# Patient Record
Sex: MALE | Race: BLACK OR AFRICAN AMERICAN | Employment: OTHER | URBAN - METROPOLITAN AREA
[De-identification: names, ages, dates, MRNs, and addresses within clinical notes are randomized per-mention and may not be internally consistent; named-entity substitution may affect disease eponyms.]

---

## 2017-10-11 PROCEDURE — 96374 THER/PROPH/DIAG INJ IV PUSH: CPT

## 2017-10-11 PROCEDURE — 96361 HYDRATE IV INFUSION ADD-ON: CPT

## 2017-10-11 PROCEDURE — 96375 TX/PRO/DX INJ NEW DRUG ADDON: CPT

## 2017-10-11 PROCEDURE — 99285 EMERGENCY DEPT VISIT HI MDM: CPT

## 2017-10-12 ENCOUNTER — APPOINTMENT (OUTPATIENT)
Dept: CT IMAGING | Age: 67
DRG: 287 | End: 2017-10-12
Payer: MEDICARE

## 2017-10-12 ENCOUNTER — APPOINTMENT (OUTPATIENT)
Dept: GENERAL RADIOLOGY | Age: 67
DRG: 287 | End: 2017-10-12
Payer: MEDICARE

## 2017-10-12 ENCOUNTER — HOME HEALTH ADMISSION (OUTPATIENT)
Dept: HOME HEALTH SERVICES | Facility: HOME HEALTH | Age: 67
End: 2017-10-12
Payer: MEDICARE

## 2017-10-12 ENCOUNTER — HOSPITAL ENCOUNTER (INPATIENT)
Age: 67
LOS: 3 days | Discharge: HOME HEALTH | DRG: 287 | End: 2017-10-15
Admitting: INTERNAL MEDICINE
Payer: MEDICARE

## 2017-10-12 DIAGNOSIS — J44.1 ACUTE EXACERBATION OF CHRONIC OBSTRUCTIVE PULMONARY DISEASE (COPD) (HCC): ICD-10-CM

## 2017-10-12 DIAGNOSIS — E11.8 TYPE 2 DIABETES MELLITUS WITH COMPLICATION, UNSPECIFIED LONG TERM INSULIN USE STATUS: ICD-10-CM

## 2017-10-12 DIAGNOSIS — R77.8 ELEVATED TROPONIN: ICD-10-CM

## 2017-10-12 DIAGNOSIS — I50.9 CONGESTIVE HEART FAILURE, UNSPECIFIED CONGESTIVE HEART FAILURE CHRONICITY, UNSPECIFIED CONGESTIVE HEART FAILURE TYPE: Primary | ICD-10-CM

## 2017-10-12 PROBLEM — Z91.199 NON COMPLIANCE WITH MEDICAL TREATMENT: Status: ACTIVE | Noted: 2017-10-12

## 2017-10-12 PROBLEM — Z87.891 FORMER TOBACCO USE: Status: ACTIVE | Noted: 2017-10-12

## 2017-10-12 LAB
ALBUMIN SERPL-MCNC: 3.1 G/DL (ref 3.2–4.6)
ALBUMIN SERPL-MCNC: 3.3 G/DL (ref 3.2–4.6)
ALBUMIN/GLOB SERPL: 0.7 {RATIO} (ref 1.2–3.5)
ALBUMIN/GLOB SERPL: 0.8 {RATIO} (ref 1.2–3.5)
ALP SERPL-CCNC: 69 U/L (ref 50–136)
ALP SERPL-CCNC: 70 U/L (ref 50–136)
ALT SERPL-CCNC: 22 U/L (ref 12–65)
ALT SERPL-CCNC: 24 U/L (ref 12–65)
ANION GAP SERPL CALC-SCNC: 10 MMOL/L (ref 7–16)
ANION GAP SERPL CALC-SCNC: 11 MMOL/L (ref 7–16)
APTT PPP: 25.4 SEC (ref 23.5–31.7)
AST SERPL-CCNC: 19 U/L (ref 15–37)
AST SERPL-CCNC: 65 U/L (ref 15–37)
ATRIAL RATE: 108 BPM
ATRIAL RATE: 90 BPM
ATRIAL RATE: 98 BPM
BASOPHILS # BLD: 0 K/UL (ref 0–0.2)
BASOPHILS NFR BLD: 0 % (ref 0–2)
BILIRUB SERPL-MCNC: 0.4 MG/DL (ref 0.2–1.1)
BILIRUB SERPL-MCNC: 0.6 MG/DL (ref 0.2–1.1)
BNP SERPL-MCNC: 599 PG/ML
BUN SERPL-MCNC: 23 MG/DL (ref 8–23)
BUN SERPL-MCNC: 25 MG/DL (ref 8–23)
CALCIUM SERPL-MCNC: 8.6 MG/DL (ref 8.3–10.4)
CALCIUM SERPL-MCNC: 8.8 MG/DL (ref 8.3–10.4)
CALCULATED P AXIS, ECG09: 64 DEGREES
CALCULATED P AXIS, ECG09: 64 DEGREES
CALCULATED P AXIS, ECG09: 74 DEGREES
CALCULATED R AXIS, ECG10: 58 DEGREES
CALCULATED R AXIS, ECG10: 64 DEGREES
CALCULATED R AXIS, ECG10: 65 DEGREES
CALCULATED T AXIS, ECG11: -150 DEGREES
CALCULATED T AXIS, ECG11: -82 DEGREES
CALCULATED T AXIS, ECG11: 96 DEGREES
CHLORIDE SERPL-SCNC: 105 MMOL/L (ref 98–107)
CHLORIDE SERPL-SCNC: 107 MMOL/L (ref 98–107)
CHOLEST SERPL-MCNC: 304 MG/DL
CO2 SERPL-SCNC: 22 MMOL/L (ref 21–32)
CO2 SERPL-SCNC: 24 MMOL/L (ref 21–32)
CREAT SERPL-MCNC: 1.37 MG/DL (ref 0.8–1.5)
CREAT SERPL-MCNC: 1.55 MG/DL (ref 0.8–1.5)
D DIMER PPP FEU-MCNC: 1.61 UG/ML(FEU)
DIAGNOSIS, 93000: NORMAL
DIFFERENTIAL METHOD BLD: ABNORMAL
EOSINOPHIL # BLD: 0.1 K/UL (ref 0–0.8)
EOSINOPHIL NFR BLD: 1 % (ref 0.5–7.8)
ERYTHROCYTE [DISTWIDTH] IN BLOOD BY AUTOMATED COUNT: 13.8 % (ref 11.9–14.6)
EST. AVERAGE GLUCOSE BLD GHB EST-MCNC: 183 MG/DL
GLOBULIN SER CALC-MCNC: 4.4 G/DL (ref 2.3–3.5)
GLOBULIN SER CALC-MCNC: 4.6 G/DL (ref 2.3–3.5)
GLUCOSE BLD STRIP.AUTO-MCNC: 180 MG/DL (ref 65–100)
GLUCOSE BLD STRIP.AUTO-MCNC: 187 MG/DL (ref 65–100)
GLUCOSE BLD STRIP.AUTO-MCNC: 192 MG/DL (ref 65–100)
GLUCOSE BLD STRIP.AUTO-MCNC: 229 MG/DL (ref 65–100)
GLUCOSE SERPL-MCNC: 164 MG/DL (ref 65–100)
GLUCOSE SERPL-MCNC: 217 MG/DL (ref 65–100)
HBA1C MFR BLD: 8 % (ref 4.8–6)
HCT VFR BLD AUTO: 39.7 % (ref 41.1–50.3)
HDLC SERPL-MCNC: 64 MG/DL (ref 40–60)
HDLC SERPL: 4.8 {RATIO}
HGB BLD-MCNC: 14.2 G/DL (ref 13.6–17.2)
IMM GRANULOCYTES # BLD: 0 K/UL (ref 0–0.5)
IMM GRANULOCYTES NFR BLD: 0.4 % (ref 0–5)
INR PPP: 1 (ref 0.9–1.2)
LDLC SERPL CALC-MCNC: 218.4 MG/DL
LIPID PROFILE,FLP: ABNORMAL
LYMPHOCYTES # BLD: 1.9 K/UL (ref 0.5–4.6)
LYMPHOCYTES NFR BLD: 35 % (ref 13–44)
MAGNESIUM SERPL-MCNC: 2.2 MG/DL (ref 1.8–2.4)
MCH RBC QN AUTO: 27.6 PG (ref 26.1–32.9)
MCHC RBC AUTO-ENTMCNC: 35.8 G/DL (ref 31.4–35)
MCV RBC AUTO: 77.1 FL (ref 79.6–97.8)
MONOCYTES # BLD: 0.3 K/UL (ref 0.1–1.3)
MONOCYTES NFR BLD: 6 % (ref 4–12)
NEUTS SEG # BLD: 3.1 K/UL (ref 1.7–8.2)
NEUTS SEG NFR BLD: 58 % (ref 43–78)
P-R INTERVAL, ECG05: 140 MS
P-R INTERVAL, ECG05: 142 MS
P-R INTERVAL, ECG05: 144 MS
PLATELET # BLD AUTO: 254 K/UL (ref 150–450)
PMV BLD AUTO: 10.3 FL (ref 10.8–14.1)
POTASSIUM SERPL-SCNC: 3.7 MMOL/L (ref 3.5–5.1)
POTASSIUM SERPL-SCNC: 5.9 MMOL/L (ref 3.5–5.1)
PROT SERPL-MCNC: 7.7 G/DL (ref 6.3–8.2)
PROT SERPL-MCNC: 7.7 G/DL (ref 6.3–8.2)
PROTHROMBIN TIME: 10.6 SEC (ref 9.6–12)
Q-T INTERVAL, ECG07: 328 MS
Q-T INTERVAL, ECG07: 364 MS
Q-T INTERVAL, ECG07: 384 MS
QRS DURATION, ECG06: 82 MS
QRS DURATION, ECG06: 82 MS
QRS DURATION, ECG06: 84 MS
QTC CALCULATION (BEZET), ECG08: 418 MS
QTC CALCULATION (BEZET), ECG08: 469 MS
QTC CALCULATION (BEZET), ECG08: 487 MS
RBC # BLD AUTO: 5.15 M/UL (ref 4.23–5.67)
SODIUM SERPL-SCNC: 139 MMOL/L (ref 136–145)
SODIUM SERPL-SCNC: 140 MMOL/L (ref 136–145)
TRIGL SERPL-MCNC: 108 MG/DL (ref 35–150)
TROPONIN I BLD-MCNC: 0.14 NG/ML (ref 0.02–0.05)
TROPONIN I BLD-MCNC: 0.17 NG/ML (ref 0.02–0.05)
TROPONIN I BLD-MCNC: 0.19 NG/ML (ref 0.02–0.05)
TROPONIN I SERPL-MCNC: 0.29 NG/ML (ref 0.02–0.05)
TSH SERPL DL<=0.005 MIU/L-ACNC: 7.11 UIU/ML (ref 0.36–3.74)
VENTRICULAR RATE, ECG03: 108 BPM
VENTRICULAR RATE, ECG03: 90 BPM
VENTRICULAR RATE, ECG03: 98 BPM
VLDLC SERPL CALC-MCNC: 21.6 MG/DL (ref 6–23)
WBC # BLD AUTO: 5.4 K/UL (ref 4.3–11.1)

## 2017-10-12 PROCEDURE — 83880 ASSAY OF NATRIURETIC PEPTIDE: CPT

## 2017-10-12 PROCEDURE — 85610 PROTHROMBIN TIME: CPT

## 2017-10-12 PROCEDURE — 74011636320 HC RX REV CODE- 636/320

## 2017-10-12 PROCEDURE — 65270000029 HC RM PRIVATE

## 2017-10-12 PROCEDURE — 80061 LIPID PANEL: CPT | Performed by: INTERNAL MEDICINE

## 2017-10-12 PROCEDURE — 84484 ASSAY OF TROPONIN QUANT: CPT

## 2017-10-12 PROCEDURE — 74011000258 HC RX REV CODE- 258

## 2017-10-12 PROCEDURE — 80053 COMPREHEN METABOLIC PANEL: CPT

## 2017-10-12 PROCEDURE — 36415 COLL VENOUS BLD VENIPUNCTURE: CPT | Performed by: INTERNAL MEDICINE

## 2017-10-12 PROCEDURE — 74011250636 HC RX REV CODE- 250/636: Performed by: INTERNAL MEDICINE

## 2017-10-12 PROCEDURE — 71260 CT THORAX DX C+: CPT

## 2017-10-12 PROCEDURE — 74011636637 HC RX REV CODE- 636/637: Performed by: INTERNAL MEDICINE

## 2017-10-12 PROCEDURE — 93005 ELECTROCARDIOGRAM TRACING: CPT

## 2017-10-12 PROCEDURE — 74011250636 HC RX REV CODE- 250/636

## 2017-10-12 PROCEDURE — 85025 COMPLETE CBC W/AUTO DIFF WBC: CPT

## 2017-10-12 PROCEDURE — 74011000250 HC RX REV CODE- 250

## 2017-10-12 PROCEDURE — 85730 THROMBOPLASTIN TIME PARTIAL: CPT

## 2017-10-12 PROCEDURE — 93005 ELECTROCARDIOGRAM TRACING: CPT | Performed by: INTERNAL MEDICINE

## 2017-10-12 PROCEDURE — 83735 ASSAY OF MAGNESIUM: CPT

## 2017-10-12 PROCEDURE — 84443 ASSAY THYROID STIM HORMONE: CPT | Performed by: INTERNAL MEDICINE

## 2017-10-12 PROCEDURE — 71010 XR CHEST PORT: CPT

## 2017-10-12 PROCEDURE — 74011250637 HC RX REV CODE- 250/637

## 2017-10-12 PROCEDURE — 65660000000 HC RM CCU STEPDOWN

## 2017-10-12 PROCEDURE — 85379 FIBRIN DEGRADATION QUANT: CPT

## 2017-10-12 PROCEDURE — 80053 COMPREHEN METABOLIC PANEL: CPT | Performed by: INTERNAL MEDICINE

## 2017-10-12 PROCEDURE — C8929 TTE W OR WO FOL WCON,DOPPLER: HCPCS

## 2017-10-12 PROCEDURE — 74011250637 HC RX REV CODE- 250/637: Performed by: INTERNAL MEDICINE

## 2017-10-12 PROCEDURE — 83036 HEMOGLOBIN GLYCOSYLATED A1C: CPT | Performed by: NURSE PRACTITIONER

## 2017-10-12 PROCEDURE — 82962 GLUCOSE BLOOD TEST: CPT

## 2017-10-12 PROCEDURE — 74011000250 HC RX REV CODE- 250: Performed by: INTERNAL MEDICINE

## 2017-10-12 RX ORDER — ACETAMINOPHEN 325 MG/1
650 TABLET ORAL
Status: DISCONTINUED | OUTPATIENT
Start: 2017-10-12 | End: 2017-10-15 | Stop reason: HOSPADM

## 2017-10-12 RX ORDER — HEPARIN SODIUM 5000 [USP'U]/ML
5000 INJECTION, SOLUTION INTRAVENOUS; SUBCUTANEOUS EVERY 8 HOURS
Status: DISCONTINUED | OUTPATIENT
Start: 2017-10-12 | End: 2017-10-15 | Stop reason: HOSPADM

## 2017-10-12 RX ORDER — LISINOPRIL 5 MG/1
5 TABLET ORAL DAILY
Status: DISCONTINUED | OUTPATIENT
Start: 2017-10-12 | End: 2017-10-13

## 2017-10-12 RX ORDER — ATORVASTATIN CALCIUM 40 MG/1
40 TABLET, FILM COATED ORAL DAILY
Status: DISCONTINUED | OUTPATIENT
Start: 2017-10-12 | End: 2017-10-15 | Stop reason: HOSPADM

## 2017-10-12 RX ORDER — METOPROLOL SUCCINATE 25 MG/1
25 TABLET, EXTENDED RELEASE ORAL DAILY
Status: DISCONTINUED | OUTPATIENT
Start: 2017-10-12 | End: 2017-10-13

## 2017-10-12 RX ORDER — FUROSEMIDE 10 MG/ML
40 INJECTION INTRAMUSCULAR; INTRAVENOUS
Status: COMPLETED | OUTPATIENT
Start: 2017-10-12 | End: 2017-10-12

## 2017-10-12 RX ORDER — FUROSEMIDE 10 MG/ML
40 INJECTION INTRAMUSCULAR; INTRAVENOUS DAILY
Status: DISCONTINUED | OUTPATIENT
Start: 2017-10-12 | End: 2017-10-12

## 2017-10-12 RX ORDER — GUAIFENESIN 100 MG/5ML
324 LIQUID (ML) ORAL
Status: COMPLETED | OUTPATIENT
Start: 2017-10-12 | End: 2017-10-12

## 2017-10-12 RX ORDER — INSULIN GLARGINE 100 [IU]/ML
15 INJECTION, SOLUTION SUBCUTANEOUS DAILY
Status: DISCONTINUED | OUTPATIENT
Start: 2017-10-13 | End: 2017-10-15 | Stop reason: HOSPADM

## 2017-10-12 RX ORDER — LABETALOL HYDROCHLORIDE 5 MG/ML
20 INJECTION, SOLUTION INTRAVENOUS
Status: COMPLETED | OUTPATIENT
Start: 2017-10-12 | End: 2017-10-12

## 2017-10-12 RX ORDER — INSULIN LISPRO 100 [IU]/ML
5 INJECTION, SOLUTION INTRAVENOUS; SUBCUTANEOUS
Status: DISCONTINUED | OUTPATIENT
Start: 2017-10-12 | End: 2017-10-15 | Stop reason: HOSPADM

## 2017-10-12 RX ORDER — ONDANSETRON 2 MG/ML
4 INJECTION INTRAMUSCULAR; INTRAVENOUS
Status: COMPLETED | OUTPATIENT
Start: 2017-10-12 | End: 2017-10-14

## 2017-10-12 RX ORDER — SODIUM CHLORIDE 0.9 % (FLUSH) 0.9 %
10 SYRINGE (ML) INJECTION
Status: COMPLETED | OUTPATIENT
Start: 2017-10-12 | End: 2017-10-12

## 2017-10-12 RX ORDER — SODIUM CHLORIDE 9 MG/ML
125 INJECTION, SOLUTION INTRAVENOUS CONTINUOUS
Status: DISCONTINUED | OUTPATIENT
Start: 2017-10-12 | End: 2017-10-12

## 2017-10-12 RX ADMIN — INSULIN LISPRO 5 UNITS: 100 INJECTION, SOLUTION INTRAVENOUS; SUBCUTANEOUS at 12:22

## 2017-10-12 RX ADMIN — FUROSEMIDE 40 MG: 10 INJECTION, SOLUTION INTRAMUSCULAR; INTRAVENOUS at 05:37

## 2017-10-12 RX ADMIN — Medication 10 ML: at 02:51

## 2017-10-12 RX ADMIN — INSULIN LISPRO 5 UNITS: 100 INJECTION, SOLUTION INTRAVENOUS; SUBCUTANEOUS at 17:29

## 2017-10-12 RX ADMIN — FUROSEMIDE 40 MG: 10 INJECTION, SOLUTION INTRAMUSCULAR; INTRAVENOUS at 09:47

## 2017-10-12 RX ADMIN — IOPAMIDOL 100 ML: 755 INJECTION, SOLUTION INTRAVENOUS at 02:51

## 2017-10-12 RX ADMIN — ASPIRIN 81 MG 324 MG: 81 TABLET ORAL at 05:44

## 2017-10-12 RX ADMIN — LISINOPRIL 5 MG: 5 TABLET ORAL at 09:47

## 2017-10-12 RX ADMIN — HEPARIN SODIUM 5000 UNITS: 5000 INJECTION, SOLUTION INTRAVENOUS; SUBCUTANEOUS at 17:29

## 2017-10-12 RX ADMIN — LABETALOL HYDROCHLORIDE 20 MG: 5 INJECTION, SOLUTION INTRAVENOUS at 07:22

## 2017-10-12 RX ADMIN — SODIUM CHLORIDE 100 ML: 900 INJECTION, SOLUTION INTRAVENOUS at 02:51

## 2017-10-12 RX ADMIN — HEPARIN SODIUM 5000 UNITS: 5000 INJECTION, SOLUTION INTRAVENOUS; SUBCUTANEOUS at 09:47

## 2017-10-12 RX ADMIN — METOPROLOL SUCCINATE 25 MG: 25 TABLET, EXTENDED RELEASE ORAL at 09:47

## 2017-10-12 RX ADMIN — PERFLUTREN 1 ML: 6.52 INJECTION, SUSPENSION INTRAVENOUS at 09:00

## 2017-10-12 RX ADMIN — SODIUM CHLORIDE 125 ML/HR: 900 INJECTION, SOLUTION INTRAVENOUS at 04:23

## 2017-10-12 RX ADMIN — ATORVASTATIN CALCIUM 40 MG: 40 TABLET, FILM COATED ORAL at 09:47

## 2017-10-12 NOTE — ED NOTES
Report from Davon Steward [facility] for continuation of care.

## 2017-10-12 NOTE — PROGRESS NOTES
Care Management Interventions  PCP Verified by CM: Yes (Has a new patient appointment with Dr. Loren Infante on October 19 at 2:30 pm)  Transition of Care Consult (CM Consult): [address]: Yes  Physical Therapy Consult: No  Occupational Therapy Consult: No  Current Support Network: Lives with Spouse, Other (Lives in his mom's home and is her primary caretaker. She  (mother) is mobile,. )  Confirm Follow Up Transport: Family  Plan discussed with Pt/Family/Caregiver: Yes  Freedom of Choice Offered: Yes  Discharge Location  Discharge Placement: Home with home health     Met with patient and his spouse. Patient is a very pleasant gentleman arnel has neglected his own health this past year. He had taken the initiative to make a new PCP appointment and in fact he was supposed to be seen by Dr. Wing Moreland at 11:10 am today. Called and notified office of hospitalization and they rescheduled him for next Thursday October 19 at 2:30 PM. Info in the AVS.     In the meantime, will order [facility] RN because of CHF exacerbation and the untreated diabetes. Patient will go out on new scripts and will need to be checking his BS. Seen by diabetic educater and does know how to give himself insulin. Prefers PENS. He has been given a new meter. No o2 in the home and patient not on it now. Ambulatory but needs education reinforcement. Sarah Albrecht December

## 2017-10-12 NOTE — DIABETES MGMT
Patient admitted with CHF, seen by diabetes educator. Patient voices a positive family history of DM and was diagnosed about 7 years ago. Only HgA1c on record was 14.4 in July of 2010. Patient reports he was taking Levemir 22 units daily and metformin 1000 mg BID a year ago, but his PCP left and her never got a new one. Patient has not taken any diabetic medications or checked his blood glucose in a year. Patient states he did attend diabetic classes when he was diagnosed. Blood glucose 164 on admission. Blood glucose 192 this morning. HgA1c to be drawn today. Patient states he had an appointment with Zulema Echavarria MD today to establish himself with her. Patient states that he has a glucometer at home but he doesn't know if it works or not. Patient states he has used insulin pens and vials and syringes in the past. Patient prefers insulin pens if he requires insulin at discharge. Explained the importance of blood glucose monitoring. Recommended frequency of qid ac, hs, and to record in log book to take to PCP appointment. Provided blood glucose meter, strips, and instructed pt in use of meter. Pt was able to return demonstrate correct use of meter. Patient will need a prescription for glucometer supplies at discharge. Pt given educational material, \"Diabetes Self-Management: A Patient Teaching Guide\", which was reviewed with pt. Primary nurse to clarify diabetic regimen with PCP. Patient voices no questions.

## 2017-10-12 NOTE — ED NOTES
Pt resting in bed, respirations even and unlabored. Pt denies any additional needs at this time. Bed locked and low, call bell within reach, pt on monitor.

## 2017-10-12 NOTE — PROGRESS NOTES
IP consult for Cardiac Rehab. Patient has Dx of CHF. Echocardiogram pending. Will contact patient when EF% is known.

## 2017-10-12 NOTE — PROGRESS NOTES
Initial visit by  to convey care and concern and encourage patient that  services are available if desired. No needs were voiced during the visit. Provided business card for future reference.      Irlanda Le [address]  Board Certified

## 2017-10-12 NOTE — PROGRESS NOTES
Pt's PIV leaking. Spoke with Dr. Baron Chandler, new orders received for central line in IR. Spoke with Rocio in [facility], reports they likely will not get to pt today. Will call [facility] for help with new PIV placement for time being.

## 2017-10-12 NOTE — ED TRIAGE NOTES
Pt complains of chest tightness and sob x2 days. He states he has been under stress from family. States he has pcp appointment for the same tomorrow but sob became worse tonight.

## 2017-10-12 NOTE — PROGRESS NOTES
Admitted to room 633 from ED. Oriented to room and call light system, instructed to call with needs, verbalized understanding. Dual skin assessment with this RN and Kaitlyn Maya RN. No pressure breakdown noted. Admission assessment completed upon arrival to floor.

## 2017-10-12 NOTE — CONSULTS
[facility] Consult                Date of  Admission: 10/12/2017 12:09 AM     Primary Care Physician: Dr. Meño Vaz  Primary Cardiologist: None  Referring Physician: Hospitalist  Consulting Physician: Dr. Donnell Moody    CC/Reason for consult: Heart failure      Kandy Khalil is a 75 y.o.  male with a PMHx or DM, HTN, HLD and former smoker (quit 2 weeks ago, was 1ppd x 20yrs) who presented to the ED last night with increasing SOB. He reports that over the past couple weeks he has noted worsening SOB and KC. SOB was so severe that he stopped smoking. He denies prior cardiac history but reports mother had an angioplasty in the past. 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 His PCP changed practices and he has been without a PCP and without his medications for approx 1 yr. In the ED BP was 205/119, initial troponin was 0.29 and . He was admitted be the Hospitalist for management of heart failure. He denies chest pain but reports chest is sore from coughing. Repeat troponin levels 0.14, 0.17, 0.19.      Patient Active Problem List   Diagnosis Code    Diabetes mellitus ([facility] E11.9    HTN (hypertension) I10    Hypertriglyceridemia E78.1    [facility]) [facility] I50.9    Non compliance with medical treatment Z91.19    Former tobacco use Z87.891       Past Medical History:   Diagnosis Date    CHF (congestive heart failure) ([facility] 10/12/2017    Diabetes mellitus ([facility] 7/22/2010    HTN (hypertension) 7/22/2010      Past Surgical History:   Procedure Laterality Date    HX APPENDECTOMY      1963     Allergies   Allergen Reactions    Aspirin Nausea and Vomiting      Family History   Problem Relation Age of Onset    Diabetes          Current Facility-Administered Medications   Medication Dose Route Frequency    ondansetron (ZOFRAN) injection 4 mg  4 mg IntraVENous ONCE PRN    lisinopril (PRINIVIL, ZESTRIL) tablet 5 mg  5 mg Oral DAILY    metoprolol succinate (TOPROL-XL) XL tablet 25 mg  25 mg Oral DAILY    heparin (porcine) injection 5,000 Units  5,000 Units SubCUTAneous Q8H    acetaminophen (TYLENOL) tablet 650 mg  650 mg Oral Q6H PRN    furosemide (LASIX) injection 40 mg  40 mg IntraVENous DAILY    atorvastatin (LIPITOR) tablet 40 mg  40 mg Oral DAILY       Review of Systems   Constitutional: Negative for chills, diaphoresis and fever. HENT: Negative. Eyes: Negative. Negative for blurred vision and double vision. Respiratory: Positive for cough, sputum production and shortness of breath. Negative for hemoptysis and wheezing. Cardiovascular: Positive for orthopnea. Negative for chest pain, palpitations and leg swelling. Increased KC   Gastrointestinal: Negative. Negative for constipation, diarrhea, nausea and vomiting. Genitourinary: Negative. Musculoskeletal: Negative. Skin: Negative. Neurological: Negative. Negative for weakness. Endo/Heme/Allergies: Negative. Psychiatric/Behavioral:        Increased stress        Physical Exam  Vitals:    10/12/17 0722 10/12/17 0724 10/12/17 0728 10/12/17 0903   BP: (!) 169/103 125/65 133/72 (!) 154/102   Pulse: 100 82 87 88   Resp:  18 16 14   Temp:    97.4 °F (36.3 °C)   SpO2:  93% 95% 95%   Weight:       Height:           Physical Exam:  Physical Exam   Constitutional: He is oriented to person, place, and time and well-developed, well-nourished, and in no distress. HENT:   Head: Normocephalic and atraumatic. Mouth/Throat: Oropharynx is clear and moist.   Eyes: Conjunctivae and EOM are normal. Pupils are equal, round, and reactive to light. No scleral icterus. Neck: Normal range of motion. Neck supple. Cardiovascular: Regular rhythm, normal heart sounds and intact distal pulses. Exam reveals no friction rub. No murmur heard. Mildly tachy   Pulmonary/Chest: Effort normal. No stridor. No respiratory distress. He has no wheezes. He has no rales. Diminished R>L   Abdominal: Soft. Bowel sounds are normal. He exhibits no distension. There is no tenderness. Musculoskeletal: Normal range of motion. He exhibits no edema. Neurological: He is alert and oriented to person, place, and time. No cranial nerve deficit. Skin: Skin is warm and dry. No rash noted. No erythema. Psychiatric: Mood, memory, affect and judgment normal.       Cardiographics    Telemetry: not on tele  ECG: SR with T-wave inversion in inferior leads changed from previous EKG on arrival  Echocardiogram: ordered    Labs:   Recent Labs      10/12/17   0125  10/12/17   0002   NA   --   139   K   --   5.9*   MG  2.2   --    BUN   --   25*   CREA   --   1.37   GLU   --   164*   WBC   --   5.4   HGB   --   14.2   HCT   --   39.7*   PLT   --   254   INR  1.0   --         Assessment/Plan:     Assessment:      Principal Problem:    CHF (congestive heart failure) -- check ECHO today, cont lasix and monitor I&O, daily weights    Active Problems:    Diabetes mellitus -- check A1C, will defer DM management to primary team, needs better management/control, will have DM Ed to see      HTN (hypertension) -- unsure home meds, now on Toprol and lisinopril -- continue and monitor BP, adjust as tolerated      Hypertriglyceridemia -- check lipids, last triglyceride level 2010 was 808, on Lipitor      Non compliance with medical treatment -- now has PCP and is motivated to go to appts      Former tobacco use -- continued cessation encouraged      Overview: Quit 2 weeks ago      Abnormal EKG -- EKG changes and elevated troponin on arrival, further recommendations following ECHO        Thank you very much for this referral. We appreciate the opportunity to participate in this patient's care. We will follow along with above stated plan.     Carson Barrett NP  Consulting MD: Dr. Minal Dobson

## 2017-10-12 NOTE — H&P
History and Physical    Patient: Jewell Tovar MRN: [id number]  SSN: [id number]    YOB: 1950  Age: 75 y.o. Sex: male      Subjective:      Jewell Tovar is a 75 y.o. male with PMH of HTN, DM (uncontrolled), HLD, who quit smoking 2 weeks ago when he noticed that he was quite short of breath. He States that over the past two weeks he has been getting porgressively more short of breath. Worse when lying down. He sleeps in a chair now. He states that he has \"good and bad\" days. Last night he was unable to catch his breath and came to the ED. He has been non compliant in his medical care, has not taken any medications in over a year, he has not seen a doctor in almost 2 years. His wife is at the bedside with him. In the ED he was found to have slightly elevated troponin levels and a BTNP of 600. Patient had a positive D dimer, and CT chest was done, which showed emphasematous changes and bilateral pleural effusions. Of note there was also a 5.1 cm mass in the right kidney that is concerning for malignancy. Dr. Irish Brothers was called by ED physician who requested hospitalist to admit for CHF exacerbation. Thiago was given lasix in the ED, and given IVF, he states he feels better already. Past Medical History:   Diagnosis Date    CHF (congestive heart failure) ([facility] 10/12/2017    [facility] 7/22/2010    HTN (hypertension) 7/22/2010     Past Surgical History:   Procedure Laterality Date    HX APPENDECTOMY      1963      Family History   Problem Relation Age of Onset    Diabetes       Social History   Substance Use Topics    Smoking status: Current Every Day Smoker     Packs/day: 1.00    Smokeless tobacco: Not on file    Alcohol use Yes      Comment: occasional      Prior to Admission medications    Medication Sig Start Date End Date Taking? Authorizing Provider   glipiZIDE SR (GLUCOTROL) 5 mg CR tablet Take 1 Tab by mouth daily (before breakfast).  7/24/10   Chelly Shook MD   insulin NPH (NOVOLIN) 100 unit/mL injection 10 Units by SubCUTAneous route two (2) times a day (before meals). 7/24/10   Kendall Sanchez MD   metformin (GLUCOPHAGE) 500 mg tablet Take 1 Tab by mouth two (2) times daily (with meals). 7/24/10   Kendall Sanchez MD   Blood-Glucose Meter monitoring kit by Does Not Apply route. Check blood sugars at meals and bedtime. Keep a record of your blood sugars and bring to your doctor.  7/24/10   Kendall Sanchez MD        Allergies   Allergen Reactions    Aspirin Nausea and Vomiting       Review of Systems:  Constitutional: No acute distress  Eyes: No visiual changes  Ears, Nose, Mouth, Throat, and Face: mucous membranes moist and dry  Respiratory: shortness of breath  Cardiovascular: no palpitations or murmurs  GI: No abdominal pain  : No dysuria  Heme: No active bleeding  Lymph: No lymphadenopathy  Msk: No weakness, or joint pain  Neuo:Alert and oriented, no headaches  Psych:Appropriate mood and affect    Objective:     Vitals:    10/12/17 0654 10/12/17 0722 10/12/17 0724 10/12/17 0728   BP: (!) 169/103 (!) 169/103 125/65 133/72   Pulse: 96 100 82 87   Resp:   18 16   Temp:       SpO2: 94%  93% 95%   Weight:       Height:            Physical Exam:  General appearance: NAD, conversant  Eyes: anicteric sclerae, moist conjunctivae; no lid-lag  Neck: Trachea midline, no jvd  FROM, supple, no thyromegaly or lymphadenopathy  Lungs: mild bilateral crackles  CV: S1,S2 present, no added murmurs  Abdomen: Soft, non-tender; no masses   Extremities: No peripheral edema or extremity lymphadenopathy  Skin: Normal temperature, turgor and texture; no subcutaneous nodules  Psych: Appropriate affect, alert and oriented to person, place and time    Assessment:     Hospital Problems  Never Reviewed          Codes Class Noted POA    CHF (congestive heart failure) (HCC) ICD-10-CM: I50.9  ICD-9-CM: 428.0  10/12/2017 Unknown              Plan:     75year old male with PMH of HTn, HLD, DM2, and current smoker (quit 2 weeks ago, 1ppd, 50+ year) presents with signs of acute CHF exacerbation and possible new right kidney mass    #Acute CHF, unspecified type  -will need repeat TTE  -f/u Hg A1c  -f/u Lipid panel  -f/u TSH  -patient without JVD or edema  -will start BB and Ace-I therapy  -will adjust medications after Echo, may require hydralazine and nitrate, or perhaps entresto therapy instead of Ace-I, may need potassium sparing diuretic  -lasix 40 mg daily for now, may not need much longer  -breathing well on 2 L NC no need for Bipap at this time  -Consult to cardiology, he is also moderate risk for ACS, may require stress    #Right renal mass  -will need CT of abdomen pelvis  -given recent dye load will get US for now  -will consult for IR guided biopsy after    #HTN  -continue on cardiac montitoring  -have started ACE-I and BB  -will titrate as needed    #HLD  -will start statin therapy  -atorvastatin 40mg daily  -baseline CMP    #DM2  -last HgA1c was 14.4  -repeat hgA1c  -Start on insulin therapy  -12 U levemir and 4U humalog TIDAC    #Smoking  -has stopped over the past 2 weeks    #GI/DVT  -Hep sub q  -Cardiac diet    #Code Status  -Full Code      Signed By: Noemi Wheatley MD     October 12, 2017

## 2017-10-12 NOTE — ED PROVIDER NOTES
HPI Comments: 57-year-old male with complaints of chest tightness shortness of breath for 2 days. Chest tightness resolved prior to arrival but shortness of breath became worse tonight. His appointment with his doctor tomorrow to recheck tonight because he was feeling tired. Patient states that under a lot of stress at home. Patient has a history of diabetes and hypertension he's not been taking any of his medicines for almost a year because he has not seen a PCP. Patient is a 75 y.o. male presenting with shortness of breath. The history is provided by the patient. Shortness of Breath   This is a new problem. The problem occurs continuously. The current episode started 2 days ago. The problem has not changed since onset. Associated symptoms include chest pain. Pertinent negatives include no fever, no orthopnea and no abdominal pain. It is unknown what precipitated the problem. He has tried nothing for the symptoms. He has had prior hospitalizations. He has had prior ED visits. Associated medical issues include asthma. Past Medical History:   Diagnosis Date    Diabetes mellitus ([address] 7/22/2010    HTN (hypertension) 7/22/2010       Past Surgical History:   Procedure Laterality Date    HX APPENDECTOMY      1963         Family History:   Problem Relation Age of Onset    Diabetes         Social History     Social History    Marital status:      Spouse name: N/A    Number of children: N/A    Years of education: N/A     Occupational History    Not on file. Social History Main Topics    Smoking status: Current Every Day Smoker     Packs/day: 1.00    Smokeless tobacco: Not on file    Alcohol use Yes      Comment: occasional    Drug use: Not on file    Sexual activity: Not on file     Other Topics Concern    Not on file     Social History Narrative    No narrative on file         ALLERGIES: Aspirin    Review of Systems   Constitutional: Negative. Negative for activity change and fever. HENT: Negative. Eyes: Negative. Respiratory: Positive for shortness of breath. Cardiovascular: Positive for chest pain. Negative for orthopnea. Gastrointestinal: Negative. Negative for abdominal pain. Genitourinary: Negative. Musculoskeletal: Negative. Skin: Negative. Neurological: Negative. Psychiatric/Behavioral: Negative. All other systems reviewed and are negative. Vitals:    10/11/17 2347   BP: (!) 205/119   Pulse: (!) 113   Resp: 18   Temp: 98 °F (36.7 °C)   Weight: 63.5 kg (140 lb)   Height: 5' 3\" (1.6 m)            Physical Exam   Constitutional: He is oriented to person, place, and time. He appears well-developed and well-nourished. No distress. HENT:   Head: Normocephalic and atraumatic. Right Ear: External ear normal.   Left Ear: External ear normal.   Nose: Nose normal.   Mouth/Throat: Oropharynx is clear and moist. No oropharyngeal exudate. Eyes: Conjunctivae and EOM are normal. Pupils are equal, round, and reactive to light. Right eye exhibits no discharge. Left eye exhibits no discharge. No scleral icterus. Neck: Normal range of motion. Neck supple. No JVD present. No tracheal deviation present. Cardiovascular: Normal rate, regular rhythm and intact distal pulses. Pulmonary/Chest: Effort normal and breath sounds normal. No stridor. No respiratory distress. He has no wheezes. He exhibits no tenderness. Abdominal: Soft. Bowel sounds are normal. He exhibits no distension and no mass. There is no tenderness. Musculoskeletal: Normal range of motion. He exhibits no edema or tenderness. Neurological: He is alert and oriented to person, place, and time. No cranial nerve deficit. Skin: Skin is warm and dry. No rash noted. He is not diaphoretic. No erythema. No pallor. Psychiatric: He has a normal mood and affect. His behavior is normal. Thought content normal.   Nursing note and vitals reviewed.        MDM  Number of Diagnoses or Management Options  Acute exacerbation of chronic obstructive pulmonary disease (COPD) ([facility]):   Congestive heart failure, unspecified congestive heart failure chronicity, unspecified congestive heart failure type [facility]):   Elevated troponin:   Type 2 diabetes mellitus with complication, unspecified long term insulin use status [facility]):   Diagnosis management comments: All symptoms resolved and he remained pain-free for his entire stay in the ED. He had no further shortness of breath. He ambulated in the room several times without complaint or discomfort. Troponin was slightly elevated but remained low.        Amount and/or Complexity of Data Reviewed  Clinical lab tests: ordered and reviewed  Tests in the radiology section of CPT®: ordered and reviewed  Tests in the medicine section of CPT®: ordered and reviewed      ED Course       Procedures

## 2017-10-12 NOTE — ED NOTES
TRANSFER - OUT REPORT:    Verbal report given to CHRISTEL Benítez(name) on [employer]  being transferred to [facility]unit) for routine progression of care       Report consisted of patients Situation, Background, Assessment and   Recommendations(SBAR). Information from the following report(s) SBAR was reviewed with the receiving nurse. Lines:   Peripheral IV 10/12/17 Left Antecubital (Active)       Peripheral IV 10/12/17 Left Hand (Active)   Site Assessment Clean, dry, & intact 10/12/2017  6:57 AM   Phlebitis Assessment 0 10/12/2017  6:57 AM   Infiltration Assessment 0 10/12/2017  6:57 AM   Dressing Status Clean, dry, & intact 10/12/2017  6:57 AM   Dressing Type Tape;Transparent 10/12/2017  6:57 AM   Hub Color/Line Status Pink 10/12/2017  6:57 AM        Opportunity for questions and clarification was provided.       Patient transported with:   O2 @ 2 liters

## 2017-10-12 NOTE — PROGRESS NOTES
Interventional Radiology Inpatient Communication    Interventional Radiology has received a consult for kidney biopsy. Dr. Renetta Aguirre reviewed pts chart and recommended a Urology consult. He also recommended a Abdomen/Pelvis CT with contrast prior to a biopsy being performed. We anticipate this procedure will be completed when further imaging available and with MD approval after reviewing that imaging. Jackelin KEARNEY aware that we will follow along on pt to see when above recommendations are complete. Primary Care Nurse:    Please ensure the following is completed on day of procedure:    Patient is NPO: yes    Blood thinners held: yes    Patient must have working IV: yes    Patient must be in a hospital gown with snaps and be transported via stretcher to Interventional Radiology. Please send hospital chart and labels. If the patient is unable to provide consent for the procedure, please contact Interventional Radiology at [phone].

## 2017-10-12 NOTE — PROGRESS NOTES
[facility] - INPATIENT  Face to Face Encounter    Patients Name: Kelly Chiu    YOB: 1950    Ordering Physician: Guido Avery    Primary Diagnosis: CHF (congestive heart failure) [facility])    Date of Face to Face:   10/12/2017                                  Face to Face Encounter findings are related to primary reason for home care:   yes. 1. I certify that the patient needs intermittent care as follows: skilled nursing care:  skilled observation/assessment, patient education    2. I certify that this patient is homebound, that is: 1) patient requires the use of a N/a device, special transportation, or assistance of another to leave the home; or 2) patient's condition makes leaving the home medically contraindicated; and 3) patient has a normal inability to leave the home and leaving the home requires considerable and taxing effort. Patient may leave the home for infrequent and short duration for medical reasons, and occasional absences for non-medical reasons. Homebound status is due to the following functional limitations: Patient with strength deficits limiting the performance of all ADL's without caregiver assistance or the use of an assistive device. 3. I certify that this patient is under my care and that I, or a nurse practitioner or  [id number], or clinical nurse specialist, or certified nurse midwife, working with me, had a Face-to-Face Encounter that meets the physician Face-to-Face Encounter requirements. The following are the clinical findings from the [address] [place] [address] encounter that support the need for skilled services and is a summary of the encounter:     See Hospital chart      [facility]  10/12/2017      THE FOLLOWING TO BE COMPLETED BY THE COMMUNITY PHYSICIAN:    I concur with the findings described above from the [facility] encounter that this patient is homebound and in need of a skilled service.     Certifying Physician: _____________________________________ Printed Certifying Physician Name: _____________________________________    Date: _________________

## 2017-10-12 NOTE — IP AVS SNAPSHOT
[address] 
[phone] Patient: Dolores Bear MRN: [id number] You are allergic to the following Allergen Reactions Aspirin Nausea and Vomiting Recent Documentation Height Weight BMI Smoking Status 1.6 m 57 kg 22.26 kg/m2 Current Every Day Smoker Emergency Contacts Name Discharge Info Relation Home Work Mobile Mercy Ricardo  Spouse [3] [phone] About your hospitalization You were admitted on:  October 12, 2017 You last received care in the:  [facility] 6 MED SURG You were discharged on:  October 15, 2017 Unit phone number:  [phone] Why you were hospitalized Your primary diagnosis was:  Systolic Chf, Acute (Hcc) Your diagnoses also included:  Chf (Congestive Heart Failure) (Hcc), Diabetes Mellitus (Hcc), Htn (Hypertension), Hypertriglyceridemia, Non Compliance With Medical Treatment, Former Tobacco Use, Renal Mass Providers Seen During Your Hospitalizations Provider Role Specialty Primary office phone Enrique Mercado MD Attending Provider Emergency Medicine [phone] Charlotte Allen MD Attending Provider Internal Medicine [phone] Your Primary Care Physician (PCP) Primary Care Physician Office Phone Office Fax Rocío Joseph [phone] Follow-up Information Follow up With Details Comments Contact Info [address] for diabetic education and for CHF education. [address] [place] [address] 
[phone] Windy Russell MD  Please call and schedule one week follow up appointment. Thank you. [facility] [address] 
[phone] MD Dr. Cale Jerez office will call you Monday to schedule follow up appointment. [address] [facility] [address] 
[phone] Your Appointments Thursday October 19, 2017  2:30 PM EDT New Patient with Ashley Lam, NP [address] ([facility]) [place] [address]  
[phone] Current Discharge Medication List  
  
START taking these medications Dose & Instructions Dispensing Information Comments Morning Noon Evening Bedtime  
 atorvastatin 40 mg tablet Commonly known as:  LIPITOR Your last dose was: Your next dose is:    
   
   
 Dose:  40 mg Take 1 Tab by mouth daily. Quantity:  30 Tab Refills:  1  
     
   
   
   
  
 carvedilol 12.5 mg tablet Commonly known as:  Brinda Paramjit Your last dose was: Your next dose is:    
   
   
 Dose:  12.5 mg Take 1 Tab by mouth two (2) times daily (with meals). Quantity:  60 Tab Refills:  2  
     
   
   
   
  
 clopidogrel 75 mg Tab Commonly known as:  PLAVIX Your last dose was: Your next dose is:    
   
   
 Dose:  75 mg Take 1 Tab by mouth daily. Quantity:  30 Tab Refills:  2  
     
   
   
   
  
 furosemide 40 mg tablet Commonly known as:  LASIX Your last dose was: Your next dose is:    
   
   
 Dose:  40 mg Take 1 Tab by mouth daily. Quantity:  30 Tab Refills:  2  
     
   
   
   
  
 lisinopril 10 mg tablet Commonly known as:  Lee Selam Your last dose was: Your next dose is:    
   
   
 Dose:  10 mg Take 1 Tab by mouth daily. Quantity:  30 Tab Refills:  2 CONTINUE these medications which have NOT CHANGED Dose & Instructions Dispensing Information Comments Morning Noon Evening Bedtime Blood-Glucose Meter monitoring kit Your last dose was: Your next dose is:    
   
   
 by Does Not Apply route. Check blood sugars at meals and bedtime.   Keep a record of your blood sugars and bring to your doctor. Quantity:  1 Kit Refills:  0  
     
   
   
   
  
 glipiZIDE SR 5 mg CR tablet Commonly known as:  GLUCOTROL XL Your last dose was: Your next dose is:    
   
   
 Dose:  5 mg Take 1 Tab by mouth daily (before breakfast). Quantity:  30 Tab Refills:  0  
     
   
   
   
  
 insulin  unit/mL injection Commonly known as:  Suad Opal Your last dose was: Your next dose is:    
   
   
 Dose:  10 Units 10 Units by SubCUTAneous route two (2) times a day (before meals). Quantity:  1 Vial  
Refills:  0  
     
   
   
   
  
 metFORMIN 500 mg tablet Commonly known as:  GLUCOPHAGE Your last dose was: Your next dose is:    
   
   
 Dose:  500 mg Take 1 Tab by mouth two (2) times daily (with meals). Quantity:  60 Tab Refills:  0 Where to Get Your Medications Information on where to get these meds will be given to you by the nurse or doctor. ! Ask your nurse or doctor about these medications  
  atorvastatin 40 mg tablet  
 carvedilol 12.5 mg tablet  
 clopidogrel 75 mg Tab  
 furosemide 40 mg tablet  
 lisinopril 10 mg tablet Discharge Instructions Left Heart Catheterization: About This Test 
What is it? Cardiac catheterization is a test to check the left side of your heart. Your doctor might look at the shape of your heart, the motion of your heart, or the blood pressure inside the chambers. Why is this test done? This test gives information about how your heart is working. It can: · Check blood flow and blood pressure in the chambers of the heart. · Check the pumping action of the heart. · Find out if a heart defect is present and how severe it is. · Find out how well the heart valves work. What happens during the test? 
· You will get medicine to help you relax. · A thin tube called a catheter is put into a blood vessel in the groin or the arm. The doctor moves the catheter through the blood vessel into your heart. · You will get a shot to numb the skin where the catheter goes in. You may feel pressure when the doctor moves the catheter through your blood vessel into your heart. · Dye may be injected into your heart. Your doctor can watch on special monitors as the dye moves in your heart. The dye helps your doctor see blood flow in your heart. · You may feel hot or flushed for several seconds when the dye is put in. 
· If a heart defect is found, cardiac catheterization sometimes is used to correct it during the test. 
How long does it take? · The test will take about 30 minutes. If a problem is found and the doctor treats it, it can take a few hours longer. What happens after the test? 
· You will stay in a room for at least a few hours to make sure the catheter site starts to heal. You may have a bandage or a compression device on your groin or arm to prevent bleeding. · If the catheter was placed in your groin, you may lie in bed for a few hours. If the catheter was put in your arm, you will need to keep your arm still for at least 1 hour. · You may or may not need to stay in the hospital overnight. You will get more instructions for what to do at home. · Drink plenty of fluids for several hours after the test. 
Follow-up care is a key part of your treatment and safety. Be sure to make and go to all appointments, and call your doctor if you are having problems. It's also a good idea to know your test results and keep a list of the medicines you take. Where can you learn more? Go to http://citlaly-john.info/. Enter W306 in the search box to learn more about \"Left Heart Catheterization: About This Test.\" Current as of: January 12, 2017 Content Version: 11.3 © 5996-2593 [employer].  Care instructions adapted under license by [address] (which disclaims liability or warranty for this information). If you have questions about a medical condition or this instruction, always ask your healthcare professional. [address] any warranty or liability for your use of this information. DISCHARGE SUMMARY from Nurse The following personal items are in your possession at time of discharge: 
 
Dental Appliances: Uppers, Lowers Visual Aid: Glasses, With patient Clothing: Shirt, Pants, Undergarments, Footwear Other Valuables: Cell Phone PATIENT INSTRUCTIONS: 
 
 
F-face looks uneven A-arms unable to move or move unevenly S-speech slurred or non-existent T-time-call 911 as soon as signs and symptoms begin-DO NOT go Back to bed or wait to see if you get better-TIME IS BRAIN. Warning Signs of HEART ATTACK Call 911 if you have these symptoms: 
? Chest discomfort. Most heart attacks involve discomfort in the center of the chest that lasts more than a few minutes, or that goes away and comes back. It can feel like uncomfortable pressure, squeezing, fullness, or pain. ? Discomfort in other areas of the upper body. Symptoms can include pain or discomfort in one or both arms, the back, neck, jaw, or stomach. ? Shortness of breath with or without chest discomfort. ? Other signs may include breaking out in a cold sweat, nausea, or lightheadedness. Don't wait more than five minutes to call [address]! Fast action can save your life.  Calling 911 is almost always the fastest way to get lifesaving treatment. Emergency Medical Services staff can begin treatment when they arrive  up to an hour sooner than if someone gets to the hospital by car. The discharge information has been reviewed with the patient. The patient verbalized understanding. Discharge medications reviewed with the patient and appropriate educational materials and side effects teaching were provided. Heart Failure: Care Instructions Your Care Instructions Heart failure occurs when your heart does not pump as much blood as the body needs. Failure does not mean that the heart has stopped pumping but rather that it is not pumping as well as it should. Over time, this causes fluid buildup in your lungs and other parts of your body. Fluid buildup can cause shortness of breath, fatigue, swollen ankles, and other problems. By taking medicines regularly, reducing sodium (salt) in your diet, checking your weight every day, and making lifestyle changes, you can feel better and live longer. Follow-up care is a key part of your treatment and safety. Be sure to make and go to all appointments, and call your doctor if you are having problems. It's also a good idea to know your test results and keep a list of the medicines you take. How can you care for yourself at home? Medicines · Be safe with medicines. Take your medicines exactly as prescribed. Call your doctor if you think you are having a problem with your medicine. · Do not take any vitamins, over-the-counter medicine, or herbal products without talking to your doctor first. Almita Johnston not take ibuprofen (Advil or Motrin) and naproxen (Aleve) without talking to your doctor first. They could make your heart failure worse. · You may be taking some of the following medicine. ¨ Beta-blockers can slow heart rate, decrease blood pressure, and improve your condition. Taking a beta-blocker may lower your chance of needing to be hospitalized. ¨ Angiotensin-converting enzyme inhibitors (ACEIs) reduce the heart's workload, lower blood pressure, and reduce swelling. Taking an ACEI may lower your chance of needing to be hospitalized again. ¨ Angiotensin II receptor blockers (ARBs) work like ACEIs. Your doctor may prescribe them instead of ACEIs. ¨ Diuretics, also called water pills, reduce swelling. ¨ Potassium supplements replace this important mineral, which is sometimes lost with diuretics. ¨ Aspirin and other blood thinners prevent blood clots, which can cause a stroke or heart attack. You will get more details on the specific medicines your doctor prescribes. Diet · Your doctor may suggest that you limit sodium to 2,000 milligrams (mg) a day or less. That is less than 1 teaspoon of salt a day, including all the salt you eat in cooking or in packaged foods. People get most of their sodium from processed foods. Fast food and restaurant meals also tend to be very high in sodium. · Ask your doctor how much liquid you can drink each day. You may have to limit liquids. Weight · Weigh yourself without clothing at the same time each day. Record your weight. Call your doctor if you have a sudden weight gain, such as more than 2 to 3 pounds in a day or 5 pounds in a week. (Your doctor may suggest a different range of weight gain.) A sudden weight gain may mean that your heart failure is getting worse. Activity level · Start light exercise (if your doctor says it is okay). Even if you can only do a small amount, exercise will help you get stronger, have more energy, and manage your weight and your stress. Walking is an easy way to get exercise. Start out by walking a little more than you did before. Bit by bit, increase the amount you walk. · When you exercise, watch for signs that your heart is working too hard. You are pushing yourself too hard if you cannot talk while you are exercising.  If you become short of breath or dizzy or have chest pain, stop, sit down, and rest. 
· If you feel \"wiped out\" the day after you exercise, walk slower or for a shorter distance until you can work up to a better pace. · Get enough rest at night. Sleeping with 1 or 2 pillows under your upper body and head may help you breathe easier. Lifestyle changes · Do not smoke. Smoking can make a heart condition worse. If you need help quitting, talk to your doctor about stop-smoking programs and medicines. These can increase your chances of quitting for good. Quitting smoking may be the most important step you can take to protect your heart. · Limit alcohol to 2 drinks a day for men and 1 drink a day for women. Too much alcohol can cause health problems. · Avoid getting sick from colds and the flu. Get a pneumococcal vaccine shot. If you have had one before, ask your doctor whether you need another dose. Get a flu shot each year. If you must be around people with colds or the flu, wash your hands often. When should you call for help? Call 911 if you have symptoms of sudden heart failure such as: 
· You have severe trouble breathing. · You cough up pink, foamy mucus. · You have a new irregular or rapid heartbeat. Call your doctor now or seek immediate medical care if: 
· You have new or increased shortness of breath. · You are dizzy or lightheaded, or you feel like you may faint. · You have sudden weight gain, such as more than 2 to 3 pounds in a day or 5 pounds in a week. (Your doctor may suggest a different range of weight gain.) · You have increased swelling in your legs, ankles, or feet. · You are suddenly so tired or weak that you cannot do your usual activities. Watch closely for changes in your health, and be sure to contact your doctor if: 
· You develop new symptoms. Where can you learn more? Go to http://citlaly-john.info/. Enter D209 in the search box to learn more about \"Heart Failure: Care Instructions. \" Current as of: April 3, 2017 Content Version: 11.3 © 0627-0810 [employer]. Care instructions adapted under license by [employer] (which disclaims liability or warranty for this information). If you have questions about a medical condition or this instruction, always ask your healthcare professional. [address] any warranty or liability for your use of this information. Discharge Orders None [employer] Announcement We are excited to announce that we are making your provider's discharge notes available to you in [employer]. You will see these notes when they are completed and signed by the physician that discharged you from your recent hospital stay. If you have any questions or concerns about any information you see in [employer] please call the Health Information Department where you were seen or reach out to your Primary Care Provider for more information about your plan of care. Introducing [facility]! [facility] introduces [employer] patient portal. Now you can access parts of your medical record, email your doctor's office, and request medication refills online. 1. In your internet browser, go to [email or web address] 2. Click on the First Time User? Click Here link in the Sign In box. You will see the New Member Sign Up page. 3. Enter your [employer] Access Code exactly as it appears below. You will not need to use this code after youve completed the sign-up process. If you do not sign up before the expiration date, you must request a new code. · [employer] Access Code: [email or web address] Expires: 1/9/2018 11:09 PM 
 
4. Enter the last four digits of your Social Security Number (xxxx) and Date of Birth (mm/dd/yyyy) as indicated and click Submit. You will be taken to the next sign-up page. 5. Create a [employer] ID. This will be your [employer] login ID and cannot be changed, so think of one that is secure and easy to remember. 6. Create a [employer] password. You can change your password at any time. 7. Enter your Password Reset Question and Answer. This can be used at a later time if you forget your password. 8. Enter your e-mail address. You will receive e-mail notification when new information is available in [address] 9. Click Sign Up. You can now view and download portions of your medical record. 10. Click the Download Summary menu link to download a portable copy of your medical information. If you have questions, please visit the Frequently Asked Questions section of the [employer] website. Remember, [employer] is NOT to be used for urgent needs. For medical emergencies, dial 911. Now available from your iPhone and Android! General Information Please provide this summary of care documentation to your next provider. Patient Signature:  ____________________________________________________________ Date:  ____________________________________________________________  
  
[facility] Provider Signature:  ____________________________________________________________ Date:  ____________________________________________________________

## 2017-10-13 ENCOUNTER — APPOINTMENT (OUTPATIENT)
Dept: ULTRASOUND IMAGING | Age: 67
DRG: 287 | End: 2017-10-13
Attending: UROLOGY
Payer: MEDICARE

## 2017-10-13 ENCOUNTER — APPOINTMENT (OUTPATIENT)
Dept: CT IMAGING | Age: 67
DRG: 287 | End: 2017-10-13
Attending: INTERNAL MEDICINE
Payer: MEDICARE

## 2017-10-13 PROBLEM — N28.89 RENAL MASS: Status: ACTIVE | Noted: 2017-10-13

## 2017-10-13 PROBLEM — I50.9 CHF (CONGESTIVE HEART FAILURE) (HCC): Status: RESOLVED | Noted: 2017-10-12 | Resolved: 2017-10-13

## 2017-10-13 PROBLEM — I50.21 SYSTOLIC CHF, ACUTE (HCC): Status: ACTIVE | Noted: 2017-10-13

## 2017-10-13 LAB
ANION GAP SERPL CALC-SCNC: 10 MMOL/L (ref 7–16)
BUN SERPL-MCNC: 30 MG/DL (ref 8–23)
CALCIUM SERPL-MCNC: 8.1 MG/DL (ref 8.3–10.4)
CHLORIDE SERPL-SCNC: 107 MMOL/L (ref 98–107)
CO2 SERPL-SCNC: 25 MMOL/L (ref 21–32)
CREAT SERPL-MCNC: 1.72 MG/DL (ref 0.8–1.5)
ERYTHROCYTE [DISTWIDTH] IN BLOOD BY AUTOMATED COUNT: 13.8 % (ref 11.9–14.6)
GLUCOSE BLD STRIP.AUTO-MCNC: 129 MG/DL (ref 65–100)
GLUCOSE BLD STRIP.AUTO-MCNC: 162 MG/DL (ref 65–100)
GLUCOSE BLD STRIP.AUTO-MCNC: 184 MG/DL (ref 65–100)
GLUCOSE BLD STRIP.AUTO-MCNC: 241 MG/DL (ref 65–100)
GLUCOSE SERPL-MCNC: 122 MG/DL (ref 65–100)
HCT VFR BLD AUTO: 38.3 % (ref 41.1–50.3)
HGB BLD-MCNC: 13.5 G/DL (ref 13.6–17.2)
MCH RBC QN AUTO: 27.2 PG (ref 26.1–32.9)
MCHC RBC AUTO-ENTMCNC: 35.2 G/DL (ref 31.4–35)
MCV RBC AUTO: 77.1 FL (ref 79.6–97.8)
PLATELET # BLD AUTO: 233 K/UL (ref 150–450)
PMV BLD AUTO: 10.2 FL (ref 10.8–14.1)
POTASSIUM SERPL-SCNC: 3.6 MMOL/L (ref 3.5–5.1)
RBC # BLD AUTO: 4.97 M/UL (ref 4.23–5.67)
SODIUM SERPL-SCNC: 142 MMOL/L (ref 136–145)
WBC # BLD AUTO: 5 K/UL (ref 4.3–11.1)

## 2017-10-13 PROCEDURE — 36415 COLL VENOUS BLD VENIPUNCTURE: CPT | Performed by: INTERNAL MEDICINE

## 2017-10-13 PROCEDURE — 65660000000 HC RM CCU STEPDOWN

## 2017-10-13 PROCEDURE — 74011250637 HC RX REV CODE- 250/637: Performed by: INTERNAL MEDICINE

## 2017-10-13 PROCEDURE — 85027 COMPLETE CBC AUTOMATED: CPT | Performed by: INTERNAL MEDICINE

## 2017-10-13 PROCEDURE — 74176 CT ABD & PELVIS W/O CONTRAST: CPT

## 2017-10-13 PROCEDURE — 80048 BASIC METABOLIC PNL TOTAL CA: CPT | Performed by: INTERNAL MEDICINE

## 2017-10-13 PROCEDURE — 82962 GLUCOSE BLOOD TEST: CPT

## 2017-10-13 PROCEDURE — 74011636637 HC RX REV CODE- 636/637: Performed by: INTERNAL MEDICINE

## 2017-10-13 PROCEDURE — 74011250636 HC RX REV CODE- 250/636: Performed by: INTERNAL MEDICINE

## 2017-10-13 PROCEDURE — 76700 US EXAM ABDOM COMPLETE: CPT

## 2017-10-13 PROCEDURE — 97161 PT EVAL LOW COMPLEX 20 MIN: CPT

## 2017-10-13 RX ORDER — SODIUM CHLORIDE 9 MG/ML
75 INJECTION, SOLUTION INTRAVENOUS CONTINUOUS
Status: DISCONTINUED | OUTPATIENT
Start: 2017-10-13 | End: 2017-10-14

## 2017-10-13 RX ORDER — METOPROLOL SUCCINATE 50 MG/1
50 TABLET, EXTENDED RELEASE ORAL DAILY
Status: DISCONTINUED | OUTPATIENT
Start: 2017-10-13 | End: 2017-10-14

## 2017-10-13 RX ORDER — SODIUM CHLORIDE 9 MG/ML
100 INJECTION, SOLUTION INTRAVENOUS CONTINUOUS
Status: DISCONTINUED | OUTPATIENT
Start: 2017-10-13 | End: 2017-10-14

## 2017-10-13 RX ADMIN — INSULIN LISPRO 5 UNITS: 100 INJECTION, SOLUTION INTRAVENOUS; SUBCUTANEOUS at 10:04

## 2017-10-13 RX ADMIN — HEPARIN SODIUM 5000 UNITS: 5000 INJECTION, SOLUTION INTRAVENOUS; SUBCUTANEOUS at 10:05

## 2017-10-13 RX ADMIN — ATORVASTATIN CALCIUM 40 MG: 40 TABLET, FILM COATED ORAL at 10:03

## 2017-10-13 RX ADMIN — HEPARIN SODIUM 5000 UNITS: 5000 INJECTION, SOLUTION INTRAVENOUS; SUBCUTANEOUS at 01:53

## 2017-10-13 RX ADMIN — HEPARIN SODIUM 5000 UNITS: 5000 INJECTION, SOLUTION INTRAVENOUS; SUBCUTANEOUS at 18:09

## 2017-10-13 RX ADMIN — INSULIN LISPRO 5 UNITS: 100 INJECTION, SOLUTION INTRAVENOUS; SUBCUTANEOUS at 18:09

## 2017-10-13 RX ADMIN — SODIUM CHLORIDE 100 ML/HR: 900 INJECTION, SOLUTION INTRAVENOUS at 10:12

## 2017-10-13 RX ADMIN — INSULIN GLARGINE 15 UNITS: 100 INJECTION, SOLUTION SUBCUTANEOUS at 10:05

## 2017-10-13 RX ADMIN — METOPROLOL SUCCINATE 50 MG: 50 TABLET, EXTENDED RELEASE ORAL at 10:03

## 2017-10-13 NOTE — PROGRESS NOTES
10/13/2017 10:18 AM    Admit Date: 10/12/2017    Admit Diagnosis: CHF (congestive heart failure) (formerly [facility])      Subjective:   No cp or sob lying flat      Objective:      Visit Vitals    /72    Pulse 80    Temp 97.9 °F (36.6 °C)    Resp 18    Ht 5' 3\" (1.6 m)    Wt 58.3 kg (128 lb 9.6 oz)    SpO2 93%    BMI 22.78 kg/m2       Physical Exam:  Garrick Heady, Well Nourished, No Acute Distress, Alert & Oriented x 3, appropriate mood. Neck- supple, no JVD  CV- regular rate and rhythm no MRG  Lung- clear bilaterally  Abd- soft, nontender, nondistended  Ext- no edema bilaterally. Skin- warm and dry        Data Review:   Recent Labs      10/13/17   0643  10/12/17   1125  10/12/17   0125   NA  142  140   --    K  3.6  3.7   --    BUN  30*  23   --    CREA  1.72*  1.55*   --    WBC  5.0   --    --    HGB  13.5*   --    --    HCT  38.3*   --    --    PLT  233   --    --    INR   --    --   1.0   HDL   --   64*   --        Assessment/Plan:     Principal Problem:    CHF (congestive heart failure) (formerly [facility]) (30/95/2218)- Acute systolic- Improved with current therapy. Will continue medications  Hold ace with increasing cr- gentle hydration- needs cath but can be done in future as no angina after cr better and renal mass biopsied    Active Problems:    Diabetes mellitus ([address] (7/22/2010)      HTN (hypertension) (7/22/2010)Improved with current therapy.  Will continue medications        Hypertriglyceridemia (7/23/2010)      Non compliance with medical treatment (10/12/2017)      Former tobacco use (10/12/2017)      Overview: Quit 2 weeks ago

## 2017-10-13 NOTE — PROGRESS NOTES
Progress Note    Patient: Rolo Kirby MRN: [id number]  SSN: [id number]    YOB: 1950  Age: 75 y.o. Sex: male      Admit Date: 10/12/2017    LOS: 1 day     Subjective:   Rolo Kirby is a 75 y.o. male with PMH of HTN, DM (uncontrolled), HLD, who quit smoking 2 weeks ago when he noticed that he was quite short of breath. He States that over the past two weeks he has been getting porgressively more short of breath. Worse when lying down. He sleeps in a chair now. He states that he has \"good and bad\" days. Last night he was unable to catch his breath and came to the ED. He has been non compliant in his medical care, has not taken any medications in over a year, he has not seen a doctor in almost 2 years. His wife is at the bedside with him. In the ED he was found to have slightly elevated troponin levels and a BTNP of 600. Patient had a positive D dimer, and CT chest was done, which showed emphasematous changes and bilateral pleural effusions. Of note there was also a 5.1 cm mass in the right kidney that is concerning for malignancy. Dr. Isabella Jason was called by ED physician who requested hospitalist to admit for CHF exacerbation. Thiago was given lasix in the ED, and given IVF, he states he feels better already.      10/13: patient is alert and oriented he has no shortness of breath, awaiting evaluation of right renal mass    Objective:     Vitals:    10/12/17 1917 10/12/17 2357 10/13/17 0459 10/13/17 0745   BP: 149/68 138/76 122/77 127/72   Pulse: 91 84 87 80   Resp: 16 16 18 18   Temp: 97.8 °F (36.6 °C) 97.9 °F (36.6 °C) 98.1 °F (36.7 °C) 97.9 °F (36.6 °C)   SpO2: 96% 96% 96% 93%   Weight:       Height:            Intake and Output:  Current Shift:    Last three shifts: 10/11 1901 - 10/13 0700  In: 600 [P.O.:600]  Out: -     Physical Exam:   General appearance: NAD, conversant  Neck: Trachea midline   Lungs: CTA, with normal respiratory effort and no intercostal retractions  CV: S1,S2 present, no added murmurs  Abdomen: Soft, non-tender; no masses   Extremities: No peripheral edema or extremity lymphadenopathy  Skin: Normal temperature, turgor and texture; no subcutaneous nodules  Psych: Appropriate affect, alert and oriented to person, place and time    Lab/Data Review: All lab results for the last 24 hours reviewed.      Assessment:     Principal Problem:    CHF (congestive heart failure) ([facility] (10/12/2017)    Active Problems:    Diabetes mellitus [facility] (7/22/2010)      HTN (hypertension) (7/22/2010)      Hypertriglyceridemia (7/23/2010)      Non compliance with medical treatment (10/12/2017)      Former tobacco use (10/12/2017)      Overview: Quit 2 weeks ago        Plan:     75year old male with PMH of HTn, HLD, DM2, and current smoker (quit 2 weeks ago, 1ppd, 50+ year) presents with signs of acute CHF exacerbation and possible new right kidney mass     #Acute CHF, unspecified type  -ECHO showing EF 35%  -HgA1c is 8  -cardiology following, appreciate help  -may need cath after renal mass is evaluated     #Right renal mass  #KALYAN  -kalyan likley from lasix  -however given renal mass cannot be sure  -will start IVF gently, DC tomrorow  -will get urine studies if Cr continues to worsen, and will call nephrology  -pending IR guided bipsy of renal mass     #HTN  -continue on cardiac montitoring  -have started ACE-I and BB  -will titrate as needed     #HLD  -will start statin therapy  -atorvastatin 40mg daily  -baseline CMP     #DM2  -continue on insulin therapy  -Lantus 15U and Humalog 5U TID     #Smoking  -has stopped over the past 2 weeks     #GI/DVT  -Hep sub q  -Cardiac diet     #Code Status  -Full Code    Signed By: Mallory Weir MD     October 13, 2017

## 2017-10-13 NOTE — PROGRESS NOTES
Hourly rounds completed this shift, needs met, family at bedside, no complaints of pain this shift.  Report given to Izabel Wong

## 2017-10-13 NOTE — PROGRESS NOTES
Echocardiogram results were discussed with patient. EF reduced at 30-35%. Deatrice Mouse is ordered.     Reg Tellez PA-C

## 2017-10-13 NOTE — ROUTINE PROCESS
CHF teaching started to pt post introduction.  Will follow post cardio assessment, Planner @ BS; 10mins    Start 2 L/D FR ( currently NPO)  Palliative care: RATT 16, none

## 2017-10-13 NOTE — PROGRESS NOTES
Consult received for renal biopsy. No renal biopsy per Dr. Epi Acosta with Urology. Primary RN made award.

## 2017-10-13 NOTE — ROUTINE PROCESS
CHF teaching started post introduction to pt.; aware of diagnosis. Planner/needs scale @ BS and will follow. Smoking/ ETOH/Illicit drug use cessation covered. Pt. aware that I can not prescribe nor adjust  medications: 15mins  Palliative Care score:  Start 2 Liter Fluid Restriction  CHF teaching continues to pt. . Emphasis on taking prescription meds as ordered, to keep F/U appts and  maintain healthy weight , to call MD STAT .  Report worsening dyspnea

## 2017-10-13 NOTE — PROGRESS NOTES
Problem: Mobility Impaired (Adult and Pediatric)  Goal: *Acute Goals and Plan of Care (Insert Text)      PHYSICAL THERAPY: INITIAL ASSESSMENT, DISCHARGE, AM 10/13/2017  INPATIENT: Hospital Day: 2  Payor: [facility] MEDICARE / Plan: [facility] MEDICARE / Product Type: Managed Care Medicare /      NAME/AGE/GENDER: Stone Pavon is a 75 y.o. male         PRIMARY DIAGNOSIS: CHF (congestive heart failure) ([facility]) Systolic CHF, acute ([facility]) Systolic CHF, acute ([facility]        ICD-10: Treatment Diagnosis:       · Generalized Muscle Weakness (M62.81)  · Other abnormalities of gait and mobility (R26.89)   Precaution/Allergies:  Aspirin       ASSESSMENT:      Mr. Rayne Mendez is a 75year old male admitted from home for acute CHF exacerbation. 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 Mostly helps with reminders and minimal physical assistance at times. He presents in supine without complaints and is agreeable to therapy assessment. Transfers to sitting independently. Ambulates total of 500 ft in room and hallway without use of any DME, demonstrating intact balance and good safety awareness with accelerated gait pace. Returned to room and back to supine at his request. Stone Pavon appears to be functioning at baseline physically and has no needs for continued therapy services at this time. He is safe and independent and hoping to discharge home soon. This section established at most recent assessment   PROBLEM LIST (Impairments causing functional limitations):   1. none    INTERVENTIONS PLANNED: (Benefits and precautions of physical therapy have been discussed with the patient.)  1. none; pt evaluated and discharge      TREATMENT PLAN: Frequency/Duration: pt evaluated and discharge 10/13/17 due to independence with mobility          RECOMMENDED REHABILITATION/EQUIPMENT: (at time of discharge pending progress): Due to the probability of continued deficits (see above) this patient will not likely need continued skilled physical therapy after discharge. Equipment:   · None at this time                   HISTORY:   History of Present Injury/Illness (Reason for Referral):  Per H&P, \"Rajesh Rankin is a 75 y.o. male with PMH of HTN, DM (uncontrolled), HLD, who quit smoking 2 weeks ago when he noticed that he was quite short of breath. He States that over the past two weeks he has been getting porgressively more short of breath. Worse when lying down. He sleeps in a chair now. He states that he has \"good and bad\" days. Last night he was unable to catch his breath and came to the ED. He has been non compliant in his medical care, has not taken any medications in over a year, he has not seen a doctor in almost 2 years. His wife is at the bedside with him. In the ED he was found to have slightly elevated troponin levels and a BTNP of 600. Patient had a positive D dimer, and CT chest was done, which showed emphasematous changes and bilateral pleural effusions. Of note there was also a 5.1 cm mass in the right kidney that is concerning for malignancy. Dr. Maday Vazquez was called by ED physician who requested hospitalist to admit for CHF exacerbation. Thiago was given lasix in the ED, and given IVF, he states he feels better already\"     Past Medical History/Comorbidities:   Mr. Devyn Mireles.  has a past medical history of CHF (congestive heart failure) ([facility] (10/12/2017); Diabetes mellitus ([address] (7/22/2010); and HTN (hypertension) (7/22/2010). He also has no past medical history of Arthritis; Asthma; Autoimmune disease ([address] CAD (coronary artery disease); Cancer ([address] Chronic kidney disease; COPD; DEMENTIA; Endocrine disease; Gastrointestinal disorder; Infectious disease; Liver disease; Neurological disorder; Other ill-defined conditions(799.89); Psychiatric disorder; PUD (peptic ulcer disease); Seizures ([address]  Sleep disorder; Stroke [facility]); or Thromboembolus [facility]).  Mr. Peewee Cisneros  has a past surgical history that includes appendectomy. Social History/Living Environment:   Home Environment: Private residence  # Steps to Enter: 5  Rails to Enter: Yes  Hand Rails : Right  Wheelchair Ramp: No  One/Two Story Residence: One story  Living Alone: No  Support Systems: Family member(s), Friends \ neighbors, Parent  Patient Expects to be Discharged to[de-identified] Private residence  Current DME Used/Available at Home: Shower chair  Tub or Shower Type: Shower  Prior Level of Function/Work/Activity:  [address] 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 He is completely independent and active. Does not use any DME. No falls. Drive. Number of Personal Factors/Comorbidities that affect the Plan of Care: 0: LOW COMPLEXITY   EXAMINATION:   Most Recent Physical Functioning:   Gross Assessment:  AROM: Within functional limits  Strength: Within functional limits  Coordination: Within functional limits               Posture:  Posture (WDL): Within defined limits  Balance:  Sitting: Intact  Standing: Intact Bed Mobility:  Rolling: Independent  Supine to Sit: Independent  Sit to Supine: Independent  Scooting: Independent  Wheelchair Mobility:     Transfers:  Sit to Stand: Independent  Stand to Sit: Independent  Bed to Chair: Independent  Gait:     Base of Support: Narrowed  Speed/Marlena: Accelerated  Gait Abnormalities: Trunk sway increased  Distance (ft): 500 Feet (ft)  Ambulation - Level of Assistance: Independent  Interventions: Verbal cues       Body Structures Involved:  1. None Body Functions Affected:  1. None Activities and Participation Affected:  1.  None   Number of elements that affect the Plan of Care: 1-2: LOW COMPLEXITY   CLINICAL PRESENTATION:   Presentation: Stable and uncomplicated: LOW COMPLEXITY   CLINICAL DECISION MAKIN [facility] [address] [id number] AM-PAC 6 Clicks   Basic Mobility Inpatient Short Form  How much difficulty does the patient currently have. .. Unable A Lot A Little None   1. Turning over in bed (including adjusting bedclothes, sheets and blankets)? [ ] 1   [ ] 2   [ ] 3   [X] 4   2. Sitting down on and standing up from a chair with arms ( e.g., wheelchair, bedside commode, etc.)   [ ] 1   [ ] 2   [ ] 3   [X] 4   3. Moving from lying on back to sitting on the side of the bed? [ ] 1   [ ] 2   [ ] 3   [X] 4   How much help from another person does the patient currently need. .. Total A Lot A Little None   4. Moving to and from a bed to a chair (including a wheelchair)? [ ] 1   [ ] 2   [ ] 3   [X] 4   5. Need to walk in hospital room? [ ] 1   [ ] 2   [ ] 3   [X] 4   6. Climbing 3-5 steps with a railing? [ ] 1   [ ] 2   [ ] 3   [X] 4   © 2007, Trustees of [address] [place] [address] [address] [address], under license to [employer]. All rights reserved    Score:  Initial: 24 Most Recent: X (Date: -- )     Interpretation of Tool:  Represents activities that are increasingly more difficult (i.e. Bed mobility, Transfers, Gait). Score 24 23 22-20 19-15 14-10 9-7 6       Modifier CH CI CJ CK CL CM CN         · Mobility - Walking and Moving Around:               - CURRENT STATUS:    CH - 0% impaired, limited or restricted               - GOAL STATUS:           CH - 0% impaired, limited or restricted               - D/C STATUS:                       CH - 0% impaired, limited or restricted  Payor: [facility] MEDICARE / Plan: [facility] MEDICARE / Product Type: Managed Care Medicare /       Medical Necessity:     · Mr. Talia Estes is performing all mobility, ambulation, and ADLs independently today and is functioning at baseline. Reason for Services/Other Comments:  · Mr. Talia Estes will be discharged from acute PT services due to safety and independence with mobility.    Use of outcome tool(s) and clinical judgement create a POC that gives a: Clear prediction of patient's progress: LOW COMPLEXITY                 TREATMENT:   (In addition to Assessment/Re-Assessment sessions the following treatments were rendered)   Pre-treatment Symptoms/Complaints:  \"thank you for coming\"  Pain: Initial:   Pain Intensity 1: 0  Post Session:  0/10      Assessment/Reassessment only, no treatment provided today     Braces/Orthotics/Lines/Etc:   · IV  · O2 Device: Room air  Treatment/Session Assessment:    · Response to Treatment:  Pt performs all mobility independently  · Interdisciplinary Collaboration:  · Physical Therapist  · Registered Nurse  · After treatment position/precautions:  · Supine in bed  · Bed/Chair-wheels locked  · Bed in low position  · Call light within reach      · Recommendations/Intent for next treatment session:  Mr. Isaak Munroe will be discharged from acute PT services as he is independent and functioning at baseline with no therapy needs.   Total Treatment Duration:  PT Patient Time In/Time Out  Time In: 1035  Time Out: Corky Fuentes 20, DPT

## 2017-10-13 NOTE — CONSULTS
Urology Consult    Subjective:     Date of Consultation:  October 13, 2017    Referring Physician: Gurpreet Oliva NP  Reason for Consultation: Renal mass    History of Present Illness:     Mr. Yenny Stanley is a very pleasant 75 y.o.  male with a past medical history of HTN, uncontrolled DM, HLD and a former smoker (quit 2 weeks ago) who is being seen for a R renal mass. He was admitted to the hospitalist service on 10/12/2017 for acute CHF. He reported then that he has been non-compliant with his medical care, he has not taken his medications in at least a year and has not seen a doctor in two years. He was found to have slightly elevated troponin and a BNP of 600. His EF is 35%. He also had a positive D-dimer. A CT of chest was ordered and  Showed:    IMPRESSION  Impression:    1. No evidence of pulmonary embolism. 2. Moderate right and small left pleural effusions. Areas of interlobular septal  thickening noted. Please correlate with cardiac function and fluid status. 3. Mild diffuse bronchial wall thickening and upper lobe-predominant  emphysematous changes. 4. Partially visualized heterogeneous 5.1 cm mass arising from the right kidney,  worrisome for malignancy. Further evaluation with contrast-enhanced abdominal CT  or renal ultrasound is advised      We were consulted to see him to evaluate the 5.1 cm right renal mass. Creatinine is currently 1.72,  BUN is 30. KALYAN thought to be from IV lasix, this has been discontinued. He is getting gentle IVF in the setting of CHF. Further evaluation of contrast-enhanced abdominal CT was recommended; however, with his worsening renal function, this would cannot be obtained at this time. He is currently getting a CT abdomen/pelvis without contrast and a renal ultrasound. Dr. Veronica Ramos has reviewed the images from the original CT scan and this mass is certainly highly suspicious of malignancy.   It would not be necessary to perform a renal biopsy, considering the size of the mass and the high suspicion for malignancy. He will more than likely need a nephrectomy once he is stable from a cardiology standpoint. He will most likely undergo a cardiac catheterization during this visit and once he is stable from a cardiology standpoint, we will get him back in for a scheduled nephrectomy.            Past Medical History:   Diagnosis Date    CHF (congestive heart failure) ([facility] 10/12/2017          Past Medical History:   Diagnosis Date    [facility] 10/12/2017    Diabetes mellitus ([facility] 7/22/2010    HTN (hypertension) 7/22/2010      Past Surgical History:   Procedure Laterality Date    HX APPENDECTOMY      1963      Family History   Problem Relation Age of Onset    Diabetes        Social History   Substance Use Topics    Smoking status: Current Every Day Smoker     Packs/day: 1.00    Smokeless tobacco: Not on file    Alcohol use Yes      Comment: occasional     Allergies   Allergen Reactions    Aspirin Nausea and Vomiting      Prior to Admission medications    Medication Sig Start Date End Date Taking? Authorizing Provider   glipiZIDE SR (GLUCOTROL) 5 mg CR tablet Take 1 Tab by mouth daily (before breakfast). 7/24/10   Hermelindo Ross MD   insulin NPH (NOVOLIN) 100 unit/mL injection 10 Units by SubCUTAneous route two (2) times a day (before meals). 7/24/10   Hermelindo Ross MD   metformin (GLUCOPHAGE) 500 mg tablet Take 1 Tab by mouth two (2) times daily (with meals). 7/24/10   Hermelindo Ross MD   Blood-Glucose Meter monitoring kit by Does Not Apply route. Check blood sugars at meals and bedtime. Keep a record of your blood sugars and bring to your doctor.  7/24/10   Hermelindo Ross MD         Review of Systems:  Constitutional: negative for fevers, chills and fatigue  Respiratory: negative for pleurisy/chest pain, wheezing or dyspnea on exertion  Cardiovascular: negative for chest pain, dyspnea, lower extremity edema, dyspnea on exertion  Gastrointestinal: negative for nausea, vomiting, melena and abdominal pain  Genitourinary:negative for frequency, dysuria, nocturia and urinary incontinence  Musculoskeletal:negative for back pain and muscle weakness    Objective:     Patient Vitals for the past 8 hrs:   BP Temp Pulse Resp SpO2 Weight   10/13/17 1104 143/79 97.9 °F (36.6 °C) 87 18 97 % -   10/13/17 1010 - - - - - 128 lb 9.6 oz (58.3 kg)   10/13/17 0745 127/72 97.9 °F (36.6 °C) 80 18 93 % -     Temp (24hrs), Av.9 °F (36.6 °C), Min:97.5 °F (36.4 °C), Max:98.1 °F (36.7 °C)      Intake and Output:   10/11 1901 - 10/13 0700  In: 600 [P.O.:600]  Out: -     Physical Exam:            General:    alert, cooperative, no distress                     Skin:  no rash or abnormalities                HEENT:  PERRLA         Throat/Neck:  neck supple. Lymph nodes:  Cervical nodes normal.                 Lungs:  clear to auscultation bilaterally      Cardiovascular:  regular rate and rhythm, S1 S2             Abdomen[de-identified]  soft, non-tender. Bowel sounds present. Musculoskeletal   Full ROM, able to move all extremities well              Assessment:     Principal Problem:    Systolic CHF, acute ([facility] (10/13/2017)    Active Problems:    Diabetes mellitus ([facility] (2010)      HTN (hypertension) (2010)      Hypertriglyceridemia (2010)      Non compliance with medical treatment (10/12/2017)      Former tobacco use (10/12/2017)      Overview: Quit 2 weeks ago      Renal mass (10/13/2017)      Overview: Right        Plan:     Await results from [address] and CT abd/pelvis. Further recommendations to follow, per Dr. Shad aBrrios. Per MERNA Freeman, they are considering heart cath tomorrow depending on lab results. Signed By: Rudy Poole NP                         2017   I have reviewed the above note and examined the patient. I agree with the exam, assessment and plan.     Lonnie Kong MD

## 2017-10-13 NOTE — PROGRESS NOTES
Hourly rounds performed; pt slept throughout night without complaints; all pt needs met. Bedside shift report given to Gina Ryan RN.

## 2017-10-13 NOTE — DIABETES MGMT
Patient's blood glucose ranged 180-229 yesterday with patient receiving Humalog 10 units. Blood glucose 122-162 this morning. HgA1c is 8 (eA). Reviewed with patient. Patient very talkative today. Patient states that the doctors are \"worried about his kidney\". Patient recalls that his mother had to have a kidney removed about 40 years ago, but he can't remember why. Patient also voices a positive family history of sickle cell which the patient states he has the \"trait\". Patient states he is a  and quit smoking a 1PPD habit about 2-3 weeks ago. \" Patient reports he has been smoking for over half his life. Patient states he quit for about a year before, but went back to smoking. Patient states he doesn't like medications. \"When I quit before I didn't use the patches like I was supposed to because in my mind it was medicine so I put one on and I only used one patch, this time I just quit cold turkey. Patient states that he also recently quit a daily marijuana habit as well. Patient states that he works on self-control because of his diabetic grandfather who Bonita Jose R himself to death\". 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 Applauded patient on working on his diet and smoking cessation. Encouraged patient to continue with the smoking cessation. Discussed the long-term impact of uncontrolled diabetes. Patient states he has been healthy and without medication for most of his life, and he hopes that all of \" the bad stuff doesn't plan on hitting me all at once. \" Patient is anxious about his kidneys. Patient ate breakfast but is NPO for procedure today. Patient to start Lantus 15 units and Humalog 5 units with meals today. Reviewed s/s treatment options for hypoglycemia. Primary RN to clarify Lantus dose with provider. Patient voices no questions at this time.

## 2017-10-13 NOTE — PROGRESS NOTES
Referral to Cardiac Rehab for dx of CHF. According to physician notes this is an acute event. No dx of Chronic heart failure. Patient is interested in participating in CR, but unfortunately does not qualify at this point as his CHF must be chronic. We will contact the patient if a qualifying referral is received.

## 2017-10-14 LAB
ANION GAP SERPL CALC-SCNC: 7 MMOL/L (ref 7–16)
BUN SERPL-MCNC: 27 MG/DL (ref 8–23)
CALCIUM SERPL-MCNC: 8.5 MG/DL (ref 8.3–10.4)
CHLORIDE SERPL-SCNC: 108 MMOL/L (ref 98–107)
CO2 SERPL-SCNC: 27 MMOL/L (ref 21–32)
CREAT SERPL-MCNC: 1.51 MG/DL (ref 0.8–1.5)
ERYTHROCYTE [DISTWIDTH] IN BLOOD BY AUTOMATED COUNT: 13.7 % (ref 11.9–14.6)
GLUCOSE BLD STRIP.AUTO-MCNC: 113 MG/DL (ref 65–100)
GLUCOSE BLD STRIP.AUTO-MCNC: 124 MG/DL (ref 65–100)
GLUCOSE BLD STRIP.AUTO-MCNC: 126 MG/DL (ref 65–100)
GLUCOSE BLD STRIP.AUTO-MCNC: 167 MG/DL (ref 65–100)
GLUCOSE SERPL-MCNC: 110 MG/DL (ref 65–100)
HCT VFR BLD AUTO: 37.6 % (ref 41.1–50.3)
HGB BLD-MCNC: 13.4 G/DL (ref 13.6–17.2)
MAGNESIUM SERPL-MCNC: 2.2 MG/DL (ref 1.8–2.4)
MCH RBC QN AUTO: 27.5 PG (ref 26.1–32.9)
MCHC RBC AUTO-ENTMCNC: 35.6 G/DL (ref 31.4–35)
MCV RBC AUTO: 77 FL (ref 79.6–97.8)
PLATELET # BLD AUTO: 233 K/UL (ref 150–450)
PMV BLD AUTO: 10.3 FL (ref 10.8–14.1)
POTASSIUM SERPL-SCNC: 3.7 MMOL/L (ref 3.5–5.1)
RBC # BLD AUTO: 4.88 M/UL (ref 4.23–5.67)
SODIUM SERPL-SCNC: 142 MMOL/L (ref 136–145)
WBC # BLD AUTO: 5.2 K/UL (ref 4.3–11.1)

## 2017-10-14 PROCEDURE — 36415 COLL VENOUS BLD VENIPUNCTURE: CPT | Performed by: INTERNAL MEDICINE

## 2017-10-14 PROCEDURE — 83735 ASSAY OF MAGNESIUM: CPT | Performed by: INTERNAL MEDICINE

## 2017-10-14 PROCEDURE — 74011250637 HC RX REV CODE- 250/637: Performed by: INTERNAL MEDICINE

## 2017-10-14 PROCEDURE — 80048 BASIC METABOLIC PNL TOTAL CA: CPT | Performed by: INTERNAL MEDICINE

## 2017-10-14 PROCEDURE — B2111ZZ FLUOROSCOPY OF MULTIPLE CORONARY ARTERIES USING LOW OSMOLAR CONTRAST: ICD-10-PCS | Performed by: INTERNAL MEDICINE

## 2017-10-14 PROCEDURE — 74011000250 HC RX REV CODE- 250: Performed by: INTERNAL MEDICINE

## 2017-10-14 PROCEDURE — C1769 GUIDE WIRE: HCPCS

## 2017-10-14 PROCEDURE — 74011250636 HC RX REV CODE- 250/636

## 2017-10-14 PROCEDURE — 93458 L HRT ARTERY/VENTRICLE ANGIO: CPT

## 2017-10-14 PROCEDURE — 74011636320 HC RX REV CODE- 636/320: Performed by: INTERNAL MEDICINE

## 2017-10-14 PROCEDURE — 77030015766

## 2017-10-14 PROCEDURE — 74011250636 HC RX REV CODE- 250/636: Performed by: INTERNAL MEDICINE

## 2017-10-14 PROCEDURE — 77030004534 HC CATH ANGI DX INFN CARD -A

## 2017-10-14 PROCEDURE — 77030029997 HC DEV COM RDL R BND TELE -B

## 2017-10-14 PROCEDURE — 74011636637 HC RX REV CODE- 636/637: Performed by: INTERNAL MEDICINE

## 2017-10-14 PROCEDURE — 4A023N7 MEASUREMENT OF CARDIAC SAMPLING AND PRESSURE, LEFT HEART, PERCUTANEOUS APPROACH: ICD-10-PCS | Performed by: INTERNAL MEDICINE

## 2017-10-14 PROCEDURE — 65660000000 HC RM CCU STEPDOWN

## 2017-10-14 PROCEDURE — C1894 INTRO/SHEATH, NON-LASER: HCPCS

## 2017-10-14 PROCEDURE — 82962 GLUCOSE BLOOD TEST: CPT

## 2017-10-14 PROCEDURE — B2151ZZ FLUOROSCOPY OF LEFT HEART USING LOW OSMOLAR CONTRAST: ICD-10-PCS | Performed by: INTERNAL MEDICINE

## 2017-10-14 PROCEDURE — 85027 COMPLETE CBC AUTOMATED: CPT | Performed by: INTERNAL MEDICINE

## 2017-10-14 PROCEDURE — 99152 MOD SED SAME PHYS/QHP 5/>YRS: CPT

## 2017-10-14 RX ORDER — ONDANSETRON 2 MG/ML
4 INJECTION INTRAMUSCULAR; INTRAVENOUS
Status: COMPLETED | OUTPATIENT
Start: 2017-10-14 | End: 2017-10-15

## 2017-10-14 RX ORDER — SODIUM CHLORIDE 0.9 % (FLUSH) 0.9 %
5-10 SYRINGE (ML) INJECTION AS NEEDED
Status: DISCONTINUED | OUTPATIENT
Start: 2017-10-14 | End: 2017-10-15 | Stop reason: HOSPADM

## 2017-10-14 RX ORDER — FENTANYL CITRATE 50 UG/ML
25-100 INJECTION, SOLUTION INTRAMUSCULAR; INTRAVENOUS
Status: DISCONTINUED | OUTPATIENT
Start: 2017-10-14 | End: 2017-10-14

## 2017-10-14 RX ORDER — FUROSEMIDE 40 MG/1
40 TABLET ORAL DAILY
Status: DISCONTINUED | OUTPATIENT
Start: 2017-10-15 | End: 2017-10-15 | Stop reason: HOSPADM

## 2017-10-14 RX ORDER — CARVEDILOL 6.25 MG/1
6.25 TABLET ORAL 2 TIMES DAILY WITH MEALS
Status: DISCONTINUED | OUTPATIENT
Start: 2017-10-14 | End: 2017-10-15

## 2017-10-14 RX ORDER — SODIUM CHLORIDE 9 MG/ML
75 INJECTION, SOLUTION INTRAVENOUS CONTINUOUS
Status: DISCONTINUED | OUTPATIENT
Start: 2017-10-14 | End: 2017-10-15

## 2017-10-14 RX ORDER — ACETAMINOPHEN 325 MG/1
650 TABLET ORAL
Status: DISCONTINUED | OUTPATIENT
Start: 2017-10-14 | End: 2017-10-15 | Stop reason: HOSPADM

## 2017-10-14 RX ORDER — HYDROCODONE BITARTRATE AND ACETAMINOPHEN 5; 325 MG/1; MG/1
1 TABLET ORAL
Status: DISCONTINUED | OUTPATIENT
Start: 2017-10-14 | End: 2017-10-15 | Stop reason: HOSPADM

## 2017-10-14 RX ORDER — HYDRALAZINE HYDROCHLORIDE 20 MG/ML
10 INJECTION INTRAMUSCULAR; INTRAVENOUS
Status: DISCONTINUED | OUTPATIENT
Start: 2017-10-14 | End: 2017-10-15 | Stop reason: HOSPADM

## 2017-10-14 RX ORDER — SODIUM CHLORIDE 0.9 % (FLUSH) 0.9 %
5-10 SYRINGE (ML) INJECTION EVERY 8 HOURS
Status: DISCONTINUED | OUTPATIENT
Start: 2017-10-14 | End: 2017-10-15 | Stop reason: HOSPADM

## 2017-10-14 RX ORDER — HEPARIN SODIUM 200 [USP'U]/100ML
25 INJECTION, SOLUTION INTRAVENOUS CONTINUOUS
Status: DISCONTINUED | OUTPATIENT
Start: 2017-10-14 | End: 2017-10-14

## 2017-10-14 RX ORDER — GUAIFENESIN 100 MG/5ML
81 LIQUID (ML) ORAL ONCE
Status: COMPLETED | OUTPATIENT
Start: 2017-10-14 | End: 2017-10-14

## 2017-10-14 RX ORDER — LIDOCAINE HYDROCHLORIDE 20 MG/ML
1-20 INJECTION, SOLUTION INFILTRATION; PERINEURAL ONCE
Status: COMPLETED | OUTPATIENT
Start: 2017-10-14 | End: 2017-10-14

## 2017-10-14 RX ORDER — MIDAZOLAM HYDROCHLORIDE 1 MG/ML
1-6 INJECTION, SOLUTION INTRAMUSCULAR; INTRAVENOUS
Status: DISCONTINUED | OUTPATIENT
Start: 2017-10-14 | End: 2017-10-14

## 2017-10-14 RX ADMIN — FENTANYL CITRATE 50 MCG: 50 INJECTION, SOLUTION INTRAMUSCULAR; INTRAVENOUS at 08:51

## 2017-10-14 RX ADMIN — ASPIRIN 81 MG 81 MG: 81 TABLET ORAL at 08:29

## 2017-10-14 RX ADMIN — ONDANSETRON 4 MG: 2 INJECTION INTRAMUSCULAR; INTRAVENOUS at 11:55

## 2017-10-14 RX ADMIN — FENTANYL CITRATE 25 MCG: 50 INJECTION, SOLUTION INTRAMUSCULAR; INTRAVENOUS at 08:35

## 2017-10-14 RX ADMIN — SODIUM CHLORIDE 75 ML/HR: 900 INJECTION, SOLUTION INTRAVENOUS at 12:50

## 2017-10-14 RX ADMIN — HEPARIN SODIUM 5000 UNITS: 5000 INJECTION, SOLUTION INTRAVENOUS; SUBCUTANEOUS at 01:00

## 2017-10-14 RX ADMIN — CARVEDILOL 6.25 MG: 6.25 TABLET, FILM COATED ORAL at 10:14

## 2017-10-14 RX ADMIN — CARVEDILOL 6.25 MG: 6.25 TABLET, FILM COATED ORAL at 16:30

## 2017-10-14 RX ADMIN — HEPARIN SODIUM 5000 UNITS: 5000 INJECTION, SOLUTION INTRAVENOUS; SUBCUTANEOUS at 16:29

## 2017-10-14 RX ADMIN — HEPARIN SODIUM 5000 UNITS: 5000 INJECTION, SOLUTION INTRAVENOUS; SUBCUTANEOUS at 10:14

## 2017-10-14 RX ADMIN — MIDAZOLAM HYDROCHLORIDE 1 MG: 1 INJECTION, SOLUTION INTRAMUSCULAR; INTRAVENOUS at 08:39

## 2017-10-14 RX ADMIN — HEPARIN SODIUM 2 ML: 10000 INJECTION, SOLUTION INTRAVENOUS; SUBCUTANEOUS at 08:43

## 2017-10-14 RX ADMIN — HYDRALAZINE HYDROCHLORIDE 10 MG: 20 INJECTION INTRAMUSCULAR; INTRAVENOUS at 20:07

## 2017-10-14 RX ADMIN — INSULIN LISPRO 5 UNITS: 100 INJECTION, SOLUTION INTRAVENOUS; SUBCUTANEOUS at 16:30

## 2017-10-14 RX ADMIN — IOPAMIDOL 55 ML: 755 INJECTION, SOLUTION INTRAVENOUS at 08:51

## 2017-10-14 RX ADMIN — HEPARIN SODIUM 25 ML/HR: 200 INJECTION, SOLUTION INTRAVENOUS at 08:27

## 2017-10-14 RX ADMIN — ATORVASTATIN CALCIUM 40 MG: 40 TABLET, FILM COATED ORAL at 10:13

## 2017-10-14 RX ADMIN — FENTANYL CITRATE 25 MCG: 50 INJECTION, SOLUTION INTRAMUSCULAR; INTRAVENOUS at 08:39

## 2017-10-14 RX ADMIN — INSULIN GLARGINE 15 UNITS: 100 INJECTION, SOLUTION SUBCUTANEOUS at 10:18

## 2017-10-14 RX ADMIN — LIDOCAINE HYDROCHLORIDE 60 MG: 20 INJECTION, SOLUTION INFILTRATION; PERINEURAL at 08:41

## 2017-10-14 RX ADMIN — MIDAZOLAM HYDROCHLORIDE 1 MG: 1 INJECTION, SOLUTION INTRAMUSCULAR; INTRAVENOUS at 08:35

## 2017-10-14 NOTE — ROUTINE PROCESS
TRANSFER - OUT REPORT:    Verbal report given to Tl Oliveros RN (name) on [facility].  being transferred to [facility] (unit) for routine progression of care       Report consisted of patients Situation, Background, Assessment and   Recommendations(SBAR). Information from the following report(s) Procedure Summary, MAR and Recent Results was reviewed with the receiving nurse. Lines:   Peripheral IV 10/12/17 Left Antecubital (Active)   Site Assessment Clean, dry, & intact 10/14/2017  7:30 AM   Phlebitis Assessment 0 10/14/2017  7:30 AM   Infiltration Assessment 0 10/14/2017  7:30 AM   Dressing Status Clean, dry, & intact 10/14/2017  7:30 AM   Dressing Type Tape;Transparent 10/14/2017  7:30 AM   Hub Color/Line Status Capped 10/14/2017  7:30 AM   Alcohol Cap Used No 10/13/2017  2:41 PM       Peripheral IV 10/12/17 Left Hand (Active)   Site Assessment Clean, dry, & intact 10/14/2017  7:30 AM   Phlebitis Assessment 0 10/14/2017  7:30 AM   Infiltration Assessment 0 10/14/2017  7:30 AM   Dressing Status Clean, dry, & intact 10/14/2017  7:30 AM   Dressing Type Tape;Transparent 10/14/2017  7:30 AM   Hub Color/Line Status Capped 10/14/2017  7:30 AM   Alcohol Cap Used No 10/13/2017  2:41 PM        Opportunity for questions and clarification was provided. Patient transported with:   Tech   RN    C w/ Dr Mac Host   RCA  R band to right radial w/ 12 mls at 0855  Versed 2 mg IV  Fentanyl 100 mcg IV    Report also called to CHRISTEL Parrish on 6th floor.

## 2017-10-14 NOTE — PROGRESS NOTES
TRANSFER - IN REPORT:    Verbal report received from Reece Solares [facility] on [address]  being received from Cath lab for routine progression of care      Report consisted of patients Situation, Background, Assessment and   Recommendations(SBAR). Information from the following report(s) Procedure Summary was reviewed with the receiving nurse. Opportunity for questions and clarification was provided. Assessment completed upon patients arrival to unit and care assumed.

## 2017-10-14 NOTE — PROCEDURES
[facility]       Name:  Matthieu Ashraf   MR#:  [id number]   :  1950   Account #:  [de-identified]   Date of Adm:  10/12/2017       DATE OF PROCEDURE: 10/14/2017     PROCEDURES: Left heart catheterization, selective coronary   arteriography, left ventriculogram via the right radial   approach. INDICATION: Severely reduced left ventricular systolic function   with recent dyspnea with minimal exertion and LV dysfunction,   concerning for underlying ischemic cardiomyopathy and active   angina. Cardiac catheterization arranged for evaluation. TECHNICAL FACTORS: After informed consent was obtained, the   patient was brought to the cardiac catheterization lab. The   right radial artery was prepped and draped in the usual sterile   fashion. Utilizing modified Seldinger technique and a   micropuncture kit, the right radial artery was entered. A 6-  Polish Terumo Slender sheath was placed without difficulty. A   radial cocktail consisting of 2000 units heparin, 2 mg   verapamil, 200 mcg nitroglycerin was administered. A 5-Polish   Tiger 4.0 catheter was used to selectively engage the ostium of   the left main coronary artery and right coronary artery   respectively. Selective injections in various projections were   performed. A pigtail catheter was used to cross the aortic valve   and enter the left ventricle. Hemodynamic measurements and left   ventriculogram were obtained. Left ventricular aortic pressure   gradient was obtained by pullback technique. At the conclusion of the diagnostic procedure, the radial sheath   was removed and a pneumatic band was placed with excellent   hemostasis. No complications were encountered. SEDATION: The patient received a total of 2 mg of Versed and 100   mcg of fentanyl. Sedation was administered by Dominique Marroquin RN, under my supervision. Sedation start time was 8:35 with   procedure completion time of 8:56. HEMODYNAMIC RESULTS     1. Aortic pressure 157/74 with a mean of 93.   2. Left ventricular end-diastolic pressure was 26.   3. There was no significant gradient across the aortic valve. CONTRAST UTILIZATION: Isovue 55. ANGIOGRAPHIC RESULTS   1. Left main coronary artery: Medium caliber vessel, 10% to 20%   distal stenosis. 2. LAD: Medium caliber vessel, 20% proximal stenosis. 3. Left circumflex: Medium caliber, nondominant vessel, 30%   proximal, 20% mid stenosis. 4. First obtuse marginal artery: Small caliber vessel, 20%   ostial stenosis. 5. Second obtuse marginals: Medium caliber vessel, 40% proximal   stenosis. 6. Third obtuse marginal artery: Small caliber vessels. Patent. 7. Right coronary artery: Occluded in the proximal segment with   bridging collaterals. There is left to right and right to right   collateralization of a medium caliber dominant right coronary   artery. The PDA is patent, but appears to show a 60% to 70%   ostial stenosis. 8. Left ventriculogram performed in COBIAN projection shows   moderately dilated left ventricle. LV function is severely   reduced with EF 20% to 25%. There is severe global hypokinesis   with relatively preserved contraction of the LV apex and distal   anterior wall. CONCLUSION   1. Obstructive 1-vessel coronary artery disease involving an   occluded right coronary artery with good collateralization. 2. Severely reduced left ventricular systolic function. PLAN: Ongoing medical therapy.         MD ALIA Dalton / Anival Prieto   D:  10/14/2017   09:02   T:  10/14/2017   10:01   Job #:  [id number]

## 2017-10-14 NOTE — PROGRESS NOTES
Hospitalist Progress Note    Patient: Fei Aleman Sr. MRN: [id number]  SSN: [id number]    YOB: 1950  Age: 75 y.o. Sex: male      Admit Date: 10/12/2017    LOS: 2 days     Subjective:     From previous notes: \"73 y. o. male with PMH of HTN, DM (uncontrolled), HLD, who quit smoking 2 weeks ago when he noticed that he was quite short of breath. He States that over the past two weeks he has been getting porgressively more short of breath. Worse when lying down. He sleeps in a chair now. He states that he has \"good and bad\" days. Last night he was unable to catch his breath and came to the ED. He has been non compliant in his medical care, has not taken any medications in over a year, he has not seen a doctor in almost 2 years. His wife is at the bedside with him. In the ED he was found to have slightly elevated troponin levels and a BTNP of 600. Patient had a positive D dimer, and CT chest was done, which showed emphasematous changes and bilateral pleural effusions. Of note there was also a 5.1 cm mass in the right kidney that is concerning for malignancy. Dr. Donnell Moody was called by ED physician who requested hospitalist to admit for CHF exacerbation. Thiago was given lasix in the ED, and given IVF, he states he feels better already. \"    10/14 - The patient went for a C this AM. He feels good. Denies CP/SOB. Denies F/C/N/V. Review of systems negative except stated above.     Objective:     Visit Vitals    BP (!) 156/93 (BP 1 Location: Left arm, BP Patient Position: At rest)    Pulse 78    Temp 97.7 °F (36.5 °C)    Resp 12    Ht 5' 3\" (1.6 m)    Wt 57.8 kg (127 lb 6.4 oz)    SpO2 96%    BMI 22.57 kg/m2      Oxygen Therapy  O2 Sat (%): 96 % (10/14/17 1047)  Pulse via Oximetry: 89 beats per minute (10/12/17 0728)  O2 Device: Room air (10/14/17 0832)  O2 Flow Rate (L/min): 0 l/min (10/12/17 1146)      Intake and Output:   Intake/Output Summary (Last 24 hours) at 10/14/17 1136  Last data filed at 10/13/17 1844   Gross per 24 hour   Intake              480 ml   Output                0 ml   Net              480 ml         Physical Exam:   GENERAL: alert, cooperative, no distress, appears stated age  EYE: conjunctivae/corneas clear. PERRL. THROAT & NECK: normal and no erythema or exudates noted. LUNG: clear to auscultation bilaterally  HEART: regular rate and rhythm, S1S2, no murmur, no JVD  ABDOMEN: soft, non-tender, non-distended. Bowel sounds normal.   EXTREMITIES:  No edema, 2+ pedal/radial pulses bilaterally  SKIN: no rash or abnormalities  NEUROLOGIC: A&Ox3. Cranial nerves 2-12 grossly intact.     Lab/Data Review:  Recent Results (from the past 24 hour(s))   GLUCOSE, POC    Collection Time: 10/13/17  3:16 PM   Result Value Ref Range    Glucose (POC) 184 (H) 65 - 100 mg/dL   GLUCOSE, POC    Collection Time: 10/13/17  9:26 PM   Result Value Ref Range    Glucose (POC) 129 (H) 65 - 100 mg/dL   CBC W/O DIFF    Collection Time: 10/14/17  4:48 AM   Result Value Ref Range    WBC 5.2 4.3 - 11.1 K/uL    RBC 4.88 4.23 - 5.67 M/uL    HGB 13.4 (L) 13.6 - 17.2 g/dL    HCT 37.6 (L) 41.1 - 50.3 %    MCV 77.0 (L) 79.6 - 97.8 FL    MCH 27.5 26.1 - 32.9 PG    MCHC 35.6 (H) 31.4 - 35.0 g/dL    RDW 13.7 11.9 - 14.6 %    PLATELET 574 112 - 403 K/uL    MPV 10.3 (L) 10.8 - 76.8 FL   METABOLIC PANEL, BASIC    Collection Time: 10/14/17  4:48 AM   Result Value Ref Range    Sodium 142 136 - 145 mmol/L    Potassium 3.7 3.5 - 5.1 mmol/L    Chloride 108 (H) 98 - 107 mmol/L    CO2 27 21 - 32 mmol/L    Anion gap 7 7 - 16 mmol/L    Glucose 110 (H) 65 - 100 mg/dL    BUN 27 (H) 8 - 23 MG/DL    Creatinine 1.51 (H) 0.8 - 1.5 MG/DL    GFR est AA 59 (L) >60 ml/min/1.73m2    GFR est non-AA 49 (L) >60 ml/min/1.73m2    Calcium 8.5 8.3 - 10.4 MG/DL   MAGNESIUM    Collection Time: 10/14/17  4:48 AM   Result Value Ref Range    Magnesium 2.2 1.8 - 2.4 mg/dL   GLUCOSE, POC    Collection Time: 10/14/17  7:12 AM   Result Value Ref Range Glucose (POC) 113 (H) 65 - 100 mg/dL   GLUCOSE, POC    Collection Time: 10/14/17 10:46 AM   Result Value Ref Range    Glucose (POC) 124 (H) 65 - 100 mg/dL       Imaging:  Ct Chest W Cont    Result Date: 10/12/2017  Impression:  1. No evidence of pulmonary embolism. 2. Moderate right and small left pleural effusions. Areas of interlobular septal thickening noted. Please correlate with cardiac function and fluid status. 3. Mild diffuse bronchial wall thickening and upper lobe-predominant emphysematous changes. 4. Partially visualized heterogeneous 5.1 cm mass arising from the right kidney, worrisome for malignancy. Further evaluation with contrast-enhanced abdominal CT or renal ultrasound is advised. The above findings were communicated to Dr. Mony Britton by Dr. Mackenzie Corey on 10/12/2017 at 4:12 AM.     Ct Abd Pelv Wo Cont    Result Date: 10/13/2017  IMPRESSION:  1.  A 5.5 CM RIGHT LOWER POLE RENAL MASS IS VERY LIKELY NEOPLASTIC, AND RENAL CELL CARCINOMA IS FAVORED. NO ABDOMINOPELVIC METASTATIC DISEASE IS IDENTIFIED. 2.  CALCIFICATIONS ELSEWHERE IN THE RIGHT KIDNEY ARE ASYMMETRIC COMPARED WITH THE LEFT WITH DIFFERENTIAL DIAGNOSES AS ABOVE. 3.  ATHEROSCLEROSIS WITH A 2.4 CM INFRARENAL ABDOMINAL AORTIC ANEURYSM. 4.  SMALL BILATERAL PLEURAL EFFUSIONS WITH ADJACENT RIGHT BASILAR ATELECTASIS/INFILTRATE. Us Abd Comp    Result Date: 10/13/2017  IMPRESSION: A 5.5 cm heterogeneous mass arising from the lower pole the right kidney must be considered suspicious for renal cell carcinoma or other neoplasm until proven otherwise. Xr Chest Port    Result Date: 10/12/2017  Impression:  Asymmetrically increased opacity in the right lung base raises suspicion for pneumonia. Cultures: All Micro Results     None          Assessment & Plan:     1.  Acute CHF, unspecified type  -ECHO showing EF 35%  -HgA1c is 8  -cardiology following, appreciate help  - Cath this AM showed obstrucive right coronary artery and EF 20%-25%  - Cardiology to order Life Vest  - 24 hours of hydration      2. KALYAN  - Improved  - likley from lasix - restarted?  - IVFs stopped this AM - ?restart for post-cath    3. Right Renal Mass  - To follow up with urology in a few weeks for radical nephrectomy      4. HTN  -continue on cardiac montitoring  -have started ACE-I and BB  -will titrate as needed      5. HLD  -atorvastatin 40mg daily  -baseline CMP      6. DM2  -continue on insulin therapy  -Lantus 15U and Humalog 5U TID      7.  Smoking  -has stopped over the past 2 weeks      8. GI/DVT  -Hep sub q  -Cardiac diet    FOOD SVCS COMMENTS  DIET NPO EXCEPT MEDS    Signed By: Shreya Vela, DO     October 14, 2017

## 2017-10-14 NOTE — PROCEDURES
Brief Cardiac Procedure Note    Patient: Marni Padron MRN: [id number]  SSN: [id number]    YOB: 1950  Age: 75 y.o. Sex: male      Date of Procedure: 10/14/2017     Pre-procedure Diagnosis: Congestive Heart Failure    Post-procedure Diagnosis: Congestive Heart Failure    Procedure: Left Heart Catheterization    Brief Description of Procedure: As above    Performed By: Alisia Lopez MD     Assistants: None    Anesthesia: Moderate Sedation    Estimated Blood Loss: Less than 10 mL      Specimens: None    Implants: None    Findings: Occluded RCA. Non-obstructive disease in other segments. EF 20-25%. Complications: None    Recommendations: Continue medical therapy.     Signed By: Alisia Lopez MD     October 14, 2017

## 2017-10-14 NOTE — PROGRESS NOTES
[facility] CARDIOLOGY PROGRESS NOTE           10/14/2017 7:42 AM    Admit Date: 10/12/2017      Subjective:   Patient denies any active chest pain or dyspnea. Lying flat in bed. Renal function improved with gentle hydration. ROS:  Cardiovascular:  As noted above    Objective:      Vitals:    10/14/17 0114 10/14/17 0513 10/14/17 0526 10/14/17 0712   BP: 123/79 149/87  140/88   Pulse: 79 83  82   Resp: 16 18  16   Temp: 98.2 °F (36.8 °C) 98.3 °F (36.8 °C)  98.5 °F (36.9 °C)   SpO2: 98% 97%  94%   Weight:   57.8 kg (127 lb 6.4 oz)    Height:           Physical Exam:  General-No Acute Distress  Neck- supple, no JVD  CV- regular rate and rhythm no MRG  Lung- clear bilaterally  Abd- soft, nontender, nondistended  Ext- no edema bilaterally. Skin- warm and dry      Data Review:   Recent Labs      10/14/17   0448  10/13/17   0643  10/12/17   1125  10/12/17   0125   NA  142  142  140   --    K  3.7  3.6  3.7   --    MG  2.2   --    --   2.2   BUN  27*  30*  23   --    CREA  1.51*  1.72*  1.55*   --    GLU  110*  122*  217*   --    WBC  5.2  5.0   --    --    HGB  13.4*  13.5*   --    --    HCT  37.6*  38.3*   --    --    PLT  233  233   --    --    INR   --    --    --   1.0   TRIGL   --    --   108   --    HDL   --    --   64*   --       No results found for: TROIQ, JF, TROPT, TNIPOC    Assessment/Plan:     Principal Problem:    Systolic CHF, acute ([facility] [address] (10/13/2017)    Stable after diuresis. Needs [facility] to evaluate for ischemic etiology. Will proceed today. ON Toprol. Holding ACE-I at present with renal insufficiency. Active Problems:    Diabetes mellitus ([facility] [address] (7/22/2010)    Defer management to primary team.          HTN (hypertension) (7/22/2010)    BP mildly elevated. Titrate in ACE-I post cath. Renal mass (10/13/2017)    Urology plans nephrectomy once cardiac status stable.              Isaak Hayden MD  10/14/2017 7:42 AM

## 2017-10-14 NOTE — PROGRESS NOTES
Feels well  AFVSS  Alert and oriented  Abdomen-flat  Cath results noted  Imp: CAD-but not severe--no plans for stenting or CABG--poor EF          R Heterogeneous Renal Mass--most likely renal cell carcinoma based on CT    Plan: May go home from  standpoint--will arrange R Hand Assisted Laparoscopic Radical Nephrectomy in a few weeks

## 2017-10-14 NOTE — PROGRESS NOTES
Hourly rounds performed this shift; all pt needs met. Pt slept throughout night with no complaints; denied any pain. Bedside shift report given to Vijay Martinez RN.

## 2017-10-14 NOTE — PROGRESS NOTES
R band removed from Right wrist  Sterile gauze and tegaderm placed over site  No bleeding or hematoma

## 2017-10-14 NOTE — PROGRESS NOTES
Dr. Sejal Alex paged at this time regarding pt /110. Notified at [address]; order for 10mg IV Hydralazine Q6 PRN sys>180 connell>100. Will continue to monitor.

## 2017-10-15 VITALS
HEIGHT: 63 IN | BODY MASS INDEX: 22.27 KG/M2 | WEIGHT: 125.66 LBS | DIASTOLIC BLOOD PRESSURE: 90 MMHG | OXYGEN SATURATION: 95 % | TEMPERATURE: 98.4 F | RESPIRATION RATE: 16 BRPM | HEART RATE: 97 BPM | SYSTOLIC BLOOD PRESSURE: 179 MMHG

## 2017-10-15 LAB
ANION GAP SERPL CALC-SCNC: 11 MMOL/L (ref 7–16)
BUN SERPL-MCNC: 26 MG/DL (ref 8–23)
CALCIUM SERPL-MCNC: 8.7 MG/DL (ref 8.3–10.4)
CHLORIDE SERPL-SCNC: 109 MMOL/L (ref 98–107)
CO2 SERPL-SCNC: 24 MMOL/L (ref 21–32)
CREAT SERPL-MCNC: 1.48 MG/DL (ref 0.8–1.5)
ERYTHROCYTE [DISTWIDTH] IN BLOOD BY AUTOMATED COUNT: 13.9 % (ref 11.9–14.6)
GLUCOSE BLD STRIP.AUTO-MCNC: 133 MG/DL (ref 65–100)
GLUCOSE BLD STRIP.AUTO-MCNC: 186 MG/DL (ref 65–100)
GLUCOSE SERPL-MCNC: 138 MG/DL (ref 65–100)
HCT VFR BLD AUTO: 40.1 % (ref 41.1–50.3)
HGB BLD-MCNC: 14 G/DL (ref 13.6–17.2)
MCH RBC QN AUTO: 27 PG (ref 26.1–32.9)
MCHC RBC AUTO-ENTMCNC: 34.9 G/DL (ref 31.4–35)
MCV RBC AUTO: 77.4 FL (ref 79.6–97.8)
PLATELET # BLD AUTO: 254 K/UL (ref 150–450)
PMV BLD AUTO: 10.3 FL (ref 10.8–14.1)
POTASSIUM SERPL-SCNC: 3.9 MMOL/L (ref 3.5–5.1)
RBC # BLD AUTO: 5.18 M/UL (ref 4.23–5.67)
SODIUM SERPL-SCNC: 144 MMOL/L (ref 136–145)
WBC # BLD AUTO: 6 K/UL (ref 4.3–11.1)

## 2017-10-15 PROCEDURE — 80048 BASIC METABOLIC PNL TOTAL CA: CPT | Performed by: INTERNAL MEDICINE

## 2017-10-15 PROCEDURE — 36415 COLL VENOUS BLD VENIPUNCTURE: CPT | Performed by: INTERNAL MEDICINE

## 2017-10-15 PROCEDURE — 85027 COMPLETE CBC AUTOMATED: CPT | Performed by: INTERNAL MEDICINE

## 2017-10-15 PROCEDURE — 74011250637 HC RX REV CODE- 250/637: Performed by: INTERNAL MEDICINE

## 2017-10-15 PROCEDURE — 74011250636 HC RX REV CODE- 250/636: Performed by: INTERNAL MEDICINE

## 2017-10-15 PROCEDURE — 82962 GLUCOSE BLOOD TEST: CPT

## 2017-10-15 PROCEDURE — 74011636637 HC RX REV CODE- 636/637: Performed by: INTERNAL MEDICINE

## 2017-10-15 RX ORDER — ATORVASTATIN CALCIUM 40 MG/1
40 TABLET, FILM COATED ORAL DAILY
Qty: 30 TAB | Refills: 1 | Status: SHIPPED | OUTPATIENT
Start: 2017-10-15 | End: 2017-11-29 | Stop reason: CLARIF

## 2017-10-15 RX ORDER — CARVEDILOL 12.5 MG/1
12.5 TABLET ORAL 2 TIMES DAILY WITH MEALS
Qty: 60 TAB | Refills: 2 | Status: SHIPPED | OUTPATIENT
Start: 2017-10-15 | End: 2017-11-27 | Stop reason: SDUPTHER

## 2017-10-15 RX ORDER — CARVEDILOL 12.5 MG/1
12.5 TABLET ORAL 2 TIMES DAILY WITH MEALS
Status: DISCONTINUED | OUTPATIENT
Start: 2017-10-15 | End: 2017-10-15 | Stop reason: HOSPADM

## 2017-10-15 RX ORDER — CLOPIDOGREL BISULFATE 75 MG/1
75 TABLET ORAL DAILY
Qty: 30 TAB | Refills: 2 | Status: SHIPPED | OUTPATIENT
Start: 2017-10-15 | End: 2017-11-27 | Stop reason: SDUPTHER

## 2017-10-15 RX ORDER — LISINOPRIL 10 MG/1
10 TABLET ORAL DAILY
Qty: 30 TAB | Refills: 2 | Status: SHIPPED | OUTPATIENT
Start: 2017-10-15 | End: 2017-11-27 | Stop reason: SDUPTHER

## 2017-10-15 RX ORDER — LISINOPRIL 5 MG/1
10 TABLET ORAL DAILY
Status: DISCONTINUED | OUTPATIENT
Start: 2017-10-15 | End: 2017-10-15 | Stop reason: HOSPADM

## 2017-10-15 RX ORDER — FUROSEMIDE 40 MG/1
40 TABLET ORAL DAILY
Qty: 30 TAB | Refills: 2 | Status: SHIPPED | OUTPATIENT
Start: 2017-10-15 | End: 2017-11-27 | Stop reason: SDUPTHER

## 2017-10-15 RX ORDER — CLOPIDOGREL BISULFATE 75 MG/1
75 TABLET ORAL DAILY
Status: DISCONTINUED | OUTPATIENT
Start: 2017-10-15 | End: 2017-10-15 | Stop reason: HOSPADM

## 2017-10-15 RX ADMIN — INSULIN LISPRO 5 UNITS: 100 INJECTION, SOLUTION INTRAVENOUS; SUBCUTANEOUS at 12:09

## 2017-10-15 RX ADMIN — LISINOPRIL 10 MG: 5 TABLET ORAL at 12:05

## 2017-10-15 RX ADMIN — SODIUM CHLORIDE 75 ML/HR: 900 INJECTION, SOLUTION INTRAVENOUS at 05:50

## 2017-10-15 RX ADMIN — CARVEDILOL 6.25 MG: 6.25 TABLET, FILM COATED ORAL at 08:15

## 2017-10-15 RX ADMIN — HEPARIN SODIUM 5000 UNITS: 5000 INJECTION, SOLUTION INTRAVENOUS; SUBCUTANEOUS at 08:15

## 2017-10-15 RX ADMIN — CLOPIDOGREL BISULFATE 75 MG: 75 TABLET ORAL at 12:05

## 2017-10-15 RX ADMIN — ONDANSETRON 4 MG: 2 INJECTION INTRAMUSCULAR; INTRAVENOUS at 08:19

## 2017-10-15 RX ADMIN — HEPARIN SODIUM 5000 UNITS: 5000 INJECTION, SOLUTION INTRAVENOUS; SUBCUTANEOUS at 01:45

## 2017-10-15 RX ADMIN — Medication 10 ML: at 01:46

## 2017-10-15 RX ADMIN — INSULIN GLARGINE 15 UNITS: 100 INJECTION, SOLUTION SUBCUTANEOUS at 08:16

## 2017-10-15 RX ADMIN — Medication 10 ML: at 05:50

## 2017-10-15 RX ADMIN — ATORVASTATIN CALCIUM 40 MG: 40 TABLET, FILM COATED ORAL at 08:15

## 2017-10-15 RX ADMIN — FUROSEMIDE 40 MG: 40 TABLET ORAL at 08:15

## 2017-10-15 NOTE — DISCHARGE INSTRUCTIONS
Left Heart Catheterization: About This Test  What is it? Cardiac catheterization is a test to check the left side of your heart. Your doctor might look at the shape of your heart, the motion of your heart, or the blood pressure inside the chambers. Why is this test done? This test gives information about how your heart is working. It can:  · Check blood flow and blood pressure in the chambers of the heart. · Check the pumping action of the heart. · Find out if a heart defect is present and how severe it is. · Find out how well the heart valves work. What happens during the test?  · You will get medicine to help you relax. · A thin tube called a catheter is put into a blood vessel in the groin or the arm. The doctor moves the catheter through the blood vessel into your heart. · You will get a shot to numb the skin where the catheter goes in. You may feel pressure when the doctor moves the catheter through your blood vessel into your heart. · Dye may be injected into your heart. Your doctor can watch on special monitors as the dye moves in your heart. The dye helps your doctor see blood flow in your heart. · You may feel hot or flushed for several seconds when the dye is put in.  · If a heart defect is found, cardiac catheterization sometimes is used to correct it during the test.  How long does it take? · The test will take about 30 minutes. If a problem is found and the doctor treats it, it can take a few hours longer. What happens after the test?  · You will stay in a room for at least a few hours to make sure the catheter site starts to heal. You may have a bandage or a compression device on your groin or arm to prevent bleeding. · If the catheter was placed in your groin, you may lie in bed for a few hours. If the catheter was put in your arm, you will need to keep your arm still for at least 1 hour. · You may or may not need to stay in the hospital overnight.  You will get more instructions for what to do at home. · Drink plenty of fluids for several hours after the test.  Follow-up care is a key part of your treatment and safety. Be sure to make and go to all appointments, and call your doctor if you are having problems. It's also a good idea to know your test results and keep a list of the medicines you take. Where can you learn more? Go to http://citlaly-john.info/. Enter W306 in the search box to learn more about \"Left Heart Catheterization: About This Test.\"  Current as of: January 12, 2017  Content Version: 11.3  © 8362-6765 [employer]. Care instructions adapted under license by [employer] (which disclaims liability or warranty for this information). If you have questions about a medical condition or this instruction, always ask your healthcare professional. [address] any warranty or liability for your use of this information. DISCHARGE SUMMARY from Nurse    The following personal items are in your possession at time of discharge:    Dental Appliances: Uppers, Lowers  Visual Aid: Glasses, With patient           Clothing: Shirt, Pants, Undergarments, Footwear  Other Valuables: Cell Phone             PATIENT INSTRUCTIONS:    After general anesthesia or intravenous sedation, for 24 hours or while taking prescription Narcotics:  · Limit your activities  · Do not drive and operate hazardous machinery  · Do not make important personal or business decisions  · Do  not drink alcoholic beverages  · If you have not urinated within 8 hours after discharge, please contact your surgeon on call.     Report the following to your surgeon:  · Excessive pain, swelling, redness or odor of or around the surgical area  · Temperature over 100.5  · Nausea and vomiting lasting longer than 4 hours or if unable to take medications  · Any signs of decreased circulation or nerve impairment to extremity: change in color, persistent  numbness, tingling, coldness or increase pain  · Any questions        What to do at Home:  Recommended activity: No lifting, or Strenuous exercise until after your follow up appointment    If you experience any of the following symptoms fever above 100.5, excessive pain, nausea, vomiting or diarrhea, chest pain please follow up with cardiology or the ED. *  Please give a list of your current medications to your Primary Care Provider. *  Please update this list whenever your medications are discontinued, doses are      changed, or new medications (including over-the-counter products) are added. *  Please carry medication information at all times in case of emergency situations. These are general instructions for a healthy lifestyle:    No smoking/ No tobacco products/ Avoid exposure to second hand smoke    Surgeon General's Warning:  Quitting smoking now greatly reduces serious risk to your health. Obesity, smoking, and sedentary lifestyle greatly increases your risk for illness    A healthy diet, regular physical exercise & weight monitoring are important for maintaining a healthy lifestyle    You may be retaining fluid if you have a history of heart failure or if you experience any of the following symptoms:  Weight gain of 3 pounds or more overnight or 5 pounds in a week, increased swelling in our hands or feet or shortness of breath while lying flat in bed. Please call your doctor as soon as you notice any of these symptoms; do not wait until your next office visit. Recognize signs and symptoms of STROKE:    F-face looks uneven    A-arms unable to move or move unevenly    S-speech slurred or non-existent    T-time-call 911 as soon as signs and symptoms begin-DO NOT go       Back to bed or wait to see if you get better-TIME IS BRAIN. Warning Signs of HEART ATTACK     Call 911 if you have these symptoms:   Chest discomfort.  Most heart attacks involve discomfort in the center of the chest that lasts more than a few minutes, or that goes away and comes back. It can feel like uncomfortable pressure, squeezing, fullness, or pain.  Discomfort in other areas of the upper body. Symptoms can include pain or discomfort in one or both arms, the back, neck, jaw, or stomach.  Shortness of breath with or without chest discomfort.  Other signs may include breaking out in a cold sweat, nausea, or lightheadedness. Don't wait more than five minutes to call 911 - MINUTES MATTER! Fast action can save your life. Calling 911 is almost always the fastest way to get lifesaving treatment. Emergency Medical Services staff can begin treatment when they arrive -- up to an hour sooner than if someone gets to the hospital by car. The discharge information has been reviewed with the patient. The patient verbalized understanding. Discharge medications reviewed with the patient and appropriate educational materials and side effects teaching were provided. Heart Failure: Care Instructions  Your Care Instructions    Heart failure occurs when your heart does not pump as much blood as the body needs. Failure does not mean that the heart has stopped pumping but rather that it is not pumping as well as it should. Over time, this causes fluid buildup in your lungs and other parts of your body. Fluid buildup can cause shortness of breath, fatigue, swollen ankles, and other problems. By taking medicines regularly, reducing sodium (salt) in your diet, checking your weight every day, and making lifestyle changes, you can feel better and live longer. Follow-up care is a key part of your treatment and safety. Be sure to make and go to all appointments, and call your doctor if you are having problems. It's also a good idea to know your test results and keep a list of the medicines you take. How can you care for yourself at home? Medicines  · Be safe with medicines. Take your medicines exactly as prescribed.  Call your doctor if you think you are having a problem with your medicine. · Do not take any vitamins, over-the-counter medicine, or herbal products without talking to your doctor first. Abelardo Mcnamara not take ibuprofen (Advil or Motrin) and naproxen (Aleve) without talking to your doctor first. They could make your heart failure worse. · You may be taking some of the following medicine. ¨ Beta-blockers can slow heart rate, decrease blood pressure, and improve your condition. Taking a beta-blocker may lower your chance of needing to be hospitalized. ¨ Angiotensin-converting enzyme inhibitors (ACEIs) reduce the heart's workload, lower blood pressure, and reduce swelling. Taking an ACEI may lower your chance of needing to be hospitalized again. ¨ Angiotensin II receptor blockers (ARBs) work like ACEIs. Your doctor may prescribe them instead of ACEIs. ¨ Diuretics, also called water pills, reduce swelling. ¨ Potassium supplements replace this important mineral, which is sometimes lost with diuretics. ¨ Aspirin and other blood thinners prevent blood clots, which can cause a stroke or heart attack. You will get more details on the specific medicines your doctor prescribes. Diet  · Your doctor may suggest that you limit sodium to 2,000 milligrams (mg) a day or less. That is less than 1 teaspoon of salt a day, including all the salt you eat in cooking or in packaged foods. People get most of their sodium from processed foods. Fast food and restaurant meals also tend to be very high in sodium. · Ask your doctor how much liquid you can drink each day. You may have to limit liquids. Weight  · Weigh yourself without clothing at the same time each day. Record your weight. Call your doctor if you have a sudden weight gain, such as more than 2 to 3 pounds in a day or 5 pounds in a week. (Your doctor may suggest a different range of weight gain.) A sudden weight gain may mean that your heart failure is getting worse.   Activity level  · Start light exercise (if your doctor says it is okay). Even if you can only do a small amount, exercise will help you get stronger, have more energy, and manage your weight and your stress. Walking is an easy way to get exercise. Start out by walking a little more than you did before. Bit by bit, increase the amount you walk. · When you exercise, watch for signs that your heart is working too hard. You are pushing yourself too hard if you cannot talk while you are exercising. If you become short of breath or dizzy or have chest pain, stop, sit down, and rest.  · If you feel \"wiped out\" the day after you exercise, walk slower or for a shorter distance until you can work up to a better pace. · Get enough rest at night. Sleeping with 1 or 2 pillows under your upper body and head may help you breathe easier. Lifestyle changes  · Do not smoke. Smoking can make a heart condition worse. If you need help quitting, talk to your doctor about stop-smoking programs and medicines. These can increase your chances of quitting for good. Quitting smoking may be the most important step you can take to protect your heart. · Limit alcohol to 2 drinks a day for men and 1 drink a day for women. Too much alcohol can cause health problems. · Avoid getting sick from colds and the flu. Get a pneumococcal vaccine shot. If you have had one before, ask your doctor whether you need another dose. Get a flu shot each year. If you must be around people with colds or the flu, wash your hands often. When should you call for help? Call 911 if you have symptoms of sudden heart failure such as:  · You have severe trouble breathing. · You cough up pink, foamy mucus. · You have a new irregular or rapid heartbeat. Call your doctor now or seek immediate medical care if:  · You have new or increased shortness of breath. · You are dizzy or lightheaded, or you feel like you may faint.   · You have sudden weight gain, such as more than 2 to 3 pounds in a day or 5 pounds in a week. (Your doctor may suggest a different range of weight gain.)  · You have increased swelling in your legs, ankles, or feet. · You are suddenly so tired or weak that you cannot do your usual activities. Watch closely for changes in your health, and be sure to contact your doctor if:  · You develop new symptoms. Where can you learn more? Go to http://citlaly-john.info/. Enter V465 in the search box to learn more about \"Heart Failure: Care Instructions. \"  Current as of: April 3, 2017  Content Version: 11.3  © 7832-6772 [employer]. Care instructions adapted under license by [employer] (which disclaims liability or warranty for this information). If you have questions about a medical condition or this instruction, always ask your healthcare professional. [address] any warranty or liability for your use of this information.

## 2017-10-15 NOTE — PROGRESS NOTES
Hourly rounds performed; all pt needs met this shift. C/o nausea since ASA administration for heart cath yesterday; refuses zofran/ antiemetics. Bed L/L, SR up x2, call light/personal items within reach. Bedside shift report given to Tai Jones RN.

## 2017-10-15 NOTE — DISCHARGE SUMMARY
Hospitalist Discharge Summary     Patient ID:  Shawanda Tanner  [id number]  75 y.o.  1950  Admit date: 10/12/2017 12:09 AM  Discharge date and time: 10/15/2017  Attending: Netta Diego MD  PCP:  Jed Vela MD  Treatment Team: Attending Provider: Netta Diego MD; Consulting Provider: Jaimie Blevins MD; Care Manager: Elsie Macdonald; Consulting Provider: Nupur Levine MD; Utilization Review: Enrico Subramanian    Principal Diagnosis Systolic CHF, acute [facility])   Principal Problem:    Systolic CHF, acute ([facility] (10/13/2017)    Active Problems:    [facility] (7/22/2010)      HTN (hypertension) (7/22/2010)      Hypertriglyceridemia (7/23/2010)      Non compliance with medical treatment (10/12/2017)      Former tobacco use (10/12/2017)      Overview: Quit 2 weeks ago      Renal mass (10/13/2017)      Overview: Right         From H&P: \"67 y. o. male with PMH of HTN, DM (uncontrolled), HLD, who quit smoking 2 weeks ago when he noticed that he was quite short of breath. He States that over the past two weeks he has been getting porgressively more short of breath. Worse when lying down. He sleeps in a chair now. He states that he has \"good and bad\" days. Last night he was unable to catch his breath and came to the ED. He has been non compliant in his medical care, has not taken any medications in over a year, he has not seen a doctor in almost 2 years. His wife is at the bedside with him. In the ED he was found to have slightly elevated troponin levels and a BTNP of 600. Patient had a positive D dimer, and CT chest was done, which showed emphasematous changes and bilateral pleural effusions. Of note there was also a 5.1 cm mass in the right kidney that is concerning for malignancy. Dr. Irish Brothers was called by ED physician who requested hospitalist to admit for CHF exacerbation. Patient was given lasix in the ED, and given IVF, he states he feels better already. [facility] Course: The patient was admitted and started on IV Lasix. His dyspnea quickly improved. Urology was consulted for his kidney mass. They will do a radical nephrectomy as an out patient. The patient went for an LHC which showed an occluded RCA so he was started on Plavix. His BP meds were adjusted by cardiology. He felt better and was discharged home. Significant Diagnostic Studies:       Labs: Results:       Chemistry Recent Labs      10/15/17   0439  10/14/17   0448  10/13/17   0643   GLU  138*  110*  122*   NA  144  142  142   K  3.9  3.7  3.6   CL  109*  108*  107   CO2  24  27  25   BUN  26*  27*  30*   CREA  1.48  1.51*  1.72*   CA  8.7  8.5  8.1*   AGAP  11  7  10      CBC w/Diff Recent Labs      10/15/17   0439  10/14/17   0448  10/13/17   0643   WBC  6.0  5.2  5.0   RBC  5.18  4.88  4.97   HGB  14.0  13.4*  13.5*   HCT  40.1*  37.6*  38.3*   PLT  254  233  233      Cardiac Enzymes No results for input(s): CPK, CKND1, CATALINO in the last 72 hours. No lab exists for component: CKRMB, TROIP   Coagulation No results for input(s): PTP, INR, APTT in the last 72 hours. No lab exists for component: INREXT    Lipid Panel Lab Results   Component Value Date/Time    Cholesterol, total 304 10/12/2017 11:25 AM    HDL Cholesterol 64 10/12/2017 11:25 AM    LDL, calculated 218.4 10/12/2017 11:25 AM    VLDL, calculated 21.6 10/12/2017 11:25 AM    Triglyceride 108 10/12/2017 11:25 AM    CHOL/HDL Ratio 4.8 10/12/2017 11:25 AM      BNP No results for input(s): BNPP in the last 72 hours. Liver Enzymes No results for input(s): TP, ALB, TBIL, AP, SGOT, GPT in the last 72 hours.     No lab exists for component: DBIL   Thyroid Studies Lab Results   Component Value Date/Time    TSH 7.110 10/12/2017 11:25 AM            Discharge Exam:  Visit Vitals    /90 (BP 1 Location: Left arm, BP Patient Position: Sitting)    Pulse 97    Temp 98.4 °F (36.9 °C)    Resp 16    Ht 5' 3\" (1.6 m)    Wt 57 kg (125 lb 10.6 oz)    SpO2 95%    BMI 22.26 kg/m2     General appearance: alert, cooperative, no distress, appears stated age  Lungs: clear to auscultation bilaterally  Heart: regular rate and rhythm, S1, S2 normal, no murmur, click, rub or gallop  Abdomen: soft, non-tender. Bowel sounds normal. No masses,  no organomegaly  Extremities: no cyanosis or edema  Neurologic: Grossly normal    Disposition: home  Discharge Condition: stable  Patient Instructions:   Current Discharge Medication List      START taking these medications    Details   atorvastatin (LIPITOR) 40 mg tablet Take 1 Tab by mouth daily. Qty: 30 Tab, Refills: 1      carvedilol (COREG) 12.5 mg tablet Take 1 Tab by mouth two (2) times daily (with meals). Qty: 60 Tab, Refills: 2      clopidogrel (PLAVIX) 75 mg tab Take 1 Tab by mouth daily. Qty: 30 Tab, Refills: 2      furosemide (LASIX) 40 mg tablet Take 1 Tab by mouth daily. Qty: 30 Tab, Refills: 2      lisinopril (PRINIVIL, ZESTRIL) 10 mg tablet Take 1 Tab by mouth daily. Qty: 30 Tab, Refills: 2         CONTINUE these medications which have NOT CHANGED    Details   glipiZIDE SR (GLUCOTROL) 5 mg CR tablet Take 1 Tab by mouth daily (before breakfast). Qty: 30 Tab, Refills: 0      insulin NPH (NOVOLIN) 100 unit/mL injection 10 Units by SubCUTAneous route two (2) times a day (before meals). Qty: 1 Vial, Refills: 0      metformin (GLUCOPHAGE) 500 mg tablet Take 1 Tab by mouth two (2) times daily (with meals). Qty: 60 Tab, Refills: 0      Blood-Glucose Meter monitoring kit by Does Not Apply route. Check blood sugars at meals and bedtime. Keep a record of your blood sugars and bring to your doctor.   Qty: 1 Kit, Refills: 0             Activity: Activity as tolerated  Diet: Cardiac Diet  Wound Care: None needed    Follow-up  - Dr. Chiki Méndez in one week  - [facility] in 3-4 weeks    Time spent to discharge patient 35 minutes  Signed:  Leslee Hadley DO  10/15/2017  11:56 AM

## 2017-10-15 NOTE — PROGRESS NOTES
DC instructions and Rx given and reviewed with pt. Opportunities for questions and clarification provided, verbalized understanding. Signed copy of AVS placed in chart. Pt refuses flu shot. Pt to DC home with family, waiting on ride at this time.

## 2017-10-15 NOTE — PROGRESS NOTES
[facility] CARDIOLOGY PROGRESS NOTE           10/15/2017 7:42 AM    Admit Date: 10/12/2017      Subjective:   Patient denies any active chest pain or dyspnea. Feels nauseated from ASA therapy given yesterday. Hypertensive. ROS:  Cardiovascular:  As noted above    Objective:      Vitals:    10/14/17 2318 10/15/17 0453 10/15/17 0554 10/15/17 0731   BP: 176/87 (!) 179/94  (!) 185/95   Pulse: (!) 102 (!) 103  (!) 101   Resp: 16 18 14   Temp: 98.3 °F (36.8 °C) 98.1 °F (36.7 °C)  98.9 °F (37.2 °C)   SpO2: 94% 92%  98%   Weight:   57 kg (125 lb 10.6 oz)    Height:           Physical Exam:  General-No Acute Distress  Neck- supple, no JVD  CV- regular rate and rhythm no MRG  Lung- clear bilaterally  Abd- soft, nontender, nondistended  Ext- no edema bilaterally. Skin- warm and dry      Data Review:   Recent Labs      10/15/17   0439  10/14/17   0448   10/12/17   1125   NA  144  142   < >  140   K  3.9  3.7   < >  3.7   MG   --   2.2   --    --    BUN  26*  27*   < >  23   CREA  1.48  1.51*   < >  1.55*   GLU  138*  110*   < >  217*   WBC  6.0  5.2   < >   --    HGB  14.0  13.4*   < >   --    HCT  40.1*  37.6*   < >   --    PLT  254  233   < >   --    TRIGL   --    --    --   108   HDL   --    --    --   64*    < > = values in this interval not displayed. No results found for: Jack Newell [facility] [address]    Assessment/Plan:     Principal Problem:    [facility] (10/13/2017)    Stable after diuresis. On coreg. Will increase dose. Add ACE-I. Will arrange follow up in 1 week and BMP prior through my office. Active Problems:    [facility] (7/22/2010)    Defer management to primary team.          HTN (hypertension) (7/22/2010)    BP  elevated. Increase coreg and add lisinopril. CAD  Occluded RCA. On Lipitor. Intolerant to ASA. Start Plavix 75 mg a day        Renal mass (10/13/2017)    Urology plans nephrectomy once cardiac status stable.      Disp:  OK for discharge. Will arrange follow up and have office contact him on Monday.             Valeria Langston MD  10/15/2017 7:42 AM

## 2017-10-16 ENCOUNTER — HOME CARE VISIT (OUTPATIENT)
Dept: SCHEDULING | Facility: HOME HEALTH | Age: 67
End: 2017-10-16
Payer: MEDICARE

## 2017-10-16 VITALS
SYSTOLIC BLOOD PRESSURE: 178 MMHG | DIASTOLIC BLOOD PRESSURE: 100 MMHG | TEMPERATURE: 97.2 F | RESPIRATION RATE: 18 BRPM | HEART RATE: 93 BPM | OXYGEN SATURATION: 96 %

## 2017-10-16 PROCEDURE — G0299 HHS/HOSPICE OF RN EA 15 MIN: HCPCS

## 2017-10-16 PROCEDURE — 3331090002 HH PPS REVENUE DEBIT

## 2017-10-16 PROCEDURE — 3331090001 HH PPS REVENUE CREDIT

## 2017-10-16 PROCEDURE — 400013 HH SOC

## 2017-10-16 NOTE — ROUTINE PROCESS
Late entry: CHF teaching completed, verbalize emphasis on monitoring self and report to MD:   If you gain 2 lbs in one day or 5 lbs in a week, and short of breath.  If you can not lay flat without developing short of breath or rapid breathing at night; or if it wakes you up. Develop a cough or wheezing.  If you notice swollen hands/feet/ankles or stomach with a bloated/ full feeling.  If you become confused or mentally fuzzy or dizzy.  If you notice a rapid or change in your heart rate.  If you become more exhausted all the time and unable to do the same level of activity without stopping to catch your breath. Drink no more than 8 cups a day in 8 oz. cups. Limit Cola Drinks. Your Heart can not handle any more. Stay away from salt (limit anything with salt or sodium in it). Limit to 250mg per serving. Exercise needs to be started with your Doctors approval.  Reduce stress, call your Doctor or myself if concerned  Pass post test via teach back, will make self available post DC ,if an questions arise. Diabetic teaching completed.  Planner/scale @ BS:  60 mins total

## 2017-10-17 ENCOUNTER — TELEPHONE (OUTPATIENT)
Dept: HOME HEALTH SERVICES | Facility: HOME HEALTH | Age: 67
End: 2017-10-17

## 2017-10-17 PROCEDURE — 3331090001 HH PPS REVENUE CREDIT

## 2017-10-17 PROCEDURE — 3331090002 HH PPS REVENUE DEBIT

## 2017-10-17 NOTE — TELEPHONE ENCOUNTER
Mr. Adán Hong called me back. He had been in the shower and heard his life vest beep and the phone ring at the same time. I explained my part of the [facility] team.  I reviewed his discharge medications. His sons make sure he takes his medication. He does not have any of his diabetic meds. I told him he needed to call his PCP today to see what he should still be taking. He said he would call as soon as we hung up. I gave him my cell phone number and asked him to call me any time with med questions or issues. He said I could call back any time.   10/17/17

## 2017-10-17 NOTE — TELEPHONE ENCOUNTER
[address] [place] [address] [facility] Pharmacist consult. Mr. Karo Okeefe was discharged prior to my consult. I have called and gotten no answer. No voice mail answered, so I was unable to leave a message. I will continue to try to reach.   10/17/17 [id number]

## 2017-10-18 PROCEDURE — 3331090001 HH PPS REVENUE CREDIT

## 2017-10-18 PROCEDURE — 3331090002 HH PPS REVENUE DEBIT

## 2017-10-18 NOTE — TELEPHONE ENCOUNTER
I called to be sure the issue about his diabetic medications was resolved. I spoke with his son. He said he had call into MD office and was waiting on them to call back. He assured me he would get this resolved as he understands the importance of his father getting the correct diabetic medications.

## 2017-10-19 ENCOUNTER — HOME CARE VISIT (OUTPATIENT)
Dept: SCHEDULING | Facility: HOME HEALTH | Age: 67
End: 2017-10-19
Payer: MEDICARE

## 2017-10-19 ENCOUNTER — HOSPITAL ENCOUNTER (OUTPATIENT)
Dept: LAB | Age: 67
Discharge: HOME | End: 2017-10-19
Payer: MEDICARE

## 2017-10-19 VITALS
RESPIRATION RATE: 18 BRPM | TEMPERATURE: 98.5 F | SYSTOLIC BLOOD PRESSURE: 137 MMHG | OXYGEN SATURATION: 98 % | HEART RATE: 87 BPM | DIASTOLIC BLOOD PRESSURE: 82 MMHG

## 2017-10-19 DIAGNOSIS — I50.21 SYSTOLIC CHF, ACUTE (HCC): ICD-10-CM

## 2017-10-19 LAB
ANION GAP SERPL CALC-SCNC: 7 MMOL/L
BUN SERPL-MCNC: 33 MG/DL (ref 8–23)
CALCIUM SERPL-MCNC: 9.3 MG/DL (ref 8.3–10.4)
CHLORIDE SERPL-SCNC: 105 MMOL/L (ref 98–107)
CO2 SERPL-SCNC: 29 MMOL/L (ref 21–32)
CREAT SERPL-MCNC: 1.6 MG/DL (ref 0.8–1.5)
GLUCOSE SERPL-MCNC: 187 MG/DL (ref 65–100)
POTASSIUM SERPL-SCNC: 4.3 MMOL/L (ref 3.5–5.1)
SODIUM SERPL-SCNC: 141 MMOL/L (ref 136–145)

## 2017-10-19 PROCEDURE — 3331090001 HH PPS REVENUE CREDIT

## 2017-10-19 PROCEDURE — 36415 COLL VENOUS BLD VENIPUNCTURE: CPT | Performed by: INTERNAL MEDICINE

## 2017-10-19 PROCEDURE — G0299 HHS/HOSPICE OF RN EA 15 MIN: HCPCS

## 2017-10-19 PROCEDURE — 80048 BASIC METABOLIC PNL TOTAL CA: CPT | Performed by: INTERNAL MEDICINE

## 2017-10-19 PROCEDURE — 3331090002 HH PPS REVENUE DEBIT

## 2017-10-20 PROBLEM — N18.30 CKD (CHRONIC KIDNEY DISEASE) STAGE 3, GFR 30-59 ML/MIN (HCC): Status: ACTIVE | Noted: 2017-10-20

## 2017-10-20 PROCEDURE — 3331090001 HH PPS REVENUE CREDIT

## 2017-10-20 PROCEDURE — 3331090002 HH PPS REVENUE DEBIT

## 2017-10-21 PROCEDURE — 3331090002 HH PPS REVENUE DEBIT

## 2017-10-21 PROCEDURE — 3331090001 HH PPS REVENUE CREDIT

## 2017-10-22 PROCEDURE — 3331090002 HH PPS REVENUE DEBIT

## 2017-10-22 PROCEDURE — 3331090001 HH PPS REVENUE CREDIT

## 2017-10-23 PROCEDURE — 3331090001 HH PPS REVENUE CREDIT

## 2017-10-23 PROCEDURE — 3331090002 HH PPS REVENUE DEBIT

## 2017-10-24 ENCOUNTER — HOME CARE VISIT (OUTPATIENT)
Dept: SCHEDULING | Facility: HOME HEALTH | Age: 67
End: 2017-10-24
Payer: MEDICARE

## 2017-10-24 PROCEDURE — 3331090002 HH PPS REVENUE DEBIT

## 2017-10-24 PROCEDURE — 3331090001 HH PPS REVENUE CREDIT

## 2017-10-24 PROCEDURE — G0299 HHS/HOSPICE OF RN EA 15 MIN: HCPCS

## 2017-10-25 VITALS
OXYGEN SATURATION: 97 % | TEMPERATURE: 98 F | HEART RATE: 95 BPM | DIASTOLIC BLOOD PRESSURE: 70 MMHG | SYSTOLIC BLOOD PRESSURE: 103 MMHG | RESPIRATION RATE: 16 BRPM

## 2017-10-25 PROBLEM — I25.5 CARDIOMYOPATHY, ISCHEMIC: Status: ACTIVE | Noted: 2017-10-25

## 2017-10-25 PROBLEM — I25.10 CORONARY ARTERY DISEASE INVOLVING NATIVE CORONARY ARTERY OF NATIVE HEART WITHOUT ANGINA PECTORIS: Status: ACTIVE | Noted: 2017-10-25

## 2017-10-25 PROBLEM — Z95.820 S/P ANGIOPLASTY WITH STENT: Status: ACTIVE | Noted: 2017-10-25

## 2017-10-25 PROCEDURE — 3331090001 HH PPS REVENUE CREDIT

## 2017-10-25 PROCEDURE — 3331090002 HH PPS REVENUE DEBIT

## 2017-10-26 ENCOUNTER — TELEPHONE (OUTPATIENT)
Dept: HOME HEALTH SERVICES | Facility: HOME HEALTH | Age: 67
End: 2017-10-26

## 2017-10-26 PROBLEM — R00.8 GALLOP RHYTHM: Status: ACTIVE | Noted: 2017-10-26

## 2017-10-26 PROCEDURE — 3331090002 HH PPS REVENUE DEBIT

## 2017-10-26 PROCEDURE — 3331090001 HH PPS REVENUE CREDIT

## 2017-10-26 NOTE — TELEPHONE ENCOUNTER
Mr. Sisi Santana said he was doing really well. He had good visit with Dr. Rufino Farncois yesterday. He had lots of questions and MD answered them all. The Mary Grace Butcher is controlling his BS well. No medication changes at this time. He is having an x-ray and lab work done next week, then back to see results with Dr. Rufino Francois. He sounded very  and had no additional medication questions at this time. He is wearing his Life Vest and no chest pain. I asked him to call with any medication issues.

## 2017-10-27 ENCOUNTER — HOME CARE VISIT (OUTPATIENT)
Dept: SCHEDULING | Facility: HOME HEALTH | Age: 67
End: 2017-10-27
Payer: MEDICARE

## 2017-10-27 PROCEDURE — G0299 HHS/HOSPICE OF RN EA 15 MIN: HCPCS

## 2017-10-27 PROCEDURE — 3331090002 HH PPS REVENUE DEBIT

## 2017-10-27 PROCEDURE — 3331090001 HH PPS REVENUE CREDIT

## 2017-10-28 PROCEDURE — 3331090002 HH PPS REVENUE DEBIT

## 2017-10-28 PROCEDURE — 3331090001 HH PPS REVENUE CREDIT

## 2017-10-29 VITALS
TEMPERATURE: 97.9 F | OXYGEN SATURATION: 96 % | RESPIRATION RATE: 18 BRPM | BODY MASS INDEX: 22.05 KG/M2 | WEIGHT: 124.5 LBS | HEART RATE: 91 BPM | DIASTOLIC BLOOD PRESSURE: 60 MMHG | SYSTOLIC BLOOD PRESSURE: 107 MMHG

## 2017-10-29 PROCEDURE — 3331090001 HH PPS REVENUE CREDIT

## 2017-10-29 PROCEDURE — 3331090002 HH PPS REVENUE DEBIT

## 2017-10-30 PROCEDURE — 3331090002 HH PPS REVENUE DEBIT

## 2017-10-30 PROCEDURE — 3331090001 HH PPS REVENUE CREDIT

## 2017-10-31 ENCOUNTER — HOME CARE VISIT (OUTPATIENT)
Dept: SCHEDULING | Facility: HOME HEALTH | Age: 67
End: 2017-10-31
Payer: MEDICARE

## 2017-10-31 PROCEDURE — 3331090001 HH PPS REVENUE CREDIT

## 2017-10-31 PROCEDURE — 3331090002 HH PPS REVENUE DEBIT

## 2017-11-01 PROCEDURE — 3331090001 HH PPS REVENUE CREDIT

## 2017-11-01 PROCEDURE — 3331090002 HH PPS REVENUE DEBIT

## 2017-11-02 ENCOUNTER — TELEPHONE (OUTPATIENT)
Dept: HOME HEALTH SERVICES | Facility: HOME HEALTH | Age: 67
End: 2017-11-02

## 2017-11-02 PROCEDURE — 3331090001 HH PPS REVENUE CREDIT

## 2017-11-02 PROCEDURE — 3331090002 HH PPS REVENUE DEBIT

## 2017-11-02 NOTE — TELEPHONE ENCOUNTER
Mr. Poole Darius said he was doing really well. He saw MD on Tuesday and got a good report. I reviewed meds. He is well versed on them and when to take. He takes his vitals every morning and his BS is good. His kidney surgery has been scheduled for 12/17 for his renal mass. He sounded very  and is a pleasure to talk with.

## 2017-11-03 PROCEDURE — 3331090001 HH PPS REVENUE CREDIT

## 2017-11-03 PROCEDURE — 3331090002 HH PPS REVENUE DEBIT

## 2017-11-04 PROCEDURE — 3331090002 HH PPS REVENUE DEBIT

## 2017-11-04 PROCEDURE — 3331090001 HH PPS REVENUE CREDIT

## 2017-11-05 PROCEDURE — 3331090001 HH PPS REVENUE CREDIT

## 2017-11-05 PROCEDURE — 3331090002 HH PPS REVENUE DEBIT

## 2017-11-06 PROCEDURE — 3331090001 HH PPS REVENUE CREDIT

## 2017-11-06 PROCEDURE — 3331090002 HH PPS REVENUE DEBIT

## 2017-11-07 PROCEDURE — 3331090001 HH PPS REVENUE CREDIT

## 2017-11-07 PROCEDURE — 3331090002 HH PPS REVENUE DEBIT

## 2017-11-08 ENCOUNTER — HOME CARE VISIT (OUTPATIENT)
Dept: HOME HEALTH SERVICES | Facility: HOME HEALTH | Age: 67
End: 2017-11-08
Payer: MEDICARE

## 2017-11-08 PROCEDURE — 3331090001 HH PPS REVENUE CREDIT

## 2017-11-08 PROCEDURE — 3331090002 HH PPS REVENUE DEBIT

## 2017-11-09 ENCOUNTER — TELEPHONE (OUTPATIENT)
Dept: HOME HEALTH SERVICES | Facility: HOME HEALTH | Age: 67
End: 2017-11-09

## 2017-11-09 PROCEDURE — 3331090002 HH PPS REVENUE DEBIT

## 2017-11-09 PROCEDURE — 3331090001 HH PPS REVENUE CREDIT

## 2017-11-09 NOTE — TELEPHONE ENCOUNTER
Mr. Sally Dupree said he was doing very well. His surgery for his renal mass is scheduled for 12/7. He said he was to expect 2 weeks recovery time, then be good to go. No medication changes or issues. He has meds and will  a couple of refills tomorrow. He is taking per orders. He sounded very upbeat.

## 2017-11-10 PROCEDURE — 3331090001 HH PPS REVENUE CREDIT

## 2017-11-10 PROCEDURE — 3331090002 HH PPS REVENUE DEBIT

## 2017-11-11 PROCEDURE — 3331090001 HH PPS REVENUE CREDIT

## 2017-11-11 PROCEDURE — 3331090002 HH PPS REVENUE DEBIT

## 2017-11-12 PROCEDURE — 3331090002 HH PPS REVENUE DEBIT

## 2017-11-12 PROCEDURE — 3331090001 HH PPS REVENUE CREDIT

## 2017-11-13 PROCEDURE — 3331090002 HH PPS REVENUE DEBIT

## 2017-11-13 PROCEDURE — 3331090001 HH PPS REVENUE CREDIT

## 2017-11-14 PROCEDURE — 3331090001 HH PPS REVENUE CREDIT

## 2017-11-14 PROCEDURE — 3331090002 HH PPS REVENUE DEBIT

## 2017-11-15 ENCOUNTER — HOME CARE VISIT (OUTPATIENT)
Dept: SCHEDULING | Facility: HOME HEALTH | Age: 67
End: 2017-11-15
Payer: MEDICARE

## 2017-11-15 VITALS
WEIGHT: 125.8 LBS | RESPIRATION RATE: 18 BRPM | HEART RATE: 80 BPM | DIASTOLIC BLOOD PRESSURE: 64 MMHG | TEMPERATURE: 98.2 F | SYSTOLIC BLOOD PRESSURE: 130 MMHG | BODY MASS INDEX: 22.28 KG/M2 | OXYGEN SATURATION: 97 %

## 2017-11-15 PROCEDURE — 3331090002 HH PPS REVENUE DEBIT

## 2017-11-15 PROCEDURE — G0299 HHS/HOSPICE OF RN EA 15 MIN: HCPCS

## 2017-11-15 PROCEDURE — 3331090001 HH PPS REVENUE CREDIT

## 2017-11-16 PROCEDURE — 3331090002 HH PPS REVENUE DEBIT

## 2017-11-16 PROCEDURE — 3331090001 HH PPS REVENUE CREDIT

## 2017-11-17 PROCEDURE — 3331090002 HH PPS REVENUE DEBIT

## 2017-11-17 PROCEDURE — 3331090001 HH PPS REVENUE CREDIT

## 2017-11-18 PROCEDURE — 3331090001 HH PPS REVENUE CREDIT

## 2017-11-18 PROCEDURE — 3331090002 HH PPS REVENUE DEBIT

## 2017-11-19 PROCEDURE — 3331090001 HH PPS REVENUE CREDIT

## 2017-11-19 PROCEDURE — 3331090002 HH PPS REVENUE DEBIT

## 2017-11-20 ENCOUNTER — HOSPITAL ENCOUNTER (OUTPATIENT)
Dept: LAB | Age: 67
Discharge: HOME | End: 2017-11-20
Payer: MEDICARE

## 2017-11-20 ENCOUNTER — HOSPITAL ENCOUNTER (OUTPATIENT)
Dept: LAB | Age: 67
Discharge: HOME | End: 2017-11-20

## 2017-11-20 DIAGNOSIS — I50.21 SYSTOLIC CHF, ACUTE (HCC): ICD-10-CM

## 2017-11-20 DIAGNOSIS — E11.8 TYPE 2 DIABETES MELLITUS WITH COMPLICATION, UNSPECIFIED LONG TERM INSULIN USE STATUS: ICD-10-CM

## 2017-11-20 LAB
ANION GAP SERPL CALC-SCNC: 7 MMOL/L (ref 7–16)
BUN SERPL-MCNC: 21 MG/DL (ref 8–23)
CALCIUM SERPL-MCNC: 8.9 MG/DL (ref 8.3–10.4)
CHLORIDE SERPL-SCNC: 106 MMOL/L (ref 98–107)
CO2 SERPL-SCNC: 28 MMOL/L (ref 21–32)
CREAT SERPL-MCNC: 1.52 MG/DL (ref 0.8–1.5)
CREAT UR-MCNC: 42.4 MG/DL
GLUCOSE SERPL-MCNC: 270 MG/DL (ref 65–100)
MICROALBUMIN UR-MCNC: 48.9 MG/DL
MICROALBUMIN/CREAT UR-RTO: 1153 MG/G
POTASSIUM SERPL-SCNC: 4 MMOL/L (ref 3.5–5.1)
SODIUM SERPL-SCNC: 141 MMOL/L (ref 136–145)

## 2017-11-20 PROCEDURE — 36415 COLL VENOUS BLD VENIPUNCTURE: CPT | Performed by: INTERNAL MEDICINE

## 2017-11-20 PROCEDURE — 3331090002 HH PPS REVENUE DEBIT

## 2017-11-20 PROCEDURE — 82043 UR ALBUMIN QUANTITATIVE: CPT | Performed by: INTERNAL MEDICINE

## 2017-11-20 PROCEDURE — 80048 BASIC METABOLIC PNL TOTAL CA: CPT | Performed by: INTERNAL MEDICINE

## 2017-11-20 PROCEDURE — 3331090001 HH PPS REVENUE CREDIT

## 2017-11-21 ENCOUNTER — HOSPITAL ENCOUNTER (OUTPATIENT)
Dept: LAB | Age: 67
Discharge: HOME | End: 2017-11-21
Payer: MEDICARE

## 2017-11-21 DIAGNOSIS — E11.8 TYPE 2 DIABETES MELLITUS WITH COMPLICATION, UNSPECIFIED LONG TERM INSULIN USE STATUS: ICD-10-CM

## 2017-11-21 DIAGNOSIS — E78.1 HYPERTRIGLYCERIDEMIA: ICD-10-CM

## 2017-11-21 LAB
CHOLEST SERPL-MCNC: 147 MG/DL
EST. AVERAGE GLUCOSE BLD GHB EST-MCNC: 212 MG/DL
HBA1C MFR BLD: 9 % (ref 4.8–6)
HDLC SERPL-MCNC: 45 MG/DL (ref 40–60)
HDLC SERPL: 3.3 {RATIO}
LDLC SERPL CALC-MCNC: 87.8 MG/DL
LIPID PROFILE,FLP: NORMAL
TRIGL SERPL-MCNC: 71 MG/DL (ref 35–150)
VLDLC SERPL CALC-MCNC: 14.2 MG/DL (ref 6–23)

## 2017-11-21 PROCEDURE — 36415 COLL VENOUS BLD VENIPUNCTURE: CPT | Performed by: NURSE PRACTITIONER

## 2017-11-21 PROCEDURE — 3331090001 HH PPS REVENUE CREDIT

## 2017-11-21 PROCEDURE — 80061 LIPID PANEL: CPT | Performed by: NURSE PRACTITIONER

## 2017-11-21 PROCEDURE — 3331090002 HH PPS REVENUE DEBIT

## 2017-11-21 PROCEDURE — 83036 HEMOGLOBIN GLYCOSYLATED A1C: CPT | Performed by: NURSE PRACTITIONER

## 2017-11-22 PROCEDURE — 3331090002 HH PPS REVENUE DEBIT

## 2017-11-22 PROCEDURE — 3331090001 HH PPS REVENUE CREDIT

## 2017-11-23 PROCEDURE — 3331090002 HH PPS REVENUE DEBIT

## 2017-11-23 PROCEDURE — 3331090001 HH PPS REVENUE CREDIT

## 2017-11-24 PROCEDURE — 3331090001 HH PPS REVENUE CREDIT

## 2017-11-24 PROCEDURE — 3331090002 HH PPS REVENUE DEBIT

## 2017-11-25 PROCEDURE — 3331090001 HH PPS REVENUE CREDIT

## 2017-11-25 PROCEDURE — 3331090002 HH PPS REVENUE DEBIT

## 2017-11-26 PROCEDURE — 3331090001 HH PPS REVENUE CREDIT

## 2017-11-26 PROCEDURE — 3331090002 HH PPS REVENUE DEBIT

## 2017-11-27 PROCEDURE — 3331090001 HH PPS REVENUE CREDIT

## 2017-11-27 PROCEDURE — 3331090002 HH PPS REVENUE DEBIT

## 2017-11-28 PROCEDURE — 3331090001 HH PPS REVENUE CREDIT

## 2017-11-28 PROCEDURE — 3331090002 HH PPS REVENUE DEBIT

## 2017-11-29 ENCOUNTER — HOSPITAL ENCOUNTER (OUTPATIENT)
Dept: SURGERY | Age: 67
Discharge: HOME | End: 2017-11-29
Payer: MEDICARE

## 2017-11-29 VITALS
TEMPERATURE: 97.6 F | SYSTOLIC BLOOD PRESSURE: 109 MMHG | DIASTOLIC BLOOD PRESSURE: 64 MMHG | WEIGHT: 132.19 LBS | HEART RATE: 82 BPM | BODY MASS INDEX: 23.42 KG/M2 | OXYGEN SATURATION: 95 % | RESPIRATION RATE: 16 BRPM | HEIGHT: 63 IN

## 2017-11-29 LAB
ANION GAP SERPL CALC-SCNC: 9 MMOL/L (ref 7–16)
APPEARANCE UR: CLEAR
BACTERIA URNS QL MICRO: 0 /HPF
BILIRUB UR QL: NEGATIVE
BUN SERPL-MCNC: 27 MG/DL (ref 8–23)
CALCIUM SERPL-MCNC: 8.7 MG/DL (ref 8.3–10.4)
CHLORIDE SERPL-SCNC: 104 MMOL/L (ref 98–107)
CO2 SERPL-SCNC: 25 MMOL/L (ref 21–32)
COLOR UR: YELLOW
CREAT SERPL-MCNC: 1.66 MG/DL (ref 0.8–1.5)
EPI CELLS #/AREA URNS HPF: ABNORMAL /HPF
ERYTHROCYTE [DISTWIDTH] IN BLOOD BY AUTOMATED COUNT: 14.2 % (ref 11.9–14.6)
GLUCOSE BLD STRIP.AUTO-MCNC: 237 MG/DL (ref 65–100)
GLUCOSE SERPL-MCNC: 257 MG/DL (ref 65–100)
GLUCOSE UR STRIP.AUTO-MCNC: >1000 MG/DL
HCT VFR BLD AUTO: 36.1 % (ref 41.1–50.3)
HGB BLD-MCNC: 12.2 G/DL (ref 13.6–17.2)
HGB UR QL STRIP: NEGATIVE
KETONES UR QL STRIP.AUTO: NEGATIVE MG/DL
LEUKOCYTE ESTERASE UR QL STRIP.AUTO: NEGATIVE
MCH RBC QN AUTO: 26.7 PG (ref 26.1–32.9)
MCHC RBC AUTO-ENTMCNC: 33.8 G/DL (ref 31.4–35)
MCV RBC AUTO: 79 FL (ref 79.6–97.8)
NITRITE UR QL STRIP.AUTO: NEGATIVE
PH UR STRIP: 6 [PH] (ref 5–9)
PLATELET # BLD AUTO: 221 K/UL (ref 150–450)
PMV BLD AUTO: 9.8 FL (ref 10.8–14.1)
POTASSIUM SERPL-SCNC: 4.8 MMOL/L (ref 3.5–5.1)
PROT UR STRIP-MCNC: 30 MG/DL
RBC # BLD AUTO: 4.57 M/UL (ref 4.23–5.67)
SODIUM SERPL-SCNC: 138 MMOL/L (ref 136–145)
SP GR UR REFRACTOMETRY: 1.02 (ref 1–1.02)
UROBILINOGEN UR QL STRIP.AUTO: 1 EU/DL (ref 0.2–1)
WBC # BLD AUTO: 4.2 K/UL (ref 4.3–11.1)

## 2017-11-29 PROCEDURE — 85027 COMPLETE CBC AUTOMATED: CPT | Performed by: UROLOGY

## 2017-11-29 PROCEDURE — 82962 GLUCOSE BLOOD TEST: CPT

## 2017-11-29 PROCEDURE — 3331090002 HH PPS REVENUE DEBIT

## 2017-11-29 PROCEDURE — 80048 BASIC METABOLIC PNL TOTAL CA: CPT | Performed by: UROLOGY

## 2017-11-29 PROCEDURE — 87086 URINE CULTURE/COLONY COUNT: CPT | Performed by: UROLOGY

## 2017-11-29 PROCEDURE — 3331090001 HH PPS REVENUE CREDIT

## 2017-11-29 PROCEDURE — 81001 URINALYSIS AUTO W/SCOPE: CPT | Performed by: UROLOGY

## 2017-11-29 RX ORDER — LISINOPRIL 10 MG/1
10 TABLET ORAL
COMMUNITY
End: 2017-12-11

## 2017-11-29 RX ORDER — ATORVASTATIN CALCIUM 40 MG/1
40 TABLET, FILM COATED ORAL
COMMUNITY
End: 2018-01-08 | Stop reason: SDUPTHER

## 2017-11-29 RX ORDER — FUROSEMIDE 40 MG/1
40 TABLET ORAL
COMMUNITY
End: 2018-01-08 | Stop reason: SDUPTHER

## 2017-11-29 NOTE — PERIOP NOTES
All labs reviewed. Creatinine 1.66, Dr Isaak Munroe notified- no new orders received. WBC 4.2, UA with 30 of protein- all labs routed to Dr Capo Perez office.

## 2017-11-29 NOTE — PERIOP NOTES
Recent Results (from the past 12 hour(s))   CBC W/O DIFF    Collection Time: 11/29/17 12:27 PM   Result Value Ref Range    WBC 4.2 (L) 4.3 - 11.1 K/uL    RBC 4.57 4.23 - 5.67 M/uL    HGB 12.2 (L) 13.6 - 17.2 g/dL    HCT 36.1 (L) 41.1 - 50.3 %    MCV 79.0 (L) 79.6 - 97.8 FL    MCH 26.7 26.1 - 32.9 PG    MCHC 33.8 31.4 - 35.0 g/dL    RDW 14.2 11.9 - 14.6 %    PLATELET 043 691 - 744 K/uL    MPV 9.8 (L) 10.8 - 34.9 FL   METABOLIC PANEL, BASIC    Collection Time: 11/29/17 12:27 PM   Result Value Ref Range    Sodium 138 136 - 145 mmol/L    Potassium 4.8 3.5 - 5.1 mmol/L    Chloride 104 98 - 107 mmol/L    CO2 25 21 - 32 mmol/L    Anion gap 9 7 - 16 mmol/L    Glucose 257 (H) 65 - 100 mg/dL    BUN 27 (H) 8 - 23 MG/DL    Creatinine 1.66 (H) 0.8 - 1.5 MG/DL    GFR est AA 53 (L) >60 ml/min/1.73m2    GFR est non-AA 44 (L) >60 ml/min/1.73m2    Calcium 8.7 8.3 - 10.4 MG/DL   URINALYSIS W/ RFLX MICROSCOPIC    Collection Time: 11/29/17 12:27 PM   Result Value Ref Range    Color YELLOW      Appearance CLEAR      Specific gravity 1.023 1.001 - 1.023      pH (UA) 6.0 5.0 - 9.0      Protein 30 (A) NEG mg/dL    Glucose >1000 mg/dL    Ketone NEGATIVE  NEG mg/dL    Bilirubin NEGATIVE  NEG      Blood NEGATIVE  NEG      Urobilinogen 1.0 0.2 - 1.0 EU/dL    Nitrites NEGATIVE  NEG      Leukocyte Esterase NEGATIVE  NEG      Epithelial cells 0-3 0 /hpf    Bacteria 0 0 /hpf   GLUCOSE, POC    Collection Time: 11/29/17 12:43 PM   Result Value Ref Range    Glucose (POC) 237 (H) 65 - 100 mg/dL

## 2017-11-29 NOTE — PERIOP NOTES
Reviewed cardiac records with Dr Nereyda Stokes. MD denies need to assess pt at this time. Pt approved for surg.

## 2017-12-01 LAB
BACTERIA SPEC CULT: NORMAL
SERVICE CMNT-IMP: NORMAL

## 2017-12-04 PROBLEM — E03.9 ACQUIRED HYPOTHYROIDISM: Status: ACTIVE | Noted: 2017-12-04

## 2017-12-05 ENCOUNTER — HOSPITAL ENCOUNTER (OUTPATIENT)
Dept: PHYSICAL THERAPY | Age: 67
End: 2017-12-05
Attending: NURSE PRACTITIONER

## 2017-12-06 ENCOUNTER — HOSPITAL ENCOUNTER (OUTPATIENT)
Dept: LAB | Age: 67
Discharge: HOME | DRG: 657 | End: 2017-12-06
Payer: MEDICARE

## 2017-12-06 ENCOUNTER — ANESTHESIA EVENT (OUTPATIENT)
Dept: SURGERY | Age: 67
DRG: 657 | End: 2017-12-06
Payer: MEDICARE

## 2017-12-06 PROCEDURE — 86900 BLOOD TYPING SEROLOGIC ABO: CPT | Performed by: UROLOGY

## 2017-12-06 PROCEDURE — 86920 COMPATIBILITY TEST SPIN: CPT | Performed by: UROLOGY

## 2017-12-06 PROCEDURE — 36415 COLL VENOUS BLD VENIPUNCTURE: CPT | Performed by: UROLOGY

## 2017-12-07 ENCOUNTER — ANESTHESIA (OUTPATIENT)
Dept: SURGERY | Age: 67
DRG: 657 | End: 2017-12-07
Payer: MEDICARE

## 2017-12-07 ENCOUNTER — HOSPITAL ENCOUNTER (INPATIENT)
Age: 67
LOS: 4 days | Discharge: HOME | DRG: 657 | End: 2017-12-11
Attending: UROLOGY | Admitting: UROLOGY
Payer: MEDICARE

## 2017-12-07 LAB
GLUCOSE BLD STRIP.AUTO-MCNC: 153 MG/DL (ref 65–100)
GLUCOSE BLD STRIP.AUTO-MCNC: 164 MG/DL (ref 65–100)
GLUCOSE BLD STRIP.AUTO-MCNC: 206 MG/DL (ref 65–100)

## 2017-12-07 PROCEDURE — 77030022474 HC RELD STPLR ENDO GIA COVD -C: Performed by: UROLOGY

## 2017-12-07 PROCEDURE — 74011250637 HC RX REV CODE- 250/637: Performed by: UROLOGY

## 2017-12-07 PROCEDURE — 77030008703 HC TU ET UNCUF COVD -A: Performed by: ANESTHESIOLOGY

## 2017-12-07 PROCEDURE — 77030013292 HC BOWL MX PRSM J&J -A: Performed by: ANESTHESIOLOGY

## 2017-12-07 PROCEDURE — 77030011640 HC PAD GRND REM COVD -A: Performed by: UROLOGY

## 2017-12-07 PROCEDURE — 74011250636 HC RX REV CODE- 250/636: Performed by: ANESTHESIOLOGY

## 2017-12-07 PROCEDURE — 77030008756 HC TU IRR SUC STRY -B: Performed by: UROLOGY

## 2017-12-07 PROCEDURE — 74011250636 HC RX REV CODE- 250/636: Performed by: UROLOGY

## 2017-12-07 PROCEDURE — 77030020782 HC GWN BAIR PAWS FLX 3M -B: Performed by: ANESTHESIOLOGY

## 2017-12-07 PROCEDURE — 65270000029 HC RM PRIVATE

## 2017-12-07 PROCEDURE — 77030035045 HC TRCR ENDOSC VRSPRT BLDLSS COVD -B: Performed by: UROLOGY

## 2017-12-07 PROCEDURE — 77030019940 HC BLNKT HYPOTHRM STRY -B: Performed by: ANESTHESIOLOGY

## 2017-12-07 PROCEDURE — 77030002966 HC SUT PDS J&J -A: Performed by: UROLOGY

## 2017-12-07 PROCEDURE — 88307 TISSUE EXAM BY PATHOLOGIST: CPT | Performed by: UROLOGY

## 2017-12-07 PROCEDURE — 76060000036 HC ANESTHESIA 2.5 TO 3 HR: Performed by: UROLOGY

## 2017-12-07 PROCEDURE — 74011250636 HC RX REV CODE- 250/636

## 2017-12-07 PROCEDURE — 82962 GLUCOSE BLOOD TEST: CPT

## 2017-12-07 PROCEDURE — 76010000172 HC OR TIME 2.5 TO 3 HR INTENSV-TIER 1: Performed by: UROLOGY

## 2017-12-07 PROCEDURE — 77030000038 HC TIP SCIS LAPSCP SURI -B: Performed by: UROLOGY

## 2017-12-07 PROCEDURE — 77030034154 HC SHR COAG HARM ACE J&J -F: Performed by: UROLOGY

## 2017-12-07 PROCEDURE — 74011000258 HC RX REV CODE- 258: Performed by: UROLOGY

## 2017-12-07 PROCEDURE — 77030025088 HC APPL CLP LIG 1 COVD -B: Performed by: UROLOGY

## 2017-12-07 PROCEDURE — 77030019908 HC STETH ESOPH SIMS -A: Performed by: ANESTHESIOLOGY

## 2017-12-07 PROCEDURE — 77030018673: Performed by: UROLOGY

## 2017-12-07 PROCEDURE — 77030008522 HC TBNG INSUF LAPRO STRY -B: Performed by: UROLOGY

## 2017-12-07 PROCEDURE — 74011000250 HC RX REV CODE- 250

## 2017-12-07 PROCEDURE — 77030035048 HC TRCR ENDOSC OPTCL COVD -B: Performed by: UROLOGY

## 2017-12-07 PROCEDURE — 77030009527 HC GEL PRT SYS AMR -E: Performed by: UROLOGY

## 2017-12-07 PROCEDURE — 74011000250 HC RX REV CODE- 250: Performed by: UROLOGY

## 2017-12-07 PROCEDURE — 76210000006 HC OR PH I REC 0.5 TO 1 HR: Performed by: UROLOGY

## 2017-12-07 PROCEDURE — 77030027138 HC INCENT SPIROMETER -A

## 2017-12-07 PROCEDURE — 0TT00ZZ RESECTION OF RIGHT KIDNEY, OPEN APPROACH: ICD-10-PCS | Performed by: UROLOGY

## 2017-12-07 PROCEDURE — 77030008467 HC STPLR SKN COVD -B: Performed by: UROLOGY

## 2017-12-07 PROCEDURE — 77030020407 HC IV BLD WRMR ST 3M -A: Performed by: ANESTHESIOLOGY

## 2017-12-07 PROCEDURE — 77030003028 HC SUT VCRL J&J -A: Performed by: UROLOGY

## 2017-12-07 PROCEDURE — 77030005401 HC CATH RAD ARRO -A: Performed by: ANESTHESIOLOGY

## 2017-12-07 PROCEDURE — 77030035051: Performed by: UROLOGY

## 2017-12-07 PROCEDURE — 77030034850: Performed by: UROLOGY

## 2017-12-07 PROCEDURE — 77030022473 HC HNDL ENDO GIA UNIV USDA -C: Performed by: UROLOGY

## 2017-12-07 PROCEDURE — 77030013794 HC KT TRNSDUC BLD EDWD -B: Performed by: NURSE ANESTHETIST, CERTIFIED REGISTERED

## 2017-12-07 PROCEDURE — 77030032490 HC SLV COMPR SCD KNE COVD -B: Performed by: UROLOGY

## 2017-12-07 RX ORDER — OXYCODONE HYDROCHLORIDE 5 MG/1
10 TABLET ORAL
Status: DISCONTINUED | OUTPATIENT
Start: 2017-12-07 | End: 2017-12-07 | Stop reason: HOSPADM

## 2017-12-07 RX ORDER — LIDOCAINE HYDROCHLORIDE 20 MG/ML
INJECTION, SOLUTION EPIDURAL; INFILTRATION; INTRACAUDAL; PERINEURAL AS NEEDED
Status: DISCONTINUED | OUTPATIENT
Start: 2017-12-07 | End: 2017-12-07 | Stop reason: HOSPADM

## 2017-12-07 RX ORDER — ATORVASTATIN CALCIUM 40 MG/1
40 TABLET, FILM COATED ORAL
Status: DISCONTINUED | OUTPATIENT
Start: 2017-12-07 | End: 2017-12-11 | Stop reason: HOSPADM

## 2017-12-07 RX ORDER — DIPHENHYDRAMINE HYDROCHLORIDE 50 MG/ML
12.5 INJECTION, SOLUTION INTRAMUSCULAR; INTRAVENOUS
Status: DISCONTINUED | OUTPATIENT
Start: 2017-12-07 | End: 2017-12-07 | Stop reason: HOSPADM

## 2017-12-07 RX ORDER — DIPHENHYDRAMINE HYDROCHLORIDE 50 MG/ML
12.5 INJECTION, SOLUTION INTRAMUSCULAR; INTRAVENOUS
Status: DISCONTINUED | OUTPATIENT
Start: 2017-12-07 | End: 2017-12-11 | Stop reason: HOSPADM

## 2017-12-07 RX ORDER — FUROSEMIDE 40 MG/1
40 TABLET ORAL
Status: DISCONTINUED | OUTPATIENT
Start: 2017-12-07 | End: 2017-12-11 | Stop reason: HOSPADM

## 2017-12-07 RX ORDER — HYDROMORPHONE HYDROCHLORIDE 2 MG/ML
0.5 INJECTION, SOLUTION INTRAMUSCULAR; INTRAVENOUS; SUBCUTANEOUS
Status: COMPLETED | OUTPATIENT
Start: 2017-12-07 | End: 2017-12-07

## 2017-12-07 RX ORDER — LISINOPRIL 5 MG/1
10 TABLET ORAL
Status: DISCONTINUED | OUTPATIENT
Start: 2017-12-07 | End: 2017-12-10

## 2017-12-07 RX ORDER — SODIUM CHLORIDE 0.9 % (FLUSH) 0.9 %
5-10 SYRINGE (ML) INJECTION AS NEEDED
Status: DISCONTINUED | OUTPATIENT
Start: 2017-12-07 | End: 2017-12-11 | Stop reason: HOSPADM

## 2017-12-07 RX ORDER — SODIUM CHLORIDE 0.9 % (FLUSH) 0.9 %
5-10 SYRINGE (ML) INJECTION AS NEEDED
Status: DISCONTINUED | OUTPATIENT
Start: 2017-12-07 | End: 2017-12-07 | Stop reason: HOSPADM

## 2017-12-07 RX ORDER — FACIAL-BODY WIPES
10 EACH TOPICAL DAILY
Status: DISCONTINUED | OUTPATIENT
Start: 2017-12-08 | End: 2017-12-11 | Stop reason: HOSPADM

## 2017-12-07 RX ORDER — ACETAMINOPHEN 10 MG/ML
1000 INJECTION, SOLUTION INTRAVENOUS ONCE
Status: COMPLETED | OUTPATIENT
Start: 2017-12-07 | End: 2017-12-07

## 2017-12-07 RX ORDER — ONDANSETRON 2 MG/ML
4 INJECTION INTRAMUSCULAR; INTRAVENOUS
Status: DISCONTINUED | OUTPATIENT
Start: 2017-12-07 | End: 2017-12-11 | Stop reason: HOSPADM

## 2017-12-07 RX ORDER — MIDAZOLAM HYDROCHLORIDE 1 MG/ML
2 INJECTION, SOLUTION INTRAMUSCULAR; INTRAVENOUS ONCE
Status: DISCONTINUED | OUTPATIENT
Start: 2017-12-07 | End: 2017-12-07 | Stop reason: HOSPADM

## 2017-12-07 RX ORDER — SODIUM CHLORIDE, SODIUM LACTATE, POTASSIUM CHLORIDE, CALCIUM CHLORIDE 600; 310; 30; 20 MG/100ML; MG/100ML; MG/100ML; MG/100ML
100 INJECTION, SOLUTION INTRAVENOUS CONTINUOUS
Status: DISCONTINUED | OUTPATIENT
Start: 2017-12-07 | End: 2017-12-07 | Stop reason: HOSPADM

## 2017-12-07 RX ORDER — ACETAMINOPHEN 325 MG/1
650 TABLET ORAL
Status: DISCONTINUED | OUTPATIENT
Start: 2017-12-07 | End: 2017-12-11 | Stop reason: HOSPADM

## 2017-12-07 RX ORDER — SODIUM CHLORIDE 9 MG/ML
INJECTION, SOLUTION INTRAVENOUS
Status: DISCONTINUED | OUTPATIENT
Start: 2017-12-07 | End: 2017-12-07 | Stop reason: HOSPADM

## 2017-12-07 RX ORDER — INSULIN LISPRO 100 [IU]/ML
INJECTION, SOLUTION INTRAVENOUS; SUBCUTANEOUS
Status: DISCONTINUED | OUTPATIENT
Start: 2017-12-07 | End: 2017-12-11 | Stop reason: HOSPADM

## 2017-12-07 RX ORDER — GLYCOPYRROLATE 0.2 MG/ML
INJECTION INTRAMUSCULAR; INTRAVENOUS AS NEEDED
Status: DISCONTINUED | OUTPATIENT
Start: 2017-12-07 | End: 2017-12-07 | Stop reason: HOSPADM

## 2017-12-07 RX ORDER — HYDROMORPHONE HYDROCHLORIDE 2 MG/ML
1 INJECTION, SOLUTION INTRAMUSCULAR; INTRAVENOUS; SUBCUTANEOUS
Status: DISCONTINUED | OUTPATIENT
Start: 2017-12-07 | End: 2017-12-11 | Stop reason: HOSPADM

## 2017-12-07 RX ORDER — FLUMAZENIL 0.1 MG/ML
0.2 INJECTION INTRAVENOUS
Status: DISCONTINUED | OUTPATIENT
Start: 2017-12-07 | End: 2017-12-07 | Stop reason: HOSPADM

## 2017-12-07 RX ORDER — NALOXONE HYDROCHLORIDE 0.4 MG/ML
0.2 INJECTION, SOLUTION INTRAMUSCULAR; INTRAVENOUS; SUBCUTANEOUS AS NEEDED
Status: DISCONTINUED | OUTPATIENT
Start: 2017-12-07 | End: 2017-12-07 | Stop reason: HOSPADM

## 2017-12-07 RX ORDER — BUPIVACAINE HYDROCHLORIDE 5 MG/ML
INJECTION, SOLUTION EPIDURAL; INTRACAUDAL AS NEEDED
Status: DISCONTINUED | OUTPATIENT
Start: 2017-12-07 | End: 2017-12-07 | Stop reason: HOSPADM

## 2017-12-07 RX ORDER — SODIUM CHLORIDE 9 MG/ML
75 INJECTION, SOLUTION INTRAVENOUS CONTINUOUS
Status: DISCONTINUED | OUTPATIENT
Start: 2017-12-07 | End: 2017-12-11

## 2017-12-07 RX ORDER — LIDOCAINE HYDROCHLORIDE 10 MG/ML
0.1 INJECTION INFILTRATION; PERINEURAL AS NEEDED
Status: DISCONTINUED | OUTPATIENT
Start: 2017-12-07 | End: 2017-12-07 | Stop reason: HOSPADM

## 2017-12-07 RX ORDER — OXYCODONE AND ACETAMINOPHEN 7.5; 325 MG/1; MG/1
1 TABLET ORAL
Status: DISCONTINUED | OUTPATIENT
Start: 2017-12-07 | End: 2017-12-11 | Stop reason: HOSPADM

## 2017-12-07 RX ORDER — SODIUM CHLORIDE 0.9 % (FLUSH) 0.9 %
5-10 SYRINGE (ML) INJECTION EVERY 8 HOURS
Status: DISCONTINUED | OUTPATIENT
Start: 2017-12-07 | End: 2017-12-11 | Stop reason: HOSPADM

## 2017-12-07 RX ORDER — HYDRALAZINE HYDROCHLORIDE 20 MG/ML
10 INJECTION INTRAMUSCULAR; INTRAVENOUS
Status: DISCONTINUED | OUTPATIENT
Start: 2017-12-07 | End: 2017-12-11 | Stop reason: HOSPADM

## 2017-12-07 RX ORDER — ROCURONIUM BROMIDE 10 MG/ML
INJECTION, SOLUTION INTRAVENOUS AS NEEDED
Status: DISCONTINUED | OUTPATIENT
Start: 2017-12-07 | End: 2017-12-07 | Stop reason: HOSPADM

## 2017-12-07 RX ORDER — PROPOFOL 10 MG/ML
INJECTION, EMULSION INTRAVENOUS AS NEEDED
Status: DISCONTINUED | OUTPATIENT
Start: 2017-12-07 | End: 2017-12-07 | Stop reason: HOSPADM

## 2017-12-07 RX ORDER — ONDANSETRON 2 MG/ML
INJECTION INTRAMUSCULAR; INTRAVENOUS AS NEEDED
Status: DISCONTINUED | OUTPATIENT
Start: 2017-12-07 | End: 2017-12-07 | Stop reason: HOSPADM

## 2017-12-07 RX ORDER — ACETAMINOPHEN 500 MG
1000 TABLET ORAL ONCE
Status: DISCONTINUED | OUTPATIENT
Start: 2017-12-07 | End: 2017-12-07

## 2017-12-07 RX ORDER — CARVEDILOL 12.5 MG/1
12.5 TABLET ORAL 2 TIMES DAILY WITH MEALS
Status: DISCONTINUED | OUTPATIENT
Start: 2017-12-07 | End: 2017-12-11 | Stop reason: HOSPADM

## 2017-12-07 RX ORDER — NEOSTIGMINE METHYLSULFATE 1 MG/ML
INJECTION INTRAVENOUS AS NEEDED
Status: DISCONTINUED | OUTPATIENT
Start: 2017-12-07 | End: 2017-12-07 | Stop reason: HOSPADM

## 2017-12-07 RX ORDER — SODIUM CHLORIDE 0.9 % (FLUSH) 0.9 %
5-10 SYRINGE (ML) INJECTION EVERY 8 HOURS
Status: DISCONTINUED | OUTPATIENT
Start: 2017-12-07 | End: 2017-12-07 | Stop reason: HOSPADM

## 2017-12-07 RX ORDER — MIDAZOLAM HYDROCHLORIDE 1 MG/ML
2 INJECTION, SOLUTION INTRAMUSCULAR; INTRAVENOUS
Status: DISCONTINUED | OUTPATIENT
Start: 2017-12-07 | End: 2017-12-07 | Stop reason: HOSPADM

## 2017-12-07 RX ORDER — CEFAZOLIN SODIUM IN 0.9 % NACL 2 G/100 ML
2 PLASTIC BAG, INJECTION (ML) INTRAVENOUS ONCE
Status: COMPLETED | OUTPATIENT
Start: 2017-12-07 | End: 2017-12-07

## 2017-12-07 RX ORDER — LEVOTHYROXINE SODIUM 50 UG/1
50 TABLET ORAL
Status: DISCONTINUED | OUTPATIENT
Start: 2017-12-08 | End: 2017-12-11 | Stop reason: HOSPADM

## 2017-12-07 RX ORDER — FENTANYL CITRATE 50 UG/ML
100 INJECTION, SOLUTION INTRAMUSCULAR; INTRAVENOUS ONCE
Status: DISCONTINUED | OUTPATIENT
Start: 2017-12-07 | End: 2017-12-07 | Stop reason: HOSPADM

## 2017-12-07 RX ORDER — FENTANYL CITRATE 50 UG/ML
INJECTION, SOLUTION INTRAMUSCULAR; INTRAVENOUS AS NEEDED
Status: DISCONTINUED | OUTPATIENT
Start: 2017-12-07 | End: 2017-12-07 | Stop reason: HOSPADM

## 2017-12-07 RX ORDER — OXYCODONE HYDROCHLORIDE 5 MG/1
5 TABLET ORAL
Status: DISCONTINUED | OUTPATIENT
Start: 2017-12-07 | End: 2017-12-07 | Stop reason: HOSPADM

## 2017-12-07 RX ADMIN — HYDROMORPHONE HYDROCHLORIDE 0.5 MG: 2 INJECTION, SOLUTION INTRAMUSCULAR; INTRAVENOUS; SUBCUTANEOUS at 10:42

## 2017-12-07 RX ADMIN — SODIUM CHLORIDE 125 ML/HR: 900 INJECTION, SOLUTION INTRAVENOUS at 21:23

## 2017-12-07 RX ADMIN — HYDRALAZINE HYDROCHLORIDE 10 MG: 20 INJECTION INTRAMUSCULAR; INTRAVENOUS at 23:22

## 2017-12-07 RX ADMIN — HYDROMORPHONE HYDROCHLORIDE 0.5 MG: 2 INJECTION, SOLUTION INTRAMUSCULAR; INTRAVENOUS; SUBCUTANEOUS at 11:03

## 2017-12-07 RX ADMIN — CEFAZOLIN SODIUM 1 G: 1 INJECTION, POWDER, FOR SOLUTION INTRAMUSCULAR; INTRAVENOUS at 21:30

## 2017-12-07 RX ADMIN — PROPOFOL 160 MG: 10 INJECTION, EMULSION INTRAVENOUS at 07:43

## 2017-12-07 RX ADMIN — ACETAMINOPHEN 1000 MG: 10 INJECTION, SOLUTION INTRAVENOUS at 10:51

## 2017-12-07 RX ADMIN — HYDROMORPHONE HYDROCHLORIDE 1 MG: 2 INJECTION, SOLUTION INTRAMUSCULAR; INTRAVENOUS; SUBCUTANEOUS at 13:46

## 2017-12-07 RX ADMIN — FENTANYL CITRATE 90 MCG: 50 INJECTION, SOLUTION INTRAMUSCULAR; INTRAVENOUS at 10:15

## 2017-12-07 RX ADMIN — OXYCODONE HYDROCHLORIDE AND ACETAMINOPHEN 1 TABLET: 7.5; 325 TABLET ORAL at 17:34

## 2017-12-07 RX ADMIN — CEFAZOLIN 2 G: 10 INJECTION, POWDER, FOR SOLUTION INTRAVENOUS; PARENTERAL at 08:10

## 2017-12-07 RX ADMIN — OXYCODONE HYDROCHLORIDE AND ACETAMINOPHEN 1 TABLET: 7.5; 325 TABLET ORAL at 23:27

## 2017-12-07 RX ADMIN — FENTANYL CITRATE 100 MCG: 50 INJECTION, SOLUTION INTRAMUSCULAR; INTRAVENOUS at 07:43

## 2017-12-07 RX ADMIN — GLYCOPYRROLATE 0.4 MG: 0.2 INJECTION INTRAMUSCULAR; INTRAVENOUS at 10:00

## 2017-12-07 RX ADMIN — Medication 10 ML: at 23:23

## 2017-12-07 RX ADMIN — HYDROMORPHONE HYDROCHLORIDE 0.5 MG: 2 INJECTION, SOLUTION INTRAMUSCULAR; INTRAVENOUS; SUBCUTANEOUS at 10:53

## 2017-12-07 RX ADMIN — ROCURONIUM BROMIDE 50 MG: 10 INJECTION, SOLUTION INTRAVENOUS at 07:43

## 2017-12-07 RX ADMIN — ATORVASTATIN CALCIUM 40 MG: 40 TABLET, FILM COATED ORAL at 19:36

## 2017-12-07 RX ADMIN — SODIUM CHLORIDE 125 ML/HR: 900 INJECTION, SOLUTION INTRAVENOUS at 13:46

## 2017-12-07 RX ADMIN — Medication 10 ML: at 13:49

## 2017-12-07 RX ADMIN — FUROSEMIDE 40 MG: 40 TABLET ORAL at 19:36

## 2017-12-07 RX ADMIN — LIDOCAINE HYDROCHLORIDE 80 MG: 20 INJECTION, SOLUTION EPIDURAL; INFILTRATION; INTRACAUDAL; PERINEURAL at 07:43

## 2017-12-07 RX ADMIN — LISINOPRIL 10 MG: 5 TABLET ORAL at 19:36

## 2017-12-07 RX ADMIN — HYDROMORPHONE HYDROCHLORIDE 0.5 MG: 2 INJECTION, SOLUTION INTRAMUSCULAR; INTRAVENOUS; SUBCUTANEOUS at 10:36

## 2017-12-07 RX ADMIN — CEFAZOLIN SODIUM 1 G: 1 INJECTION, POWDER, FOR SOLUTION INTRAMUSCULAR; INTRAVENOUS at 13:47

## 2017-12-07 RX ADMIN — SODIUM CHLORIDE: 9 INJECTION, SOLUTION INTRAVENOUS at 07:59

## 2017-12-07 RX ADMIN — CARVEDILOL 12.5 MG: 12.5 TABLET, FILM COATED ORAL at 17:34

## 2017-12-07 RX ADMIN — SODIUM CHLORIDE, SODIUM LACTATE, POTASSIUM CHLORIDE, AND CALCIUM CHLORIDE: 600; 310; 30; 20 INJECTION, SOLUTION INTRAVENOUS at 07:37

## 2017-12-07 RX ADMIN — NEOSTIGMINE METHYLSULFATE 3 MG: 1 INJECTION INTRAVENOUS at 10:00

## 2017-12-07 RX ADMIN — ONDANSETRON 4 MG: 2 INJECTION INTRAMUSCULAR; INTRAVENOUS at 09:55

## 2017-12-07 RX ADMIN — FENTANYL CITRATE 10 MCG: 50 INJECTION, SOLUTION INTRAMUSCULAR; INTRAVENOUS at 10:01

## 2017-12-07 NOTE — IP AVS SNAPSHOT
[address] 
[phone] Patient: Dhruv Dove MRN: [id number] SYY:[birth year] About your hospitalization You were admitted on:  December 7, 2017 You last received care in the:  [facility] 6 MED SURG You were discharged on:  December 11, 2017 Why you were hospitalized Your primary diagnosis was:  Renal Mass Things You Need To Do (next 8 weeks) Follow up with Danyell Douglas MD  
  
Phone:  [phone] Where:  [address] [facility], [address] [facility] [address] Friday Dec 15, 2017 Office Visit with YISSEL Carr at  8:15 AM  
Where:  [facility] ([address] [place] PT INITIAL ORTHO with Jasen Prasad PT at  3:30 PM  
Where:  [facility] ([address]) Follow up with Buzz Acevedo MD  
at 8:15 Phone:  [phone] Where:  [address] , [facility] [address] Friday Jan 05, 2018 LAB with [facility] at  9:15 AM  
Where:  [address] ([address] [facility]) Friday Jan 12, 2018 Office Visit with Tim Carreno NP at  9:50 AM  
Where:  [address] ([address] [facility]) Discharge Orders None A check fitz indicates which time of day the medication should be taken. My Medications TAKE these medications as instructed Instructions Each Dose to Equal  
 Morning Noon Evening Bedtime  
 carvedilol 12.5 mg tablet Commonly known as:  Richy Si Your next dose is: This evening Take 1 Tab by mouth two (2) times daily (with meals). 12.5 mg  
    
  
   
   
  
   
  
 clopidogrel 75 mg Tab Commonly known as:  PLAVIX Your next dose is:  Tomorrow Morning Take 1 Tab by mouth daily. 75 mg  
    
  
   
   
   
  
 dapagliflozin 10 mg Tab tablet Commonly known as:  [employer] Your next dose is:  Tomorrow Morning Take 1 Tab by mouth daily. 10 mg HYDROcodone-acetaminophen  mg tablet Commonly known as:  Clemetine [place] Your next dose is: Take on as needed schedule Take 1 Tab by mouth every six (6) hours as needed for Pain. Max Daily Amount: 4 Tabs. 1 Tab LASIX 40 mg tablet Generic drug:  furosemide Your next dose is: This evening Take 40 mg by mouth nightly. 40 mg  
    
   
   
  
   
  
 LIPITOR 40 mg tablet Generic drug:  atorvastatin Your next dose is: This evening Take 40 mg by mouth nightly. 40 mg  
    
   
   
  
   
  
 Liraglutide 0.6 mg/0.1 mL (18 mg/3 mL) Pnij Commonly known as:  [place] Your next dose is:  Tomorrow Morning 1.2 mg by SubCUTAneous route daily. 1.2 mg  
    
  
   
   
   
  
 lisinopril 20 mg tablet Commonly known as:  [place] Your next dose is: This evening Take 1 Tab by mouth nightly. 20 mg  
    
   
   
  
   
  
 NIFEdipine 10 mg capsule Commonly known as:  Jonatan Clark Your next dose is: This afternoon Take 1 Cap by mouth every eight (8) hours. 10 mg  
    
  
   
  
   
  
   
  
 UNITHROID 50 mcg tablet Generic drug:  levothyroxine Your next dose is:  Tomorrow Morning Take 1 Tab by mouth Daily (before breakfast). 50 mcg Where to Get Your Medications Information on where to get these meds will be given to you by the nurse or doctor. ! Ask your nurse or doctor about these medications HYDROcodone-acetaminophen  mg tablet  
 lisinopril 20 mg tablet NIFEdipine 10 mg capsule Discharge Instructions DISCHARGE SUMMARY from Nurse PATIENT INSTRUCTIONS: 
 
 
F-face looks uneven A-arms unable to move or move unevenly S-speech slurred or non-existent T-time-call 911 as soon as signs and symptoms begin-DO NOT go Back to bed or wait to see if you get better-TIME IS BRAIN. Warning Signs of HEART ATTACK Call 911 if you have these symptoms: 
? Chest discomfort. Most heart attacks involve discomfort in the center of the chest that lasts more than a few minutes, or that goes away and comes back. It can feel like uncomfortable pressure, squeezing, fullness, or pain. ? Discomfort in other areas of the upper body. Symptoms can include pain or discomfort in one or both arms, the back, neck, jaw, or stomach. ? Shortness of breath with or without chest discomfort. ? Other signs may include breaking out in a cold sweat, nausea, or lightheadedness. Don't wait more than five minutes to call [address]! Fast action can save your life. Calling 911 is almost always the fastest way to get lifesaving treatment. Emergency Medical Services staff can begin treatment when they arrive  up to an hour sooner than if someone gets to the hospital by car. The discharge information has been reviewed with the patient. The patient verbalized understanding. Discharge medications reviewed with the patient and appropriate educational materials and side effects teaching were provided. ___________________________________________________________________________________________________________________________________ Introducing [facility]! [facility] [employer] introduces [employer] patient portal. Now you can access parts of your medical record, email your doctor's office, and request medication refills online.    
 
1. In your internet browser, go to [email or web address] 2. Click on the First Time User? Click Here link in the Sign In box. You will see the New Member Sign Up page. 3. Enter your [place] Access Code exactly as it appears below. You will not need to use this code after youve completed the sign-up process. If you do not sign up before the expiration date, you must request a new code. · [place] Access Code: [email or web address] Expires: 1/9/2018 10:09 PM 
 
4. Enter the last four digits of your Social Security Number (xxxx) and Date of Birth (mm/dd/yyyy) as indicated and click Submit. You will be taken to the next sign-up page. 5. Create a [place] ID. This will be your [place] login ID and cannot be changed, so think of one that is secure and easy to remember. 6. Create a [place] password. You can change your password at any time. 7. Enter your Password Reset Question and Answer. This can be used at a later time if you forget your password. 8. Enter your e-mail address. You will receive e-mail notification when new information is available in [address] 9. Click Sign Up. You can now view and download portions of your medical record. 10. Click the Download Summary menu link to download a portable copy of your medical information. If you have questions, please visit the Frequently Asked Questions section of the [place] website. Remember, [place] is NOT to be used for urgent needs. For medical emergencies, dial 911. Now available from your iPhone and Android! Providers Seen During Your Hospitalization Provider Specialty Primary office phone Alexia Ardon MD Urology [phone] Immunizations Administered for This Admission Name Date Influenza Vaccine (Quad) PF  Deferred () Your Primary Care Physician (PCP) Primary Care Physician Office Phone Office Fax Мария Sandoval [phone] You are allergic to the following Allergen Reactions Aspirin Nausea and Vomiting Recent Documentation Height Weight BMI Smoking Status 1.6 m 57.9 kg 22.6 kg/m2 Former Smoker Emergency Contacts Name Discharge Info Relation Home Work Mobile Pinky Richardson  Son [[phone] Patient Belongings The following personal items are in your possession at time of discharge: 
  Dental Appliances: Lowers, Uppers, Partials  Visual Aid: Glasses, With patient      Home Medications: None   Jewelry: None  Clothing: Footwear, Pants, Shirt, Undergarments    Other Valuables: Eyeglasses Discharge Instructions Attachments/References NEPHRECTOMY: LAPAROSCOPIC: POST-OP (ENGLISH) Patient Handouts Laparoscopic Nephrectomy: What to Expect at [facility] Your Recovery A nephrectomy is surgery to take out part or all of the kidney. One or both kidneys may be taken out. Sometimes other tissue near the kidney is taken out at the same time. Your belly will feel sore after the surgery. This usually lasts about 1 to 2 weeks. Your doctor will give you pain medicine for this. You may also have other symptoms such as nausea, diarrhea, constipation, gas, or a headache. At first, you may have low energy and get tired quickly. It may take 3 to 6 months for your energy to fully return. Your body can work fine with one healthy kidney. If both kidneys are removed or your remaining kidney is not healthy, your doctor will talk to you about the kind of treatment you will need after surgery. This care sheet gives you a general idea about how long it will take for you to recover. But each person recovers at a different pace. Follow the steps below to get better as quickly as possible. How can you care for yourself at home? Activity ? · Rest when you feel tired. Getting enough sleep will help you recover. ? · Try to walk each day.  Start by walking a little more than you did the day before. Bit by bit, increase the amount you walk. Walking boosts blood flow and helps prevent pneumonia and constipation. ? · Avoid exercises that use your belly muscles and strenuous activities such as bicycle riding, jogging, weight lifting, or aerobic exercise until your doctor says it is okay. ? · For at least 4 weeks, avoid lifting anything that would make you strain. This may include a child, heavy grocery bags and milk containers, a heavy briefcase or backpack, cat litter or dog food bags, or a vacuum . ? · Hold a pillow over the cuts the doctor made (incisions) when you cough or take deep breaths. This will support your belly and decrease your pain. ? · Do breathing exercises at home as instructed by your doctor. This will help prevent pneumonia. ? · Ask your doctor when you can drive again. ? · You will probably need to take 4 to 6 weeks off from work. It depends on the type of work you do and how you feel. ? · You may be able to take showers (unless you have a drainage tube near your incisions). If you have a drainage tube, follow your doctor's instructions to empty and care for it. Do not take a bath for the first 2 weeks, or until your doctor tells you it is okay. ? · Ask your doctor when it is okay for you to have sex. Diet ? · You can eat your normal diet. If you were on a special diet for your kidneys before surgery, follow that diet until your doctor tells you to stop. ? · If your stomach is upset, try bland, low-fat foods like plain rice, broiled chicken, toast, and yogurt. ? · Drink plenty of fluids (unless your doctor tells you not to). ? · You may notice that your bowel movements are not regular right after your surgery. This is common. Try to avoid constipation and straining with bowel movements. You may want to take a fiber supplement every day. If you have not had a bowel movement after a couple of days, ask your doctor about taking a mild laxative. Medicines ? · Your doctor will tell you if and when you can restart your medicines. He or she will also give you instructions about taking any new medicines. ? · If you take blood thinners, such as warfarin (Coumadin), clopidogrel (Plavix), or aspirin, be sure to talk to your doctor. He or she will tell you if and when to start taking those medicines again. Make sure that you understand exactly what your doctor wants you to do. ? · Take pain medicines exactly as directed. ¨ If the doctor gave you a prescription medicine for pain, take it as prescribed. ¨ If you are not taking a prescription pain medicine, take an over-the-counter medicine that your doctor recommends. Read and follow all instructions on the label. ¨ Do not take aspirin, ibuprofen (Advil, Motrin), or naproxen (Aleve), or other nonsteroidal anti-inflammatory drugs (NSAIDs) unless your doctor says it is okay. ? · If you think your pain medicine is making you sick to your stomach: 
¨ Take your medicine after meals (unless your doctor has told you not to). ¨ Ask your doctor for a different pain medicine. ? · If your doctor prescribed antibiotics, take them as directed. Do not stop taking them just because you feel better. You need to take the full course of antibiotics. Incision care ? · If you have strips of tape on the incisions, leave the tape on for a week or until it falls off.  
? · Wash the area around the incisions daily with warm, soapy water and pat it dry. Don't use hydrogen peroxide or alcohol, which can slow healing. You may cover the incisions with gauze bandages if they weep or rub against clothing. Change the bandages every day. ? · Keep the area around the incisions clean and dry. Follow-up care is a key part of your treatment and safety. Be sure to make and go to all appointments, and call your doctor if you are having problems.  It's also a good idea to know your test results and keep a list of the medicines you take. When should you call for help? Call 911 anytime you think you may need emergency care. For example, call if: 
? · You passed out (lost consciousness). ? · You have chest pain, are short of breath, or cough up blood. ?Call your doctor now or seek immediate medical care if: 
? · You have pain that does not get better after you take your pain medicine. ? · You have symptoms of a urinary tract infection. These may include: 
¨ Pain or burning when you urinate. ¨ A frequent need to urinate without being able to pass much urine. ¨ Pain in the flank, which is just below the rib cage and above the waist on either side of the back. ¨ Blood in the urine. ¨ A fever. ? · You have signs of infection, such as: 
¨ Increased pain, swelling, warmth, or redness. ¨ Red streaks leading from the incisions. ¨ Pus draining from the incisions. ¨ A fever. ? · You have loose stitches, or your incisions come open. ? · You are bleeding from the incisions. ? · You cannot urinate. ? · You are sick to your stomach or cannot drink fluids. ? · You have signs of a blood clot in your leg (called a deep vein thrombosis), such as: 
¨ Pain in the calf, back of the knee, thigh, or groin. ¨ Redness and swelling in your leg. ? Watch closely for changes in your health, and be sure to contact your doctor if you are having any problems. Where can you learn more? Go to http://citlaly-john.info/. Enter [id number] in the search box to learn more about \"Laparoscopic Nephrectomy: What to Expect at Home. \" Current as of: May 12, 2017 Content Version: 11.4 © 8966-0279 [employer]. Care instructions adapted under license by [employer] (which disclaims liability or warranty for this information).  If you have questions about a medical condition or this instruction, always ask your healthcare professional. [employer] disclaims any warranty or liability for your use of this information. Please provide this summary of care documentation to your next provider. Signatures-by signing, you are acknowledging that this After Visit Summary has been reviewed with you and you have received a copy. Patient Signature:  ____________________________________________________________ Date:  ____________________________________________________________  
  
[facility] Provider Signature:  ____________________________________________________________ Date:  ____________________________________________________________

## 2017-12-07 NOTE — BRIEF OP NOTE
BRIEF OPERATIVE NOTE    Date of Procedure: 12/7/2017   Preoperative Diagnosis: Renal mass, right [N28.89]  Postoperative Diagnosis: Renal mass, right [N28.89]    Procedure(s):  RIGHT RADICAL NEPHRECTOMY LAPAROSCOPIC HAND ASSISTED  Surgeon(s) and Role:     * RANDI Carver MD - Primary     * Lynnette Purvis DO - Assisting         Assistant Staff:       Surgical Staff:  Circ-1: Esther Gaspar RN  Circ-2: Stephanie Camilo RN  Circ-Relief: Jose Carlos Mckinney RN  Scrub Tech-1: Clemente Pearson  Scrub Tech-2: Les Streeter [place]  Event Time In   Incision Start 1689   Incision Close 1010     Anesthesia: General   Estimated Blood Loss: 25ml  Specimens:   ID Type Source Tests Collected by Time Destination   1 : right kidney Fresh Kidney  Areli Escamilla MD 12/7/2017 0945 Pathology      Findings: no evidence of mets   Complications: 0  Implants: * No implants in log *    Op [facility]-[id number]

## 2017-12-07 NOTE — PROGRESS NOTES
TRANSFER - IN REPORT:    Verbal report received from Catie Mayers on [employer]  being received from [employer]) for routine post - op      Report consisted of patients Situation, Background, Assessment and   Recommendations(SBAR). Information from the following report(s) SBAR, Kardex, OR Summary, Procedure Summary, Intake/Output, MAR, Recent Results and Med Rec Status was reviewed with the receiving nurse. Opportunity for questions and clarification was provided. Assessment completed upon patients arrival to unit and care assume.     Primary Nurse Abdifatah Longoria RN and Mahendra Muhammad RN performed a dual skin assessment on this patient No impairment noted  Heriberto score is 22

## 2017-12-07 NOTE — PROGRESS NOTES
Pt is alert and oriented  AFVSS  Appears to be making decent urine output  Bandage dry  Imp: Doing well s/p R BROOKE Radical Nephrectomy  Plan: Usual post op care---Dr Mia Liz briefed and to follow pt

## 2017-12-07 NOTE — PROGRESS NOTES
Pre-op prayer request received. Prayer and support given to patient and son. Rev.  [employer]

## 2017-12-07 NOTE — PERIOP NOTES
TRANSFER - OUT REPORT:    Verbal report given to [address] Aminata Prieto RN(name) on [employer].  being transferred to 60(unit) for routine post - op       Report consisted of patients Situation, Background, Assessment and   Recommendations(SBAR). Information from the following report(s) SBAR, Kardex, OR Summary, Procedure Summary, Intake/Output and MAR was reviewed with the receiving nurse. Lines:   Peripheral IV 12/07/17 Left Hand (Active)   Site Assessment Clean, dry, & intact 12/7/2017 10:32 AM   Phlebitis Assessment 0 12/7/2017 10:32 AM   Infiltration Assessment 0 12/7/2017 10:32 AM   Dressing Status Clean, dry, & intact 12/7/2017 10:32 AM   Dressing Type Transparent 12/7/2017 10:32 AM   Hub Color/Line Status Capped 12/7/2017 10:32 AM       Peripheral IV 12/07/17 Right Forearm (Active)   Site Assessment Clean, dry, & intact 12/7/2017 10:32 AM   Phlebitis Assessment 0 12/7/2017 10:32 AM   Infiltration Assessment 0 12/7/2017 10:32 AM   Dressing Status Clean, dry, & intact 12/7/2017 10:32 AM   Dressing Type Transparent 12/7/2017 10:32 AM   Hub Color/Line Status Infusing 12/7/2017 10:32 AM       Arterial Line 12/07/17 Left Radial artery (Active)   Site Assessment Clean, dry, & intact 12/7/2017 10:32 AM   Dressing Status Clean, dry, & intact 12/7/2017 10:32 AM   Dressing Type Transparent 12/7/2017 10:32 AM   Line Status Intact and in place 12/7/2017 10:32 AM        Opportunity for questions and clarification was provided. Patient transported with:   O2 @ 2 liters    VTE prophylaxis orders have been written for [address] .    Patient and family given floor number and nurses name. Family updated re: pt status after security code verified.

## 2017-12-07 NOTE — ANESTHESIA PREPROCEDURE EVALUATION
Anesthetic History               Review of Systems / Medical History  Patient summary reviewed and pertinent labs reviewed    Pulmonary          Smoker (former 40 pack years)         Neuro/Psych              Cardiovascular    Hypertension      CHF (EF 47%): NYHA Classification II    CAD (Obstructive 1v CAD (RCA) with collateralization )    Exercise tolerance: >4 METS  Comments: Previously wearing a lifevest for ICM with EF 25%. Apparently that has improved to > 40% and no longer needs his lifevest   GI/Hepatic/Renal  Within defined limits              Endo/Other    Diabetes: poorly controlled, type 2  Hypothyroidism       Other Findings            Physical Exam    Airway  Mallampati: III  TM Distance: 4 - 6 cm  Neck ROM: normal range of motion   Mouth opening: Normal     Cardiovascular    Rhythm: regular  Rate: normal         Dental    Dentition: Full upper dentures and Lower partial plate     Pulmonary  Breath sounds clear to auscultation               Abdominal         Other Findings            Anesthetic Plan    ASA: 3  Anesthesia type: general    Monitoring Plan: Arterial line      Induction: Intravenous  Anesthetic plan and risks discussed with: Patient and Son / Daughter      Stat pads.

## 2017-12-07 NOTE — ANESTHESIA PROCEDURE NOTES
Arterial Line Placement    Start time: 12/7/2017 7:55 AM  End time: 12/7/2017 8:03 AM  Performed by: Wai Esposito  Authorized by: Wai Esposito     Pre-Procedure  Indications:  Arterial pressure monitoring and blood sampling  Preanesthetic Checklist: patient identified, risks and benefits discussed, anesthesia consent, site marked, patient being monitored, timeout performed and patient being monitored    Timeout Time: 07:55        Procedure:   Prep:  Chlorhexidine  Seldinger Technique?: No    Orientation:  Left  Location:  Radial artery  Catheter size:  20 G  Number of attempts:  3

## 2017-12-08 LAB
ANION GAP SERPL CALC-SCNC: 10 MMOL/L (ref 7–16)
BUN SERPL-MCNC: 28 MG/DL (ref 8–23)
CALCIUM SERPL-MCNC: 8.4 MG/DL (ref 8.3–10.4)
CHLORIDE SERPL-SCNC: 111 MMOL/L (ref 98–107)
CO2 SERPL-SCNC: 24 MMOL/L (ref 21–32)
CREAT SERPL-MCNC: 1.36 MG/DL (ref 0.8–1.5)
GLUCOSE BLD STRIP.AUTO-MCNC: 148 MG/DL (ref 65–100)
GLUCOSE BLD STRIP.AUTO-MCNC: 183 MG/DL (ref 65–100)
GLUCOSE BLD STRIP.AUTO-MCNC: 198 MG/DL (ref 65–100)
GLUCOSE BLD STRIP.AUTO-MCNC: 205 MG/DL (ref 65–100)
GLUCOSE SERPL-MCNC: 130 MG/DL (ref 65–100)
HCT VFR BLD AUTO: 34 % (ref 41.1–50.3)
HGB BLD-MCNC: 11.3 G/DL (ref 13.6–17.2)
POTASSIUM SERPL-SCNC: 4 MMOL/L (ref 3.5–5.1)
SODIUM SERPL-SCNC: 145 MMOL/L (ref 136–145)

## 2017-12-08 PROCEDURE — 85018 HEMOGLOBIN: CPT | Performed by: UROLOGY

## 2017-12-08 PROCEDURE — 74011250636 HC RX REV CODE- 250/636: Performed by: NURSE PRACTITIONER

## 2017-12-08 PROCEDURE — 80048 BASIC METABOLIC PNL TOTAL CA: CPT | Performed by: UROLOGY

## 2017-12-08 PROCEDURE — 74011250637 HC RX REV CODE- 250/637: Performed by: UROLOGY

## 2017-12-08 PROCEDURE — 74011636637 HC RX REV CODE- 636/637: Performed by: NURSE PRACTITIONER

## 2017-12-08 PROCEDURE — 74011250636 HC RX REV CODE- 250/636: Performed by: UROLOGY

## 2017-12-08 PROCEDURE — 82962 GLUCOSE BLOOD TEST: CPT

## 2017-12-08 PROCEDURE — 97161 PT EVAL LOW COMPLEX 20 MIN: CPT

## 2017-12-08 PROCEDURE — 74011250637 HC RX REV CODE- 250/637: Performed by: NURSE PRACTITIONER

## 2017-12-08 PROCEDURE — 74011000258 HC RX REV CODE- 258: Performed by: UROLOGY

## 2017-12-08 PROCEDURE — 65270000029 HC RM PRIVATE

## 2017-12-08 PROCEDURE — 36415 COLL VENOUS BLD VENIPUNCTURE: CPT | Performed by: UROLOGY

## 2017-12-08 RX ORDER — CLOPIDOGREL BISULFATE 75 MG/1
75 TABLET ORAL DAILY
Status: DISCONTINUED | OUTPATIENT
Start: 2017-12-08 | End: 2017-12-11 | Stop reason: HOSPADM

## 2017-12-08 RX ADMIN — Medication 10 ML: at 05:47

## 2017-12-08 RX ADMIN — INSULIN LISPRO 2 UNITS: 100 INJECTION, SOLUTION INTRAVENOUS; SUBCUTANEOUS at 21:56

## 2017-12-08 RX ADMIN — CARVEDILOL 12.5 MG: 12.5 TABLET, FILM COATED ORAL at 17:05

## 2017-12-08 RX ADMIN — HYDROMORPHONE HYDROCHLORIDE 1 MG: 2 INJECTION, SOLUTION INTRAMUSCULAR; INTRAVENOUS; SUBCUTANEOUS at 14:21

## 2017-12-08 RX ADMIN — Medication 10 ML: at 21:57

## 2017-12-08 RX ADMIN — OXYCODONE HYDROCHLORIDE AND ACETAMINOPHEN 1 TABLET: 7.5; 325 TABLET ORAL at 11:45

## 2017-12-08 RX ADMIN — SODIUM CHLORIDE 75 ML/HR: 900 INJECTION, SOLUTION INTRAVENOUS at 17:10

## 2017-12-08 RX ADMIN — BISACODYL 10 MG: 10 SUPPOSITORY RECTAL at 09:00

## 2017-12-08 RX ADMIN — LEVOTHYROXINE SODIUM 50 MCG: 50 TABLET ORAL at 05:42

## 2017-12-08 RX ADMIN — CEFAZOLIN SODIUM 1 G: 1 INJECTION, POWDER, FOR SOLUTION INTRAMUSCULAR; INTRAVENOUS at 05:34

## 2017-12-08 RX ADMIN — CLOPIDOGREL BISULFATE 75 MG: 75 TABLET ORAL at 10:10

## 2017-12-08 RX ADMIN — FUROSEMIDE 40 MG: 40 TABLET ORAL at 21:56

## 2017-12-08 RX ADMIN — CARVEDILOL 12.5 MG: 12.5 TABLET, FILM COATED ORAL at 07:49

## 2017-12-08 RX ADMIN — SODIUM CHLORIDE 125 ML/HR: 900 INJECTION, SOLUTION INTRAVENOUS at 05:45

## 2017-12-08 RX ADMIN — ATORVASTATIN CALCIUM 40 MG: 40 TABLET, FILM COATED ORAL at 21:56

## 2017-12-08 RX ADMIN — INSULIN LISPRO 4 UNITS: 100 INJECTION, SOLUTION INTRAVENOUS; SUBCUTANEOUS at 11:44

## 2017-12-08 RX ADMIN — Medication 10 ML: at 17:12

## 2017-12-08 RX ADMIN — LISINOPRIL 10 MG: 5 TABLET ORAL at 21:56

## 2017-12-08 RX ADMIN — INSULIN LISPRO 2 UNITS: 100 INJECTION, SOLUTION INTRAVENOUS; SUBCUTANEOUS at 17:10

## 2017-12-08 RX ADMIN — OXYCODONE HYDROCHLORIDE AND ACETAMINOPHEN 1 TABLET: 7.5; 325 TABLET ORAL at 05:33

## 2017-12-08 NOTE — ADT AUTH CERT NOTES
Op Notes  Unsigned   Date of Service: 17 [address], [place]   Urology      []Hide copied text  []Tressa for attribution information  Chicho Echavarria  Name:  Zulema Hollingsworth   MR#:  [id number]   :  1950   Account #:  [de-identified]   Date of Adm:  2017         DATE OF SURGERY: 2017     PREOPERATIVE DIAGNOSIS: Heterogenous right renal mass.     POSTOPERATIVE DIAGNOSIS: Heterogenous right renal mass.     PROCEDURE PERFORMED: Right hand-assisted laparoscopic radical   nephrectomy.     SURGEON: Hemal Rucker MD     FIRST ASSISTANT: Uday Mckeon DO     INDICATIONS FOR PROCEDURE: The patient is a 18-year-old black   male found to have a heterogenous 5.5 cm mass extending from the   lower pole centrally which would make a partial nephrectomy very   difficult. The patient's creatinine was such that we did not   have a contrasted study, but on ultrasound, this confirms this   to be a solid mass and even on the noncontrasted study this is a   heterogenous mass. Recommendation was for radical nephrectomy. Risks not exclusive of infection, bleeding, death, hernia,   visceral injury, renal failure with permanent dialysis, and the   fact that this lesion could prove to be benign, were discussed   in detail with the patient and his son. They wished to proceed.     DESCRIPTION OF PROCEDURE: The patient received Ancef 2 grams IV   piggyback. General endotracheal anesthesia was obtained. The   patient was placed in the lateral position with the kidney rest   up, the table flexed. The patient was secured with a beanbag and   padding was placed in appropriate areas and an axillary roll   placed. The patient was taped securely to the table.      A right lower quadrant incision was made with the scalpel. The   electrocautery unit was used to incise down to fascia. The   fascia was opened and the muscle layers divided. Peritoneum was   entered into. There were a few adhesions noted, but nothing in   the way. The GelPort was placed without difficulty. Gas was then   placed through the GelPort and taken to 15 mmHg pressure. Two 12   mm ports were placed slightly medial and superior to the   GelPort. The camera was a superior port. The working element was   the inferior port. The Harmonic scalpel was utilized to take   down some of the adhesions. The white line of Toldt was taken   down and this was scored anteriorly and across the midline. The   liver was not a factor. The colon was mobilized medially. The   duodenum was never really in the way as such. The lower pole of   the kidney was freed up. The ureter was identified and divided   between metal clips. The investing tissues were taken down with   a Harmonic to further expose the lower pole and then we tracked   superior, identifying the renal vein. There was 1 main renal   vein. There was 1 main renal artery. Further dissection was   carried up superiorly. The adrenal was left intact. The Harmonic   was used to score inferior to the adrenal ultimately,   essentially all that was left was the hilum. A [place]   dissector was utilized to develop a plane between the renal   artery and the vein. The artery was divided between a vascular   load and there was no evidence of bleeding. The renal vein then   likewise was divided between a white vascular load and the   specimen was removed from the wound. The lesion was palpable in   the lower pole. There was no evidence of any lymphadenopathy or   obvious metastatic disease. The gas was taken down to 5 mm and   no bleeding was noted.      The trocar sites were closed with the closure device passed   through the fascia superior and inferior to the incisions in the   fascia. This was accomplished bilaterally and under direct   vision with the camera. The GelPort then was removed.  The   sutures to the port sites then were tied and then the incision   was closed in 2 layers with #1 double strand PDS. Marcaine,   approximately 25 mL, was used to infiltrate the incisions and   the trocar sites. The staples were then used to reapproximate   skin bilaterally.      BLOOD LOSS: Less than 25 mL.      SPECIMENS TO PATHOLOGY: Right kidney.     The patient was carried to PACU in good condition. It should be   noted a Caal catheter was placed into the bladder under sterile   conditions prior to initiation of the procedure.           MD [facility] [facility] [place]   D:  12/07/2017   11:01   T:  12/08/2017   10:23   Job #:  [id number]              Admission (Current) on 12/7/2017              Detailed Report         Note Details   Author Suresh Crooks MD File Time 12/08/17 1024   Author Type Physician Status Unsigned   Last  Suresh Crooks MD Specialty Urology   Hospital Acct # [de-identified] Admit Date 12/7/2017

## 2017-12-08 NOTE — PROGRESS NOTES
Dr. Alona Whitt paged at this time per Debi Martinez regarding pts BP; see VS.     Notified MD at ; order for 10mg IV Hydralazine Q6 PRN systolic>160. Will continue to monitor.

## 2017-12-08 NOTE — PROGRESS NOTES
Admit Date: 12/7/2017    Subjective:     Mr. Mahogany Maya is sitting up in the chair. No complaints. Begum draining arsen urine. Objective:     Patient Vitals for the past 8 hrs:   BP Temp Pulse Resp SpO2   12/08/17 0651 159/82 98.9 °F (37.2 °C) 98 17 91 %   12/08/17 0402 117/68 98.7 °F (37.1 °C) 98 16 94 %        12/06 1901 - 12/08 0700  In: 2177 [I.V.:4072]  Out: 6761 [Urine:1600]    Physical Exam:  GENERAL: alert, cooperative, no distress  LUNG: clear to auscultation bilaterally  HEART: regular rate and rhythm, S1, S2   ABDOMEN: semi-soft, tender, dressing to R abdomen c/d/i, active BS, no flatus  NEUROLOGIC: AOx3     Data Review   Recent Results (from the past 24 hour(s))   GLUCOSE, POC    Collection Time: 12/07/17 12:15 PM   Result Value Ref Range    Glucose (POC) 206 (H) 65 - 100 mg/dL   GLUCOSE, POC    Collection Time: 12/07/17  8:32 PM   Result Value Ref Range    Glucose (POC) 153 (H) 65 - 897 mg/dL   METABOLIC PANEL, BASIC    Collection Time: 12/08/17  4:59 AM   Result Value Ref Range    Sodium 145 136 - 145 mmol/L    Potassium 4.0 3.5 - 5.1 mmol/L    Chloride 111 (H) 98 - 107 mmol/L    CO2 24 21 - 32 mmol/L    Anion gap 10 7 - 16 mmol/L    Glucose 130 (H) 65 - 100 mg/dL    BUN 28 (H) 8 - 23 MG/DL    Creatinine 1.36 0.8 - 1.5 MG/DL    GFR est AA >60 >60 ml/min/1.73m2    GFR est non-AA 55 (L) >60 ml/min/1.73m2    Calcium 8.4 8.3 - 10.4 MG/DL   HGB & HCT    Collection Time: 12/08/17  4:59 AM   Result Value Ref Range    HGB 11.3 (L) 13.6 - 17.2 g/dL    HCT 34.0 (L) 41.1 - 50.3 %   GLUCOSE, POC    Collection Time: 12/08/17  7:27 AM   Result Value Ref Range    Glucose (POC) 148 (H) 65 - 100 mg/dL       Assessment:     Active Problems:    Renal mass (10/13/2017)      Overview: Right      POD 1 Right radical hand-assisted laparoscopic nephrectomy    Plan:     Cn 1.6. VSS. Afebrile. Remove begum. Decrease IVF to 75/hr. Incentive spirometer. Continue clear liquids until passing flatus.     Daily suppository. Ambulate. Resume plavix. Breonna Renteria NP  I have reviewed the above note and examined the patient. I agree with the exam, assessment and plan.     Hemal Rucker MD

## 2017-12-08 NOTE — PROGRESS NOTES
Problem: Mobility Impaired (Adult and Pediatric)  Goal: *Acute Goals and Plan of Care (Insert Text)  LTG:  (1.)Mr. Aj Arevalo will move from supine to sit and sit to supine, scoot up and down and roll side to side INDEPENDENTLY with bed flat within 7 day(s). (2.)Mr. Aj Arevalo will transfer from bed to chair and chair to bed INDEPENDENTLY using the least restrictive device within 7 day(s). (3.)Mr. Aj Arevalo will ambulate with MODIFIED INDEPENDENCE for 450+ feet with the least restrictive device within 7 day(s). (4.)Mr. Aj Arevalo will ascend and descend 5 steps with SUPERVISION using handrail within 7 days. ________________________________________________________________________________________________    PHYSICAL THERAPY: Initial Assessment, PM 12/8/2017  INPATIENT: Hospital Day: 2  Payor: [facility] [place]IC[place] / [place] [facility] MEDICARE HMO/PPO / Product Type: [employer] Medicare /      NAME/AGE/GENDER: Marcia Spurling. is a 75 y.o. male   PRIMARY DIAGNOSIS: Renal mass, right [N28.89] <principal problem not specified> <principal problem not specified>  Procedure(s) (LRB):  RIGHT RADICAL NEPHRECTOMY LAPAROSCOPIC HAND ASSISTED (Right)  1 Day Post-Op  ICD-10: Treatment Diagnosis:   · Generalized Muscle Weakness (M62.81)  · Difficulty in walking, Not elsewhere classified (R26.2)  · History of falling (Z91.81)   Precaution/Allergies:  Aspirin      ASSESSMENT:     Mr. Aj Arevalo is a 75year old male who was admitted from home for above surgery. Seen today for initial therapy assessment. Reports pain much improved and denies any pain at this time. Transfers to sitting with head of bed elevated. His biggest limitation is left foot drop which he reports started 2-3 days after his recent discharge from [facility]. Has impaired sensation in left dorsum of foot and left shin area (numb, tingling). He performs sit-stand transfers and ambulates into bathroom with supervision for safety.  Ambulates for an additional 250 ft in hallway with SBA and occasional use of railing on wall. Has left hip hike to compensate for left foot drop but demonstrates good safety awareness and is cautious with mobility. He returns to room and up to recliner after activity with son present. Pt reports had two falls when he first noticed foot drop but since then has been independent and cautious. Has outpatient PT one week from today to address foot drop. Will continue working on gait safety and possibly assess for use of cane (although pt does not want to use). Recommended continued mobility over the weekend with family and outpatient PT follow up. Pt in agreement. This section established at most recent assessment   PROBLEM LIST (Impairments causing functional limitations):  1. Decreased Strength  2. Decreased Ambulation Ability/Technique  3. Decreased Activity Tolerance   INTERVENTIONS PLANNED: (Benefits and precautions of physical therapy have been discussed with the patient.)  1. Balance Exercise  2. Bed Mobility  3. Gait Training  4. Therapeutic Activites  5. Therapeutic Exercise/Strengthening  6. Transfer Training  7. Group Therapy     TREATMENT PLAN: Frequency/Duration: 3 times a week for duration of hospital stay  Rehabilitation Potential For Stated Goals: Good     RECOMMENDED REHABILITATION/EQUIPMENT: (at time of discharge pending progress): Due to the probability of continued deficits (see above) this patient will benefit from ontinued skilled physical therapy after discharge (outpatient PT). Equipment:    likely none              HISTORY:   History of Present Injury/Illness (Reason for Referral):  Per H&P, \"The patient is a 54-year-old black   male found to have a heterogenous 5.5 cm mass extending from the   lower pole centrally which would make a partial nephrectomy very   difficult.  The patient's creatinine was such that we did not   have a contrasted study, but on ultrasound, this confirms this   to be a solid mass and even on the noncontrasted study this is a   heterogenous mass. Recommendation was for radical nephrectomy. Risks not exclusive of infection, bleeding, death, hernia,   visceral injury, renal failure with permanent dialysis, and the   fact that this lesion could prove to be benign, were discussed   in detail with the patient and his son. They wished to proceed\"    Past Medical History/Comorbidities:   Mr. Steff Jean  has a past medical history of Cardiomyopathy [facility]); [facility] (10/12/2017); Coronary artery disease involving native coronary artery of native heart without angina pectoris (10/25/2017); Foot drop, left; Former cigarette smoker; HTN (hypertension) (07/22/2010); Hypothyroidism; Long term current use of anticoagulant; Right renal mass; Sickle cell trait ([facility] and Type 2 diabetes mellitus ([facility] He also has no past medical history of COPD; DEMENTIA; Gastrointestinal disorder; Infectious disease; Neurological disorder; Other ill-defined conditions(799.89); or Sleep disorder. Mr. Steff Jean  has a past surgical history that includes appendectomy (1963). Social History/Living Environment:   Home Environment: Private residence  # Steps to Enter: 5  Rails to Enter: Yes  One/Two Story Residence: One story  Living Alone: No  Support Systems: Child(kierra), Family member(s)  Patient Expects to be Discharged to[de-identified] Private residence  Current DME Used/Available at Home: None  Prior Level of Function/Work/Activity:  [address] lives at home; son lives with him and he has a cousin who will be staying with him for the next week or two. He is typically very independent and active. Drives.  Reports left foot drop started a few day after his last discharge from hospital.      Number of Personal Factors/Comorbidities that affect the Plan of Care: 1-2: MODERATE COMPLEXITY   EXAMINATION:   Most Recent Physical Functioning:   Gross Assessment:  AROM: Generally decreased, functional (L foot drop)  Strength: Generally decreased, functional (dec L ankle dorsiflexion)  Coordination: Generally decreased, functional (dec L ankle dorsiflexion)  Sensation: Impaired (decreased left dorsal foot and anterior lower legs)               Posture:  Posture (WDL): Exceptions to WDL  Posture Assessment: Forward head, Rounded shoulders  Balance:  Sitting: Intact  Standing: Impaired  Standing - Static: Good  Standing - Dynamic : Fair (+) Bed Mobility:  Supine to Sit: Supervision  Scooting: Independent  Wheelchair Mobility:     Transfers:  Sit to Stand: Supervision  Stand to Sit: Supervision  Bed to Chair: Supervision  Gait:     Base of Support: Narrowed  Speed/Marlena: Pace decreased (<100 feet/min)  Step Length: Left shortened;Right shortened  Gait Abnormalities: Foot drop;Hip Hike  Distance (ft): 250 Feet (ft)  Assistive Device: Other (comment) (occasional use of rails)  Ambulation - Level of Assistance: Contact guard assistance;Stand-by asssistance  Interventions: Verbal cues; Safety awareness training; Tactile cues      Body Structures Involved:  1. Nerves  2. Muscles Body Functions Affected:  1. Neuromusculoskeletal  2. Movement Related Activities and Participation Affected:  1. General Tasks and Demands  2. Mobility  3. Self Care  4. Domestic Life  5. Community, Social and [place]   Number of elements that affect the Plan of Care: 4+: HIGH COMPLEXITY   CLINICAL PRESENTATION:   Presentation: Stable and uncomplicated: LOW COMPLEXITY   CLINICAL DECISION MAKIN [facility] Inpatient Short Form  How much difficulty does the patient currently have. .. Unable A Lot A Little None   1. Turning over in bed (including adjusting bedclothes, sheets and blankets)? [] 1   [] 2   [] 3   [x] 4   2. Sitting down on and standing up from a chair with arms ( e.g., wheelchair, bedside commode, etc.)   [] 1   [] 2   [] 3   [x] 4   3. Moving from lying on back to sitting on the side of the bed?    [] 1   [] 2   [] 3   [x] 4   How much help from another person does the patient currently need. .. Total A Lot A Little None   4. Moving to and from a bed to a chair (including a wheelchair)? [] 1   [] 2   [] 3   [x] 4   5. Need to walk in hospital room? [] 1   [] 2   [x] 3   [] 4   6. Climbing 3-5 steps with a railing? [] 1   [] 2   [x] 3   [] 4   © 2007, Trustees of [address] [place] [address] [address] [address], under license to [employer]. All rights reserved      Score:  Initial: 22 Most Recent: X (Date: -- )    Interpretation of Tool:  Represents activities that are increasingly more difficult (i.e. Bed mobility, Transfers, Gait). Score 24 23 22-20 19-15 14-10 9-7 6     Modifier CH CI CJ CK CL CM CN      ? Mobility - Walking and Moving Around:     - CURRENT STATUS: CJ - 20%-39% impaired, limited or restricted    - GOAL STATUS: CH - 0% impaired, limited or restricted    - D/C STATUS:  ---------------To be determined---------------  Payor: [employer] [facility] [place] / [place] [employer] [place] / Product Type: Managed Care Medicare /      Medical Necessity:     · Patient demonstrates good rehab potential due to higher previous functional level. Reason for Services/Other Comments:  · Patient continues to demonstrate capacity to improve strength, mobility, balance, transfers, activity tolerance which will increase independence, decrease amount of assistance required from caregiver and increase safety.    Use of outcome tool(s) and clinical judgement create a POC that gives a: Clear prediction of patient's progress: LOW COMPLEXITY            TREATMENT:   (In addition to Assessment/Re-Assessment sessions the following treatments were rendered)   Pre-treatment Symptoms/Complaints:  \"thank you\"  Pain: Initial:   Pain Intensity 1: 0  Post Session:  0/10     Assessment/Reassessment only, no treatment provided today    Braces/Orthotics/Lines/Etc:   · O2 Device: Room air  Treatment/Session Assessment:    · Response to Treatment:  Pt performs mobility with CGA-supervision  · Interdisciplinary Collaboration:   o Physical Therapist  o Registered Nurse  · After treatment position/precautions:   o Up in chair  o Bed/Chair-wheels locked  o Bed in low position  o Call light within reach  o RN notified  o Family at bedside   · Compliance with Program/Exercises: compliant all the time. · Recommendations/Intent for next treatment session: \"Next visit will focus on advancements to more challenging activities and reduction in assistance provided\".   Total Treatment Duration:  PT Patient Time In/Time Out  Time In: 1325  Time Out: [address], DPT

## 2017-12-08 NOTE — OP NOTES
[address]   OPERATIVE REPORT       Name:  Christy Colbert   MR#:  [id number]   :  1950   Account #:  [de-identified]   Date of Adm:  2017       DATE OF SURGERY: 2017    PREOPERATIVE DIAGNOSIS: Heterogenous right renal mass. POSTOPERATIVE DIAGNOSIS: Heterogenous right renal mass. PROCEDURE PERFORMED: Right hand-assisted laparoscopic radical   nephrectomy. SURGEON: Mayra Collier MD    FIRST ASSISTANT: Jyoti Mcmanus DO    INDICATIONS FOR PROCEDURE: The patient is a 41-year-old black   male found to have a heterogenous 5.5 cm mass extending from the   lower pole centrally which would make a partial nephrectomy very   difficult. The patient's creatinine was such that we did not   have a contrasted study, but on ultrasound, this confirms this   to be a solid mass and even on the noncontrasted study this is a   heterogenous mass. Recommendation was for radical nephrectomy. Risks not exclusive of infection, bleeding, death, hernia,   visceral injury, renal failure with permanent dialysis, and the   fact that this lesion could prove to be benign, were discussed   in detail with the patient and his son. They wished to proceed. DESCRIPTION OF PROCEDURE: The patient received Ancef 2 grams IV   piggyback. General endotracheal anesthesia was obtained. The   patient was placed in the lateral position with the kidney rest   up, the table flexed. The patient was secured with a beanbag and   padding was placed in appropriate areas and an axillary roll   placed. The patient was taped securely to the table. A right lower quadrant incision was made with the scalpel. The   electrocautery unit was used to incise down to fascia. The   fascia was opened and the muscle layers divided. Peritoneum was   entered into. There were a few adhesions noted, but nothing in   the way. The GelPort was placed without difficulty.  Gas was then   placed through the GelPort and taken to 15 mmHg pressure. Two 12   mm ports were placed slightly medial and superior to the   GelPort. The camera was a superior port. The working element was   the inferior port. The Harmonic scalpel was utilized to take   down some of the adhesions. The white line of Toldt was taken   down and this was scored anteriorly and across the midline. The   liver was not a factor. The colon was mobilized medially. The   duodenum was never really in the way as such. The lower pole of   the kidney was freed up. The ureter was identified and divided   between metal clips. The investing tissues were taken down with   a Harmonic to further expose the lower pole and then we tracked   superior, identifying the renal vein. There was 1 main renal   vein. There was 1 main renal artery. Further dissection was   carried up superiorly. The adrenal was left intact. The Harmonic   was used to score inferior to the adrenal ultimately,   essentially all that was left was the hilum. A [place]   dissector was utilized to develop a plane between the renal   artery and the vein. The artery was divided between a vascular   load and there was no evidence of bleeding. The renal vein then   likewise was divided between a white vascular load and the   specimen was removed from the wound. The lesion was palpable in   the lower pole. There was no evidence of any lymphadenopathy or   obvious metastatic disease. The gas was taken down to 5 mm and   no bleeding was noted. The trocar sites were closed with the closure device passed   through the fascia superior and inferior to the incisions in the   fascia. This was accomplished bilaterally and under direct   vision with the camera. The GelPort then was removed. The   sutures to the port sites then were tied and then the incision   was closed in 2 layers with #1 double strand PDS. Marcaine,   approximately 25 mL, was used to infiltrate the incisions and   the trocar sites.  The staples were then used to reapproximate   skin bilaterally. BLOOD LOSS: Less than 25 mL. SPECIMENS TO PATHOLOGY: Right kidney. The patient was carried to PACU in good condition. It should be   noted a Caal catheter was placed into the bladder under sterile   conditions prior to initiation of the procedure.         MD [facility] / [place]   D:  12/07/2017   11:01   T:  12/08/2017   10:23   Job #:  [id number]

## 2017-12-08 NOTE — PROGRESS NOTES
Pt ambulated in galvan x 2 this shift. Up in chair for several hours. Now back in bed. Has not voided since begum was removed. Will continue to monitor. Has not passed gas or had BM today. Pain adequately controlled with po pain medication most of the day. Required 1 dose of IV dilaudid. Dressing to side dry and intact.       1800- Pt voided and had BM

## 2017-12-08 NOTE — ANESTHESIA POSTPROCEDURE EVALUATION
Post-Anesthesia Evaluation and Assessment    Patient: Nahun Fernández. MRN: [id number]  SSN: [id number]    YOB: 1950  Age: 75 y.o. Sex: male       Cardiovascular Function/Vital Signs  Visit Vitals    BP (!) 180/100 (BP 1 Location: Right arm, BP Patient Position: At rest)    Pulse 89    Temp 36.3 °C (97.3 °F)    Resp 15    Ht 5' 3\" (1.6 m)    Wt 59.6 kg (131 lb 8 oz)    SpO2 96%    BMI 23.29 kg/m2       Patient is status post general anesthesia for Procedure(s):  RIGHT RADICAL NEPHRECTOMY LAPAROSCOPIC HAND ASSISTED. Nausea/Vomiting: None    Postoperative hydration reviewed and adequate. Pain:  Pain Scale 1: Visual (12/07/17 1131)  Pain Intensity 1: 2 (12/07/17 1131)   Managed    Neurological Status:   Neuro (WDL): Within Defined Limits (12/07/17 1131)  Neuro  Neurologic State: Alert (12/07/17 1230)  LUE Motor Response: Purposeful (12/07/17 1032)  LLE Motor Response: Purposeful (12/07/17 1032)  RUE Motor Response: Purposeful (12/07/17 1032)  RLE Motor Response: Purposeful (12/07/17 1032)   At baseline    Mental Status and Level of Consciousness: Arousable    Pulmonary Status:   O2 Device: Nasal cannula (12/07/17 1435)   Adequate oxygenation and airway patent    Complications related to anesthesia: None    Post-anesthesia assessment completed.  No concerns    Signed By: Yelitza Humphreys MD     December 7, 2017

## 2017-12-09 LAB
GLUCOSE BLD STRIP.AUTO-MCNC: 122 MG/DL (ref 65–100)
GLUCOSE BLD STRIP.AUTO-MCNC: 131 MG/DL (ref 65–100)
GLUCOSE BLD STRIP.AUTO-MCNC: 145 MG/DL (ref 65–100)
GLUCOSE BLD STRIP.AUTO-MCNC: 155 MG/DL (ref 65–100)

## 2017-12-09 PROCEDURE — 74011250636 HC RX REV CODE- 250/636: Performed by: NURSE PRACTITIONER

## 2017-12-09 PROCEDURE — 74011250636 HC RX REV CODE- 250/636: Performed by: UROLOGY

## 2017-12-09 PROCEDURE — 74011250637 HC RX REV CODE- 250/637: Performed by: NURSE PRACTITIONER

## 2017-12-09 PROCEDURE — 65270000029 HC RM PRIVATE

## 2017-12-09 PROCEDURE — 82962 GLUCOSE BLOOD TEST: CPT

## 2017-12-09 PROCEDURE — 74011636637 HC RX REV CODE- 636/637: Performed by: NURSE PRACTITIONER

## 2017-12-09 PROCEDURE — 74011250637 HC RX REV CODE- 250/637: Performed by: UROLOGY

## 2017-12-09 RX ADMIN — CLOPIDOGREL BISULFATE 75 MG: 75 TABLET ORAL at 09:12

## 2017-12-09 RX ADMIN — Medication 5 ML: at 21:52

## 2017-12-09 RX ADMIN — FUROSEMIDE 40 MG: 40 TABLET ORAL at 21:52

## 2017-12-09 RX ADMIN — INSULIN LISPRO 2 UNITS: 100 INJECTION, SOLUTION INTRAVENOUS; SUBCUTANEOUS at 22:00

## 2017-12-09 RX ADMIN — CARVEDILOL 12.5 MG: 12.5 TABLET, FILM COATED ORAL at 09:12

## 2017-12-09 RX ADMIN — Medication 10 ML: at 15:53

## 2017-12-09 RX ADMIN — ATORVASTATIN CALCIUM 40 MG: 40 TABLET, FILM COATED ORAL at 21:52

## 2017-12-09 RX ADMIN — BISACODYL 10 MG: 10 SUPPOSITORY RECTAL at 09:13

## 2017-12-09 RX ADMIN — Medication 10 ML: at 06:01

## 2017-12-09 RX ADMIN — SODIUM CHLORIDE 75 ML/HR: 900 INJECTION, SOLUTION INTRAVENOUS at 09:13

## 2017-12-09 RX ADMIN — LEVOTHYROXINE SODIUM 50 MCG: 50 TABLET ORAL at 06:01

## 2017-12-09 RX ADMIN — CARVEDILOL 12.5 MG: 12.5 TABLET, FILM COATED ORAL at 17:07

## 2017-12-09 RX ADMIN — HYDRALAZINE HYDROCHLORIDE 10 MG: 20 INJECTION INTRAMUSCULAR; INTRAVENOUS at 19:33

## 2017-12-09 RX ADMIN — LISINOPRIL 10 MG: 5 TABLET ORAL at 21:52

## 2017-12-09 NOTE — PROGRESS NOTES
Hourly rounds performed; all pt needs met. Pt states he is feeling much better since he has passed flatus and had a BM; frequent ambulation encouraged. Slept throughout night without complaints; voiding WNL. Bed L/L, SR up x2, call light/ personal items within reach. Bedside shift report to Mortimer Maple, RN.

## 2017-12-09 NOTE — PROGRESS NOTES
Subjective:   Daily Progress Note: 2017 3:50 PM  No Complaints, has had BM, tolerating clear liquids well. Objective:     Visit Vitals    /82    Pulse 88    Temp 99.3 °F (37.4 °C)    Resp 18    Ht 5' 3\" (1.6 m)    Wt 133 lb 3.2 oz (60.4 kg)    SpO2 94%    BMI 23.6 kg/m2    O2 Flow Rate (L/min): 2 l/min O2 Device: Room air    Temp (24hrs), Av.8 °F (37.1 °C), Min:98 °F (36.7 °C), Max:99.7 °F (37.6 °C)      1901 - 700  In: 3377 [P.O.:780; I.V.:2597]  Out: 2622 [Urine:1200]  701 - 1900  In: -   Out: 3162 [OPMQS:7696]    [unfilled]  [unfilled]  [unfilled]    Exam: abdomen soft, non tender. Data Review    Recent Results (from the past 24 hour(s))   GLUCOSE, POC    Collection Time: 17  4:14 PM   Result Value Ref Range    Glucose (POC) 198 (H) 65 - 100 mg/dL   GLUCOSE, POC    Collection Time: 17  7:54 PM   Result Value Ref Range    Glucose (POC) 183 (H) 65 - 100 mg/dL   GLUCOSE, POC    Collection Time: 17  7:21 AM   Result Value Ref Range    Glucose (POC) 122 (H) 65 - 100 mg/dL   GLUCOSE, POC    Collection Time: 17 11:38 AM   Result Value Ref Range    Glucose (POC) 131 (H) 65 - 100 mg/dL       Assessment   Active Problems:    Renal mass (10/13/2017)      Overview: Right        Plan: Will advance to regular diet.

## 2017-12-09 NOTE — PROGRESS NOTES
Hourly rounds completed and continuing to monitor the patient. All needs were met and patient is stable. Pt had a BM and is passing gas.

## 2017-12-10 LAB
ABO + RH BLD: NORMAL
BLD PROD TYP BPU: NORMAL
BLD PROD TYP BPU: NORMAL
BLOOD GROUP ANTIBODIES SERPL: NORMAL
BPU ID: NORMAL
BPU ID: NORMAL
CROSSMATCH RESULT,%XM: NORMAL
CROSSMATCH RESULT,%XM: NORMAL
GLUCOSE BLD STRIP.AUTO-MCNC: 120 MG/DL (ref 65–100)
GLUCOSE BLD STRIP.AUTO-MCNC: 168 MG/DL (ref 65–100)
GLUCOSE BLD STRIP.AUTO-MCNC: 172 MG/DL (ref 65–100)
GLUCOSE BLD STRIP.AUTO-MCNC: 175 MG/DL (ref 65–100)
SPECIMEN EXP DATE BLD: NORMAL
STATUS OF UNIT,%ST: NORMAL
STATUS OF UNIT,%ST: NORMAL
UNIT DIVISION, %UDIV: 0
UNIT DIVISION, %UDIV: 0

## 2017-12-10 PROCEDURE — 74011636637 HC RX REV CODE- 636/637: Performed by: NURSE PRACTITIONER

## 2017-12-10 PROCEDURE — 74011250637 HC RX REV CODE- 250/637: Performed by: INTERNAL MEDICINE

## 2017-12-10 PROCEDURE — 74011250637 HC RX REV CODE- 250/637: Performed by: UROLOGY

## 2017-12-10 PROCEDURE — 74011250636 HC RX REV CODE- 250/636: Performed by: UROLOGY

## 2017-12-10 PROCEDURE — 74011250637 HC RX REV CODE- 250/637: Performed by: NURSE PRACTITIONER

## 2017-12-10 PROCEDURE — 82962 GLUCOSE BLOOD TEST: CPT

## 2017-12-10 PROCEDURE — 65270000029 HC RM PRIVATE

## 2017-12-10 RX ORDER — LISINOPRIL 20 MG/1
20 TABLET ORAL 2 TIMES DAILY
Status: DISCONTINUED | OUTPATIENT
Start: 2017-12-10 | End: 2017-12-11 | Stop reason: HOSPADM

## 2017-12-10 RX ORDER — NIFEDIPINE 10 MG/1
20 CAPSULE ORAL EVERY 8 HOURS
Status: DISCONTINUED | OUTPATIENT
Start: 2017-12-10 | End: 2017-12-10

## 2017-12-10 RX ORDER — NIFEDIPINE 10 MG/1
10 CAPSULE ORAL EVERY 8 HOURS
Status: DISCONTINUED | OUTPATIENT
Start: 2017-12-10 | End: 2017-12-11 | Stop reason: HOSPADM

## 2017-12-10 RX ADMIN — INSULIN LISPRO 2 UNITS: 100 INJECTION, SOLUTION INTRAVENOUS; SUBCUTANEOUS at 17:00

## 2017-12-10 RX ADMIN — CARVEDILOL 12.5 MG: 12.5 TABLET, FILM COATED ORAL at 17:03

## 2017-12-10 RX ADMIN — INSULIN LISPRO 2 UNITS: 100 INJECTION, SOLUTION INTRAVENOUS; SUBCUTANEOUS at 12:06

## 2017-12-10 RX ADMIN — Medication 10 ML: at 21:59

## 2017-12-10 RX ADMIN — Medication 10 ML: at 05:23

## 2017-12-10 RX ADMIN — LEVOTHYROXINE SODIUM 50 MCG: 50 TABLET ORAL at 05:23

## 2017-12-10 RX ADMIN — INSULIN LISPRO 2 UNITS: 100 INJECTION, SOLUTION INTRAVENOUS; SUBCUTANEOUS at 21:59

## 2017-12-10 RX ADMIN — HYDRALAZINE HYDROCHLORIDE 10 MG: 20 INJECTION INTRAMUSCULAR; INTRAVENOUS at 02:03

## 2017-12-10 RX ADMIN — NIFEDIPINE 10 MG: 10 CAPSULE, LIQUID FILLED ORAL at 21:58

## 2017-12-10 RX ADMIN — LISINOPRIL 20 MG: 20 TABLET ORAL at 15:01

## 2017-12-10 RX ADMIN — LISINOPRIL 20 MG: 20 TABLET ORAL at 21:59

## 2017-12-10 RX ADMIN — CLOPIDOGREL BISULFATE 75 MG: 75 TABLET ORAL at 09:41

## 2017-12-10 RX ADMIN — CARVEDILOL 12.5 MG: 12.5 TABLET, FILM COATED ORAL at 09:41

## 2017-12-10 RX ADMIN — BISACODYL 10 MG: 10 SUPPOSITORY RECTAL at 15:05

## 2017-12-10 RX ADMIN — Medication 10 ML: at 15:05

## 2017-12-10 RX ADMIN — ATORVASTATIN CALCIUM 40 MG: 40 TABLET, FILM COATED ORAL at 21:59

## 2017-12-10 RX ADMIN — FUROSEMIDE 40 MG: 40 TABLET ORAL at 21:59

## 2017-12-10 RX ADMIN — NIFEDIPINE 10 MG: 10 CAPSULE, LIQUID FILLED ORAL at 15:00

## 2017-12-10 NOTE — PROGRESS NOTES
Hourly rounds completed and continuing to monitor the patient. All needs were met and patient is stable. Procardia and Lisinopril were given and bp has been WDL.

## 2017-12-10 NOTE — PROGRESS NOTES
Subjective:   Daily Progress Note: 12/10/2017 11:21 AM  No Complaints  Objective:     Visit Vitals    BP (!) 190/92    Pulse 86    Temp 98.3 °F (36.8 °C)    Resp 18    Ht 5' 3\" (1.6 m)    Wt 133 lb 3.2 oz (60.4 kg)    SpO2 93%    BMI 23.6 kg/m2    O2 Flow Rate (L/min): 2 l/min O2 Device: Room air    Temp (24hrs), Av.8 °F (37.1 °C), Min:98.2 °F (36.8 °C), Max:100.1 °F (37.8 °C)      1901 - 12/10 0700  In: 9415 [I.V.:3482]  Out: 0167 [Urine:3050]  12/10 0701 - 12/10 1900  In: -   Out: 220 [Urine:220]    [unfilled]  [unfilled]  [unfilled]    Exam: abdomen soft, nontender. Data Review    Recent Results (from the past 24 hour(s))   GLUCOSE, POC    Collection Time: 17 11:38 AM   Result Value Ref Range    Glucose (POC) 131 (H) 65 - 100 mg/dL   GLUCOSE, POC    Collection Time: 17  4:46 PM   Result Value Ref Range    Glucose (POC) 145 (H) 65 - 100 mg/dL   GLUCOSE, POC    Collection Time: 17  8:31 PM   Result Value Ref Range    Glucose (POC) 155 (H) 65 - 100 mg/dL   GLUCOSE, POC    Collection Time: 12/10/17  7:32 AM   Result Value Ref Range    Glucose (POC) 120 (H) 65 - 100 mg/dL       Assessment   Principal Problem:    Renal mass (10/13/2017)      Overview: Right        Plan:  SBP has been in 190's , despite home BP meds. Will consult hospitalist. Not in pain.

## 2017-12-10 NOTE — CONSULTS
HOSPITALIST CONSULT  NAME:  Erick Carranza Sr.   Age:  75 y.o.  :   1950   MRN:   [id number]  PCP: Stanton Henriquez MD  Consulting MD:    Treatment Team: Attending Provider: Ariel Olvera MD; Utilization Review: Huyen Villanueva; Consulting Provider: Lisa Marie MD    ADMISSION DIAGNOSIS: nephrectomy  REASON FOR CONSULT: HTN     HPI:   Patient is a 74yr old male with pmhx CHF, htn, hypothyroidism, foot drop rt renal mass s/p nephrectomy. BP uncontrolled here with sbp in the 190's  - hospitalist asked to see. Pt not on a low salt diet. Complete ROS done and is as stated in HPI or otherwise negative  Past Medical History:   Diagnosis Date    [facility]     CHF (congestive heart failure) ([facility] [facility] 10/12/2017    Coronary artery disease involving native coronary artery of native heart without angina pectoris 10/25/2017    followed by [address] lal, left     Former cigarette smoker     HTN (hypertension) 2010    controlled well with meds    Hypothyroidism     currently taking Synthroid    Long term current use of anticoagulant     Plavix    Right renal mass     Sickle cell trait (HCC)     Type 2 diabetes mellitus (HCC)     oral and Victoza/ AVG BS: 180/no s.s of hypoglycemia/ Lat A1c 9.0      Past Surgical History:   Procedure Laterality Date    HX APPENDECTOMY        Prior to Admission Medications   Prescriptions Last Dose Informant Patient Reported? Taking? Liraglutide (VICTOZA) 0.6 mg/0.1 mL (18 mg/3 mL) pnij 2017 at Unknown time  No Yes   Si.2 mg by SubCUTAneous route daily. UNITHROID 50 mcg tablet 2017 at Unknown time  No Yes   Sig: Take 1 Tab by mouth Daily (before breakfast). atorvastatin (LIPITOR) 40 mg tablet 2017 at Unknown time  Yes Yes   Sig: Take 40 mg by mouth nightly. carvedilol (COREG) 12.5 mg tablet 2017 at Unknown time  No Yes   Sig: Take 1 Tab by mouth two (2) times daily (with meals).    clopidogrel (PLAVIX) 75 mg tab 2017  No No   Sig: Take 1 Tab by mouth daily. Patient taking differently: Take 75 mg by mouth daily. Pt last dose 17   dapagliflozin (FARXIGA) 10 mg tab tablet 2017 at Unknown time  No Yes   Sig: Take 1 Tab by mouth daily. furosemide (LASIX) 40 mg tablet 2017 at Unknown time  Yes Yes   Sig: Take 40 mg by mouth nightly. lisinopril (PRINIVIL, ZESTRIL) 10 mg tablet 2017 at Unknown time  Yes Yes   Sig: Take 10 mg by mouth nightly. Facility-Administered Medications: None     Allergies   Allergen Reactions    Aspirin Nausea and Vomiting      Social History   Substance Use Topics    Smoking status: Former Smoker     Packs/day: 1.00     Years: 40.00     Quit date:     Smokeless tobacco: Never Used    Alcohol use Yes      Comment: socially      Family History   Problem Relation Age of Onset    Diabetes Other     Cancer Mother      Ovarian CA    Heart Disease Mother     Heart Disease Father     Sickle Cell Anemia Brother     Heart Disease Brother       Objective:     Visit Vitals    BP (!) 184/92    Pulse 75    Temp 98.4 °F (36.9 °C)    Resp 16    Ht 5' 3\" (1.6 m)    Wt 57.9 kg (127 lb 9.6 oz)    SpO2 98%    BMI 22.6 kg/m2      Temp (24hrs), Av.7 °F (37.1 °C), Min:98.2 °F (36.8 °C), Max:100.1 °F (37.8 °C)    Oxygen Therapy  O2 Sat (%): 98 % (12/10/17 1152)  Pulse via Oximetry: 61 beats per minute (17 1131)  O2 Device: Room air (17 1348)  O2 Flow Rate (L/min): 2 l/min (17 1435)  Physical Exam:  General:    Alert, cooperative, no distress, appears stated age. Head:   Normocephalic, without obvious abnormality, atraumatic. Nose:  Nares normal. No drainage or sinus tenderness. Lungs:   Clear to auscultation bilaterally. No Wheezing or Rhonchi. No rales. Heart:   Regular rate and rhythm,  no murmur, rub or gallop. Abdomen:   Soft, tender over belly from the scars. Not distended. Bowel sounds normal.   Extremities: No cyanosis.   No edema. No clubbing  Skin:     Texture, turgor normal. No rashes or lesions. Not Jaundiced  Neurologic: Alert and oriented x 3,  left foot drop  Data Review: personally by me   Recent Results (from the past 24 hour(s))   GLUCOSE, POC    Collection Time: 12/09/17  4:46 PM   Result Value Ref Range    Glucose (POC) 145 (H) 65 - 100 mg/dL   GLUCOSE, POC    Collection Time: 12/09/17  8:31 PM   Result Value Ref Range    Glucose (POC) 155 (H) 65 - 100 mg/dL   GLUCOSE, POC    Collection Time: 12/10/17  7:32 AM   Result Value Ref Range    Glucose (POC) 120 (H) 65 - 100 mg/dL   GLUCOSE, POC    Collection Time: 12/10/17 11:23 AM   Result Value Ref Range    Glucose (POC) 168 (H) 65 - 100 mg/dL     Imaging /Procedures /Studies reviewed personally by me  XR Results (most recent):    Results from Hospital Encounter encounter on 10/12/17   XR CHEST PORT   Narrative Examination: Chest, portable AP view    History: Shortness of breath      Comparison: 7/22/2010    Findings:    Cardiac silhouette is normal in size. There is asymmetrically increased opacity  in the right lung base. No sizable pleural effusion or pneumothorax. Impression Impression:    Asymmetrically increased opacity in the right lung base raises suspicion for  pneumonia. CT Scan    Results from Hospital Encounter encounter on 10/12/17   CT ABD PELV WO CONT   Narrative NONCONTRAST CT ABDOMEN AND PELVIS:     CLINICAL HISTORY:  Follow-up right renal mass from chest CT yesterday. TECHNIQUE:  Without contrast administration, the abdomen and pelvis were scanned  with spiral technique. COMPARISON:  Abdominal ultrasound today and chest CT yesterday. FINDINGS: There are small bilateral pleural effusions as well as focal  atelectasis/infiltrate posteriorly at the right lung base. Small hiatal hernia. There is a 5.5 cm mass at the lower pole right kidney which is very suspicious  for renal cell carcinoma.   Calcifications elsewhere the right kidney may  represent nonobstructing stones, nephrocalcinosis, and/or renal arterial  atherosclerotic calcifications. No similar calcifications are seen on the left. There is a 2.4 cm infrarenal abdominal aortic aneurysm. The liver, spleen,  pancreas, and adrenals appear unremarkable without contrast. The appendix is not  confidently identified, but there are no secondary signs of appendicitis. No  pathologically enlarged lymph nodes or free fluid is evident. Bone windows  demonstrate no aggressive process accounting for scattered degenerative changes. Impression IMPRESSION:      1.  A 5.5 CM RIGHT LOWER POLE RENAL MASS IS VERY LIKELY NEOPLASTIC, AND RENAL  CELL CARCINOMA IS FAVORED. NO ABDOMINOPELVIC METASTATIC DISEASE IS IDENTIFIED. 2.  CALCIFICATIONS ELSEWHERE IN THE RIGHT KIDNEY ARE ASYMMETRIC COMPARED WITH  THE LEFT WITH DIFFERENTIAL DIAGNOSES AS ABOVE. 3.  ATHEROSCLEROSIS WITH A 2.4 CM INFRARENAL ABDOMINAL AORTIC ANEURYSM.    4.  SMALL BILATERAL PLEURAL EFFUSIONS WITH ADJACENT RIGHT BASILAR  ATELECTASIS/INFILTRATE. Assessment and Plan:      Active Hospital Problems    Diagnosis Date Noted    Renal mass 10/13/2017     Right         PLAN  ·  HTN- will increase lisinopril to 20mg qhs, start low dose nifedipine, start low salt diabetic diet,continue prn meds  · Further workup and mgt as his clinical course dictates  · Will follow with you       Signed By: Jose David Driscoll MD     December 10, 2017

## 2017-12-10 NOTE — PROGRESS NOTES
Hourly rounds performed; all pt needs met. Pt slept throughout night without complaint. Passed flatus/ BM x1; voiding WNL; PRN Hydralazine administered per MAR. Bed L/L, SR up x2, call light/ personal items within reach. Bedside shift report to Haylie Mckay RN.

## 2017-12-11 VITALS
DIASTOLIC BLOOD PRESSURE: 71 MMHG | TEMPERATURE: 98.2 F | WEIGHT: 127.6 LBS | RESPIRATION RATE: 20 BRPM | HEIGHT: 63 IN | SYSTOLIC BLOOD PRESSURE: 117 MMHG | OXYGEN SATURATION: 95 % | HEART RATE: 91 BPM | BODY MASS INDEX: 22.61 KG/M2

## 2017-12-11 LAB — GLUCOSE BLD STRIP.AUTO-MCNC: 159 MG/DL (ref 65–100)

## 2017-12-11 PROCEDURE — 74011250637 HC RX REV CODE- 250/637: Performed by: UROLOGY

## 2017-12-11 PROCEDURE — 74011636637 HC RX REV CODE- 636/637: Performed by: NURSE PRACTITIONER

## 2017-12-11 PROCEDURE — 74011250637 HC RX REV CODE- 250/637: Performed by: NURSE PRACTITIONER

## 2017-12-11 PROCEDURE — 82962 GLUCOSE BLOOD TEST: CPT

## 2017-12-11 PROCEDURE — 74011250637 HC RX REV CODE- 250/637: Performed by: INTERNAL MEDICINE

## 2017-12-11 RX ORDER — HYDROCODONE BITARTRATE AND ACETAMINOPHEN 10; 325 MG/1; MG/1
1 TABLET ORAL
Qty: 20 TAB | Refills: 0 | Status: SHIPPED | OUTPATIENT
Start: 2017-12-11 | End: 2018-03-02

## 2017-12-11 RX ORDER — LISINOPRIL 20 MG/1
20 TABLET ORAL
Qty: 30 TAB | Refills: 11 | Status: SHIPPED | OUTPATIENT
Start: 2017-12-11 | End: 2018-01-08 | Stop reason: SDUPTHER

## 2017-12-11 RX ORDER — NIFEDIPINE 10 MG/1
10 CAPSULE ORAL EVERY 8 HOURS
Qty: 90 CAP | Refills: 11 | Status: SHIPPED | OUTPATIENT
Start: 2017-12-11 | End: 2018-01-08

## 2017-12-11 RX ADMIN — CLOPIDOGREL BISULFATE 75 MG: 75 TABLET ORAL at 08:49

## 2017-12-11 RX ADMIN — NIFEDIPINE 10 MG: 10 CAPSULE, LIQUID FILLED ORAL at 06:26

## 2017-12-11 RX ADMIN — LEVOTHYROXINE SODIUM 50 MCG: 50 TABLET ORAL at 06:26

## 2017-12-11 RX ADMIN — INSULIN LISPRO 2 UNITS: 100 INJECTION, SOLUTION INTRAVENOUS; SUBCUTANEOUS at 08:49

## 2017-12-11 RX ADMIN — Medication 10 ML: at 06:26

## 2017-12-11 RX ADMIN — LISINOPRIL 20 MG: 20 TABLET ORAL at 08:49

## 2017-12-11 RX ADMIN — CARVEDILOL 12.5 MG: 12.5 TABLET, FILM COATED ORAL at 08:49

## 2017-12-11 NOTE — PROGRESS NOTES
Hourly rounding complete. No complaints of pain or discomfort. Bedside report given to Jurgen Saini RN.

## 2017-12-11 NOTE — ADT AUTH CERT NOTES
LOC:Acute Adult-General Surgical (12/10/2017) by Alex Mcnamara        Review Entered Review Status       12/11/2017 In Primary       Details         REVIEW SUMMARY     Patient: TRISTON BRUNSON SR. Review Number: [id number]  Review Status: In Primary     Condition Specific: Yes     Condition Level Of Care Code: ACUTE  Condition Level Of Care Description: Acute        OUTCOMES  Outcome Type: Primary           REVIEW DETAILS     Service Date: 12/10/2017  Admit Date: 12/07/2017  Product: Fariha Rivas Adult  Subset: General Surgical      (Symptom or finding within 24h)         (Excludes PO medications unless noted)          [X] Select Day, One:              [X] Post-op Day 3, One:                  [X] ACUTE, One:                      [X] Moderate stay surgery, One:                          [X] Partial responder, not clinically stable for discharge and requires continued stay, >= One:                              [X] Inadequate oral intake <= 3d, One:                                  [X] Advancing diet as tolerated                                  ~--Admin, IQ Admin Admin on 12- 08:58 AM--~                                  Daily Progress Note: 12/10/2017 11:21 AM                                  No Complaints                                  T 98.3, /92, P 86, R 18, 02 SAT 93% RA                                      Plan:  SBP has been in 190's , despite home BP meds. Will consult hospitalist. Not in pain.                                      HOSPITALIST CONSULT                                  Patient is a 74yr old male with pmhx CHF, htn, hypothyroidism, foot drop rt renal mass s/p nephrectomy. BP uncontrolled here with sbp in the 190's  - hospitalist asked to see. Pt not on a low salt diet.                                   PLAN                                  ·                 HTN- will increase lisinopril to 20mg qhs, start low dose nifedipine, start low salt diabetic diet,continue prn meds ·                Further workup and mgt as his clinical course dictates                                  ·                Will follow with you                                           LISINOPRIL 20 MG PO BID, NIFEDIPINE PO Q 8 HRS, OTHER MEDS ARE SAME                     Version: InterQual® 2016. 3  InterQual® and [employer]  © [employer] and/or one of its [place]. All Rights Reserved. CPT only © 2015 American Medical Association. All Rights Reserved.                  LOC:Acute Adult-General Surgical (12/9/2017) by She Capps        Review Entered Review Status       12/11/2017 In Primary       Details         REVIEW SUMMARY     Patient: TRISTON BRUNSON SR. Review Number: [id number]  Review Status:  In Primary     Condition Specific: Yes     Condition Level Of Care Code: ACUTE  Condition Level Of Care Description: Acute        OUTCOMES  Outcome Type: Primary           REVIEW DETAILS     Service Date: 12/09/2017  Admit Date: 12/07/2017  Product: Reza Lama Adult  Subset: General Surgical      (Symptom or finding within 24h)         (Excludes PO medications unless noted)          [X] Select Day, One:              [X] Post-op Day 2, One:                  [X] ACUTE, One:                      [X] Moderate stay surgery, One:                          [X] Partial responder, not clinically stable for discharge and requires continued stay, >= One:                              [X] Requiring continued post-op care, >= One:                                  [X] Inadequate oral intake, One:                                      [X] Advancing diet as tolerated                                      ~--Admin, IQ Admin Admin on 12- 08:54 AM--~                                      OPERATIVE REPORT                                        DATE OF SURGERY: 12/07/2017                                          PREOPERATIVE DIAGNOSIS: Heterogenous right renal mass.     POSTOPERATIVE DIAGNOSIS: Heterogenous right renal mass.                                          PROCEDURE PERFORMED: Right hand-assisted laparoscopic radical nephrectomy.                                         INDICATIONS FOR PROCEDURE: The patient is a 25-year-old black male found to have a heterogenous 5.5 cm mass extending from the lower pole centrally which would make a partial nephrectomy very difficult. The patient's creatinine was such that we did not have a contrasted study, but on ultrasound, this confirms this to be a solid mass and even on the noncontrasted study this is a heterogenous mass. Recommendation was for radical nephrectomy.                                             PROGRESS NOTE 12/9/17:                                          No Complaints, has had BM, tolerating clear liquids well. T 99.3., /82, P 88. R 18. SAT 94% 2L NC                                      Exam: abdomen soft, non tender. POC GLUC  122-145                                      Assessment                                      Active Problems:                                        Renal mass (10/13/2017)                                          Overview: Right                                                  Plan: Will advance to regular diet.                                               COREG PO BID, LASIX PO Q HS, HYDRALAZINE IV Q 6 HRS PRN X 1 (2X/24 HRS), SSI SC AC &HS, IV FLS 75 ML/HR, DIET DIABETIC WITH OPTIONS Consistent Carb, AMBULATES TO BATHROOM                     Version: InterQual® 2016. 3  InterQual® and [employer]  © [employer] and/or one of its [place]. All Rights Reserved. CPT only © 2015 American Medical Association. All Rights Reserved.

## 2017-12-11 NOTE — PROGRESS NOTES
Feels well  AFVSS  Abdomen flat--incisions look good  Path Grade II RCC--no capsular penetration  Imp: Doing well  Plan: Discharge            For staple removal --FRIDAY

## 2017-12-11 NOTE — DISCHARGE INSTRUCTIONS
DISCHARGE SUMMARY from Nurse    PATIENT INSTRUCTIONS:    After general anesthesia or intravenous sedation, for 24 hours or while taking prescription Narcotics:  · Limit your activities  · Do not drive and operate hazardous machinery  · Do not make important personal or business decisions  · Do  not drink alcoholic beverages  · If you have not urinated within 8 hours after discharge, please contact your surgeon on call. Report the following to your surgeon:  · Excessive pain, swelling, redness or odor of or around the surgical area  · Temperature over 100.5  · Nausea and vomiting lasting longer than 4 hours or if unable to take medications  · Any signs of decreased circulation or nerve impairment to extremity: change in color, persistent  numbness, tingling, coldness or increase pain  · Any questions    What to do at Home:  Recommended activity: Activity as tolerated,     If you experience any of the following symptoms fever, chills, new or unrelieved pain, persistent nausea or vomiting, complications at incision site (redness, warmth, swelling, or pus like drainage), please follow up with Dr. Javy Wellington. *  Please give a list of your current medications to your Primary Care Provider. *  Please update this list whenever your medications are discontinued, doses are      changed, or new medications (including over-the-counter products) are added. *  Please carry medication information at all times in case of emergency situations. These are general instructions for a healthy lifestyle:    No smoking/ No tobacco products/ Avoid exposure to second hand smoke  Surgeon General's Warning:  Quitting smoking now greatly reduces serious risk to your health.     Obesity, smoking, and sedentary lifestyle greatly increases your risk for illness    A healthy diet, regular physical exercise & weight monitoring are important for maintaining a healthy lifestyle    You may be retaining fluid if you have a history of heart failure or if you experience any of the following symptoms:  Weight gain of 3 pounds or more overnight or 5 pounds in a week, increased swelling in our hands or feet or shortness of breath while lying flat in bed. Please call your doctor as soon as you notice any of these symptoms; do not wait until your next office visit. Recognize signs and symptoms of STROKE:    F-face looks uneven    A-arms unable to move or move unevenly    S-speech slurred or non-existent    T-time-call 911 as soon as signs and symptoms begin-DO NOT go       Back to bed or wait to see if you get better-TIME IS BRAIN. Warning Signs of HEART ATTACK     Call 911 if you have these symptoms:   Chest discomfort. Most heart attacks involve discomfort in the center of the chest that lasts more than a few minutes, or that goes away and comes back. It can feel like uncomfortable pressure, squeezing, fullness, or pain.  Discomfort in other areas of the upper body. Symptoms can include pain or discomfort in one or both arms, the back, neck, jaw, or stomach.  Shortness of breath with or without chest discomfort.  Other signs may include breaking out in a cold sweat, nausea, or lightheadedness. Don't wait more than five minutes to call 911 - MINUTES MATTER! Fast action can save your life. Calling 911 is almost always the fastest way to get lifesaving treatment. Emergency Medical Services staff can begin treatment when they arrive -- up to an hour sooner than if someone gets to the hospital by car. The discharge information has been reviewed with the patient. The patient verbalized understanding. Discharge medications reviewed with the patient and appropriate educational materials and side effects teaching were provided.   ___________________________________________________________________________________________________________________________________

## 2017-12-12 ENCOUNTER — PATIENT OUTREACH (OUTPATIENT)
Dept: CASE MANAGEMENT | Age: 67
End: 2017-12-12

## 2017-12-13 NOTE — PROGRESS NOTES
Transition of Care Discharge Follow-up Questionnaire   Date/Time of Call:   12/12/2017 1:00 PM   What was the patient hospitalized for? Patient was hospitalized for Renal Mass   Does the patient understand his/her diagnosis and/or treatment and what happened during the hospitalization? Spoke to patient who verbalized understanding of treatment and diagnosis. Did the patient receive discharge instructions? Patient states d/c instructions received. Review any discharge instructions (see notes in [facility]). Ask patient if they understand these. Do they have any questions? Patient discussed discharge plan and instruction as Patient understood it to be with Care Coordinator. Which I have documented in the pertinent areas throughout this progress note. Were home services ordered (nursing, PT, OT, ST, etc.)? No HH ordered at d/c. If so, has the first visit occurred? If not, why? (Assist with coordination of services if necessary.) N/A   Was any DME ordered? No DME  ordered at d/c. If so, has it been received? If not, why?  (Assist with coordination of arranging DME orders if necessary.) N/A   Complete a review of all medications (new, continued and discontinued meds per the D/C instructions and medication tab in [address] [place] [address]). All meds reviewed with Care Coordinator and Patient. Were all new prescriptions filled? If not, why?  (Assist with obtainment of medications if necessary.) N/A   Does the patient understand the purpose and dosing instructions for all medications? (If patient has questions, provide explanation and education.) Indicated by Patient to Care Coordinator, the purpose and dosing instructions for all medications were understood. Does the patient have any problems in performing ADLs? (If patient is unable to perform ADLs  what is the limiting factor(s)?   Do they have a support system that can assist? If no support system is present, discuss possible assistance that they may be able to obtain.) Patient states he is independent with all ADLs. Does the patient have all follow-up appointments scheduled? 7 day f/up with PCP?    7-14 day f/up with specialist?    If f/up has not been made  what actions has the care coordinator made to accomplish this? Has transportation been arranged? [facility]monary follow-up should be within 7 days of discharge; all others should have PCP follow-up within 7 days of discharge; follow-ups with other specialists as appropriate or ordered.) PCP f/u and Urology f/u are both on 12/15 at different times per patient. Patient denies need for transportation. Any other questions or concerns expressed by the patient? Gratitude stated and no further questions asked or needs identified. JED Call Completed By: Lucero Li LPN  [facility] [address] [facility] Coordinator          This note will not be viewable in [address]

## 2017-12-15 NOTE — DISCHARGE SUMMARY
Taqueria Nguyen , Sheila Xiong  MR#: [id number]  : 1950  ACCOUNT #: [de-identified]   ADMIT DATE: 2017  DISCHARGE DATE: 2017    DISCHARGE DIAGNOSES:    1. Right renal cell carcinoma. 2.  Abdominal aortic aneurysm. 3.  Congestive heart failure. 4.  Coronary artery disease. 5.  Diabetes mellitus. 6.  Hypertension. 7.  Hypothyroidism. 8.  Dyslipidemia. HISTORY OF PRESENT ILLNESS:  The patient is a 68-year-old black male who was found to have a heterogeneous 5.5 cm right lower pole mass extending centrally. This was confirmed to be solid on ultrasound. The patient's creatinine was somewhat elevated whereas I decided not to do a contrasted study but, based on the appearance on noncontrasted CT and ultrasound, I recommended hand-assisted laparoscopic right radical nephrectomy. Risks, benefits, and alternatives were fully discussed with the patient, who stated that he understood and wished to proceed. The patient was cleared by Cardiology for the procedure. The patient denied any hematuria, weight loss, flank pain, night sweats. PAST MEDICAL HISTORY:  Significant, again, for congestive heart failure, coronary disease, diabetes, hypertension, and dyslipidemia. PAST SURGICAL HISTORY:  Include an appendectomy. MEDICATIONS:  Prior to admission were Plavix 75 mg daily (which was held for a week), Lasix 40 mg daily, Coreg 12.5 mg b.i.d., Prinivil 10 mg daily, Victoza 1.2 mg subcu, insulin, Synthroid 50 mcg daily, Farxiga 10 mg daily, Lipitor 40 mg daily. ALLERGIES:  ASPIRIN. SOCIAL HISTORY:  The patient is . He stopped smoking in . The patient uses alcohol occasionally. FAMILY HISTORY:  Positive for diabetes. REVIEW OF SYSTEMS:  Negative for current chest pain, shortness of breath, abdominal pain.     PHYSICAL EXAMINATION:   GENERAL:  Revealed a thin, alert, oriented, pleasant black male in no apparent distress. CHEST:  Clear to auscultation. HEART:  Revealed no murmurs. ABDOMEN:  Unremarkable. HOSPITAL COURSE:  The patient underwent an uneventful hand-assisted laparoscopic right radical nephrectomy. He had quick return of bowel function. He did well while hospitalized. His postoperative creatinine was 1.36. 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 The plans are to discharge the patient on 12/11/2017. He will follow up later in the week for staple removal.  He has limited activity for a month. He is discharged on his usual diabetic diet. He is discharged on his admission medications along with nifedipine 10 mg t.i.d., which was recommended by the hospitalist as he had some hypertension issues. He will need no obvious other therapy, but will need followup, which was discussed with the patient. DISPOSITION:  [place]      MD JOSE LUIS Godfrey/MN  D: 12/13/2017 15:23     T: 12/14/2017 22:57  JOB #: [id number]  CC: Epifanio Nixon MD

## 2017-12-20 NOTE — ADT AUTH CERT NOTES
LOC:Acute Adult-General Surgical (12/8/2017) by Sharona Ames        Review Entered Review Status       12/8/2017 In Primary       Details         REVIEW SUMMARY     Patient: TRISTON BRUNSON SR. Review Number: [id number]  Review Status: In Primary     Condition Specific: Yes     Condition Level Of Care Code: ACUTE  Condition Level Of Care Description: Acute        OUTCOMES  Outcome Type: Primary           REVIEW DETAILS     Service Date: 12/08/2017  Admit Date: 12/07/2017  Product: [facility] Adult  Subset: General Surgical      (Symptom or finding within 24h)         (Excludes PO medications unless noted)          [X] Select Day, One:              [X] Post-op Day 1, One:                  [X] ACUTE, One:                      [X] Moderate or long stay surgery and expected post-op course, All:                      ~--Admin, IQ Admin Admin on 12- 02:23 PM--~                      PROGRESS NOTE:                      Mr. Cristi Hall is sitting up in the chair. No complaints. Begum draining arsen urine. EXAM:  ABDOMEN: semi-soft, tender, dressing to R abdomen c/d/i, active BS, no flatus                      T 98.9, /82, P 98, R 17                      HGB 11.3, HCT 34.0, , GLUC 130, BUN 28                      POD 1 Right radical hand-assisted laparoscopic nephrectomy                          Plan:                          Cn 1.6. VSS. Afebrile.                          Remove begum.                         Decrease IVF to 75/hr.                         Incentive spirometer.                          Continue clear liquids until passing flatus.                         Daily suppository.                         Ambulate.                          Resume plavix.                            PT EVAL, INCENT SPIR, D/C FOLE CATHETER, IV FLS 75 ML/HR, COREG PO BID, PLAVIX PO QD, LASIX PO Q HS,  SSI SC AC & HS, LISINOPRIL PO Q HS, PERCOCET PO Q 4 HRS PRN X2 (4X/24 HRS),  ANCEF IV DC'D                                              [X] DVT prophylaxis or patient ambulatory                          [X] Hydration or nutrition, One:                              [X] Advancing diet as tolerated or nutritional route established                          [X] Mobility advancing as tolerated                          [X] Pain assessment, One:                              [X] Pain controlled     Version: InterQual® 2016. 3  InterQual® and [employer]  © [employer] and/or one of its [place]. All Rights Reserved. CPT only © 2015 American Medical Association. All Rights Reserved.

## 2017-12-26 ENCOUNTER — TELEPHONE (OUTPATIENT)
Dept: DIABETES SERVICES | Age: 67
End: 2017-12-26

## 2017-12-26 NOTE — TELEPHONE ENCOUNTER
Call to patient about diabetes education. 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 Instructed we are booking into late January. Still will not be able to take education. Instructed to let physician know when ready and we will be glad to educate.

## 2018-01-02 ENCOUNTER — HOSPITAL ENCOUNTER (OUTPATIENT)
Dept: PHYSICAL THERAPY | Age: 68
Discharge: HOME | End: 2018-01-02
Attending: NURSE PRACTITIONER
Payer: MEDICARE

## 2018-01-02 PROCEDURE — 97162 PT EVAL MOD COMPLEX 30 MIN: CPT

## 2018-01-02 PROCEDURE — 97110 THERAPEUTIC EXERCISES: CPT

## 2018-01-02 PROCEDURE — 97014 ELECTRIC STIMULATION THERAPY: CPT

## 2018-01-02 PROCEDURE — G8979 MOBILITY GOAL STATUS: HCPCS

## 2018-01-02 PROCEDURE — G8978 MOBILITY CURRENT STATUS: HCPCS

## 2018-01-02 NOTE — THERAPY EVALUATION
[facility]  : 1950  Primary: Jonelle  Secondary:  [address]  at [place] [id number] [facility], [address] [place] [address],[address], [address] [facility]  [phone]   [phone]           OUTPATIENT PHYSICAL THERAPY:Initial Assessment 2018    ICD-10: Treatment Diagnosis: Foot drop, left foot (M21.372)  Precautions/Allergies:   Aspirin   Fall Risk Score: 6 (? 5 = High Risk)  MD Orders: Evaluate and Treat MEDICAL/REFERRING DIAGNOSIS:  Foot drop, left foot [M21.372]   DATE OF ONSET: Approximately 2 months ago  REFERRING PHYSICIAN: Aminta Fleischer, NP  RETURN PHYSICIAN APPOINTMENT: 18     INITIAL ASSESSMENT:   [facility] presents with decreased strength/flexibility L ankle with gait difficulties/foot drop leading to increased risk for falls and decreased overall functional status. Pt would benefit from skilled physical therapy services to address the above deficits and help patient return to prior level of function. PROBLEM LIST (Impacting functional limitations):  1. Decreased Strength  2. Decreased ADL/Functional Activities  3. Decreased Flexibility/Joint Mobility  4. Decreased [place] with Home Exercise Program INTERVENTIONS PLANNED:  1. Electrical Stimulation  2. Gait Training  3. Home Exercise Program (HEP)  4. Manual Therapy  5. Range of Motion (ROM)  6. Therapeutic Exercise/Strengthening   TREATMENT PLAN:  Effective Dates: 18 TO 18. Frequency/Duration: 2 times a week for 2 months  GOALS: (Goals have been discussed and agreed upon with patient.)  Short-Term Functional Goals: Time Frame: 4 weeks  1. Pt will increase strength L ankle 2/5 to assist with household ADL's  2. Pt will be fitted with AFO to help prevent falls  Discharge Goals: Time Frame: 8 weeks  1. Pt will be independent with HEP  2. Pt will increase strength 3/5 to assist with household ADL's  3.  Pt will perform 20 minutes household cleaning activities independently with no falls  Rehabilitation Potential For Stated Goals: [address] therapy, I certify that the treatment plan above will be carried out by a therapist or under their direction. Thank you for this referral,  Dejan Doss, PT     Referring Physician Signature: Franc Kathleen NP              Date                    The information in this section was collected on 01-02-18 (except where otherwise noted). HISTORY:   History of Present Injury/Illness (Reason for Referral):  Pt reports insidious onset L foot drop of approximately 2 months duration. He states he had gradual weakness in his L foot since being in the hospital in November 2017. He states he was admitted to hospital for SOB and was diagnosed with CHF. He denies any pain in his L foot/ankle. He states he gets numbness/tingling in the L foot. Pt denies any LBP. He states he is diabetic and his blood sugar is not regulated. He denies any numbness/tingling in the LE other than in the L foot. He rates his current pain 0/10. Pt states he has had several falls with most recent being 12-11-17. Pt states he was home and there was a \"ruffle in the rug\" and he stumbled. He reports some difficulties with all household cooking/cleaning activities due to his L foot weakness. Pt cares for his mother who lives with him. He has to assist her with walking and all daily activities. 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 Funmilayo John  has a past medical history of Cardiomyopathy [facility]); CHF (congestive heart failure) ([facility] (10/12/2017); Coronary artery disease involving native coronary artery of native heart without angina pectoris (10/25/2017); Foot drop, left; Former cigarette smoker; HTN (hypertension) (07/22/2010); Hypothyroidism; Long term current use of anticoagulant; Right renal mass; Sickle cell trait ([facility] and Type 2 diabetes mellitus [facility]  He also has no past medical history of COPD; DEMENTIA; Gastrointestinal disorder; Infectious disease; Neurological disorder; Other ill-defined conditions(799.89); or Sleep disorder. Mr. Lino Sousa  has a past surgical history that includes hx appendectomy (1963). Social History/Living Environment:     Pt lives with his mother in a one story house  Prior Level of Function/Work/Activity:  Pt is retired  Dominant Side:         RIGHT  Other Clinical Tests:          No diagnostic testing performed   Personal Factors:          Sex:  male        Age:  75 y.o. Profession:  Pt is retired  Current Medications:       Current Outpatient Prescriptions:     lisinopril (PRINIVIL, ZESTRIL) 20 mg tablet, Take 1 Tab by mouth nightly., Disp: 30 Tab, Rfl: 11    NIFEdipine (PROCARDIA) 10 mg capsule, Take 1 Cap by mouth every eight (8) hours. , Disp: 90 Cap, Rfl: 11    HYDROcodone-acetaminophen (NORCO)  mg tablet, Take 1 Tab by mouth every six (6) hours as needed for Pain. Max Daily Amount: 4 Tabs., Disp: 20 Tab, Rfl: 0    UNITHROID 50 mcg tablet, Take 1 Tab by mouth Daily (before breakfast). , Disp: 30 Tab, Rfl: 5    atorvastatin (LIPITOR) 40 mg tablet, Take 40 mg by mouth nightly., Disp: , Rfl:     furosemide (LASIX) 40 mg tablet, Take 40 mg by mouth nightly., Disp: , Rfl:     clopidogrel (PLAVIX) 75 mg tab, Take 1 Tab by mouth daily. (Patient taking differently: Take 75 mg by mouth daily. Pt last dose 11.29.17), Disp: 90 Tab, Rfl: 3    carvedilol (COREG) 12.5 mg tablet, Take 1 Tab by mouth two (2) times daily (with meals). , Disp: 180 Tab, Rfl: 3    Liraglutide (VICTOZA) 0.6 mg/0.1 mL (18 mg/3 mL) pnij, 1.2 mg by SubCUTAneous route daily. , Disp: 1 Pen, Rfl: 5    dapagliflozin (FARXIGA) 10 mg tab tablet, Take 1 Tab by mouth daily. , Disp: 30 Tab, Rfl: 2   Date Last Reviewed:  01-02-18   Number of Personal Factors/Comorbidities that affect the Plan of Care: 1-2: MODERATE COMPLEXITY   EXAMINATION:   Observation/Orthostatic Postural Assessment: Forward head, rounded shoulders  Palpation:          Tenderness in L gastroc/soleus. No tenderness in low back. ROM:    Lumbar flexion = 55 degrees (strain in left leg)  Lumbar extension = 25 degrees  Lumbar side bending R = 30 degrees  Lumbar side bending L = 30 degrees    Strength:    R hip flexion = 5/5  R hip abduction = 5/5  R hip adduction = 5/5  R knee extension = 5/5  R knee flexion = 5/5  R ankle DF = 5/5  R ankle PF = 5/5  R ankle IV = 5/5  R ankle EV = 5/5    L hip flexion = 5/5  L hip abduction = 5/5  L hip adduction = 5/5  L knee extension = 5/5  L knee flexion = 5/5  L ankle DF = 0/5  L ankle PF = 5/5  L ankle IV = 5/5  L ankle EV = 0/5    Special Tests:          SLR 45 degrees with hamstring tightness noted bilaterally   Neurological Screen:        Myotomes:  Weakness L ankle dorsiflexors and evertors         Dermatomes:  Intact to light touch bilateral LE's         Reflexes:  2+ to bilateral achilles and patellar  Functional Mobility:         Gait/Ambulation:  Decreased heel strike on left. Transfers:  Pt able to transition sit to supine and supine to sit independently    Body Structures Involved:  1. Nerves  2. Muscles Body Functions Affected:  1. Sensory/Pain  2. Neuromusculoskeletal Activities and Participation Affected:  1. Mobility  2. Self Care  3. Domestic Life   Number of elements (examined above) that affect the Plan of Care: 3: MODERATE COMPLEXITY   CLINICAL PRESENTATION:   Presentation: Evolving clinical presentation with changing clinical characteristics: MODERATE COMPLEXITY   CLINICAL DECISION MAKING:   Outcome Measure: Tool Used: PT/OT FOOT AND ANKLE ABILITY MEASURE  Score:  Initial: 51 Most Recent: X (Date: -- )   Interpretation of Score: For the \"Activities of Daily Living\", there are 21 questions each scored on a 5 point scale with 0 representing \"Unable to do\" and 4 representing \"No difficulty\".   The lower the score, the greater the functional disability. 84/84 represents no disability. Minimal detectable change is 5.7 points. With the addition of the 8 questions in the \"Sports Subscale,\" there are 29 questions, each scored on a 5 point scale with 0 representing \"Unable to do\" and 4 representing \"No difficulty\". The lower the score, the greater the functional disability. 116/116 represents no disability. Minimal detectable change is 12.3 points. Activities of Daily Living + Sports Subscale:  Score 116 115-94 93-71 70-47 46-24 23-1 0   Modifier CH CI CJ CK CL CM CN     ? Mobility - Walking and Moving Around:     - CURRENT STATUS: CK - 40%-59% impaired, limited or restricted    - GOAL STATUS: CJ - 20%-39% impaired, limited or restricted    - D/C STATUS:  ---------------To be determined---------------    Medical Necessity:   · Patient is expected to demonstrate progress in strength, range of motion and functional technique to increase independence with household ADL's. · Patient demonstrates good rehab potential due to higher previous functional level. Reason for Services/Other Comments:  · Patient continues to require skilled intervention due to decreased strength L foot leading to increased risk for falls and decreased overall functional status. Use of outcome tool(s) and clinical judgement create a POC that gives a: Questionable prediction of patient's progress: MODERATE COMPLEXITY            TREATMENT:   (In addition to Assessment/Re-Assessment sessions the following treatments were rendered)  Pre-treatment Symptoms/Complaints:  Weakness L ankle/L Foot Drop  Pain: Initial:     0/10 Post Session:  0/10     THERAPEUTIC EXERCISE: (15 minutes):  Exercises per grid below to improve mobility and strength. Required minimal verbal cues to promote proper body alignment and promote proper body posture. Progressed resistance and repetitions as indicated.   MODALITIES: (10 minutes):      [place] Muscle Stimulation to L ankle dorsiflexors x10 minutes . On time 10 secs, off time 30 secs. Skin clear afterwards. Date:  01-02-18 Date:   Date:     Activity/Exercise Parameters Parameters Parameters   SKTC 3 reps  15 sec holds     Active Hamstring Stretch 3 reps  15 sec holds     Bridging 10 reps  5 sec holds     Left Gastroc Stretch with Strap 3 reps  15 sec holds     Manual Left Gastroc Stretching 5 minutes                     [employer] Portal  Treatment/Session Assessment:    · Response to Treatment:  Pt tolerated evaluation and treatments well today with no c/o. Left message for Melissa Granados NP to see if could get an order to send pt to have an AFO made for his L foot. · Compliance with Program/Exercises: Will assess as treatment progresses. · Recommendations/Intent for next treatment session: \"Next visit will focus on advancements to more challenging activities\".   Total Treatment Duration:  25 minutes  PT Patient Time In/Time Out  Time In: 0800  Time Out: 0900    Franck Bolanos, PT          9

## 2018-01-02 NOTE — PROGRESS NOTES
Ambulatory/Rehab Services H2 Model Falls Risk Assessment    Risk Factor Pts. ·   Confusion/Disorientation/Impulsivity  []    4 ·   Symptomatic Depression  []   2 ·   Altered Elimination  []   1 ·   Dizziness/Vertigo  []   1 ·   Gender (Male)  [x]   1 ·   Any administered antiepileptics (anticonvulsants):  []   2 ·   Any administered benzodiazepines:  []   1 ·   Visual Impairment (specify):  []   1 ·   Portable Oxygen Use  []   1 ·   Orthostatic ? BP  []   1 ·   History of Recent Falls (within 3 mos.)  [x]   5     Ability to Rise from Chair (choose one) Pts. ·   Ability to rise in a single movement  [x]   0 ·   Pushes up, successful in one attempt  []   1 ·   Multiple attempts, but successful  []   3 ·   Unable to rise without assistance  []   4   Total: (5 or greater = High Risk) 6     Falls Prevention Plan:   []                Physical Limitations to Exercise (specify):   []                Mobility Assistance Device (type):   [x]                Exercise/Equipment Adaptation (specify): Close SBA during standing exercise    ©2010 [facility] [address] [place] [address] Patent #[id number].  Federal Law prohibits the replication, distribution or use without written permission from [employer]

## 2018-01-08 ENCOUNTER — HOSPITAL ENCOUNTER (OUTPATIENT)
Dept: PHYSICAL THERAPY | Age: 68
Discharge: HOME | End: 2018-01-08
Attending: NURSE PRACTITIONER
Payer: MEDICARE

## 2018-01-08 PROBLEM — E11.21 TYPE 2 DIABETES MELLITUS WITH NEPHROPATHY (HCC): Status: ACTIVE | Noted: 2018-01-08

## 2018-01-08 PROCEDURE — 97110 THERAPEUTIC EXERCISES: CPT

## 2018-01-08 PROCEDURE — 97014 ELECTRIC STIMULATION THERAPY: CPT

## 2018-01-08 NOTE — PROGRESS NOTES
[address] [facility]  : 1950  Primary: [place] [employer] [place] [employer] Hm*  Secondary:  [address]  [facility] [address] [address],[address], [address]  [phone]   [phone]           OUTPATIENT PHYSICAL THERAPY:Daily Note 2018    ICD-10: Treatment Diagnosis: Foot drop, left foot (M21.372)  Precautions/Allergies:   Aspirin   Fall Risk Score: 6 (? 5 = High Risk)  MD Orders: Evaluate and Treat MEDICAL/REFERRING DIAGNOSIS:  Foot drop, left foot [M21.372]   DATE OF ONSET: Approximately 2 months ago  REFERRING PHYSICIAN: Lola Valdovinos NP  RETURN PHYSICIAN APPOINTMENT: 18     INITIAL ASSESSMENT:  Mr. Aj Arevalo presents with decreased strength/flexibility L ankle with gait difficulties/foot drop leading to increased risk for falls and decreased overall functional status. Pt would benefit from skilled physical therapy services to address the above deficits and help patient return to prior level of function. PROBLEM LIST (Impacting functional limitations):  1. Decreased Strength  2. Decreased ADL/Functional Activities  3. Decreased Flexibility/Joint Mobility  4. Decreased [place] with Home Exercise Program INTERVENTIONS PLANNED:  1. Electrical Stimulation  2. Gait Training  3. Home Exercise Program (HEP)  4. Manual Therapy  5. Range of Motion (ROM)  6. Therapeutic Exercise/Strengthening   TREATMENT PLAN:  Effective Dates: 18 TO 18. Frequency/Duration: 2 times a week for 2 months  GOALS: (Goals have been discussed and agreed upon with patient.)  Short-Term Functional Goals: Time Frame: 4 weeks  1. Pt will increase strength L ankle 2/5 to assist with household ADL's  2. Pt will be fitted with AFO to help prevent falls  Discharge Goals: Time Frame: 8 weeks  1. Pt will be independent with HEP  2. Pt will increase strength 3/5 to assist with household ADL's  3.  Pt will perform 20 minutes household cleaning activities independently with no falls  Rehabilitation Potential For Stated Goals: Eleuterio 6 Sr.'s therapy, I certify that the treatment plan above will be carried out by a therapist or under their direction. Thank you for this referral,  Pito Soto PT     Referring Physician Signature: Emelina Hong NP              Date                    The information in this section was collected on 01-02-18 (except where otherwise noted). HISTORY:   History of Present Injury/Illness (Reason for Referral):  Pt reports insidious onset L foot drop of approximately 2 months duration. He states he had gradual weakness in his L foot since being in the hospital in November 2017. He states he was admitted to hospital for SOB and was diagnosed with CHF. He denies any pain in his L foot/ankle. He states he gets numbness/tingling in the L foot. Pt denies any LBP. He states he is diabetic and his blood sugar is not regulated. He denies any numbness/tingling in the LE other than in the L foot. He rates his current pain 0/10. Pt states he has had several falls with most recent being 12-11-17. Pt states he was home and there was a \"ruffle in the rug\" and he stumbled. He reports some difficulties with all household cooking/cleaning activities due to his L foot weakness. Pt cares for his mother who lives with him. He has to assist her with walking and all daily activities. 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 Jabari Marlow  has a past medical history of Cardiomyopathy [facility]); CHF (congestive heart failure) ([facility] [address] (10/12/2017); Coronary artery disease involving native coronary artery of native heart without angina pectoris (10/25/2017); Foot drop, left; Former cigarette smoker; HTN (hypertension) (07/22/2010); Hypothyroidism; Long term current use of anticoagulant; Right renal mass; Sickle cell trait ([facility] [address] and Type 2 diabetes mellitus ([facility] [address]  He also has no past medical history of COPD; DEMENTIA; Gastrointestinal disorder; Infectious disease; Neurological disorder; Other ill-defined conditions(799.89); or Sleep disorder. Mr. Lcuía Lopez  has a past surgical history that includes hx appendectomy (1963). Social History/Living Environment:     Pt lives with his mother in a one story house  Prior Level of Function/Work/Activity:  Pt is retired  Dominant Side:         RIGHT  Other Clinical Tests:          No diagnostic testing performed   Personal Factors:          Sex:  male        Age:  75 y.o. Profession:  Pt is retired  Current Medications:       Current Outpatient Prescriptions:     dapagliflozin (FARXIGA) 10 mg tab tablet, Take 1 Tab by mouth daily. , Disp: 30 Tab, Rfl: 5    atorvastatin (LIPITOR) 40 mg tablet, Take 1 Tab by mouth nightly., Disp: 30 Tab, Rfl: 5    furosemide (LASIX) 40 mg tablet, Take 1 Tab by mouth nightly., Disp: 30 Tab, Rfl: 5    clopidogrel (PLAVIX) 75 mg tab, Take 1 Tab by mouth daily. , Disp: 30 Tab, Rfl: 5    carvedilol (COREG) 12.5 mg tablet, Take 1 Tab by mouth two (2) times daily (with meals). , Disp: 60 Tab, Rfl: 5    UNITHROID 50 mcg tablet, Take 1 Tab by mouth Daily (before breakfast). , Disp: 30 Tab, Rfl: 5    lisinopril (PRINIVIL, ZESTRIL) 20 mg tablet, Take 1 Tab by mouth nightly., Disp: 30 Tab, Rfl: 5    niCARdipine (CARDENE) 20 mg capsule, Take 1 Cap by mouth three (3) times daily. , Disp: 90 Cap, Rfl: 5    HYDROcodone-acetaminophen (NORCO)  mg tablet, Take 1 Tab by mouth every six (6) hours as needed for Pain. Max Daily Amount: 4 Tabs., Disp: 20 Tab, Rfl: 0    Liraglutide (VICTOZA) 0.6 mg/0.1 mL (18 mg/3 mL) pnij, 1.2 mg by SubCUTAneous route daily. , Disp: 1 Pen, Rfl: 5   Date Last Reviewed:  01-02-18   Number of Personal Factors/Comorbidities that affect the Plan of Care: 1-2: MODERATE COMPLEXITY   EXAMINATION:   Observation/Orthostatic Postural Assessment:           Forward head, rounded shoulders  Palpation: Tenderness in L gastroc/soleus. No tenderness in low back. ROM:    Lumbar flexion = 55 degrees (strain in left leg)  Lumbar extension = 25 degrees  Lumbar side bending R = 30 degrees  Lumbar side bending L = 30 degrees    Strength:    R hip flexion = 5/5  R hip abduction = 5/5  R hip adduction = 5/5  R knee extension = 5/5  R knee flexion = 5/5  R ankle DF = 5/5  R ankle PF = 5/5  R ankle IV = 5/5  R ankle EV = 5/5    L hip flexion = 5/5  L hip abduction = 5/5  L hip adduction = 5/5  L knee extension = 5/5  L knee flexion = 5/5  L ankle DF = 0/5  L ankle PF = 5/5  L ankle IV = 5/5  L ankle EV = 0/5    Special Tests:          SLR 45 degrees with hamstring tightness noted bilaterally   Neurological Screen:        Myotomes:  Weakness L ankle dorsiflexors and evertors         Dermatomes:  Intact to light touch bilateral LE's         Reflexes:  2+ to bilateral achilles and patellar  Functional Mobility:         Gait/Ambulation:  Decreased heel strike on left. Transfers:  Pt able to transition sit to supine and supine to sit independently    Body Structures Involved:  1. Nerves  2. Muscles Body Functions Affected:  1. Sensory/Pain  2. Neuromusculoskeletal Activities and Participation Affected:  1. Mobility  2. Self Care  3. Domestic Life   Number of elements (examined above) that affect the Plan of Care: 3: MODERATE COMPLEXITY   CLINICAL PRESENTATION:   Presentation: Evolving clinical presentation with changing clinical characteristics: MODERATE COMPLEXITY   CLINICAL DECISION MAKING:   Outcome Measure: Tool Used: PT/OT FOOT AND ANKLE ABILITY MEASURE  Score:  Initial: 51 Most Recent: X (Date: -- )   Interpretation of Score: For the \"Activities of Daily Living\", there are 21 questions each scored on a 5 point scale with 0 representing \"Unable to do\" and 4 representing \"No difficulty\". The lower the score, the greater the functional disability. 84/84 represents no disability.   Minimal detectable change is 5.7 points. With the addition of the 8 questions in the \"Sports Subscale,\" there are 29 questions, each scored on a 5 point scale with 0 representing \"Unable to do\" and 4 representing \"No difficulty\". The lower the score, the greater the functional disability. 116/116 represents no disability. Minimal detectable change is 12.3 points. Activities of Daily Living + Sports Subscale:  Score 116 115-94 93-71 70-47 46-24 23-1 0   Modifier CH CI CJ CK CL CM CN     ? Mobility - Walking and Moving Around:     - CURRENT STATUS: CK - 40%-59% impaired, limited or restricted    - GOAL STATUS: CJ - 20%-39% impaired, limited or restricted    - D/C STATUS:  ---------------To be determined---------------    Medical Necessity:   · Patient is expected to demonstrate progress in strength, range of motion and functional technique to increase independence with household ADL's. · Patient demonstrates good rehab potential due to higher previous functional level. Reason for Services/Other Comments:  · Patient continues to require skilled intervention due to decreased strength L foot leading to increased risk for falls and decreased overall functional status. Use of outcome tool(s) and clinical judgement create a POC that gives a: Questionable prediction of patient's progress: MODERATE COMPLEXITY            TREATMENT:   (In addition to Assessment/Re-Assessment sessions the following treatments were rendered)  Pre-treatment Symptoms/Complaints:  Pt states his foot is doing about the same  Pain: Initial:     0/10 Post Session:  0/10     THERAPEUTIC EXERCISE: (25 minutes):  Exercises per grid below to improve mobility and strength. Required minimal verbal cues to promote proper body alignment and promote proper body posture. Progressed resistance and repetitions as indicated. MODALITIES: (15 minutes):      [place] Muscle Stimulation to L ankle dorsiflexors x15 minutes . On time 10 secs, off time 20 secs.   Skin clear afterwards. Date:  01-02-18 Date:  01-08-18 Date:     Activity/Exercise Parameters Parameters Parameters   SKTC 3 reps  15 sec holds 3 reps  15 sec holds    Active Hamstring Stretch 3 reps  15 sec holds 3 reps  15 sec holds    Bridging 10 reps  5 sec holds 15 reps  5 sec holds    Left Gastroc Stretch with Strap 3 reps  15 sec holds     Manual Left Gastroc Stretching 5 minutes 5 minutes    NuStep  Level 4  6 minutes    Piriformis Stretch  3 reps  15 sec holds        [employer] Portal  Treatment/Session Assessment:    · Response to Treatment:  Pt tolerated treatments well. Did not hear back from MD office regarding getting a prescription to have an AFO fabricated. I filled out a prescription today and faxed it to MD office asking them to sign it if they agree and fax back to me at this office. · Compliance with Program/Exercises: Will assess as treatment progresses. · Recommendations/Intent for next treatment session: \"Next visit will focus on advancements to more challenging activities\".   Total Treatment Duration:  40 minutes  PT Patient Time In/Time Out  Time In: 1030  Time Out: Joss Woodruff PT

## 2018-01-10 ENCOUNTER — HOSPITAL ENCOUNTER (OUTPATIENT)
Dept: PHYSICAL THERAPY | Age: 68
Discharge: HOME | End: 2018-01-10
Attending: NURSE PRACTITIONER
Payer: MEDICARE

## 2018-01-10 PROCEDURE — 97110 THERAPEUTIC EXERCISES: CPT

## 2018-01-10 PROCEDURE — 97014 ELECTRIC STIMULATION THERAPY: CPT

## 2018-01-10 NOTE — PROGRESS NOTES
[address] [facility]  : 1950  Primary: Jonelle  Secondary:  [address]  at [place] [id number] [facility], [address] [place] [address],[address], [place] [address].  [phone]   [phone]           OUTPATIENT PHYSICAL THERAPY:Daily Note 1/10/2018    ICD-10: Treatment Diagnosis: Foot drop, left foot (M21.372)  Precautions/Allergies:   Aspirin   Fall Risk Score: 6 (? 5 = High Risk)  MD Orders: Evaluate and Treat MEDICAL/REFERRING DIAGNOSIS:  Foot drop, left foot [M21.372]   DATE OF ONSET: Approximately 2 months ago  REFERRING PHYSICIAN: Jaida Dooley NP  RETURN PHYSICIAN APPOINTMENT: 18     INITIAL ASSESSMENT:  Mr. Lizzie Riddle presents with decreased strength/flexibility L ankle with gait difficulties/foot drop leading to increased risk for falls and decreased overall functional status. Pt would benefit from skilled physical therapy services to address the above deficits and help patient return to prior level of function. PROBLEM LIST (Impacting functional limitations):  1. Decreased Strength  2. Decreased ADL/Functional Activities  3. Decreased Flexibility/Joint Mobility  4. Decreased [place] with Home Exercise Program INTERVENTIONS PLANNED:  1. Electrical Stimulation  2. Gait Training  3. Home Exercise Program (HEP)  4. Manual Therapy  5. Range of Motion (ROM)  6. Therapeutic Exercise/Strengthening   TREATMENT PLAN:  Effective Dates: 18 TO 18. Frequency/Duration: 2 times a week for 2 months  GOALS: (Goals have been discussed and agreed upon with patient.)  Short-Term Functional Goals: Time Frame: 4 weeks  1. Pt will increase strength L ankle 2/5 to assist with household ADL's  2. Pt will be fitted with AFO to help prevent falls  Discharge Goals: Time Frame: 8 weeks  1. Pt will be independent with HEP  2. Pt will increase strength 3/5 to assist with household ADL's  3.  Pt will perform 20 minutes household cleaning activities independently with no falls  Rehabilitation Potential For Stated Goals: Noelse 6 Sr.'s therapy, I certify that the treatment plan above will be carried out by a therapist or under their direction. Thank you for this referral,  Nereyda Quesada PT     Referring Physician Signature: Lauren Ramires NP              Date                    The information in this section was collected on 01-02-18 (except where otherwise noted). HISTORY:   History of Present Injury/Illness (Reason for Referral):  Pt reports insidious onset L foot drop of approximately 2 months duration. He states he had gradual weakness in his L foot since being in the hospital in November 2017. He states he was admitted to hospital for SOB and was diagnosed with CHF. He denies any pain in his L foot/ankle. He states he gets numbness/tingling in the L foot. Pt denies any LBP. He states he is diabetic and his blood sugar is not regulated. He denies any numbness/tingling in the LE other than in the L foot. He rates his current pain 0/10. Pt states he has had several falls with most recent being 12-11-17. Pt states he was home and there was a \"ruffle in the rug\" and he stumbled. He reports some difficulties with all household cooking/cleaning activities due to his L foot weakness. Pt cares for his mother who lives with him. He has to assist her with walking and all daily activities. 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 Lea Lowe  has a past medical history of Cardiomyopathy [facility]); CHF (congestive heart failure) ([facility] [address] (10/12/2017); Coronary artery disease involving native coronary artery of native heart without angina pectoris (10/25/2017); Foot drop, left; Former cigarette smoker; HTN (hypertension) (07/22/2010); Hypothyroidism; Long term current use of anticoagulant; Right renal mass; Sickle cell trait ([facility] [address] and Type 2 diabetes mellitus ([facility] [address]  He also has no past medical history of COPD; DEMENTIA; Gastrointestinal disorder; Infectious disease; Neurological disorder; Other ill-defined conditions(799.89); or Sleep disorder. Mr. Jose A Tinoco  has a past surgical history that includes hx appendectomy (1963). Social History/Living Environment:     Pt lives with his mother in a one story house  Prior Level of Function/Work/Activity:  Pt is retired  Dominant Side:         RIGHT  Other Clinical Tests:          No diagnostic testing performed   Personal Factors:          Sex:  male        Age:  75 y.o. Profession:  Pt is retired  Current Medications:       Current Outpatient Prescriptions:     dapagliflozin (FARXIGA) 10 mg tab tablet, Take 1 Tab by mouth daily. , Disp: 30 Tab, Rfl: 5    atorvastatin (LIPITOR) 40 mg tablet, Take 1 Tab by mouth nightly., Disp: 30 Tab, Rfl: 5    furosemide (LASIX) 40 mg tablet, Take 1 Tab by mouth nightly., Disp: 30 Tab, Rfl: 5    clopidogrel (PLAVIX) 75 mg tab, Take 1 Tab by mouth daily. , Disp: 30 Tab, Rfl: 5    carvedilol (COREG) 12.5 mg tablet, Take 1 Tab by mouth two (2) times daily (with meals). , Disp: 60 Tab, Rfl: 5    UNITHROID 50 mcg tablet, Take 1 Tab by mouth Daily (before breakfast). , Disp: 30 Tab, Rfl: 5    lisinopril (PRINIVIL, ZESTRIL) 20 mg tablet, Take 1 Tab by mouth nightly., Disp: 30 Tab, Rfl: 5    niCARdipine (CARDENE) 20 mg capsule, Take 1 Cap by mouth three (3) times daily. , Disp: 90 Cap, Rfl: 5    HYDROcodone-acetaminophen (NORCO)  mg tablet, Take 1 Tab by mouth every six (6) hours as needed for Pain. Max Daily Amount: 4 Tabs., Disp: 20 Tab, Rfl: 0    Liraglutide (VICTOZA) 0.6 mg/0.1 mL (18 mg/3 mL) pnij, 1.2 mg by SubCUTAneous route daily. , Disp: 1 Pen, Rfl: 5   Date Last Reviewed:  01-02-18   Number of Personal Factors/Comorbidities that affect the Plan of Care: 1-2: MODERATE COMPLEXITY   EXAMINATION:   Observation/Orthostatic Postural Assessment:           Forward head, rounded shoulders  Palpation: Tenderness in L gastroc/soleus. No tenderness in low back. ROM:    Lumbar flexion = 55 degrees (strain in left leg)  Lumbar extension = 25 degrees  Lumbar side bending R = 30 degrees  Lumbar side bending L = 30 degrees    Strength:    R hip flexion = 5/5  R hip abduction = 5/5  R hip adduction = 5/5  R knee extension = 5/5  R knee flexion = 5/5  R ankle DF = 5/5  R ankle PF = 5/5  R ankle IV = 5/5  R ankle EV = 5/5    L hip flexion = 5/5  L hip abduction = 5/5  L hip adduction = 5/5  L knee extension = 5/5  L knee flexion = 5/5  L ankle DF = 0/5  L ankle PF = 5/5  L ankle IV = 5/5  L ankle EV = 0/5    Special Tests:          SLR 45 degrees with hamstring tightness noted bilaterally   Neurological Screen:        Myotomes:  Weakness L ankle dorsiflexors and evertors         Dermatomes:  Intact to light touch bilateral LE's         Reflexes:  2+ to bilateral achilles and patellar  Functional Mobility:         Gait/Ambulation:  Decreased heel strike on left. Transfers:  Pt able to transition sit to supine and supine to sit independently    Body Structures Involved:  1. Nerves  2. Muscles Body Functions Affected:  1. Sensory/Pain  2. Neuromusculoskeletal Activities and Participation Affected:  1. Mobility  2. Self Care  3. Domestic Life   Number of elements (examined above) that affect the Plan of Care: 3: MODERATE COMPLEXITY   CLINICAL PRESENTATION:   Presentation: Evolving clinical presentation with changing clinical characteristics: MODERATE COMPLEXITY   CLINICAL DECISION MAKING:   Outcome Measure: Tool Used: PT/OT FOOT AND ANKLE ABILITY MEASURE  Score:  Initial: 51 Most Recent: X (Date: -- )   Interpretation of Score: For the \"Activities of Daily Living\", there are 21 questions each scored on a 5 point scale with 0 representing \"Unable to do\" and 4 representing \"No difficulty\". The lower the score, the greater the functional disability. 84/84 represents no disability.   Minimal detectable change is 5.7 points. With the addition of the 8 questions in the \"Sports Subscale,\" there are 29 questions, each scored on a 5 point scale with 0 representing \"Unable to do\" and 4 representing \"No difficulty\". The lower the score, the greater the functional disability. 116/116 represents no disability. Minimal detectable change is 12.3 points. Activities of Daily Living + Sports Subscale:  Score 116 115-94 93-71 70-47 46-24 23-1 0   Modifier CH CI CJ CK CL CM CN     ? Mobility - Walking and Moving Around:     - CURRENT STATUS: CK - 40%-59% impaired, limited or restricted    - GOAL STATUS: CJ - 20%-39% impaired, limited or restricted    - D/C STATUS:  ---------------To be determined---------------    Medical Necessity:   · Patient is expected to demonstrate progress in strength, range of motion and functional technique to increase independence with household ADL's. · Patient demonstrates good rehab potential due to higher previous functional level. Reason for Services/Other Comments:  · Patient continues to require skilled intervention due to decreased strength L foot leading to increased risk for falls and decreased overall functional status. Use of outcome tool(s) and clinical judgement create a POC that gives a: Questionable prediction of patient's progress: MODERATE COMPLEXITY            TREATMENT:   (In addition to Assessment/Re-Assessment sessions the following treatments were rendered)  Pre-treatment Symptoms/Complaints:  Pt reports no change in the AROM of his L ankle thus far. Pain: Initial:     0/10 Post Session:  0/10     THERAPEUTIC EXERCISE: (25 minutes):  Exercises per grid below to improve mobility and strength. Required minimal verbal cues to promote proper body alignment and promote proper body posture. Progressed resistance and repetitions as indicated. MODALITIES: (15 minutes):      [place] Muscle Stimulation to L ankle dorsiflexors x15 minutes .   On time 10 secs, off time 20 secs.  Skin clear afterwards. Date:  01-02-18 Date:  01-08-18 Date:  01-10-18   Activity/Exercise Parameters Parameters Parameters   SKTC 3 reps  15 sec holds 3 reps  15 sec holds 3 reps  15 sec holds   Active Hamstring Stretch 3 reps  15 sec holds 3 reps  15 sec holds 3 reps  15 sec holds   Bridging 10 reps  5 sec holds 15 reps  5 sec holds 15 reps  5 sec holds   Left Gastroc Stretch with Strap 3 reps  15 sec holds     Manual Left Gastroc Stretching 5 minutes 5 minutes 5 minutes   NuStep  Level 4  6 minutes Level 4  6 minutes   Piriformis Stretch  3 reps  15 sec holds 3 reps  15 sec holds       [employer] Portal  Treatment/Session Assessment:    · Response to Treatment:  Pt tolerated treatments well. Have not received signed prescription from MD yet for AFO. I gave pt prescription to take to MD to see if he could get is signed. I also gave him contact information for Hubert to call and set up an appt if he gets prescription signed by MD.  · Compliance with Program/Exercises: Will assess as treatment progresses. · Recommendations/Intent for next treatment session: \"Next visit will focus on advancements to more challenging activities\".   Total Treatment Duration:  40 minutes  PT Patient Time In/Time Out  Time In: 1030  Time Out: Joss Loya, PT

## 2018-01-15 ENCOUNTER — HOSPITAL ENCOUNTER (OUTPATIENT)
Dept: PHYSICAL THERAPY | Age: 68
Discharge: HOME | End: 2018-01-15
Attending: NURSE PRACTITIONER
Payer: MEDICARE

## 2018-01-15 PROCEDURE — 97014 ELECTRIC STIMULATION THERAPY: CPT

## 2018-01-15 PROCEDURE — 97110 THERAPEUTIC EXERCISES: CPT

## 2018-01-15 NOTE — PROGRESS NOTES
[address] [facility]  : 1950  Primary: Jonelle  Secondary:  [address]  at [place] [id number] [facility], [address] [place] [address],[address], [place] [address].  [phone]   [phone]           OUTPATIENT PHYSICAL THERAPY:Daily Note 1/15/2018    ICD-10: Treatment Diagnosis: Foot drop, left foot (M21.372)  Precautions/Allergies:   Aspirin   Fall Risk Score: 6 (? 5 = High Risk)  MD Orders: Evaluate and Treat MEDICAL/REFERRING DIAGNOSIS:  Foot drop, left foot [M21.372]   DATE OF ONSET: Approximately 2 months ago  REFERRING PHYSICIAN: John Valladares NP  RETURN PHYSICIAN APPOINTMENT: 18     INITIAL ASSESSMENT:  Mr. Harjinder Roa presents with decreased strength/flexibility L ankle with gait difficulties/foot drop leading to increased risk for falls and decreased overall functional status. Pt would benefit from skilled physical therapy services to address the above deficits and help patient return to prior level of function. PROBLEM LIST (Impacting functional limitations):  1. Decreased Strength  2. Decreased ADL/Functional Activities  3. Decreased Flexibility/Joint Mobility  4. Decreased [place] with Home Exercise Program INTERVENTIONS PLANNED:  1. Electrical Stimulation  2. Gait Training  3. Home Exercise Program (HEP)  4. Manual Therapy  5. Range of Motion (ROM)  6. Therapeutic Exercise/Strengthening   TREATMENT PLAN:  Effective Dates: 18 TO 18. Frequency/Duration: 2 times a week for 2 months  GOALS: (Goals have been discussed and agreed upon with patient.)  Short-Term Functional Goals: Time Frame: 4 weeks  1. Pt will increase strength L ankle 2/5 to assist with household ADL's  2. Pt will be fitted with AFO to help prevent falls  Discharge Goals: Time Frame: 8 weeks  1. Pt will be independent with HEP  2. Pt will increase strength 3/5 to assist with household ADL's  3.  Pt will perform 20 minutes household cleaning activities independently with no falls  Rehabilitation Potential For Stated Goals: Eleuterio 6 Sr.'s therapy, I certify that the treatment plan above will be carried out by a therapist or under their direction. Thank you for this referral,  Ava Linton PTA     Referring Physician Signature: Narendra Sarabia NP              Date                    The information in this section was collected on 01-02-18 (except where otherwise noted). HISTORY:   History of Present Injury/Illness (Reason for Referral):  Pt reports insidious onset L foot drop of approximately 2 months duration. He states he had gradual weakness in his L foot since being in the hospital in November 2017. He states he was admitted to hospital for SOB and was diagnosed with CHF. He denies any pain in his L foot/ankle. He states he gets numbness/tingling in the L foot. Pt denies any LBP. He states he is diabetic and his blood sugar is not regulated. He denies any numbness/tingling in the LE other than in the L foot. He rates his current pain 0/10. Pt states he has had several falls with most recent being 12-11-17. Pt states he was home and there was a \"ruffle in the rug\" and he stumbled. He reports some difficulties with all household cooking/cleaning activities due to his L foot weakness. Pt cares for his mother who lives with him. He has to assist her with walking and all daily activities. 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 Lang Hurley  has a past medical history of Cardiomyopathy [facility]); CHF (congestive heart failure) ([facility] [address] (10/12/2017); Coronary artery disease involving native coronary artery of native heart without angina pectoris (10/25/2017); Foot drop, left; Former cigarette smoker; HTN (hypertension) (07/22/2010); Hypothyroidism; Long term current use of anticoagulant; Right renal mass; Sickle cell trait ([facility] [address] and Type 2 diabetes mellitus ([facility] [address]  He also has no past medical history of COPD; DEMENTIA; Gastrointestinal disorder; Infectious disease; Neurological disorder; Other ill-defined conditions(799.89); or Sleep disorder. Mr. Cristi Hall  has a past surgical history that includes hx appendectomy (1963). Social History/Living Environment:     Pt lives with his mother in a one story house  Prior Level of Function/Work/Activity:  Pt is retired  Dominant Side:         RIGHT  Other Clinical Tests:          No diagnostic testing performed   Personal Factors:          Sex:  male        Age:  75 y.o. Profession:  Pt is retired  Current Medications:       Current Outpatient Prescriptions:     dapagliflozin (FARXIGA) 10 mg tab tablet, Take 1 Tab by mouth daily. , Disp: 30 Tab, Rfl: 5    atorvastatin (LIPITOR) 40 mg tablet, Take 1 Tab by mouth nightly., Disp: 30 Tab, Rfl: 5    furosemide (LASIX) 40 mg tablet, Take 1 Tab by mouth nightly., Disp: 30 Tab, Rfl: 5    clopidogrel (PLAVIX) 75 mg tab, Take 1 Tab by mouth daily. , Disp: 30 Tab, Rfl: 5    carvedilol (COREG) 12.5 mg tablet, Take 1 Tab by mouth two (2) times daily (with meals). , Disp: 60 Tab, Rfl: 5    UNITHROID 50 mcg tablet, Take 1 Tab by mouth Daily (before breakfast). , Disp: 30 Tab, Rfl: 5    lisinopril (PRINIVIL, ZESTRIL) 20 mg tablet, Take 1 Tab by mouth nightly., Disp: 30 Tab, Rfl: 5    niCARdipine (CARDENE) 20 mg capsule, Take 1 Cap by mouth three (3) times daily. , Disp: 90 Cap, Rfl: 5    HYDROcodone-acetaminophen (NORCO)  mg tablet, Take 1 Tab by mouth every six (6) hours as needed for Pain. Max Daily Amount: 4 Tabs., Disp: 20 Tab, Rfl: 0    Liraglutide (VICTOZA) 0.6 mg/0.1 mL (18 mg/3 mL) pnij, 1.2 mg by SubCUTAneous route daily. , Disp: 1 Pen, Rfl: 5   Date Last Reviewed:  01-02-18   Number of Personal Factors/Comorbidities that affect the Plan of Care: 1-2: MODERATE COMPLEXITY   EXAMINATION:   Observation/Orthostatic Postural Assessment:           Forward head, rounded shoulders  Palpation: Tenderness in L gastroc/soleus. No tenderness in low back. ROM:    Lumbar flexion = 55 degrees (strain in left leg)  Lumbar extension = 25 degrees  Lumbar side bending R = 30 degrees  Lumbar side bending L = 30 degrees    Strength:    R hip flexion = 5/5  R hip abduction = 5/5  R hip adduction = 5/5  R knee extension = 5/5  R knee flexion = 5/5  R ankle DF = 5/5  R ankle PF = 5/5  R ankle IV = 5/5  R ankle EV = 5/5    L hip flexion = 5/5  L hip abduction = 5/5  L hip adduction = 5/5  L knee extension = 5/5  L knee flexion = 5/5  L ankle DF = 0/5  L ankle PF = 5/5  L ankle IV = 5/5  L ankle EV = 0/5    Special Tests:          SLR 45 degrees with hamstring tightness noted bilaterally   Neurological Screen:        Myotomes:  Weakness L ankle dorsiflexors and evertors         Dermatomes:  Intact to light touch bilateral LE's         Reflexes:  2+ to bilateral achilles and patellar  Functional Mobility:         Gait/Ambulation:  Decreased heel strike on left. Transfers:  Pt able to transition sit to supine and supine to sit independently    Body Structures Involved:  1. Nerves  2. Muscles Body Functions Affected:  1. Sensory/Pain  2. Neuromusculoskeletal Activities and Participation Affected:  1. Mobility  2. Self Care  3. Domestic Life   Number of elements (examined above) that affect the Plan of Care: 3: MODERATE COMPLEXITY   CLINICAL PRESENTATION:   Presentation: Evolving clinical presentation with changing clinical characteristics: MODERATE COMPLEXITY   CLINICAL DECISION MAKING:   Outcome Measure: Tool Used: PT/OT FOOT AND ANKLE ABILITY MEASURE  Score:  Initial: 51 Most Recent: X (Date: -- )   Interpretation of Score: For the \"Activities of Daily Living\", there are 21 questions each scored on a 5 point scale with 0 representing \"Unable to do\" and 4 representing \"No difficulty\". The lower the score, the greater the functional disability. 84/84 represents no disability.   Minimal detectable change is 5.7 points. With the addition of the 8 questions in the \"Sports Subscale,\" there are 29 questions, each scored on a 5 point scale with 0 representing \"Unable to do\" and 4 representing \"No difficulty\". The lower the score, the greater the functional disability. 116/116 represents no disability. Minimal detectable change is 12.3 points. Activities of Daily Living + Sports Subscale:  Score 116 115-94 93-71 70-47 46-24 23-1 0   Modifier CH CI CJ CK CL CM CN     ? Mobility - Walking and Moving Around:     - CURRENT STATUS: CK - 40%-59% impaired, limited or restricted    - GOAL STATUS: CJ - 20%-39% impaired, limited or restricted    - D/C STATUS:  ---------------To be determined---------------    Medical Necessity:   · Patient is expected to demonstrate progress in strength, range of motion and functional technique to increase independence with household ADL's. · Patient demonstrates good rehab potential due to higher previous functional level. Reason for Services/Other Comments:  · Patient continues to require skilled intervention due to decreased strength L foot leading to increased risk for falls and decreased overall functional status. Use of outcome tool(s) and clinical judgement create a POC that gives a: Questionable prediction of patient's progress: MODERATE COMPLEXITY            TREATMENT:   (In addition to Assessment/Re-Assessment sessions the following treatments were rendered)  Pre-treatment Symptoms/Complaints:  Pt reports no change in the AROM of his L ankle thus far. Pain: Initial:     0/10 Post Session:  0/10     THERAPEUTIC EXERCISE: (25 minutes):  Exercises per grid below to improve mobility and strength. Required minimal verbal cues to promote proper body alignment and promote proper body posture. Progressed resistance and repetitions as indicated. MODALITIES: (15 minutes):      [place] Muscle Stimulation to L ankle dorsiflexors x15 minutes .   On time 10 secs, off time 20 secs.  Skin clear afterwards. Date:  01-02-18 Date:  01-08-18 Date:  01-15-18   Activity/Exercise Parameters Parameters Parameters   SKTC 3 reps  15 sec holds 3 reps  15 sec holds 3 reps  15 sec holds   Active Hamstring Stretch 3 reps  15 sec holds 3 reps  15 sec holds 3 reps  15 sec holds   Bridging 10 reps  5 sec holds 15 reps  5 sec holds 15 reps  5 sec holds   Left Gastroc Stretch with Strap 3 reps  15 sec holds     Manual Left Gastroc Stretching 5 minutes 5 minutes 5 minutes   NuStep  Level 4  6 minutes Level 4  6 minutes   Piriformis Stretch  3 reps  15 sec holds 3 reps  15 sec holds       [employer] Portal  Treatment/Session Assessment:    · Response to Treatment:  Pt tolerated treatments well. He has follow up on wed with  for AFO. He continue's to do his exercises at home. · Compliance with Program/Exercises: Will assess as treatment progresses. · Recommendations/Intent for next treatment session: \"Next visit will focus on advancements to more challenging activities\".   Total Treatment Duration:  40 minutes  PT Patient Time In/Time Out  Time In: 1030  Time Out: [address], PTA

## 2018-01-17 ENCOUNTER — HOSPITAL ENCOUNTER (OUTPATIENT)
Dept: PHYSICAL THERAPY | Age: 68
End: 2018-01-17
Attending: NURSE PRACTITIONER
Payer: MEDICARE

## 2018-01-22 ENCOUNTER — HOSPITAL ENCOUNTER (OUTPATIENT)
Dept: PHYSICAL THERAPY | Age: 68
Discharge: HOME | End: 2018-01-22
Attending: NURSE PRACTITIONER
Payer: MEDICARE

## 2018-01-22 PROCEDURE — 97110 THERAPEUTIC EXERCISES: CPT

## 2018-01-22 PROCEDURE — 97014 ELECTRIC STIMULATION THERAPY: CPT

## 2018-01-22 NOTE — PROGRESS NOTES
[address] [facility]  : 1950  Primary: Jonelle  Secondary:  [address]  at [place] [id number] [facility], [address] [place] [address],[address], [facility] [address]  [phone]   [phone]           OUTPATIENT PHYSICAL THERAPY:Daily Note 2018    ICD-10: Treatment Diagnosis: Foot drop, left foot (M21.372)  Precautions/Allergies:   Aspirin   Fall Risk Score: 6 (? 5 = High Risk)  MD Orders: Evaluate and Treat MEDICAL/REFERRING DIAGNOSIS:  Foot drop, left foot [M21.372]   DATE OF ONSET: Approximately 2 months ago  REFERRING PHYSICIAN: Tatum Ulloa NP  RETURN PHYSICIAN APPOINTMENT: 18     INITIAL ASSESSMENT:  Mr. Steff Jean presents with decreased strength/flexibility L ankle with gait difficulties/foot drop leading to increased risk for falls and decreased overall functional status. Pt would benefit from skilled physical therapy services to address the above deficits and help patient return to prior level of function. PROBLEM LIST (Impacting functional limitations):  1. Decreased Strength  2. Decreased ADL/Functional Activities  3. Decreased Flexibility/Joint Mobility  4. Decreased [place] with Home Exercise Program INTERVENTIONS PLANNED:  1. Electrical Stimulation  2. Gait Training  3. Home Exercise Program (HEP)  4. Manual Therapy  5. Range of Motion (ROM)  6. Therapeutic Exercise/Strengthening   TREATMENT PLAN:  Effective Dates: 18 TO 18. Frequency/Duration: 2 times a week for 2 months  GOALS: (Goals have been discussed and agreed upon with patient.)  Short-Term Functional Goals: Time Frame: 4 weeks  1. Pt will increase strength L ankle 2/5 to assist with household ADL's  2. Pt will be fitted with AFO to help prevent falls  Discharge Goals: Time Frame: 8 weeks  1. Pt will be independent with HEP  2. Pt will increase strength 3/5 to assist with household ADL's  3.  Pt will perform 20 minutes household cleaning activities independently with no falls  Rehabilitation Potential For Stated Goals: Noelse 6 Sr.'s therapy, I certify that the treatment plan above will be carried out by a therapist or under their direction. Thank you for this referral,  Mahi Oconnor PTA     Referring Physician Signature: Aminta Fleischer, NP              Date                    The information in this section was collected on 01-02-18 (except where otherwise noted). HISTORY:   History of Present Injury/Illness (Reason for Referral):  Pt reports insidious onset L foot drop of approximately 2 months duration. He states he had gradual weakness in his L foot since being in the hospital in November 2017. He states he was admitted to hospital for SOB and was diagnosed with CHF. He denies any pain in his L foot/ankle. He states he gets numbness/tingling in the L foot. Pt denies any LBP. He states he is diabetic and his blood sugar is not regulated. He denies any numbness/tingling in the LE other than in the L foot. He rates his current pain 0/10. Pt states he has had several falls with most recent being 12-11-17. Pt states he was home and there was a \"ruffle in the rug\" and he stumbled. He reports some difficulties with all household cooking/cleaning activities due to his L foot weakness. Pt cares for his mother who lives with him. He has to assist her with walking and all daily activities. 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 Benjy Palacios  has a past medical history of Cardiomyopathy [facility]); CHF (congestive heart failure) ([facility] [address] (10/12/2017); Coronary artery disease involving native coronary artery of native heart without angina pectoris (10/25/2017); Foot drop, left; Former cigarette smoker; HTN (hypertension) (07/22/2010); Hypothyroidism; Long term current use of anticoagulant; Right renal mass; Sickle cell trait ([facility] [address] and Type 2 diabetes mellitus ([facility] [address]  He also has no past medical history of COPD; DEMENTIA; Gastrointestinal disorder; Infectious disease; Neurological disorder; Other ill-defined conditions(799.89); or Sleep disorder. Mr. Lea Lowe  has a past surgical history that includes hx appendectomy (1963). Social History/Living Environment:     Pt lives with his mother in a one story house  Prior Level of Function/Work/Activity:  Pt is retired  Dominant Side:         RIGHT  Other Clinical Tests:          No diagnostic testing performed   Personal Factors:          Sex:  male        Age:  75 y.o. Profession:  Pt is retired  Current Medications:       Current Outpatient Prescriptions:     dapagliflozin (FARXIGA) 10 mg tab tablet, Take 1 Tab by mouth daily. , Disp: 30 Tab, Rfl: 5    atorvastatin (LIPITOR) 40 mg tablet, Take 1 Tab by mouth nightly., Disp: 30 Tab, Rfl: 5    furosemide (LASIX) 40 mg tablet, Take 1 Tab by mouth nightly., Disp: 30 Tab, Rfl: 5    clopidogrel (PLAVIX) 75 mg tab, Take 1 Tab by mouth daily. , Disp: 30 Tab, Rfl: 5    carvedilol (COREG) 12.5 mg tablet, Take 1 Tab by mouth two (2) times daily (with meals). , Disp: 60 Tab, Rfl: 5    UNITHROID 50 mcg tablet, Take 1 Tab by mouth Daily (before breakfast). , Disp: 30 Tab, Rfl: 5    lisinopril (PRINIVIL, ZESTRIL) 20 mg tablet, Take 1 Tab by mouth nightly., Disp: 30 Tab, Rfl: 5    niCARdipine (CARDENE) 20 mg capsule, Take 1 Cap by mouth three (3) times daily. , Disp: 90 Cap, Rfl: 5    HYDROcodone-acetaminophen (NORCO)  mg tablet, Take 1 Tab by mouth every six (6) hours as needed for Pain. Max Daily Amount: 4 Tabs., Disp: 20 Tab, Rfl: 0    Liraglutide (VICTOZA) 0.6 mg/0.1 mL (18 mg/3 mL) pnij, 1.2 mg by SubCUTAneous route daily. , Disp: 1 Pen, Rfl: 5   Date Last Reviewed:  01-02-18   Number of Personal Factors/Comorbidities that affect the Plan of Care: 1-2: MODERATE COMPLEXITY   EXAMINATION:   Observation/Orthostatic Postural Assessment:           Forward head, rounded shoulders  Palpation: Tenderness in L gastroc/soleus. No tenderness in low back. ROM:    Lumbar flexion = 55 degrees (strain in left leg)  Lumbar extension = 25 degrees  Lumbar side bending R = 30 degrees  Lumbar side bending L = 30 degrees    Strength:    R hip flexion = 5/5  R hip abduction = 5/5  R hip adduction = 5/5  R knee extension = 5/5  R knee flexion = 5/5  R ankle DF = 5/5  R ankle PF = 5/5  R ankle IV = 5/5  R ankle EV = 5/5    L hip flexion = 5/5  L hip abduction = 5/5  L hip adduction = 5/5  L knee extension = 5/5  L knee flexion = 5/5  L ankle DF = 0/5  L ankle PF = 5/5  L ankle IV = 5/5  L ankle EV = 0/5    Special Tests:          SLR 45 degrees with hamstring tightness noted bilaterally   Neurological Screen:        Myotomes:  Weakness L ankle dorsiflexors and evertors         Dermatomes:  Intact to light touch bilateral LE's         Reflexes:  2+ to bilateral achilles and patellar  Functional Mobility:         Gait/Ambulation:  Decreased heel strike on left. Transfers:  Pt able to transition sit to supine and supine to sit independently    Body Structures Involved:  1. Nerves  2. Muscles Body Functions Affected:  1. Sensory/Pain  2. Neuromusculoskeletal Activities and Participation Affected:  1. Mobility  2. Self Care  3. Domestic Life   Number of elements (examined above) that affect the Plan of Care: 3: MODERATE COMPLEXITY   CLINICAL PRESENTATION:   Presentation: Evolving clinical presentation with changing clinical characteristics: MODERATE COMPLEXITY   CLINICAL DECISION MAKING:   Outcome Measure: Tool Used: PT/OT FOOT AND ANKLE ABILITY MEASURE  Score:  Initial: 51 Most Recent: X (Date: -- )   Interpretation of Score: For the \"Activities of Daily Living\", there are 21 questions each scored on a 5 point scale with 0 representing \"Unable to do\" and 4 representing \"No difficulty\". The lower the score, the greater the functional disability. 84/84 represents no disability.   Minimal detectable change is 5.7 points. With the addition of the 8 questions in the \"Sports Subscale,\" there are 29 questions, each scored on a 5 point scale with 0 representing \"Unable to do\" and 4 representing \"No difficulty\". The lower the score, the greater the functional disability. 116/116 represents no disability. Minimal detectable change is 12.3 points. Activities of Daily Living + Sports Subscale:  Score 116 115-94 93-71 70-47 46-24 23-1 0   Modifier CH CI CJ CK CL CM CN     ? Mobility - Walking and Moving Around:     - CURRENT STATUS: CK - 40%-59% impaired, limited or restricted    - GOAL STATUS: CJ - 20%-39% impaired, limited or restricted    - D/C STATUS:  ---------------To be determined---------------    Medical Necessity:   · Patient is expected to demonstrate progress in strength, range of motion and functional technique to increase independence with household ADL's. · Patient demonstrates good rehab potential due to higher previous functional level. Reason for Services/Other Comments:  · Patient continues to require skilled intervention due to decreased strength L foot leading to increased risk for falls and decreased overall functional status. Use of outcome tool(s) and clinical judgement create a POC that gives a: Questionable prediction of patient's progress: MODERATE COMPLEXITY            TREATMENT:   (In addition to Assessment/Re-Assessment sessions the following treatments were rendered)  Pre-treatment Symptoms/Complaints:  Pt reports no change in the AROM of his L ankle thus far. Pain: Initial:     0/10 Post Session:  0/10     THERAPEUTIC EXERCISE: (25 minutes):  Exercises per grid below to improve mobility and strength. Required minimal verbal cues to promote proper body alignment and promote proper body posture. Progressed resistance and repetitions as indicated. MODALITIES: (15 minutes):      [place] Muscle Stimulation to L ankle dorsiflexors x15 minutes .   On time 10 secs, off time 20 secs.  Skin clear afterwards. Date:   Date:   Date:  01-22-18   Activity/Exercise Parameters Parameters Parameters   SKTC   3 reps  15 sec holds   Active Hamstring Stretch   3 reps  15 sec holds   Bridging   15 reps  5 sec holds   Left Gastroc Stretch with Strap      Manual Left Gastroc Stretching   5 minutes   NuStep   Level 4  6 minutes   Piriformis Stretch   3 reps  15 sec holds       [employer] Portal  Treatment/Session Assessment:    · Response to Treatment:  Pt tolerated treatments well. He has follow up today Monday afternoon with [facility] for AFO, last appointment got cx because of the snow. He continue's to do his exercises at home. · Compliance with Program/Exercises: Will assess as treatment progresses. · Recommendations/Intent for next treatment session: \"Next visit will focus on advancements to more challenging activities\".   Total Treatment Duration:  45 minutes  PT Patient Time In/Time Out  Time In: 1030  Time Out: [address], PTA

## 2018-01-29 ENCOUNTER — APPOINTMENT (OUTPATIENT)
Dept: PHYSICAL THERAPY | Age: 68
End: 2018-01-29
Attending: NURSE PRACTITIONER
Payer: MEDICARE

## 2018-01-31 ENCOUNTER — APPOINTMENT (OUTPATIENT)
Dept: PHYSICAL THERAPY | Age: 68
End: 2018-01-31
Attending: NURSE PRACTITIONER
Payer: MEDICARE

## 2018-01-31 PROBLEM — I71.40 ABDOMINAL AORTIC ANEURYSM (AAA): Status: ACTIVE | Noted: 2018-01-31

## 2018-02-19 PROBLEM — I73.9 PAD (PERIPHERAL ARTERY DISEASE) (HCC): Status: ACTIVE | Noted: 2018-02-19

## 2018-02-19 PROBLEM — I70.219 ATHEROSCLEROSIS OF NATIVE ARTERIES OF EXTREMITY WITH INTERMITTENT CLAUDICATION (HCC): Status: ACTIVE | Noted: 2018-02-19

## 2018-02-19 PROBLEM — I77.1 ILIAC ARTERY STENOSIS, LEFT (HCC): Status: ACTIVE | Noted: 2018-02-19

## 2018-02-26 ENCOUNTER — APPOINTMENT (OUTPATIENT)
Dept: PHYSICAL THERAPY | Age: 68
End: 2018-02-26
Attending: NURSE PRACTITIONER

## 2018-03-02 PROBLEM — E78.1 PURE HYPERGLYCERIDEMIA: Status: ACTIVE | Noted: 2018-03-02

## 2018-03-14 NOTE — PROGRESS NOTES
[address] [facility]  : 1950  Primary: [facility] Medicare Hm*  Secondary:  [address] [place]  at [address] [facility], [address] [place] [address],[address], 2972 [facility]  [phone]   [phone]           OUTPATIENT PHYSICAL [facility] 1/10/2018    ICD-10: Treatment Diagnosis: Foot drop, left foot (M21.372)  Precautions/Allergies:   Aspirin   Fall Risk Score: 6 (? 5 = High Risk)  MD Orders: Evaluate and Treat MEDICAL/REFERRING DIAGNOSIS:  Foot drop, left foot [M21.372]   DATE OF ONSET: Approximately 2 months ago  REFERRING PHYSICIAN: Jennifer Chavez NP  RETURN PHYSICIAN APPOINTMENT: 18     INITIAL ASSESSMENT:  Mr. Eladio Bravo was last seen for PT on 18. He had appt with  to have AFO made. He did not attend any further PT appt's. Discharge from PT to independent HEP. PROBLEM LIST (Impacting functional limitations):  1. Decreased Strength  2. Decreased ADL/Functional Activities  3. Decreased Flexibility/Joint Mobility  4. Decreased [place] with Home Exercise Program INTERVENTIONS PLANNED:  1. Electrical Stimulation  2. Gait Training  3. Home Exercise Program (HEP)  4. Manual Therapy  5. Range of Motion (ROM)  6. Therapeutic Exercise/Strengthening   TREATMENT PLAN:  Effective Dates: 18 TO 18. Frequency/Duration: 2 times a week for 2 months  GOALS: (Goals have been discussed and agreed upon with patient.)  Short-Term Functional Goals: Time Frame: 4 weeks  1. Pt will increase strength L ankle 2/5 to assist with household ADL's  2. Pt will be fitted with AFO to help prevent falls  Discharge Goals: Time Frame: 8 weeks  1. Pt will be independent with HEP  2. Pt will increase strength 3/5 to assist with household ADL's  3.  Pt will perform 20 minutes household cleaning activities independently with no falls  Rehabilitation Potential For Stated Goals: Good  Regarding [address]s therapy, I certify that the treatment plan above will be carried out by a therapist or under their direction. Thank you for this referral,  Melo Martino PT     Referring Physician Signature: Noreen Luis NP              Date                    The information in this section was collected on 01-02-18 (except where otherwise noted). HISTORY:   History of Present Injury/Illness (Reason for Referral):  Pt reports insidious onset L foot drop of approximately 2 months duration. He states he had gradual weakness in his L foot since being in the hospital in November 2017. He states he was admitted to hospital for SOB and was diagnosed with CHF. He denies any pain in his L foot/ankle. He states he gets numbness/tingling in the L foot. Pt denies any LBP. He states he is diabetic and his blood sugar is not regulated. He denies any numbness/tingling in the LE other than in the L foot. He rates his current pain 0/10. Pt states he has had several falls with most recent being 12-11-17. Pt states he was home and there was a \"ruffle in the rug\" and he stumbled. He reports some difficulties with all household cooking/cleaning activities due to his L foot weakness. Pt cares for his mother who lives with him. He has to assist her with walking and all daily activities. 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 Ollie Stauffer  has a past medical history of Cardiomyopathy [facility]); CHF (congestive heart failure) ([facility] [address] (10/12/2017); Coronary artery disease involving native coronary artery of native heart without angina pectoris (10/25/2017); Foot drop, left; Former cigarette smoker; HTN (hypertension) (07/22/2010); Hypothyroidism; Long term current use of anticoagulant; Right renal mass; Sickle cell trait ([facility] [address] and Type 2 diabetes mellitus ([facility] [address] He also has no past medical history of COPD; DEMENTIA; Gastrointestinal disorder; Infectious disease; Neurological disorder;  Other ill-defined conditions(799.89); or Sleep disorder. Mr. Tracie Mccurdy  has a past surgical history that includes hx appendectomy (1963). Social History/Living Environment:     Pt lives with his mother in a one story house  Prior Level of Function/Work/Activity:  Pt is retired  Dominant Side:         RIGHT  Other Clinical Tests:          No diagnostic testing performed   Personal Factors:          Sex:  male        Age:  75 y.o. Profession:  Pt is retired  Current Medications:       Current Outpatient Prescriptions:     dapagliflozin (FARXIGA) 10 mg tab tablet, Take 1 Tab by mouth daily. , Disp: 30 Tab, Rfl: 5    atorvastatin (LIPITOR) 40 mg tablet, Take 1 Tab by mouth nightly., Disp: 30 Tab, Rfl: 5    furosemide (LASIX) 40 mg tablet, Take 1 Tab by mouth nightly., Disp: 30 Tab, Rfl: 5    clopidogrel (PLAVIX) 75 mg tab, Take 1 Tab by mouth daily. , Disp: 30 Tab, Rfl: 5    carvedilol (COREG) 12.5 mg tablet, Take 1 Tab by mouth two (2) times daily (with meals). , Disp: 60 Tab, Rfl: 5    UNITHROID 50 mcg tablet, Take 1 Tab by mouth Daily (before breakfast). , Disp: 30 Tab, Rfl: 5    lisinopril (PRINIVIL, ZESTRIL) 20 mg tablet, Take 1 Tab by mouth nightly., Disp: 30 Tab, Rfl: 5    niCARdipine (CARDENE) 20 mg capsule, Take 1 Cap by mouth three (3) times daily. , Disp: 90 Cap, Rfl: 5    Liraglutide (VICTOZA) 0.6 mg/0.1 mL (18 mg/3 mL) pnij, 1.2 mg by SubCUTAneous route daily. , Disp: 1 Pen, Rfl: 5   Date Last Reviewed:  01-02-18   Number of Personal Factors/Comorbidities that affect the Plan of Care: 1-2: MODERATE COMPLEXITY   EXAMINATION:   Observation/Orthostatic Postural Assessment: Forward head, rounded shoulders  Palpation:          Tenderness in L gastroc/soleus. No tenderness in low back.   ROM:    Lumbar flexion = 55 degrees (strain in left leg)  Lumbar extension = 25 degrees  Lumbar side bending R = 30 degrees  Lumbar side bending L = 30 degrees    Strength:    R hip flexion = 5/5  R hip abduction = 5/5  R hip adduction = 5/5  R knee extension = 5/5  R knee flexion = 5/5  R ankle DF = 5/5  R ankle PF = 5/5  R ankle IV = 5/5  R ankle EV = 5/5    L hip flexion = 5/5  L hip abduction = 5/5  L hip adduction = 5/5  L knee extension = 5/5  L knee flexion = 5/5  L ankle DF = 0/5  L ankle PF = 5/5  L ankle IV = 5/5  L ankle EV = 0/5    Special Tests:          SLR 45 degrees with hamstring tightness noted bilaterally   Neurological Screen:        Myotomes:  Weakness L ankle dorsiflexors and evertors         Dermatomes:  Intact to light touch bilateral LE's         Reflexes:  2+ to bilateral achilles and patellar  Functional Mobility:         Gait/Ambulation:  Decreased heel strike on left. Transfers:  Pt able to transition sit to supine and supine to sit independently    Body Structures Involved:  1. Nerves  2. Muscles Body Functions Affected:  1. Sensory/Pain  2. Neuromusculoskeletal Activities and Participation Affected:  1. Mobility  2. Self Care  3. Domestic Life   Number of elements (examined above) that affect the Plan of Care: 3: MODERATE COMPLEXITY   CLINICAL PRESENTATION:   Presentation: Evolving clinical presentation with changing clinical characteristics: MODERATE COMPLEXITY   CLINICAL DECISION MAKING:   Outcome Measure: Tool Used: PT/OT FOOT AND ANKLE ABILITY MEASURE  Score:  Initial: 51 Most Recent: X (Date: -- )   Interpretation of Score: For the \"Activities of Daily Living\", there are 21 questions each scored on a 5 point scale with 0 representing \"Unable to do\" and 4 representing \"No difficulty\". The lower the score, the greater the functional disability. 84/84 represents no disability. Minimal detectable change is 5.7 points. With the addition of the 8 questions in the \"Sports Subscale,\" there are 29 questions, each scored on a 5 point scale with 0 representing \"Unable to do\" and 4 representing \"No difficulty\". The lower the score, the greater the functional disability.  116/116 represents no disability. Minimal detectable change is 12.3 points. Activities of Daily Living + Sports Subscale:  Score 116 115-94 93-71 70-47 46-24 23-1 0   Modifier CH CI CJ CK CL CM CN     ? Mobility - Walking and Moving Around:     - CURRENT STATUS: CK - 40%-59% impaired, limited or restricted    - GOAL STATUS: CJ - 20%-39% impaired, limited or restricted    - D/C STATUS:  ---------------To be determined---------------    Medical Necessity:   · Patient is expected to demonstrate progress in strength, range of motion and functional technique to increase independence with household ADL's. · Patient demonstrates good rehab potential due to higher previous functional level. Reason for Services/Other Comments:  · Patient continues to require skilled intervention due to decreased strength L foot leading to increased risk for falls and decreased overall functional status.    Use of outcome tool(s) and clinical judgement create a POC that gives a: Questionable prediction of patient's progress: MODERATE COMPLEXITY        Layman Alfonso, PT

## 2018-07-02 ENCOUNTER — HOSPITAL ENCOUNTER (OUTPATIENT)
Dept: LAB | Age: 68
Discharge: HOME | End: 2018-07-02
Attending: INTERNAL MEDICINE
Payer: MEDICARE

## 2018-07-02 LAB
CHOLEST SERPL-MCNC: 109 MG/DL
HDLC SERPL-MCNC: 30 MG/DL (ref 40–60)
HDLC SERPL: 3.6 {RATIO}
LDLC SERPL CALC-MCNC: 50.8 MG/DL
LIPID PROFILE,FLP: ABNORMAL
TRIGL SERPL-MCNC: 141 MG/DL (ref 35–150)
VLDLC SERPL CALC-MCNC: 28.2 MG/DL (ref 6–23)

## 2018-07-02 PROCEDURE — 80061 LIPID PANEL: CPT | Performed by: INTERNAL MEDICINE

## 2018-07-02 PROCEDURE — 36415 COLL VENOUS BLD VENIPUNCTURE: CPT | Performed by: INTERNAL MEDICINE

## 2018-08-01 ENCOUNTER — HOSPITAL ENCOUNTER (OUTPATIENT)
Dept: ULTRASOUND IMAGING | Age: 68
Discharge: HOME | End: 2018-08-01
Attending: UROLOGY
Payer: MEDICARE

## 2018-08-01 DIAGNOSIS — C64.1 MALIGNANT NEOPLASM OF RIGHT KIDNEY (HCC): ICD-10-CM

## 2018-08-01 PROCEDURE — 76770 US EXAM ABDO BACK WALL COMP: CPT

## 2018-09-05 ENCOUNTER — HOSPITAL ENCOUNTER (OUTPATIENT)
Dept: MRI IMAGING | Age: 68
End: 2018-09-05
Attending: SURGERY
Payer: MEDICARE

## 2018-09-05 ENCOUNTER — HOSPITAL ENCOUNTER (OUTPATIENT)
Dept: CT IMAGING | Age: 68
Discharge: HOME | End: 2018-09-05
Attending: SURGERY
Payer: MEDICARE

## 2018-09-05 ENCOUNTER — HOSPITAL ENCOUNTER (OUTPATIENT)
Dept: CT IMAGING | Age: 68
End: 2018-09-05
Attending: SURGERY
Payer: MEDICARE

## 2018-09-05 VITALS — WEIGHT: 137 LBS | BODY MASS INDEX: 24.27 KG/M2 | HEIGHT: 63 IN

## 2018-09-05 DIAGNOSIS — I77.1 ILIAC ARTERY STENOSIS, LEFT (HCC): ICD-10-CM

## 2018-09-05 LAB — CREAT BLD-MCNC: 2.4 MG/DL (ref 0.8–1.5)

## 2018-09-05 PROCEDURE — 75635 CT ANGIO ABDOMINAL ARTERIES: CPT

## 2018-09-05 PROCEDURE — 74011000258 HC RX REV CODE- 258: Performed by: SURGERY

## 2018-09-05 PROCEDURE — 74011250636 HC RX REV CODE- 250/636: Performed by: SURGERY

## 2018-09-05 PROCEDURE — 74011636320 HC RX REV CODE- 636/320: Performed by: SURGERY

## 2018-09-05 PROCEDURE — 82565 ASSAY OF CREATININE: CPT

## 2018-09-05 RX ORDER — SODIUM CHLORIDE 9 MG/ML
1 INJECTION, SOLUTION INTRAVENOUS AS NEEDED
Status: ACTIVE | OUTPATIENT
Start: 2018-09-05 | End: 2018-09-06

## 2018-09-05 RX ORDER — SODIUM CHLORIDE 0.9 % (FLUSH) 0.9 %
10 SYRINGE (ML) INJECTION
Status: COMPLETED | OUTPATIENT
Start: 2018-09-05 | End: 2018-09-05

## 2018-09-05 RX ADMIN — SODIUM CHLORIDE 1 ML/KG/HR: 900 INJECTION, SOLUTION INTRAVENOUS at 09:30

## 2018-09-05 RX ADMIN — IOPAMIDOL 125 ML: 755 INJECTION, SOLUTION INTRAVENOUS at 12:32

## 2018-09-05 RX ADMIN — Medication 10 ML: at 12:32

## 2018-09-05 RX ADMIN — SODIUM CHLORIDE 100 ML: 900 INJECTION, SOLUTION INTRAVENOUS at 12:32

## 2018-09-18 ENCOUNTER — HOSPITAL ENCOUNTER (OUTPATIENT)
Dept: MRI IMAGING | Age: 68
Discharge: HOME | End: 2018-09-18
Attending: SURGERY
Payer: MEDICARE

## 2018-09-18 VITALS — WEIGHT: 137 LBS | BODY MASS INDEX: 24.27 KG/M2

## 2018-09-18 DIAGNOSIS — I73.9 PAD (PERIPHERAL ARTERY DISEASE) (HCC): ICD-10-CM

## 2018-09-18 PROCEDURE — 74011250636 HC RX REV CODE- 250/636: Performed by: SURGERY

## 2018-09-18 PROCEDURE — A9575 INJ GADOTERATE MEGLUMI 0.1ML: HCPCS | Performed by: SURGERY

## 2018-09-18 PROCEDURE — 72158 MRI LUMBAR SPINE W/O & W/DYE: CPT

## 2018-09-18 RX ORDER — SODIUM CHLORIDE 0.9 % (FLUSH) 0.9 %
10 SYRINGE (ML) INJECTION
Status: COMPLETED | OUTPATIENT
Start: 2018-09-18 | End: 2018-09-18

## 2018-09-18 RX ORDER — GADOTERATE MEGLUMINE 376.9 MG/ML
12 INJECTION INTRAVENOUS
Status: COMPLETED | OUTPATIENT
Start: 2018-09-18 | End: 2018-09-18

## 2018-09-18 RX ADMIN — Medication 10 ML: at 08:05

## 2018-09-18 RX ADMIN — GADOTERATE MEGLUMINE 15 ML: 376.9 INJECTION INTRAVENOUS at 08:05

## 2018-10-01 ENCOUNTER — HOSPITAL ENCOUNTER (OUTPATIENT)
Dept: SURGERY | Age: 68
Discharge: HOME | End: 2018-10-01
Payer: MEDICARE

## 2018-10-01 VITALS
TEMPERATURE: 97.6 F | DIASTOLIC BLOOD PRESSURE: 90 MMHG | OXYGEN SATURATION: 98 % | HEIGHT: 65 IN | HEART RATE: 80 BPM | WEIGHT: 142.1 LBS | SYSTOLIC BLOOD PRESSURE: 174 MMHG | BODY MASS INDEX: 23.68 KG/M2 | RESPIRATION RATE: 16 BRPM

## 2018-10-01 LAB
ANION GAP SERPL CALC-SCNC: 8 MMOL/L (ref 7–16)
ATRIAL RATE: 73 BPM
BUN SERPL-MCNC: 26 MG/DL (ref 8–23)
CALCIUM SERPL-MCNC: 9.6 MG/DL (ref 8.3–10.4)
CALCULATED P AXIS, ECG09: 57 DEGREES
CALCULATED R AXIS, ECG10: 39 DEGREES
CALCULATED T AXIS, ECG11: 30 DEGREES
CHLORIDE SERPL-SCNC: 110 MMOL/L (ref 98–107)
CO2 SERPL-SCNC: 25 MMOL/L (ref 21–32)
CREAT SERPL-MCNC: 1.82 MG/DL (ref 0.8–1.5)
DIAGNOSIS, 93000: NORMAL
ERYTHROCYTE [DISTWIDTH] IN BLOOD BY AUTOMATED COUNT: 14.4 %
GLUCOSE BLD STRIP.AUTO-MCNC: 143 MG/DL (ref 65–100)
GLUCOSE SERPL-MCNC: 139 MG/DL (ref 65–100)
HCT VFR BLD AUTO: 38.3 % (ref 41.1–50.3)
HGB BLD-MCNC: 12.3 G/DL (ref 13.6–17.2)
MCH RBC QN AUTO: 26.2 PG (ref 26.1–32.9)
MCHC RBC AUTO-ENTMCNC: 32.1 G/DL (ref 31.4–35)
MCV RBC AUTO: 81.5 FL (ref 79.6–97.8)
NRBC # BLD: 0 K/UL (ref 0–0.2)
P-R INTERVAL, ECG05: 164 MS
PLATELET # BLD AUTO: 240 K/UL (ref 150–450)
PMV BLD AUTO: 10.5 FL (ref 9.4–12.3)
POTASSIUM SERPL-SCNC: 4.8 MMOL/L (ref 3.5–5.1)
Q-T INTERVAL, ECG07: 376 MS
QRS DURATION, ECG06: 80 MS
QTC CALCULATION (BEZET), ECG08: 414 MS
RBC # BLD AUTO: 4.7 M/UL (ref 4.23–5.6)
SODIUM SERPL-SCNC: 143 MMOL/L (ref 136–145)
VENTRICULAR RATE, ECG03: 73 BPM
WBC # BLD AUTO: 4.2 K/UL (ref 4.3–11.1)

## 2018-10-01 PROCEDURE — 93005 ELECTROCARDIOGRAM TRACING: CPT | Performed by: ANESTHESIOLOGY

## 2018-10-01 PROCEDURE — 85027 COMPLETE CBC AUTOMATED: CPT

## 2018-10-01 PROCEDURE — 80048 BASIC METABOLIC PNL TOTAL CA: CPT

## 2018-10-01 PROCEDURE — 82962 GLUCOSE BLOOD TEST: CPT

## 2018-10-01 NOTE — PERIOP NOTES
Recent Results (from the past 12 hour(s)) GLUCOSE, POC Collection Time: 10/01/18 11:58 AM  
Result Value Ref Range Glucose (POC) 143 (H) 65 - 100 mg/dL CBC W/O DIFF Collection Time: 10/01/18 12:14 PM  
Result Value Ref Range WBC 4.2 (L) 4.3 - 11.1 K/uL  
 RBC 4.70 4.23 - 5.6 M/uL  
 HGB 12.3 (L) 13.6 - 17.2 g/dL HCT 38.3 (L) 41.1 - 50.3 % MCV 81.5 79.6 - 97.8 FL  
 MCH 26.2 26.1 - 32.9 PG  
 MCHC 32.1 31.4 - 35.0 g/dL  
 RDW 14.4 % PLATELET 237 674 - 623 K/uL MPV 10.5 9.4 - 12.3 FL ABSOLUTE NRBC 0.00 0.0 - 0.2 K/uL METABOLIC PANEL, BASIC Collection Time: 10/01/18 12:14 PM  
Result Value Ref Range Sodium 143 136 - 145 mmol/L Potassium 4.8 3.5 - 5.1 mmol/L Chloride 110 (H) 98 - 107 mmol/L  
 CO2 25 21 - 32 mmol/L Anion gap 8 7 - 16 mmol/L Glucose 139 (H) 65 - 100 mg/dL BUN 26 (H) 8 - 23 MG/DL Creatinine 1.82 (H) 0.8 - 1.5 MG/DL  
 GFR est AA 48 (L) >60 ml/min/1.73m2 GFR est non-AA 39 (L) >60 ml/min/1.73m2 Calcium 9.6 8.3 - 10.4 MG/DL Labs routed to Dr. Diamante Condon

## 2018-10-01 NOTE — PERIOP NOTES
Type 2 surgery,  assessment complete. Labs per surgeon: CBC; BMP Labs per anesthesia protocol: none EKG: 10/1/2018- NSR Glucose: 143 Hibiclens and instructions given per hospital policy. Patient provided with and instructed on educational handouts including Guide to Surgery, Pain Management, Hand Hygiene, Blood Transfusion Education, and [facility] Brochure. Patient answered medical/surgical history questions at their best of ability. All prior to admission medications documented in [facility]. Patient instructed to hold all vitamins 7 days prior to surgery and NSAIDS 5 days prior to surgery, patient verbalized understanding. Medications to be held: none Patient instructed to continue previous medications as prescribed prior to surgery and to take the following medications the day of surgery according to anesthesia guidelines with a small sip of water: coreg, unithroid, plavix, nicardipine. Per Dr. Gutierrez Quivers office note 9/25/2018 patient to continue Plavix. Patient instructed on the following: 
Arrive at [facility], time of arrival to be called the day before by 1700 NPO after midnight including gum, mints, and ice chips. Responsible adult must drive patient to the hospital, stay during surgery, and patient will require supervision 24 hours after anesthesia. Use antibacterial soap in shower the night before surgery and on the morning of surgery. Leave all valuables (money and jewelry) at home but bring insurance card and ID on       DOS. Do not wear make-up, nail polish, lotions, cologne, perfumes, powders, or oil on skin. Patient teach back successful and patient demonstrates knowledge of instruction.

## 2018-10-10 ENCOUNTER — ANESTHESIA EVENT (OUTPATIENT)
Dept: SURGERY | Age: 68
End: 2018-10-10
Payer: MEDICARE

## 2018-10-11 ENCOUNTER — HOSPITAL ENCOUNTER (OUTPATIENT)
Age: 68
Setting detail: OUTPATIENT SURGERY
Discharge: HOME | End: 2018-10-11
Attending: SURGERY | Admitting: SURGERY
Payer: MEDICARE

## 2018-10-11 ENCOUNTER — ANESTHESIA (OUTPATIENT)
Dept: SURGERY | Age: 68
End: 2018-10-11
Payer: MEDICARE

## 2018-10-11 ENCOUNTER — APPOINTMENT (OUTPATIENT)
Dept: INTERVENTIONAL RADIOLOGY/VASCULAR | Age: 68
End: 2018-10-11
Attending: SURGERY
Payer: MEDICARE

## 2018-10-11 VITALS
TEMPERATURE: 97.3 F | BODY MASS INDEX: 23.67 KG/M2 | WEIGHT: 142.06 LBS | OXYGEN SATURATION: 99 % | SYSTOLIC BLOOD PRESSURE: 139 MMHG | RESPIRATION RATE: 16 BRPM | HEIGHT: 65 IN | DIASTOLIC BLOOD PRESSURE: 79 MMHG | HEART RATE: 55 BPM

## 2018-10-11 LAB
CREAT BLD-MCNC: 2 MG/DL (ref 0.8–1.5)
GLUCOSE BLD STRIP.AUTO-MCNC: 170 MG/DL (ref 65–100)
POTASSIUM BLD-SCNC: 4.2 MMOL/L (ref 3.5–5.1)

## 2018-10-11 PROCEDURE — 76210000022 HC REC RM PH II 1.5 TO 2 HR: Performed by: SURGERY

## 2018-10-11 PROCEDURE — 82565 ASSAY OF CREATININE: CPT

## 2018-10-11 PROCEDURE — 77030020143 HC AIRWY LARYN INTUB CGAS -A: Performed by: ANESTHESIOLOGY

## 2018-10-11 PROCEDURE — 82962 GLUCOSE BLOOD TEST: CPT

## 2018-10-11 PROCEDURE — 74011636320 HC RX REV CODE- 636/320: Performed by: SURGERY

## 2018-10-11 PROCEDURE — 74011250636 HC RX REV CODE- 250/636: Performed by: SURGERY

## 2018-10-11 PROCEDURE — C1760 CLOSURE DEV, VASC: HCPCS

## 2018-10-11 PROCEDURE — C1894 INTRO/SHEATH, NON-LASER: HCPCS

## 2018-10-11 PROCEDURE — 74011250636 HC RX REV CODE- 250/636

## 2018-10-11 PROCEDURE — 74011250637 HC RX REV CODE- 250/637: Performed by: ANESTHESIOLOGY

## 2018-10-11 PROCEDURE — 77030020782 HC GWN BAIR PAWS FLX 3M -B: Performed by: ANESTHESIOLOGY

## 2018-10-11 PROCEDURE — 76060000033 HC ANESTHESIA 1 TO 1.5 HR: Performed by: SURGERY

## 2018-10-11 PROCEDURE — C1874 STENT, COATED/COV W/DEL SYS: HCPCS

## 2018-10-11 PROCEDURE — 74011250636 HC RX REV CODE- 250/636: Performed by: ANESTHESIOLOGY

## 2018-10-11 PROCEDURE — C1769 GUIDE WIRE: HCPCS

## 2018-10-11 PROCEDURE — 84132 ASSAY OF SERUM POTASSIUM: CPT

## 2018-10-11 PROCEDURE — 76210000016 HC OR PH I REC 1 TO 1.5 HR: Performed by: SURGERY

## 2018-10-11 PROCEDURE — 77030013519 HC DEV INFL BASIX MRTM -B

## 2018-10-11 PROCEDURE — 74011000250 HC RX REV CODE- 250

## 2018-10-11 PROCEDURE — 76010000149 HC OR TIME 1 TO 1.5 HR: Performed by: SURGERY

## 2018-10-11 PROCEDURE — 77030016730 HC CATH ANGI SZ AVU1 ANGI -B

## 2018-10-11 PROCEDURE — 75716 ARTERY X-RAYS ARMS/LEGS: CPT

## 2018-10-11 PROCEDURE — C1887 CATHETER, GUIDING: HCPCS

## 2018-10-11 DEVICE — ICAST COVERED STENT, 9MMX59MMX80CM
Type: IMPLANTABLE DEVICE | Site: GROIN | Status: FUNCTIONAL
Brand: ICAST

## 2018-10-11 RX ORDER — OXYCODONE AND ACETAMINOPHEN 5; 325 MG/1; MG/1
1 TABLET ORAL
Status: DISCONTINUED | OUTPATIENT
Start: 2018-10-11 | End: 2018-10-11 | Stop reason: HOSPADM

## 2018-10-11 RX ORDER — SODIUM CHLORIDE, SODIUM LACTATE, POTASSIUM CHLORIDE, CALCIUM CHLORIDE 600; 310; 30; 20 MG/100ML; MG/100ML; MG/100ML; MG/100ML
75 INJECTION, SOLUTION INTRAVENOUS CONTINUOUS
Status: DISCONTINUED | OUTPATIENT
Start: 2018-10-11 | End: 2018-10-11 | Stop reason: HOSPADM

## 2018-10-11 RX ORDER — OXYCODONE HYDROCHLORIDE 5 MG/1
5 TABLET ORAL
Status: COMPLETED | OUTPATIENT
Start: 2018-10-11 | End: 2018-10-11

## 2018-10-11 RX ORDER — SODIUM CHLORIDE 0.9 % (FLUSH) 0.9 %
5-10 SYRINGE (ML) INJECTION AS NEEDED
Status: DISCONTINUED | OUTPATIENT
Start: 2018-10-11 | End: 2018-10-11 | Stop reason: HOSPADM

## 2018-10-11 RX ORDER — ALBUTEROL SULFATE 0.83 MG/ML
2.5 SOLUTION RESPIRATORY (INHALATION) AS NEEDED
Status: DISCONTINUED | OUTPATIENT
Start: 2018-10-11 | End: 2018-10-11 | Stop reason: HOSPADM

## 2018-10-11 RX ORDER — FENTANYL CITRATE 50 UG/ML
INJECTION, SOLUTION INTRAMUSCULAR; INTRAVENOUS AS NEEDED
Status: DISCONTINUED | OUTPATIENT
Start: 2018-10-11 | End: 2018-10-11 | Stop reason: HOSPADM

## 2018-10-11 RX ORDER — ONDANSETRON 2 MG/ML
4 INJECTION INTRAMUSCULAR; INTRAVENOUS
Status: DISCONTINUED | OUTPATIENT
Start: 2018-10-11 | End: 2018-10-11 | Stop reason: HOSPADM

## 2018-10-11 RX ORDER — HYDROMORPHONE HYDROCHLORIDE 2 MG/ML
0.5 INJECTION, SOLUTION INTRAMUSCULAR; INTRAVENOUS; SUBCUTANEOUS
Status: COMPLETED | OUTPATIENT
Start: 2018-10-11 | End: 2018-10-11

## 2018-10-11 RX ORDER — PROPOFOL 10 MG/ML
INJECTION, EMULSION INTRAVENOUS AS NEEDED
Status: DISCONTINUED | OUTPATIENT
Start: 2018-10-11 | End: 2018-10-11 | Stop reason: HOSPADM

## 2018-10-11 RX ORDER — MORPHINE SULFATE 10 MG/ML
1 INJECTION, SOLUTION INTRAMUSCULAR; INTRAVENOUS
Status: DISCONTINUED | OUTPATIENT
Start: 2018-10-11 | End: 2018-10-11 | Stop reason: HOSPADM

## 2018-10-11 RX ORDER — FENTANYL CITRATE 50 UG/ML
100 INJECTION, SOLUTION INTRAMUSCULAR; INTRAVENOUS ONCE
Status: DISCONTINUED | OUTPATIENT
Start: 2018-10-11 | End: 2018-10-11 | Stop reason: HOSPADM

## 2018-10-11 RX ORDER — HEPARIN SODIUM 1000 [USP'U]/ML
INJECTION, SOLUTION INTRAVENOUS; SUBCUTANEOUS AS NEEDED
Status: DISCONTINUED | OUTPATIENT
Start: 2018-10-11 | End: 2018-10-11 | Stop reason: HOSPADM

## 2018-10-11 RX ORDER — SODIUM CHLORIDE 9 MG/ML
75 INJECTION, SOLUTION INTRAVENOUS CONTINUOUS
Status: DISCONTINUED | OUTPATIENT
Start: 2018-10-11 | End: 2018-10-11 | Stop reason: HOSPADM

## 2018-10-11 RX ORDER — SODIUM CHLORIDE, SODIUM LACTATE, POTASSIUM CHLORIDE, CALCIUM CHLORIDE 600; 310; 30; 20 MG/100ML; MG/100ML; MG/100ML; MG/100ML
50 INJECTION, SOLUTION INTRAVENOUS CONTINUOUS
Status: DISCONTINUED | OUTPATIENT
Start: 2018-10-11 | End: 2018-10-11 | Stop reason: HOSPADM

## 2018-10-11 RX ORDER — IODIXANOL 320 MG/ML
INJECTION, SOLUTION INTRAVASCULAR AS NEEDED
Status: DISCONTINUED | OUTPATIENT
Start: 2018-10-11 | End: 2018-10-11 | Stop reason: HOSPADM

## 2018-10-11 RX ORDER — SODIUM CHLORIDE 0.9 % (FLUSH) 0.9 %
5-10 SYRINGE (ML) INJECTION EVERY 8 HOURS
Status: DISCONTINUED | OUTPATIENT
Start: 2018-10-11 | End: 2018-10-11 | Stop reason: HOSPADM

## 2018-10-11 RX ORDER — MIDAZOLAM HYDROCHLORIDE 1 MG/ML
2 INJECTION, SOLUTION INTRAMUSCULAR; INTRAVENOUS ONCE
Status: DISCONTINUED | OUTPATIENT
Start: 2018-10-11 | End: 2018-10-11 | Stop reason: HOSPADM

## 2018-10-11 RX ORDER — MIDAZOLAM HYDROCHLORIDE 1 MG/ML
2 INJECTION, SOLUTION INTRAMUSCULAR; INTRAVENOUS
Status: DISCONTINUED | OUTPATIENT
Start: 2018-10-11 | End: 2018-10-11 | Stop reason: HOSPADM

## 2018-10-11 RX ORDER — PROTAMINE SULFATE 10 MG/ML
INJECTION, SOLUTION INTRAVENOUS AS NEEDED
Status: DISCONTINUED | OUTPATIENT
Start: 2018-10-11 | End: 2018-10-11 | Stop reason: HOSPADM

## 2018-10-11 RX ORDER — EPHEDRINE SULFATE 50 MG/ML
INJECTION, SOLUTION INTRAVENOUS AS NEEDED
Status: DISCONTINUED | OUTPATIENT
Start: 2018-10-11 | End: 2018-10-11 | Stop reason: HOSPADM

## 2018-10-11 RX ORDER — LIDOCAINE HYDROCHLORIDE 20 MG/ML
INJECTION, SOLUTION EPIDURAL; INFILTRATION; INTRACAUDAL; PERINEURAL AS NEEDED
Status: DISCONTINUED | OUTPATIENT
Start: 2018-10-11 | End: 2018-10-11 | Stop reason: HOSPADM

## 2018-10-11 RX ORDER — CEFAZOLIN SODIUM/WATER 2 G/20 ML
2 SYRINGE (ML) INTRAVENOUS ONCE
Status: COMPLETED | OUTPATIENT
Start: 2018-10-11 | End: 2018-10-11

## 2018-10-11 RX ORDER — HEPARIN SODIUM 5000 [USP'U]/ML
INJECTION, SOLUTION INTRAVENOUS; SUBCUTANEOUS AS NEEDED
Status: DISCONTINUED | OUTPATIENT
Start: 2018-10-11 | End: 2018-10-11 | Stop reason: HOSPADM

## 2018-10-11 RX ORDER — LIDOCAINE HYDROCHLORIDE 10 MG/ML
0.1 INJECTION INFILTRATION; PERINEURAL AS NEEDED
Status: DISCONTINUED | OUTPATIENT
Start: 2018-10-11 | End: 2018-10-11 | Stop reason: HOSPADM

## 2018-10-11 RX ADMIN — HYDROMORPHONE HYDROCHLORIDE 0.5 MG: 2 INJECTION, SOLUTION INTRAMUSCULAR; INTRAVENOUS; SUBCUTANEOUS at 14:02

## 2018-10-11 RX ADMIN — Medication 2 G: at 12:37

## 2018-10-11 RX ADMIN — FENTANYL CITRATE 25 MCG: 50 INJECTION, SOLUTION INTRAMUSCULAR; INTRAVENOUS at 13:22

## 2018-10-11 RX ADMIN — FENTANYL CITRATE 25 MCG: 50 INJECTION, SOLUTION INTRAMUSCULAR; INTRAVENOUS at 13:01

## 2018-10-11 RX ADMIN — EPHEDRINE SULFATE 10 MG: 50 INJECTION, SOLUTION INTRAVENOUS at 12:53

## 2018-10-11 RX ADMIN — PROTAMINE SULFATE 1 MG: 10 INJECTION, SOLUTION INTRAVENOUS at 13:23

## 2018-10-11 RX ADMIN — SODIUM CHLORIDE, SODIUM LACTATE, POTASSIUM CHLORIDE, AND CALCIUM CHLORIDE: 600; 310; 30; 20 INJECTION, SOLUTION INTRAVENOUS at 13:25

## 2018-10-11 RX ADMIN — PROTAMINE SULFATE 10 MG: 10 INJECTION, SOLUTION INTRAVENOUS at 13:25

## 2018-10-11 RX ADMIN — HYDROMORPHONE HYDROCHLORIDE 0.5 MG: 2 INJECTION, SOLUTION INTRAMUSCULAR; INTRAVENOUS; SUBCUTANEOUS at 13:53

## 2018-10-11 RX ADMIN — HYDROMORPHONE HYDROCHLORIDE 0.5 MG: 2 INJECTION, SOLUTION INTRAMUSCULAR; INTRAVENOUS; SUBCUTANEOUS at 14:21

## 2018-10-11 RX ADMIN — PROPOFOL 200 MG: 10 INJECTION, EMULSION INTRAVENOUS at 12:38

## 2018-10-11 RX ADMIN — SODIUM CHLORIDE, SODIUM LACTATE, POTASSIUM CHLORIDE, AND CALCIUM CHLORIDE 75 ML/HR: 600; 310; 30; 20 INJECTION, SOLUTION INTRAVENOUS at 07:37

## 2018-10-11 RX ADMIN — PROTAMINE SULFATE 10 MG: 10 INJECTION, SOLUTION INTRAVENOUS at 13:28

## 2018-10-11 RX ADMIN — FENTANYL CITRATE 50 MCG: 50 INJECTION, SOLUTION INTRAMUSCULAR; INTRAVENOUS at 12:34

## 2018-10-11 RX ADMIN — EPHEDRINE SULFATE 10 MG: 50 INJECTION, SOLUTION INTRAVENOUS at 13:17

## 2018-10-11 RX ADMIN — OXYCODONE HYDROCHLORIDE 5 MG: 5 TABLET ORAL at 14:34

## 2018-10-11 RX ADMIN — PROTAMINE SULFATE 9 MG: 10 INJECTION, SOLUTION INTRAVENOUS at 13:24

## 2018-10-11 RX ADMIN — LIDOCAINE HYDROCHLORIDE 100 MG: 20 INJECTION, SOLUTION EPIDURAL; INFILTRATION; INTRACAUDAL; PERINEURAL at 12:38

## 2018-10-11 RX ADMIN — HEPARIN SODIUM 6000 UNITS: 1000 INJECTION, SOLUTION INTRAVENOUS; SUBCUTANEOUS at 13:09

## 2018-10-11 RX ADMIN — HYDROMORPHONE HYDROCHLORIDE 0.5 MG: 2 INJECTION, SOLUTION INTRAMUSCULAR; INTRAVENOUS; SUBCUTANEOUS at 14:12

## 2018-10-11 NOTE — DISCHARGE INSTRUCTIONS
Arteriogram: What to Expect at [address] [place] [address] Drive inserted a thin, flexible tube (catheter) into a blood vessel in your groin. In some cases, the catheter is placed in a blood vessel in the arm. After an arteriogram, your groin or arm may have a bruise and feel sore for a day or two. You can do light activities around the house but nothing strenuous for several days. Your doctor may give you specific instructions on when you can do your normal activities again, such as driving and going back to work. This care sheet gives you a general idea about how long it will take for you to recover. But each person recovers at a different pace. Follow the steps below to feel better as quickly as possible. How can you care for yourself at home? Activity  · Do not do strenuous exercise and do not lift, pull, or push anything heavy until your doctor says it is okay. This may be for a day or two. You can walk around the house and do light activity, such as cooking. · You may shower 24 to 48 hours after the procedure, if your doctor okays it. Pat the incision dry. Do not take a bath for 1 week, or until your doctor tells you it is okay. · If the catheter was placed in your groin, try not to walk up stairs for the first couple of days. · If your doctor recommends it, get more exercise. Walking is a good choice. Bit by bit, increase the amount you walk every day. Try for at least 30 minutes on most days of the week. Diet  · Drink plenty of fluids to help your body flush out the dye. If you have kidney, heart, or liver disease and have to limit fluids, talk with your doctor before you increase the amount of fluids you drink. · You can eat your normal diet. If your stomach is upset, try bland, low-fat foods like plain rice, broiled chicken, toast, and yogurt. Medicines  · Be safe with medicines. Read and follow all instructions on the label.   ¨ If the doctor gave you a prescription medicine for pain, take it as prescribed. ¨ If you are not taking a prescription pain medicine, ask your doctor if you can take an over-the-counter medicine. · If you take blood thinners, such as warfarin (Coumadin), clopidogrel (Plavix), or aspirin, be sure to talk to your doctor. He or she will tell you if and when to start taking those medicines again. Make sure that you understand exactly what your doctor wants you to do. · Your doctor will tell you if and when you can restart your medicines. He or she will also give you instructions about taking any new medicines. Care of the catheter site  · You will have a dressing over the cut (incision). A dressing helps the incision heal and protects it. Your doctor will tell you how to take care of this. · Put ice or a cold pack on the area for 10 to 20 minutes at a time to help with soreness or swelling. Put a thin cloth between the ice and your skin. Follow-up care is a key part of your treatment and safety. Be sure to make and go to all appointments, and call your doctor if you are having problems. It's also a good idea to know your test results and keep a list of the medicines you take. When should you call for help? Call 911 anytime you think you may need emergency care. For example, call if:  · You passed out (lost consciousness). · You have severe trouble breathing. · You have sudden chest pain and shortness of breath, or you cough up blood. Call your doctor now or seek immediate medical care if:  · You are bleeding from the area where the catheter was put in your artery. · You have a fast-growing, painful lump at the catheter site. · You have signs of infection, such as:  ¨ Increased pain, swelling, warmth, or redness. ¨ Red streaks leading from the incision. ¨ Pus draining from the incision. ¨ A fever. Watch closely for any changes in your health, and be sure to contact your doctor if:  · You don't get better as expected.   After general anesthesia or intravenous sedation, for 24 hours or while taking prescription Narcotics:  · Limit your activities  · Do not drive and operate hazardous machinery  · Do not make important personal or business decisions  · Do  not drink alcoholic beverages  · If you have not urinated within 8 hours after discharge, please contact your surgeon on call. *  Please give a list of your current medications to your Primary Care Provider. *  Please update this list whenever your medications are discontinued, doses are      changed, or new medications (including over-the-counter products) are added. *  Please carry medication information at all times in case of emergency situations. These are general instructions for a healthy lifestyle:  No smoking/ No tobacco products/ Avoid exposure to second hand smoke  Surgeon General's Warning:  Quitting smoking now greatly reduces serious risk to your health. Obesity, smoking, and sedentary lifestyle greatly increases your risk for illness  A healthy diet, regular physical exercise & weight monitoring are important for maintaining a healthy lifestyle    You may be retaining fluid if you have a history of heart failure or if you experience any of the following symptoms:  Weight gain of 3 pounds or more overnight or 5 pounds in a week, increased swelling in our hands or feet or shortness of breath while lying flat in bed. Please call your doctor as soon as you notice any of these symptoms; do not wait until your next office visit. Recognize signs and symptoms of STROKE:    F-face looks uneven    A-arms unable to move or move unevenly    S-speech slurred or non-existent    T-time-call 911 as soon as signs and symptoms begin-DO NOT go       Back to bed or wait to see if you get better-TIME IS BRAIN.

## 2018-10-11 NOTE — ANESTHESIA POSTPROCEDURE EVALUATION
Post-Anesthesia Evaluation and Assessment Patient: Judy Batres Sr. MRN: [id number]  SSN: [id number] YOB: 1950  Age: 70 y.o. Sex: male Cardiovascular Function/Vital Signs Visit Vitals  /74  Pulse 83  Temp 36.3 °C (97.3 °F)  Resp 16  
 Ht 5' 5\" (1.651 m)  Wt 64.4 kg (142 lb 1 oz)  SpO2 97%  BMI 23.64 kg/m2 Patient is status post general anesthesia for Procedure(s): BILATERAL LOWER EXTREMITY ARTERIOGRAM, BILATERAL ILIAC ANGIOPLASTY AND STENT. Nausea/Vomiting: None Postoperative hydration reviewed and adequate. Pain: 
Pain Scale 1: Numeric (0 - 10) (10/11/18 1434) Pain Intensity 1: 5 (10/11/18 1434) Managed Neurological Status:  
Neuro (WDL): Within Defined Limits (10/11/18 1434) Neuro LUE Motor Response: Spontaneous  (10/11/18 1434) LLE Motor Response: Spontaneous  (10/11/18 1434) RUE Motor Response: Spontaneous  (10/11/18 1434) RLE Motor Response: Spontaneous  (10/11/18 1434) At baseline Mental Status and Level of Consciousness: Arousable Pulmonary Status:  
O2 Device: Nasal cannula (10/11/18 1348) Adequate oxygenation and airway patent Complications related to anesthesia: None Post-anesthesia assessment completed. No concerns Signed By: Anabelle Alexis MD   
 October 11, 2018

## 2018-10-11 NOTE — BRIEF OP NOTE
[address] [facility]. [address] [place] [address] FAX: [phone] Brief Op Note Template Note Pre-Op Diagnosis: Peripheral vascular disease, unspecified ([facility] [address] [I73.9] Post-Op Diagnosis:  Peripheral vascular disease, unspecified ([facility] [address] [I73.9] Procedures: Procedure(s): BILATERAL LOWER EXTREMITY ARTERIOGRAM, BILATERAL ILIAC ANGIOPLASTY AND STENT Surgeon: Aggie Navarrete MD 
 
Assistants: Surgeon(s): 
Aggie Navarrete MD   
 
Anesthesia:  General  
 
Findings: Bilateral high-grade iliac stenosis balloon angioplasty and stented with 9 x 58 I-Cath stent Tourniquet Time:  * No tourniquets in log * Estimated Blood Loss:       
      
Specimens:  
        
Implants:   
Implant Name Type Inv. Item Serial No.  Lot No. LRB No. Used Action STENT TRACH BLLN [id number] -- ICAST CVR - [id number] Stent STENT TRACH BLLN [id number] -- ICAST CVR  [employer] CARDIOVASCL[employer]  Right 1 Implanted STENT [facility] BLLN [id number] -- ICAST CVR - [id number] Stent STENT [facility] BLLN [id number] -- ICAST CVR [id number] [employer]AS[employer]   Left 1 Implanted Complications: None Signed: Aggie Navarrete MD 
   
Elements of this note have been dictated using speech recognition software. As a result, errors of speech recognition may have occurred.

## 2018-10-11 NOTE — IP AVS SNAPSHOT
[address] [place] [id number] 
[phone] Patient: Dolores Pack Sr. MRN: [id number] MJV:[id number] About your hospitalization You were admitted on:  October 11, 2018 You last received care in the:  [facility] PACU You were discharged on:  October 11, 2018 Why you were hospitalized Your primary diagnosis was:  Not on File Follow-up Information Follow up With Details Comments Contact Info Christina Willis NP   [address] [facility] [address] 
[phone] Shira Hernandes MD Go on 10/30/2018 09:00 am [address] [facility] [address] 
[phone] Your Scheduled Appointments Friday October 12, 2018  9:00 AM EDT  
LAB with [facility] ([address] [place] [address]) [facility]  
[phone] Friday October 19, 2018  8:30 AM EDT Office Visit with JUAN C [facility]. ([address] [place] [address]) [place] [address]  
[phone] Tuesday October 30, 2018  9:00 AM EDT  
ESTABLISHED PATIENT with Shira Hernandes MD  
[facility] VASCULAR SURGERY ([facility]) [address] [address] [phone] Discharge Orders None A check fitz indicates which time of day the medication should be taken. My Medications CHANGE how you take these medications Instructions Each Dose to Equal  
 Morning Noon Evening Bedtime  
 clopidogrel 75 mg Tab Commonly known as:  PLAVIX What changed:  additional instructions Your last dose was: Your next dose is: Take 1 Tab by mouth daily. 75 mg CONTINUE taking these medications Instructions Each Dose to Equal  
 Morning Noon Evening Bedtime  
 atorvastatin 40 mg tablet Commonly known as:  LIPITOR Your last dose was: Your next dose is: Take 1 Tab by mouth nightly. 40 mg  
    
   
   
   
  
 carvedilol 12.5 mg tablet Commonly known as:  Aaliyah Hale Your last dose was: Your next dose is: Take 1 Tab by mouth two (2) times daily (with meals). 12.5 mg  
    
   
   
   
  
 dapagliflozin 10 mg Tab tablet Commonly known as:  [employer] Your last dose was: Your next dose is: Take 1 Tab by mouth daily. 10 mg  
    
   
   
   
  
 furosemide 40 mg tablet Commonly known as:  LASIX Your last dose was: Your next dose is: Take 1 Tab by mouth nightly. 40 mg  
    
   
   
   
  
 Liraglutide 0.6 mg/0.1 mL (18 mg/3 mL) Pnij Commonly known as:  Orlando Willoughby Your last dose was: Your next dose is:    
   
   
 1.8 mg by SubCUTAneous route daily. 1.8 mg  
    
   
   
   
  
 lisinopril 20 mg tablet Commonly known as:  Madina Vasquez Your last dose was: Your next dose is: Take 1 Tab by mouth nightly. 20 mg Olamide Pen Needle 32 gauge x 5/32\" Ndle Generic drug:  Insulin Needles (Disposable) Your last dose was: Your next dose is:    
   
   
 use once daily  
     
   
   
   
  
 niCARdipine 20 mg capsule Commonly known as:  Finesse Mccarthy Your last dose was: Your next dose is: Take 1 Cap by mouth three (3) times daily. 20 mg  
    
   
   
   
  
 ONETOUCH ULTRA BLUE TEST STRIP strip Generic drug:  glucose blood VI test strips Your last dose was: Your next dose is:    
   
   
      
   
   
   
  
 UNITHROID 50 mcg tablet Generic drug:  levothyroxine Your last dose was: Your next dose is: Take 1 Tab by mouth Daily (before breakfast). 50 mcg Discharge Instructions Arteriogram: What to Expect at [facility] Your Recovery The doctor inserted a thin, flexible tube (catheter) into a blood vessel in your groin. In some cases, the catheter is placed in a blood vessel in the arm. After an arteriogram, your groin or arm may have a bruise and feel sore for a day or two. You can do light activities around the house but nothing strenuous for several days. Your doctor may give you specific instructions on when you can do your normal activities again, such as driving and going back to work. This care sheet gives you a general idea about how long it will take for you to recover. But each person recovers at a different pace. Follow the steps below to feel better as quickly as possible. How can you care for yourself at home? Activity · Do not do strenuous exercise and do not lift, pull, or push anything heavy until your doctor says it is okay. This may be for a day or two. You can walk around the house and do light activity, such as cooking. · You may shower 24 to 48 hours after the procedure, if your doctor okays it. Pat the incision dry. Do not take a bath for 1 week, or until your doctor tells you it is okay. · If the catheter was placed in your groin, try not to walk up stairs for the first couple of days. · If your doctor recommends it, get more exercise. Walking is a good choice. Bit by bit, increase the amount you walk every day. Try for at least 30 minutes on most days of the week. Diet · Drink plenty of fluids to help your body flush out the dye. If you have kidney, heart, or liver disease and have to limit fluids, talk with your doctor before you increase the amount of fluids you drink. · You can eat your normal diet. If your stomach is upset, try bland, low-fat foods like plain rice, broiled chicken, toast, and yogurt. Medicines · Be safe with medicines. Read and follow all instructions on the label.  
¨ If the doctor gave you a prescription medicine for pain, take it as prescribed. ¨ If you are not taking a prescription pain medicine, ask your doctor if you can take an over-the-counter medicine. · If you take blood thinners, such as warfarin (Coumadin), clopidogrel (Plavix), or aspirin, be sure to talk to your doctor. He or she will tell you if and when to start taking those medicines again. Make sure that you understand exactly what your doctor wants you to do. · Your doctor will tell you if and when you can restart your medicines. He or she will also give you instructions about taking any new medicines. Care of the catheter site · You will have a dressing over the cut (incision). A dressing helps the incision heal and protects it. Your doctor will tell you how to take care of this. · Put ice or a cold pack on the area for 10 to 20 minutes at a time to help with soreness or swelling. Put a thin cloth between the ice and your skin. Follow-up care is a key part of your treatment and safety. Be sure to make and go to all appointments, and call your doctor if you are having problems. It's also a good idea to know your test results and keep a list of the medicines you take. When should you call for help? Call 911 anytime you think you may need emergency care. For example, call if: 
· You passed out (lost consciousness). · You have severe trouble breathing. · You have sudden chest pain and shortness of breath, or you cough up blood. Call your doctor now or seek immediate medical care if: 
· You are bleeding from the area where the catheter was put in your artery. · You have a fast-growing, painful lump at the catheter site. · You have signs of infection, such as: 
¨ Increased pain, swelling, warmth, or redness. ¨ Red streaks leading from the incision. ¨ Pus draining from the incision. ¨ A fever. Watch closely for any changes in your health, and be sure to contact your doctor if: 
· You don't get better as expected. After general anesthesia or intravenous sedation, for 24 hours or while taking prescription Narcotics: · Limit your activities · Do not drive and operate hazardous machinery · Do not make important personal or business decisions · Do  not drink alcoholic beverages · If you have not urinated within 8 hours after discharge, please contact your surgeon on call. *  Please give a list of your current medications to your Primary Care Provider. *  Please update this list whenever your medications are discontinued, doses are 
    changed, or new medications (including over-the-counter products) are added. *  Please carry medication information at all times in case of emergency situations. These are general instructions for a healthy lifestyle: No smoking/ No tobacco products/ Avoid exposure to second hand smoke Surgeon General's Warning:  Quitting smoking now greatly reduces serious risk to your health. Obesity, smoking, and sedentary lifestyle greatly increases your risk for illness A healthy diet, regular physical exercise & weight monitoring are important for maintaining a healthy lifestyle You may be retaining fluid if you have a history of heart failure or if you experience any of the following symptoms:  Weight gain of 3 pounds or more overnight or 5 pounds in a week, increased swelling in our hands or feet or shortness of breath while lying flat in bed. Please call your doctor as soon as you notice any of these symptoms; do not wait until your next office visit. Recognize signs and symptoms of STROKE: 
 
F-face looks uneven A-arms unable to move or move unevenly S-speech slurred or non-existent T-time-call 911 as soon as signs and symptoms begin-DO NOT go Back to bed or wait to see if you get better-TIME IS BRAIN. Introducing \[facility]\"" & HEALTH SERVICES!    
 Jourdan Gates introduces [employer] patient portal. Now you can access parts of your medical record, email your doctor's office, and request medication refills online. 1. In your internet browser, go to [email or web address] 2. Click on the First Time User? Click Here link in the Sign In box. You will see the New Member Sign Up page. 3. Enter your TimePoints Access Code exactly as it appears below. You will not need to use this code after youve completed the sign-up process. If you do not sign up before the expiration date, you must request a new code. · TimePoints Access Code: [email or web address] Expires: 10/18/2018  9:26 AM 
 
4. Enter the last four digits of your Social Security Number (xxxx) and Date of Birth (mm/dd/yyyy) as indicated and click Submit. You will be taken to the next sign-up page. 5. Create a TimePoints ID. This will be your TimePoints login ID and cannot be changed, so think of one that is secure and easy to remember. 6. Create a TimePoints password. You can change your password at any time. 7. Enter your Password Reset Question and Answer. This can be used at a later time if you forget your password. 8. Enter your e-mail address. You will receive e-mail notification when new information is available in [address] 9. Click Sign Up. You can now view and download portions of your medical record. 10. Click the Download Summary menu link to download a portable copy of your medical information. If you have questions, please visit the Frequently Asked Questions section of the TimePoints website. Remember, TimePoints is NOT to be used for urgent needs. For medical emergencies, dial 911. Now available from your iPhone and Android! Introducing Colt Mckeon As a [employer] patient, I wanted to make you aware of our electronic visit tool called Colt Mckeon. [employer] 24/7 allows you to connect within minutes with a medical provider 24 hours a day, seven days a week via a mobile device or tablet or logging into a secure website from your computer. You can access Interview from anywhere in the [place]. A virtual visit might be right for you when you have a simple condition and feel like you just dont want to get out of bed, or cant get away from work for an appointment, when your regular [employer] provider is not available (evenings, weekends or holidays), or when youre out of town and need minor care. Electronic visits cost only $49 and if the [employer] 24/7 provider determines a prescription is needed to treat your condition, one can be electronically transmitted to a nearby pharmacy*. Please take a moment to enroll today if you have not already done so. The enrollment process is free and takes just a few minutes. To enroll, please download the [employer] 24/7 freddy to your tablet or phone, or visit [email or web address] to enroll on your computer. And, as an [address] [place] [address] patient with a [employer] account, the results of your visits will be scanned into your electronic medical record and your primary care provider will be able to view the scanned results. We urge you to continue to see your regular [employer] provider for your ongoing medical care. And while your primary care provider may not be the one available when you seek a Interview virtual visit, the peace of mind you get from getting a real diagnosis real time can be priceless. For more information on Interview, view our Frequently Asked Questions (FAQs) at [email or web address]. Sincerely, 
 
Tita Mcgee MD 
Chief Medical Officer [address] *:  certain medications cannot be prescribed via Interview Unresulted Labs-Please follow up with your PCP about these lab tests Order Current Status IR ANGIO EXT LOWER BI In process Providers Seen During Your Hospitalization Provider Specialty Primary office phone Luisana Trinh MD Vascular Surgery [phone] Your Primary Care Physician (PCP) Primary Care Physician Office Phone Office Fax Seabron Boas [phone] You are allergic to the following Allergen Reactions Aspirin Nausea and Vomiting Recent Documentation Height Weight BMI Smoking Status 1.651 m 64.4 kg 23.64 kg/m2 Former Smoker Emergency Contacts Name Discharge Info Relation Home Work Mobile [id number] [facility] DISCHARGE CAREGIVER [3] Son [22] [phone] Patient Belongings The following personal items are in your possession at time of discharge: 
  Dental Appliances: Uppers  Visual Aid: Glasses      Home Medications: None   Jewelry: None  Clothing: Footwear, Shirt, Undergarments, Pants, Socks, Nargis    Other Valuables: Eyeglasses Please provide this summary of care documentation to your next provider. Signatures-by signing, you are acknowledging that this After Visit Summary has been reviewed with you and you have received a copy. Patient Signature:  ____________________________________________________________ Date:  ____________________________________________________________  
  
Isabela Urbano Provider Signature:  ____________________________________________________________ Date:  ____________________________________________________________

## 2018-10-11 NOTE — ANESTHESIA PREPROCEDURE EVALUATION
Anesthetic History No history of anesthetic complications Review of Systems / Medical History Patient summary reviewed and pertinent labs reviewed Pulmonary Smoker (former 40 pack years) Neuro/Psych Cardiovascular Hypertension CHF (EF 47%): NYHA Classification II 
 
CAD (Obstructive 1v CAD (RCA) with collateralization ) and hyperlipidemia Exercise tolerance: >4 METS 
  
GI/Hepatic/Renal 
  
 
 
Renal disease (s/p right nephrectomy): CRI Endo/Other Diabetes: poorly controlled, type 2 Hypothyroidism Other Findings Physical Exam 
 
Airway Mallampati: II 
TM Distance: 4 - 6 cm Neck ROM: normal range of motion Mouth opening: Normal 
 
 Cardiovascular Rhythm: regular Rate: normal 
 
 
 
 Dental 
 
Dentition: Full upper dentures and Lower partial plate Pulmonary Breath sounds clear to auscultation Abdominal 
 
 
 
 Other Findings Anesthetic Plan ASA: 3 Anesthesia type: general 
 
 
 
 
Induction: Intravenous Anesthetic plan and risks discussed with: Patient and Son / Daughter

## 2018-10-11 NOTE — OP NOTES
[address] 
OPERATIVE REPORT Name:TRISTON BRUNSON SR. 
MR#: [id number] : 1950 ACCOUNT #: [de-identified] DATE OF SERVICE: 10/11/2018 PREOPERATIVE DIAGNOSIS:  Debilitating bilateral leg claudication. POSTOPERATIVE DIAGNOSIS:  Debilitating left leg claudication. PROCEDURES PERFORMED: 
1.  Bilateral ultrasound-guided access of catheter, placed in the catheter and aorta. 2.  Bilateral iliac angioplasty and stent with a 9 x 58 iCAST balloon. SURGEON:  Chaim Topete MD  
 
ANESTHESIA:  General. 
 
COMPLICATIONS:  None. OPERATIVE FINDINGS: 
1. There was significant bilateral high-grade proximal iliac stenosis greater than 50% bilaterally. 2.  Hypogastric arteries are patent without any disease. 3.  External iliac and common femoral arteries were patent. SURGEON:  Chaim Topete MD 
 
ASSISTANT:   
 
ANESTHESIA:  General. 
 
ESTIMATED BLOOD LOSS:        
 
SPECIMENS REMOVED:          
 
COMPLICATIONS:      
 
IMPLANTS:   
 
INDICATION FOR PROCEDURE:  This is a 62-year-old man with history of bilateral hip, thigh, leg claudication symptoms. Underwent ultrasound and PTA, which showed he had bilateral iliac disease. He consented for the procedure. PROCEDURE IN DETAIL:  After giving informed consent, the patient was brought to the operating room. General anesthesia was then induced. Preoperative antibiotics were given before skin incision. The patient's bilateral groin was all prepped and draped in normal sterile fashion. Ultrasound-guided access and access to the right common femoral artery femoral head using a micropuncture technique. We upsized to a 5-Bulgarian sheath over 0.035 starter wire. We did a hand injection from the groin. The same procedure was done on the left side. This showed high-grade proximal right iliac and left iliac disease stenosis.   We then brought in to the field a 9 x 58 iCAST balloon, which was placed just at the proximal iliac bifurcation bilaterally. We balloon angioplastied the stent and placed it to a nominal pressure with 8 mmHg, which got the artery to about 9.1 mm. There was significant waste in bilateral stents. Once we deflated the balloon, we removed both balloons and did a followup arteriogram which showed patency of both stents. No evidence of any dissection or thrombosis. We then did a completion arteriogram of the right groin showed both iliac and common femoral arteries patent. We then closed both groins with a Mynx device. We gave 30 mg of protamine. TOTAL CONTRAST:  30 mL of contrast. 
 
 
MD BENITA Agosto / MIREYA 
D: 10/11/2018 13:40 T: 10/11/2018 13:59 
JOB #: [id number]

## 2019-01-07 ENCOUNTER — HOSPITAL ENCOUNTER (OUTPATIENT)
Dept: CT IMAGING | Age: 69
Discharge: HOME | End: 2019-01-07
Attending: OTOLARYNGOLOGY
Payer: MEDICARE

## 2019-01-07 DIAGNOSIS — R43.8 HYPOSMIA: ICD-10-CM

## 2019-01-07 PROCEDURE — 70486 CT MAXILLOFACIAL W/O DYE: CPT

## 2019-01-14 PROBLEM — E78.00 HYPERCHOLESTEREMIA: Status: ACTIVE | Noted: 2019-01-14

## 2019-05-03 ENCOUNTER — HOSPITAL ENCOUNTER (OUTPATIENT)
Dept: SURGERY | Age: 69
Discharge: HOME | End: 2019-05-03
Payer: MEDICARE

## 2019-05-03 VITALS
BODY MASS INDEX: 25.39 KG/M2 | WEIGHT: 143.31 LBS | RESPIRATION RATE: 169 BRPM | OXYGEN SATURATION: 99 % | HEIGHT: 63 IN | HEART RATE: 74 BPM | DIASTOLIC BLOOD PRESSURE: 67 MMHG | TEMPERATURE: 97.9 F | SYSTOLIC BLOOD PRESSURE: 115 MMHG

## 2019-05-03 LAB
ANION GAP SERPL CALC-SCNC: 7 MMOL/L (ref 7–16)
BUN SERPL-MCNC: 29 MG/DL (ref 8–23)
CALCIUM SERPL-MCNC: 8.7 MG/DL (ref 8.3–10.4)
CHLORIDE SERPL-SCNC: 112 MMOL/L (ref 98–107)
CO2 SERPL-SCNC: 24 MMOL/L (ref 21–32)
CREAT SERPL-MCNC: 1.95 MG/DL (ref 0.8–1.5)
ERYTHROCYTE [DISTWIDTH] IN BLOOD BY AUTOMATED COUNT: 14.1 % (ref 11.9–14.6)
GLUCOSE BLD STRIP.AUTO-MCNC: 144 MG/DL (ref 65–100)
GLUCOSE SERPL-MCNC: 150 MG/DL (ref 65–100)
HCT VFR BLD AUTO: 37.6 % (ref 41.1–50.3)
HGB BLD-MCNC: 12.3 G/DL (ref 13.6–17.2)
MCH RBC QN AUTO: 26.6 PG (ref 26.1–32.9)
MCHC RBC AUTO-ENTMCNC: 32.7 G/DL (ref 31.4–35)
MCV RBC AUTO: 81.4 FL (ref 79.6–97.8)
NRBC # BLD: 0 K/UL (ref 0–0.2)
PLATELET # BLD AUTO: 204 K/UL (ref 150–450)
PMV BLD AUTO: 10.5 FL (ref 9.4–12.3)
POTASSIUM SERPL-SCNC: 3.9 MMOL/L (ref 3.5–5.1)
RBC # BLD AUTO: 4.62 M/UL (ref 4.23–5.6)
SODIUM SERPL-SCNC: 143 MMOL/L (ref 136–145)
WBC # BLD AUTO: 3.7 K/UL (ref 4.3–11.1)

## 2019-05-03 PROCEDURE — 85027 COMPLETE CBC AUTOMATED: CPT

## 2019-05-03 PROCEDURE — 82962 GLUCOSE BLOOD TEST: CPT

## 2019-05-03 PROCEDURE — 80048 BASIC METABOLIC PNL TOTAL CA: CPT

## 2019-05-03 NOTE — PERIOP NOTES
Lab results reviewed and routed to surgeon. Recent Results (from the past 12 hour(s)) GLUCOSE, POC Collection Time: 05/03/19 10:23 AM  
Result Value Ref Range Glucose (POC) 144 (H) 65 - 100 mg/dL CBC W/O DIFF Collection Time: 05/03/19 10:24 AM  
Result Value Ref Range WBC 3.7 (L) 4.3 - 11.1 K/uL  
 RBC 4.62 4.23 - 5.6 M/uL  
 HGB 12.3 (L) 13.6 - 17.2 g/dL HCT 37.6 (L) 41.1 - 50.3 % MCV 81.4 79.6 - 97.8 FL  
 MCH 26.6 26.1 - 32.9 PG  
 MCHC 32.7 31.4 - 35.0 g/dL  
 RDW 14.1 11.9 - 14.6 % PLATELET 668 197 - 855 K/uL MPV 10.5 9.4 - 12.3 FL ABSOLUTE NRBC 0.00 0.0 - 0.2 K/uL METABOLIC PANEL, BASIC Collection Time: 05/03/19 10:24 AM  
Result Value Ref Range Sodium 143 136 - 145 mmol/L Potassium 3.9 3.5 - 5.1 mmol/L Chloride 112 (H) 98 - 107 mmol/L  
 CO2 24 21 - 32 mmol/L Anion gap 7 7 - 16 mmol/L Glucose 150 (H) 65 - 100 mg/dL BUN 29 (H) 8 - 23 MG/DL Creatinine 1.95 (H) 0.8 - 1.5 MG/DL  
 GFR est AA 44 (L) >60 ml/min/1.73m2 GFR est non-AA 36 (L) >60 ml/min/1.73m2  Calcium 8.7 8.3 - 10.4 MG/DL

## 2019-05-03 NOTE — PERIOP NOTES
Patient verified name and . Patient provided medical/health information and PTA medications to the best of their ability. TYPE  CASE:ll 
Order for consent  found in EHR and matches case posting. Labs per surgeon:CBC, BMP. Labs per anesthesia protocol: Poc Glucose. EKG  :  10/1/2018 NSR, Echo done 2019 EF 55-60%, Last heart cath in 2017 with no intervention. Orders received to continue Plavix. Poc Glucose : 144 Patient provided with and instructed on education handouts including Guide to Surgery, blood transfusions, pain management, and hand hygiene for the family and community, and [facility] brochure. Soap and Hibiclens instructions given per hospital policy. Instructed patient to continue previous medications as prescribed prior to surgery unless otherwise directed and to take the following medications the day of surgery according to anesthesia guidelines : Coreg, Plavix, Unithroid, Nicardipine . Instructed patient to hold  the following medications: Vitamins. Original medication prescription bottles were not visualized during patient appointment. Patient teach back successful and patient demonstrates knowledge of instruction.

## 2019-05-10 RX ORDER — SODIUM CHLORIDE 9 MG/ML
1 INJECTION, SOLUTION INTRAVENOUS CONTINUOUS
Status: CANCELLED | OUTPATIENT
Start: 2019-05-10 | End: 2019-05-11

## 2019-05-13 ENCOUNTER — ANESTHESIA EVENT (OUTPATIENT)
Dept: SURGERY | Age: 69
End: 2019-05-13
Payer: MEDICARE

## 2019-05-13 ENCOUNTER — HOSPITAL ENCOUNTER (OUTPATIENT)
Age: 69
Setting detail: OUTPATIENT SURGERY
Discharge: HOME | End: 2019-05-13
Attending: SURGERY | Admitting: SURGERY
Payer: MEDICARE

## 2019-05-13 ENCOUNTER — ANESTHESIA (OUTPATIENT)
Dept: SURGERY | Age: 69
End: 2019-05-13
Payer: MEDICARE

## 2019-05-13 ENCOUNTER — APPOINTMENT (OUTPATIENT)
Dept: INTERVENTIONAL RADIOLOGY/VASCULAR | Age: 69
End: 2019-05-13
Attending: SURGERY
Payer: MEDICARE

## 2019-05-13 VITALS
BODY MASS INDEX: 24.19 KG/M2 | HEART RATE: 60 BPM | HEIGHT: 64 IN | SYSTOLIC BLOOD PRESSURE: 156 MMHG | OXYGEN SATURATION: 96 % | DIASTOLIC BLOOD PRESSURE: 74 MMHG | RESPIRATION RATE: 16 BRPM | WEIGHT: 141.7 LBS | TEMPERATURE: 97.4 F

## 2019-05-13 LAB — GLUCOSE BLD STRIP.AUTO-MCNC: 145 MG/DL (ref 65–100)

## 2019-05-13 PROCEDURE — 74011250636 HC RX REV CODE- 250/636: Performed by: ANESTHESIOLOGY

## 2019-05-13 PROCEDURE — 74011000250 HC RX REV CODE- 250

## 2019-05-13 PROCEDURE — 77030013519 HC DEV INFL BASIX MRTM -B

## 2019-05-13 PROCEDURE — 77030010509 HC AIRWY LMA MSK TELE -A: Performed by: ANESTHESIOLOGY

## 2019-05-13 PROCEDURE — C1760 CLOSURE DEV, VASC: HCPCS

## 2019-05-13 PROCEDURE — 74011250636 HC RX REV CODE- 250/636: Performed by: SURGERY

## 2019-05-13 PROCEDURE — C1894 INTRO/SHEATH, NON-LASER: HCPCS

## 2019-05-13 PROCEDURE — C1769 GUIDE WIRE: HCPCS

## 2019-05-13 PROCEDURE — 37221 HC PLC STNT ILIAC +/- PTA INIT: CPT

## 2019-05-13 PROCEDURE — 76000 FLUOROSCOPY <1 HR PHYS/QHP: CPT

## 2019-05-13 PROCEDURE — 76060000032 HC ANESTHESIA 0.5 TO 1 HR: Performed by: SURGERY

## 2019-05-13 PROCEDURE — 76210000006 HC OR PH I REC 0.5 TO 1 HR: Performed by: SURGERY

## 2019-05-13 PROCEDURE — 82962 GLUCOSE BLOOD TEST: CPT

## 2019-05-13 PROCEDURE — 75716 ARTERY X-RAYS ARMS/LEGS: CPT

## 2019-05-13 PROCEDURE — 74011636320 HC RX REV CODE- 636/320: Performed by: SURGERY

## 2019-05-13 PROCEDURE — C1876 STENT, NON-COA/NON-COV W/DEL: HCPCS

## 2019-05-13 PROCEDURE — C1725 CATH, TRANSLUMIN NON-LASER: HCPCS

## 2019-05-13 PROCEDURE — 76210000023 HC REC RM PH II 2 TO 2.5 HR: Performed by: SURGERY

## 2019-05-13 PROCEDURE — 74011250636 HC RX REV CODE- 250/636

## 2019-05-13 PROCEDURE — 76010000204 HC CV SURG 0.5 TO 1 HR INTENSV-TIER 1: Performed by: SURGERY

## 2019-05-13 DEVICE — IMPLANTABLE DEVICE: Type: IMPLANTABLE DEVICE | Site: GROIN | Status: FUNCTIONAL

## 2019-05-13 RX ORDER — MORPHINE SULFATE 2 MG/ML
1 INJECTION, SOLUTION INTRAMUSCULAR; INTRAVENOUS
Status: DISCONTINUED | OUTPATIENT
Start: 2019-05-13 | End: 2019-05-13 | Stop reason: HOSPADM

## 2019-05-13 RX ORDER — HEPARIN SODIUM 1000 [USP'U]/ML
INJECTION, SOLUTION INTRAVENOUS; SUBCUTANEOUS AS NEEDED
Status: DISCONTINUED | OUTPATIENT
Start: 2019-05-13 | End: 2019-05-13 | Stop reason: HOSPADM

## 2019-05-13 RX ORDER — SODIUM CHLORIDE 0.9 % (FLUSH) 0.9 %
5-40 SYRINGE (ML) INJECTION AS NEEDED
Status: DISCONTINUED | OUTPATIENT
Start: 2019-05-13 | End: 2019-05-13 | Stop reason: HOSPADM

## 2019-05-13 RX ORDER — OXYCODONE AND ACETAMINOPHEN 10; 325 MG/1; MG/1
1 TABLET ORAL AS NEEDED
Status: DISCONTINUED | OUTPATIENT
Start: 2019-05-13 | End: 2019-05-13 | Stop reason: HOSPADM

## 2019-05-13 RX ORDER — HEPARIN SODIUM 200 [USP'U]/100ML
INJECTION, SOLUTION INTRAVENOUS
Status: COMPLETED | OUTPATIENT
Start: 2019-05-13 | End: 2019-05-13

## 2019-05-13 RX ORDER — HYDROMORPHONE HYDROCHLORIDE 2 MG/ML
0.5 INJECTION, SOLUTION INTRAMUSCULAR; INTRAVENOUS; SUBCUTANEOUS
Status: DISCONTINUED | OUTPATIENT
Start: 2019-05-13 | End: 2019-05-13 | Stop reason: HOSPADM

## 2019-05-13 RX ORDER — ONDANSETRON 2 MG/ML
4 INJECTION INTRAMUSCULAR; INTRAVENOUS
Status: DISCONTINUED | OUTPATIENT
Start: 2019-05-13 | End: 2019-05-13 | Stop reason: HOSPADM

## 2019-05-13 RX ORDER — OXYCODONE AND ACETAMINOPHEN 5; 325 MG/1; MG/1
1 TABLET ORAL
Status: DISCONTINUED | OUTPATIENT
Start: 2019-05-13 | End: 2019-05-13 | Stop reason: HOSPADM

## 2019-05-13 RX ORDER — SODIUM CHLORIDE 9 MG/ML
1 INJECTION, SOLUTION INTRAVENOUS AS NEEDED
Status: DISCONTINUED | OUTPATIENT
Start: 2019-05-13 | End: 2019-05-13 | Stop reason: HOSPADM

## 2019-05-13 RX ORDER — SODIUM CHLORIDE 0.9 % (FLUSH) 0.9 %
5-40 SYRINGE (ML) INJECTION EVERY 8 HOURS
Status: DISCONTINUED | OUTPATIENT
Start: 2019-05-13 | End: 2019-05-13 | Stop reason: HOSPADM

## 2019-05-13 RX ORDER — SODIUM CHLORIDE, SODIUM LACTATE, POTASSIUM CHLORIDE, CALCIUM CHLORIDE 600; 310; 30; 20 MG/100ML; MG/100ML; MG/100ML; MG/100ML
75 INJECTION, SOLUTION INTRAVENOUS CONTINUOUS
Status: DISCONTINUED | OUTPATIENT
Start: 2019-05-13 | End: 2019-05-13 | Stop reason: HOSPADM

## 2019-05-13 RX ORDER — DEXAMETHASONE SODIUM PHOSPHATE 4 MG/ML
INJECTION, SOLUTION INTRA-ARTICULAR; INTRALESIONAL; INTRAMUSCULAR; INTRAVENOUS; SOFT TISSUE AS NEEDED
Status: DISCONTINUED | OUTPATIENT
Start: 2019-05-13 | End: 2019-05-13 | Stop reason: HOSPADM

## 2019-05-13 RX ORDER — FENTANYL CITRATE 50 UG/ML
INJECTION, SOLUTION INTRAMUSCULAR; INTRAVENOUS AS NEEDED
Status: DISCONTINUED | OUTPATIENT
Start: 2019-05-13 | End: 2019-05-13 | Stop reason: HOSPADM

## 2019-05-13 RX ORDER — SODIUM CHLORIDE 9 MG/ML
75 INJECTION, SOLUTION INTRAVENOUS CONTINUOUS
Status: DISCONTINUED | OUTPATIENT
Start: 2019-05-13 | End: 2019-05-13 | Stop reason: HOSPADM

## 2019-05-13 RX ORDER — CEFAZOLIN SODIUM/WATER 2 G/20 ML
2 SYRINGE (ML) INTRAVENOUS ONCE
Status: COMPLETED | OUTPATIENT
Start: 2019-05-13 | End: 2019-05-13

## 2019-05-13 RX ORDER — OXYCODONE HYDROCHLORIDE 5 MG/1
5 TABLET ORAL
Status: DISCONTINUED | OUTPATIENT
Start: 2019-05-13 | End: 2019-05-13 | Stop reason: HOSPADM

## 2019-05-13 RX ORDER — PROPOFOL 10 MG/ML
INJECTION, EMULSION INTRAVENOUS AS NEEDED
Status: DISCONTINUED | OUTPATIENT
Start: 2019-05-13 | End: 2019-05-13 | Stop reason: HOSPADM

## 2019-05-13 RX ORDER — LIDOCAINE HYDROCHLORIDE 20 MG/ML
INJECTION, SOLUTION EPIDURAL; INFILTRATION; INTRACAUDAL; PERINEURAL AS NEEDED
Status: DISCONTINUED | OUTPATIENT
Start: 2019-05-13 | End: 2019-05-13 | Stop reason: HOSPADM

## 2019-05-13 RX ORDER — EPHEDRINE SULFATE 50 MG/ML
INJECTION, SOLUTION INTRAVENOUS AS NEEDED
Status: DISCONTINUED | OUTPATIENT
Start: 2019-05-13 | End: 2019-05-13 | Stop reason: HOSPADM

## 2019-05-13 RX ADMIN — SODIUM CHLORIDE, SODIUM LACTATE, POTASSIUM CHLORIDE, AND CALCIUM CHLORIDE 75 ML/HR: 600; 310; 30; 20 INJECTION, SOLUTION INTRAVENOUS at 07:19

## 2019-05-13 RX ADMIN — FENTANYL CITRATE 25 MCG: 50 INJECTION, SOLUTION INTRAMUSCULAR; INTRAVENOUS at 08:41

## 2019-05-13 RX ADMIN — Medication 2 G: at 08:30

## 2019-05-13 RX ADMIN — SODIUM CHLORIDE 1 ML/KG/HR: 900 INJECTION, SOLUTION INTRAVENOUS at 07:19

## 2019-05-13 RX ADMIN — FENTANYL CITRATE 25 MCG: 50 INJECTION, SOLUTION INTRAMUSCULAR; INTRAVENOUS at 08:30

## 2019-05-13 RX ADMIN — EPHEDRINE SULFATE 10 MG: 50 INJECTION, SOLUTION INTRAVENOUS at 08:49

## 2019-05-13 RX ADMIN — HEPARIN SODIUM 4000 UNITS: 1000 INJECTION, SOLUTION INTRAVENOUS; SUBCUTANEOUS at 08:42

## 2019-05-13 RX ADMIN — PROPOFOL 120 MG: 10 INJECTION, EMULSION INTRAVENOUS at 08:24

## 2019-05-13 RX ADMIN — EPHEDRINE SULFATE 10 MG: 50 INJECTION, SOLUTION INTRAVENOUS at 08:58

## 2019-05-13 RX ADMIN — LIDOCAINE HYDROCHLORIDE 100 MG: 20 INJECTION, SOLUTION EPIDURAL; INFILTRATION; INTRACAUDAL; PERINEURAL at 08:24

## 2019-05-13 RX ADMIN — DEXAMETHASONE SODIUM PHOSPHATE 4 MG: 4 INJECTION, SOLUTION INTRA-ARTICULAR; INTRALESIONAL; INTRAMUSCULAR; INTRAVENOUS; SOFT TISSUE at 08:30

## 2019-05-13 RX ADMIN — EPHEDRINE SULFATE 10 MG: 50 INJECTION, SOLUTION INTRAVENOUS at 08:37

## 2019-05-13 NOTE — ANESTHESIA POSTPROCEDURE EVALUATION
Procedure(s): RIGHT LEG ARTERIOGRAM WITH ULTRASOUND GUIDED ACCESS RIGHT ILIAC STENT ANGIOPLASTY. No value filed. Anesthesia Post Evaluation Multimodal analgesia: multimodal analgesia not used between 6 hours prior to anesthesia start to PACU discharge Patient location during evaluation: PACU Patient participation: complete - patient participated Level of consciousness: awake Pain management: adequate Airway patency: patent Anesthetic complications: no 
Cardiovascular status: acceptable Respiratory status: acceptable Hydration status: acceptable Post anesthesia nausea and vomiting:  none Vitals Value Taken Time /84 5/13/2019  9:46 AM  
Temp 36.3 °C (97.4 °F) 5/13/2019  9:46 AM  
Pulse 66 5/13/2019  9:56 AM  
Resp 16 5/13/2019  9:46 AM  
SpO2 99 % 5/13/2019  9:56 AM  
Vitals shown include unvalidated device data.

## 2019-05-13 NOTE — BRIEF OP NOTE
[address], [address] [place] [address]. [address] [place] [address] FAX: [phone] Op Note Template Note Pre-Op Diagnosis: Peripheral vascular disease, unspecified ([address] [I73.9] Post-Op Diagnosis:  Peripheral vascular disease, unspecified ([address] [I73.9] Procedures: Procedure(s): RIGHT LEG ARTERIOGRAM WITH ULTRASOUND GUIDED ACCESS RIGHT ILIAC STENT ANGIOPLASTY Surgeon: Jone Montana MD 
 
Assistants: Surgeon(s): Alison Pena MD   
 
Anesthesia:  General  
 
Findings: Right external iliac angioplasty and stent with a 8 x 4 Smart stent Tourniquet Time:  * No tourniquets in log * Estimated Blood Loss:       
      
Specimens:  
        
Implants:   
Implant Name Type Inv. Item Serial No.  Lot No. LRB No. Used Action STENT BILI ILIAC [id number] 120 -- SMART CONTROL - ORDER AS EA - [id number] Stent STENT BILI ILIAC [id number] 120 -- SMART CONTROL - ORDER AS EA  [facility]  Right 1 Implanted Complications: None Signed: Jone Montana MD 
   
Elements of this note have been dictated using speech recognition software. As a result, errors of speech recognition may have occurred.

## 2019-05-13 NOTE — ANESTHESIA PREPROCEDURE EVALUATION
Relevant Problems No relevant active problems Anesthetic History No history of anesthetic complications Review of Systems / Medical History Patient summary reviewed and pertinent labs reviewed Pulmonary Within defined limits Neuro/Psych Within defined limits Cardiovascular Hypertension CAD Exercise tolerance: >4 METS 
  
GI/Hepatic/Renal 
  
 
 
Renal disease: CRI Endo/Other Diabetes: type 2, using insulin Hypothyroidism Other Findings Physical Exam 
 
Airway Mallampati: II 
TM Distance: > 6 cm Neck ROM: normal range of motion Mouth opening: Normal 
 
 Cardiovascular Rhythm: regular Dental 
 
Dentition: Full upper dentures Pulmonary Abdominal 
GI exam deferred Other Findings Anesthetic Plan ASA: 3 Induction: Intravenous Anesthetic plan and risks discussed with: Patient

## 2019-05-13 NOTE — PROGRESS NOTES
Discharge instructions reviewed with pt and family member who verbalize understanding of follow up care. Pt remains on bedrest until noon.

## 2019-05-13 NOTE — PROGRESS NOTES
Pt discharged. Able to walk, void beofre discharge. R groin site redressed with 4x4 and tegaderm for pt to remove in 24h. No oozing or hematoma noted.

## 2019-05-13 NOTE — DISCHARGE INSTRUCTIONS
Arteriogram: What to Expect at [address] [place] [address] Drive inserted a thin, flexible tube (catheter) into a blood vessel in your groin. In some cases, the catheter is placed in a blood vessel in the arm. After an arteriogram, your groin or arm may have a bruise and feel sore for a day or two. You can do light activities around the house but nothing strenuous for several days. Your doctor may give you specific instructions on when you can do your normal activities again, such as driving and going back to work. This care sheet gives you a general idea about how long it will take for you to recover. But each person recovers at a different pace. Follow the steps below to feel better as quickly as possible. How can you care for yourself at home? Activity  · Do not do strenuous exercise and do not lift, pull, or push anything heavy until your doctor says it is okay. This may be for a day or two. You can walk around the house and do light activity, such as cooking. · You may shower 24 to 48 hours after the procedure, if your doctor okays it. Pat the incision dry. Do not take a bath for 1 week, or until your doctor tells you it is okay. · If the catheter was placed in your groin, try not to walk up stairs for the first couple of days. · If your doctor recommends it, get more exercise. Walking is a good choice. Bit by bit, increase the amount you walk every day. Try for at least 30 minutes on most days of the week. Diet  · Drink plenty of fluids to help your body flush out the dye. If you have kidney, heart, or liver disease and have to limit fluids, talk with your doctor before you increase the amount of fluids you drink. · You can eat your normal diet. If your stomach is upset, try bland, low-fat foods like plain rice, broiled chicken, toast, and yogurt. Medicines  · Be safe with medicines. Read and follow all instructions on the label.   ¨ If the doctor gave you a prescription medicine for pain, take it as prescribed. ¨ If you are not taking a prescription pain medicine, ask your doctor if you can take an over-the-counter medicine. · If you take blood thinners, such as warfarin (Coumadin), clopidogrel (Plavix), or aspirin, be sure to talk to your doctor. He or she will tell you if and when to start taking those medicines again. Make sure that you understand exactly what your doctor wants you to do. · Your doctor will tell you if and when you can restart your medicines. He or she will also give you instructions about taking any new medicines. Care of the catheter site  · You will have a dressing over the cut (incision). A dressing helps the incision heal and protects it. Your doctor will tell you how to take care of this. · Put ice or a cold pack on the area for 10 to 20 minutes at a time to help with soreness or swelling. Put a thin cloth between the ice and your skin. Follow-up care is a key part of your treatment and safety. Be sure to make and go to all appointments, and call your doctor if you are having problems. It's also a good idea to know your test results and keep a list of the medicines you take. When should you call for help? Call 911 anytime you think you may need emergency care. For example, call if:  · You passed out (lost consciousness). · You have severe trouble breathing. · You have sudden chest pain and shortness of breath, or you cough up blood. Call your doctor now or seek immediate medical care if:  · You are bleeding from the area where the catheter was put in your artery. · You have a fast-growing, painful lump at the catheter site. · You have signs of infection, such as:  ¨ Increased pain, swelling, warmth, or redness. ¨ Red streaks leading from the incision. ¨ Pus draining from the incision. ¨ A fever. Watch closely for any changes in your health, and be sure to contact your doctor if:  · You don't get better as expected.   After general anesthesia or intravenous sedation, for 24 hours or while taking prescription Narcotics:  · Limit your activities  · Do not drive and operate hazardous machinery  · Do not make important personal or business decisions  · Do  not drink alcoholic beverages  · If you have not urinated within 8 hours after discharge, please contact your surgeon on call. *  Please give a list of your current medications to your Primary Care Provider. *  Please update this list whenever your medications are discontinued, doses are      changed, or new medications (including over-the-counter products) are added. *  Please carry medication information at all times in case of emergency situations. These are general instructions for a healthy lifestyle:  No smoking/ No tobacco products/ Avoid exposure to second hand smoke  Surgeon General's Warning:  Quitting smoking now greatly reduces serious risk to your health. Obesity, smoking, and sedentary lifestyle greatly increases your risk for illness  A healthy diet, regular physical exercise & weight monitoring are important for maintaining a healthy lifestyle    You may be retaining fluid if you have a history of heart failure or if you experience any of the following symptoms:  Weight gain of 3 pounds or more overnight or 5 pounds in a week, increased swelling in our hands or feet or shortness of breath while lying flat in bed. Please call your doctor as soon as you notice any of these symptoms; do not wait until your next office visit. Recognize signs and symptoms of STROKE:    F-face looks uneven    A-arms unable to move or move unevenly    S-speech slurred or non-existent    T-time-call 911 as soon as signs and symptoms begin-DO NOT go       Back to bed or wait to see if you get better-TIME IS BRAIN.

## 2019-05-14 NOTE — OP NOTES
[facility] 
OPERATIVE REPORT Name:  Dionicio Veliz 
MR#:  [id number] :  1950 ACCOUNT #:  [de-identified] DATE OF SERVICE:  2019 CLINICAL SERVICE:  Vascular Surgery. PREOPERATIVE DIAGNOSIS:  In-stent right iliac stenosis. POSTOPERATIVE DIAGNOSIS:  In-stent right iliac stenosis. PROCEDURES PERFORMED: 
1. Ultrasound-guided access of the right common femoral artery and placement of catheter in the aorta for aortogram. 
2.  Right lower extremity arteriogram with balloon angioplasty and stent of the right external iliac artery with 8 x 4 S. M.A.R.T. stent. SURGEON:  Everett Buenrostro MD 
 
ASSISTANT:   
 
ANESTHESIA:  General. 
 
COMPLICATIONS:  None. SPECIMENS REMOVED:   
 
IMPLANTS:  . ESTIMATED BLOOD LOSS:   
 
OPERATIVE FINDINGS: 
1. There was no evidence of aortic disease. 2.  Bilateral iliac proximal stents were patent without any significant disease with the right external iliac high-grade stenosis with patent hypogastric arteries bilaterally. PROCEDURE IN DETAIL:  After getting informed consent, the patient was brought to the operating room. Anesthesia was then induced. Preop antibiotics were given before skin incision. The patient's right groin was all prepped and draped in normal sterile fashion. Ultrasound-guided access was used in the right common femoral artery over the femoral head using micropuncture technique. We upsized to a 5-[place] sheath over 0.035 starter wire. We did digital subtraction with an Estonian projection at the hypogastric. This showed no evidence of any stenosis in the bilateral iliac stents; however, in the distal stent and proximal external, there was a high-grade stenosis. We then first balloon angioplastied the area with a 7 x 4 balloon. It was slightly improved, however there were small areas of dissection laterally.   We decided to stent that area with 8 x 40 balloon, which we placed overlapping 2 cm into the distal common iliac stent. We post-dilated that with 8 x 4 balloon. Post procedure, it was patent. There was no evidence of any dissection or thrombosis and the hypogastric artery was well patent. We were happy with the procedure. We then closed the groin with CorMatrix. The patient was then extubated and returned to the PACU in stable condition. MD FITO [email or web address] 
D:  05/13/2019 9:16 
T:  05/13/2019 17:51 
JOB #:  [id number]

## 2019-06-14 ENCOUNTER — ANESTHESIA EVENT (OUTPATIENT)
Dept: SURGERY | Age: 69
DRG: 253 | End: 2019-06-14
Payer: MEDICARE

## 2019-06-14 ENCOUNTER — HOSPITAL ENCOUNTER (OUTPATIENT)
Dept: SURGERY | Age: 69
Discharge: HOME | End: 2019-06-14
Payer: MEDICARE

## 2019-06-14 VITALS
BODY MASS INDEX: 24.08 KG/M2 | WEIGHT: 141.06 LBS | RESPIRATION RATE: 18 BRPM | SYSTOLIC BLOOD PRESSURE: 121 MMHG | HEIGHT: 64 IN | HEART RATE: 87 BPM | OXYGEN SATURATION: 99 % | TEMPERATURE: 97.9 F | DIASTOLIC BLOOD PRESSURE: 74 MMHG

## 2019-06-14 LAB
ANION GAP SERPL CALC-SCNC: 7 MMOL/L (ref 7–16)
BUN SERPL-MCNC: 30 MG/DL (ref 8–23)
CALCIUM SERPL-MCNC: 8.8 MG/DL (ref 8.3–10.4)
CHLORIDE SERPL-SCNC: 110 MMOL/L (ref 98–107)
CO2 SERPL-SCNC: 26 MMOL/L (ref 21–32)
CREAT SERPL-MCNC: 1.88 MG/DL (ref 0.8–1.5)
ERYTHROCYTE [DISTWIDTH] IN BLOOD BY AUTOMATED COUNT: 13.8 % (ref 11.9–14.6)
GLUCOSE BLD STRIP.AUTO-MCNC: 209 MG/DL (ref 65–100)
GLUCOSE SERPL-MCNC: 198 MG/DL (ref 65–100)
HCT VFR BLD AUTO: 37.4 % (ref 41.1–50.3)
HGB BLD-MCNC: 12 G/DL (ref 13.6–17.2)
MCH RBC QN AUTO: 26.1 PG (ref 26.1–32.9)
MCHC RBC AUTO-ENTMCNC: 32.1 G/DL (ref 31.4–35)
MCV RBC AUTO: 81.5 FL (ref 79.6–97.8)
NRBC # BLD: 0 K/UL (ref 0–0.2)
PLATELET # BLD AUTO: 198 K/UL (ref 150–450)
PMV BLD AUTO: 10.6 FL (ref 9.4–12.3)
POTASSIUM SERPL-SCNC: 4.4 MMOL/L (ref 3.5–5.1)
RBC # BLD AUTO: 4.59 M/UL (ref 4.23–5.6)
SODIUM SERPL-SCNC: 143 MMOL/L (ref 136–145)
WBC # BLD AUTO: 4.3 K/UL (ref 4.3–11.1)

## 2019-06-14 PROCEDURE — 85027 COMPLETE CBC AUTOMATED: CPT

## 2019-06-14 PROCEDURE — 82962 GLUCOSE BLOOD TEST: CPT

## 2019-06-14 PROCEDURE — 93005 ELECTROCARDIOGRAM TRACING: CPT | Performed by: ANESTHESIOLOGY

## 2019-06-14 PROCEDURE — 80048 BASIC METABOLIC PNL TOTAL CA: CPT

## 2019-06-14 RX ORDER — FLUTICASONE PROPIONATE 50 MCG
2 SPRAY, SUSPENSION (ML) NASAL DAILY
Status: ON HOLD | COMMUNITY
End: 2020-01-01

## 2019-06-14 RX ORDER — FUROSEMIDE 40 MG/1
40 TABLET ORAL
COMMUNITY
End: 2020-01-01

## 2019-06-14 NOTE — PERIOP NOTES
Recent Results (from the past 12 hour(s)) GLUCOSE, POC Collection Time: 06/14/19 10:23 AM  
Result Value Ref Range Glucose (POC) 209 (H) 65 - 100 mg/dL CBC W/O DIFF Collection Time: 06/14/19 10:27 AM  
Result Value Ref Range WBC 4.3 4.3 - 11.1 K/uL  
 RBC 4.59 4.23 - 5.6 M/uL  
 HGB 12.0 (L) 13.6 - 17.2 g/dL HCT 37.4 (L) 41.1 - 50.3 % MCV 81.5 79.6 - 97.8 FL  
 MCH 26.1 26.1 - 32.9 PG  
 MCHC 32.1 31.4 - 35.0 g/dL  
 RDW 13.8 11.9 - 14.6 % PLATELET 783 989 - 407 K/uL MPV 10.6 9.4 - 12.3 FL ABSOLUTE NRBC 0.00 0.0 - 0.2 K/uL METABOLIC PANEL, BASIC Collection Time: 06/14/19 10:27 AM  
Result Value Ref Range Sodium 143 136 - 145 mmol/L Potassium 4.4 3.5 - 5.1 mmol/L Chloride 110 (H) 98 - 107 mmol/L  
 CO2 26 21 - 32 mmol/L Anion gap 7 7 - 16 mmol/L Glucose 198 (H) 65 - 100 mg/dL BUN 30 (H) 8 - 23 MG/DL Creatinine 1.88 (H) 0.8 - 1.5 MG/DL  
 GFR est AA 46 (L) >60 ml/min/1.73m2 GFR est non-AA 38 (L) >60 ml/min/1.73m2  Calcium 8.8 8.3 - 10.4 MG/DL

## 2019-06-14 NOTE — ANESTHESIA PREPROCEDURE EVALUATION
Relevant Problems No relevant active problems Anesthetic History No history of anesthetic complications Review of Systems / Medical History Patient summary reviewed and pertinent labs reviewed Pulmonary Within defined limits Neuro/Psych Within defined limits Cardiovascular Hypertension CAD (nonobstructive disease on cath 2017) and PAD Exercise tolerance: <4 METS: Limited by leg pain. Comments: History of CHF 20-25% in 2017, recent ECHO with normal LV function 4/2019 GI/Hepatic/Renal 
  
 
 
Renal disease (s/p right nephrectomy): CRI Endo/Other Diabetes (last HgA1C 8.5): type 2, using insulin Hypothyroidism Cancer (kidney) Other Findings Comments: Sickle cell trait Physical Exam 
 
Airway Mallampati: II 
TM Distance: > 6 cm Neck ROM: normal range of motion Mouth opening: Normal 
 
 Cardiovascular Rhythm: regular Rate: normal 
 
 
 
 Dental 
 
Dentition: Full upper dentures Pulmonary Breath sounds clear to auscultation Abdominal 
GI exam deferred Other Findings Anesthetic Plan ASA: 4 Anesthesia type: general 
 
Monitoring Plan: Arterial line Induction: Intravenous Anesthetic plan and risks discussed with: Patient and Son / Daughter

## 2019-06-14 NOTE — PERIOP NOTES
Patient verified name and . Patient provided medical/health information and PTA medications to the best of their ability. TYPE  CASE:lll Order for consent were found in EHR and matches case posting. Labs per surgeon:cbc, bmp. Labs per anesthesia protocol: T&S on arrival.   
EKG  :  ekg done today and reviewed by Dr Elicia Jay, who also reviewed recent echo and echo from 2017 and cleared for surgery Patient provided with and instructed on education handouts including Guide to Surgery, blood transfusions, pain management, and hand hygiene for the family and community, and [facility] brochure.  
 
hibiclens with soap and instructions given per hospital policy. Instructed patient to continue previous medications as prescribed prior to surgery unless otherwise directed and to take the following medications the day of surgery according to anesthesia guidelines : carvedilol and nicardipine, and unithroid . Instructed patient to hold  the following medications: plavix, awaiting cardiac clearance from Lynnette Berrios has requested. Original medication prescription bottles were visualized during patient appointment. Patient teach back successful and patient demonstrates knowledge of instruction.

## 2019-06-15 LAB
ATRIAL RATE: 65 BPM
CALCULATED P AXIS, ECG09: 56 DEGREES
CALCULATED R AXIS, ECG10: 38 DEGREES
CALCULATED T AXIS, ECG11: 59 DEGREES
DIAGNOSIS, 93000: NORMAL
P-R INTERVAL, ECG05: 164 MS
Q-T INTERVAL, ECG07: 374 MS
QRS DURATION, ECG06: 86 MS
QTC CALCULATION (BEZET), ECG08: 388 MS
VENTRICULAR RATE, ECG03: 65 BPM

## 2019-06-17 NOTE — PERIOP NOTES
Received phone call from staff at [facility] regarding Plavix hold. States Plavix hold should initiate from Dr. Lisa Trujillo as he prescribed Plavix after peripheral stent. Left v/m message for [facility].

## 2019-06-19 NOTE — PERIOP NOTES
Order is listed under signed and held, by Dr. Shiloh Cruz, stating hold Plavix 5 days prior to surgery.

## 2019-06-20 ENCOUNTER — ANESTHESIA (OUTPATIENT)
Dept: SURGERY | Age: 69
DRG: 253 | End: 2019-06-20
Payer: MEDICARE

## 2019-06-20 ENCOUNTER — APPOINTMENT (OUTPATIENT)
Dept: INTERVENTIONAL RADIOLOGY/VASCULAR | Age: 69
DRG: 253 | End: 2019-06-20
Attending: SURGERY
Payer: MEDICARE

## 2019-06-20 ENCOUNTER — HOSPITAL ENCOUNTER (INPATIENT)
Age: 69
LOS: 1 days | Discharge: HOME | DRG: 253 | End: 2019-06-21
Attending: SURGERY | Admitting: SURGERY
Payer: MEDICARE

## 2019-06-20 DIAGNOSIS — I73.9 PVD (PERIPHERAL VASCULAR DISEASE) (HCC): Primary | ICD-10-CM

## 2019-06-20 LAB
ABO + RH BLD: NORMAL
ACT BLD: 120 SECS (ref 70–128)
BLOOD GROUP ANTIBODIES SERPL: NORMAL
GLUCOSE BLD STRIP.AUTO-MCNC: 155 MG/DL (ref 65–100)
GLUCOSE BLD STRIP.AUTO-MCNC: 178 MG/DL (ref 65–100)
GLUCOSE BLD STRIP.AUTO-MCNC: 314 MG/DL (ref 65–100)
SPECIMEN EXP DATE BLD: NORMAL

## 2019-06-20 PROCEDURE — 77030002996 HC SUT SLK J&J -A: Performed by: SURGERY

## 2019-06-20 PROCEDURE — 77030034074 HC PTCH CORMATRIX CARD REP CMTX -E: Performed by: SURGERY

## 2019-06-20 PROCEDURE — 76010000207 HC CV SURG 2.5 TO 3 HR INTENSV-TIER 1: Performed by: SURGERY

## 2019-06-20 PROCEDURE — 77030032490 HC SLV COMPR SCD KNE COVD -B

## 2019-06-20 PROCEDURE — 77030002966 HC SUT PDS J&J -A: Performed by: SURGERY

## 2019-06-20 PROCEDURE — 77030005401 HC CATH RAD ARRO -A: Performed by: ANESTHESIOLOGY

## 2019-06-20 PROCEDURE — 74011250636 HC RX REV CODE- 250/636: Performed by: NURSE PRACTITIONER

## 2019-06-20 PROCEDURE — 74011250636 HC RX REV CODE- 250/636: Performed by: ANESTHESIOLOGY

## 2019-06-20 PROCEDURE — 77030034850: Performed by: SURGERY

## 2019-06-20 PROCEDURE — 77030002987 HC SUT PROL J&J -B: Performed by: SURGERY

## 2019-06-20 PROCEDURE — 74011000250 HC RX REV CODE- 250

## 2019-06-20 PROCEDURE — 74011250636 HC RX REV CODE- 250/636

## 2019-06-20 PROCEDURE — 77030002933 HC SUT MCRYL J&J -A: Performed by: SURGERY

## 2019-06-20 PROCEDURE — 76210000016 HC OR PH I REC 1 TO 1.5 HR: Performed by: SURGERY

## 2019-06-20 PROCEDURE — 86900 BLOOD TYPING SEROLOGIC ABO: CPT

## 2019-06-20 PROCEDURE — 77030008467 HC STPLR SKN COVD -B: Performed by: SURGERY

## 2019-06-20 PROCEDURE — 77030014008 HC SPNG HEMSTAT J&J -C: Performed by: SURGERY

## 2019-06-20 PROCEDURE — 04CK0ZZ EXTIRPATION OF MATTER FROM RIGHT FEMORAL ARTERY, OPEN APPROACH: ICD-10-PCS | Performed by: SURGERY

## 2019-06-20 PROCEDURE — 74011250637 HC RX REV CODE- 250/637: Performed by: SURGERY

## 2019-06-20 PROCEDURE — 85347 COAGULATION TIME ACTIVATED: CPT

## 2019-06-20 PROCEDURE — 77030018673: Performed by: SURGERY

## 2019-06-20 PROCEDURE — 77030020263 HC SOL INJ SOD CL0.9% LFCR 1000ML

## 2019-06-20 PROCEDURE — C1769 GUIDE WIRE: HCPCS | Performed by: SURGERY

## 2019-06-20 PROCEDURE — 77030011943: Performed by: SURGERY

## 2019-06-20 PROCEDURE — 76000 FLUOROSCOPY <1 HR PHYS/QHP: CPT

## 2019-06-20 PROCEDURE — 77030010512 HC APPL CLP LIG J&J -C: Performed by: SURGERY

## 2019-06-20 PROCEDURE — 74011250636 HC RX REV CODE- 250/636: Performed by: SURGERY

## 2019-06-20 PROCEDURE — 77030031139 HC SUT VCRL2 J&J -A: Performed by: SURGERY

## 2019-06-20 PROCEDURE — 77030011264 HC ELECTRD BLD EXT COVD -A: Performed by: SURGERY

## 2019-06-20 PROCEDURE — C1769 GUIDE WIRE: HCPCS

## 2019-06-20 PROCEDURE — B41JZZZ FLUOROSCOPY OF OTHER LOWER ARTERIES: ICD-10-PCS | Performed by: SURGERY

## 2019-06-20 PROCEDURE — 77030010514 HC APPL CLP LIG COVD -B: Performed by: SURGERY

## 2019-06-20 PROCEDURE — C1894 INTRO/SHEATH, NON-LASER: HCPCS | Performed by: SURGERY

## 2019-06-20 PROCEDURE — 74011250637 HC RX REV CODE- 250/637: Performed by: ANESTHESIOLOGY

## 2019-06-20 PROCEDURE — 77030037088 HC TUBE ENDOTRACH ORAL NSL COVD-A: Performed by: ANESTHESIOLOGY

## 2019-06-20 PROCEDURE — 74011000250 HC RX REV CODE- 250: Performed by: SURGERY

## 2019-06-20 PROCEDURE — 76060000036 HC ANESTHESIA 2.5 TO 3 HR: Performed by: SURGERY

## 2019-06-20 PROCEDURE — 65620000000 HC RM CCU GENERAL

## 2019-06-20 PROCEDURE — 77030021532 HC CATH ANGI DX IMPRS MRTM -B: Performed by: SURGERY

## 2019-06-20 PROCEDURE — C1757 CATH, THROMBECTOMY/EMBOLECT: HCPCS | Performed by: SURGERY

## 2019-06-20 PROCEDURE — 77030013794 HC KT TRNSDUC BLD EDWD -B: Performed by: ANESTHESIOLOGY

## 2019-06-20 PROCEDURE — 74011636637 HC RX REV CODE- 636/637: Performed by: SURGERY

## 2019-06-20 PROCEDURE — 94760 N-INVAS EAR/PLS OXIMETRY 1: CPT

## 2019-06-20 PROCEDURE — 74011636320 HC RX REV CODE- 636/320: Performed by: SURGERY

## 2019-06-20 PROCEDURE — 77030019908 HC STETH ESOPH SIMS -A: Performed by: ANESTHESIOLOGY

## 2019-06-20 PROCEDURE — 77030039425 HC BLD LARYNG TRULITE DISP TELE -A: Performed by: ANESTHESIOLOGY

## 2019-06-20 PROCEDURE — 77030019940 HC BLNKT HYPOTHRM STRY -B: Performed by: ANESTHESIOLOGY

## 2019-06-20 PROCEDURE — 77030020782 HC GWN BAIR PAWS FLX 3M -B: Performed by: ANESTHESIOLOGY

## 2019-06-20 PROCEDURE — 77030039266 HC ADH SKN EXOFIN S2SG -A: Performed by: SURGERY

## 2019-06-20 PROCEDURE — 82962 GLUCOSE BLOOD TEST: CPT

## 2019-06-20 PROCEDURE — 77030019605

## 2019-06-20 PROCEDURE — 77030034888 HC SUT PROL 2 J&J -B: Performed by: SURGERY

## 2019-06-20 DEVICE — VASCURE FOR VASCULAR REPAIR IS INTENDED FOR USE AS A PATCH MATERIAL FOR REPAIR AND RECONSTRUCTION OF PERIPHERAL VASCULATURE INCLUDING THE CAROTID, RENAL, ILIAC, FEMORAL, AND TIBIAL BLOOD VESSELS. VASCURE FOR VASCULAR REPAIR MAY BE USED FOR PATCH CLOSURE OF VESSELS, AS A PLEDGET, OR FOR SUTURE LINE BUTTRESSING WHEN REPAIRING PERIPHERAL VESSELS.
Type: IMPLANTABLE DEVICE | Site: GROIN | Status: FUNCTIONAL
Brand: VASCURE FOR VASCULAR REPAIR

## 2019-06-20 RX ORDER — DEXAMETHASONE SODIUM PHOSPHATE 4 MG/ML
INJECTION, SOLUTION INTRA-ARTICULAR; INTRALESIONAL; INTRAMUSCULAR; INTRAVENOUS; SOFT TISSUE AS NEEDED
Status: DISCONTINUED | OUTPATIENT
Start: 2019-06-20 | End: 2019-06-20 | Stop reason: HOSPADM

## 2019-06-20 RX ORDER — SODIUM CHLORIDE 0.9 % (FLUSH) 0.9 %
5-40 SYRINGE (ML) INJECTION AS NEEDED
Status: DISCONTINUED | OUTPATIENT
Start: 2019-06-20 | End: 2019-06-20 | Stop reason: HOSPADM

## 2019-06-20 RX ORDER — ONDANSETRON 2 MG/ML
INJECTION INTRAMUSCULAR; INTRAVENOUS AS NEEDED
Status: DISCONTINUED | OUTPATIENT
Start: 2019-06-20 | End: 2019-06-20 | Stop reason: HOSPADM

## 2019-06-20 RX ORDER — INSULIN GLARGINE 100 [IU]/ML
10 INJECTION, SOLUTION SUBCUTANEOUS
Status: DISCONTINUED | OUTPATIENT
Start: 2019-06-20 | End: 2019-06-21 | Stop reason: HOSPADM

## 2019-06-20 RX ORDER — SODIUM CHLORIDE, SODIUM LACTATE, POTASSIUM CHLORIDE, CALCIUM CHLORIDE 600; 310; 30; 20 MG/100ML; MG/100ML; MG/100ML; MG/100ML
100 INJECTION, SOLUTION INTRAVENOUS CONTINUOUS
Status: DISCONTINUED | OUTPATIENT
Start: 2019-06-20 | End: 2019-06-20 | Stop reason: HOSPADM

## 2019-06-20 RX ORDER — ROCURONIUM BROMIDE 10 MG/ML
INJECTION, SOLUTION INTRAVENOUS AS NEEDED
Status: DISCONTINUED | OUTPATIENT
Start: 2019-06-20 | End: 2019-06-20 | Stop reason: HOSPADM

## 2019-06-20 RX ORDER — BUPIVACAINE HYDROCHLORIDE 2.5 MG/ML
INJECTION, SOLUTION EPIDURAL; INFILTRATION; INTRACAUDAL AS NEEDED
Status: DISCONTINUED | OUTPATIENT
Start: 2019-06-20 | End: 2019-06-20 | Stop reason: HOSPADM

## 2019-06-20 RX ORDER — LIDOCAINE HYDROCHLORIDE 10 MG/ML
0.1 INJECTION INFILTRATION; PERINEURAL AS NEEDED
Status: DISCONTINUED | OUTPATIENT
Start: 2019-06-20 | End: 2019-06-20 | Stop reason: HOSPADM

## 2019-06-20 RX ORDER — CEFAZOLIN SODIUM 1 G/3ML
INJECTION, POWDER, FOR SOLUTION INTRAMUSCULAR; INTRAVENOUS AS NEEDED
Status: DISCONTINUED | OUTPATIENT
Start: 2019-06-20 | End: 2019-06-20 | Stop reason: HOSPADM

## 2019-06-20 RX ORDER — HYDROMORPHONE HYDROCHLORIDE 2 MG/ML
0.5 INJECTION, SOLUTION INTRAMUSCULAR; INTRAVENOUS; SUBCUTANEOUS
Status: COMPLETED | OUTPATIENT
Start: 2019-06-20 | End: 2019-06-20

## 2019-06-20 RX ORDER — OXYCODONE HYDROCHLORIDE 5 MG/1
5 TABLET ORAL
Status: COMPLETED | OUTPATIENT
Start: 2019-06-20 | End: 2019-06-20

## 2019-06-20 RX ORDER — SODIUM CHLORIDE 9 MG/ML
75 INJECTION, SOLUTION INTRAVENOUS CONTINUOUS
Status: DISCONTINUED | OUTPATIENT
Start: 2019-06-20 | End: 2019-06-21 | Stop reason: HOSPADM

## 2019-06-20 RX ORDER — MIDAZOLAM HYDROCHLORIDE 1 MG/ML
2 INJECTION, SOLUTION INTRAMUSCULAR; INTRAVENOUS
Status: DISCONTINUED | OUTPATIENT
Start: 2019-06-20 | End: 2019-06-20 | Stop reason: HOSPADM

## 2019-06-20 RX ORDER — FENTANYL CITRATE 50 UG/ML
INJECTION, SOLUTION INTRAMUSCULAR; INTRAVENOUS AS NEEDED
Status: DISCONTINUED | OUTPATIENT
Start: 2019-06-20 | End: 2019-06-20 | Stop reason: HOSPADM

## 2019-06-20 RX ORDER — FENTANYL CITRATE 50 UG/ML
100 INJECTION, SOLUTION INTRAMUSCULAR; INTRAVENOUS ONCE
Status: DISCONTINUED | OUTPATIENT
Start: 2019-06-20 | End: 2019-06-20 | Stop reason: HOSPADM

## 2019-06-20 RX ORDER — SODIUM CHLORIDE 9 MG/ML
1 INJECTION, SOLUTION INTRAVENOUS CONTINUOUS
Status: DISPENSED | OUTPATIENT
Start: 2019-06-20 | End: 2019-06-21

## 2019-06-20 RX ORDER — SODIUM CHLORIDE 0.9 % (FLUSH) 0.9 %
5-40 SYRINGE (ML) INJECTION AS NEEDED
Status: DISCONTINUED | OUTPATIENT
Start: 2019-06-20 | End: 2019-06-21 | Stop reason: HOSPADM

## 2019-06-20 RX ORDER — HEPARIN SODIUM 5000 [USP'U]/ML
INJECTION, SOLUTION INTRAVENOUS; SUBCUTANEOUS AS NEEDED
Status: DISCONTINUED | OUTPATIENT
Start: 2019-06-20 | End: 2019-06-20 | Stop reason: HOSPADM

## 2019-06-20 RX ORDER — MORPHINE SULFATE 2 MG/ML
2 INJECTION, SOLUTION INTRAMUSCULAR; INTRAVENOUS
Status: DISCONTINUED | OUTPATIENT
Start: 2019-06-20 | End: 2019-06-21 | Stop reason: HOSPADM

## 2019-06-20 RX ORDER — NEOSTIGMINE METHYLSULFATE 1 MG/ML
INJECTION INTRAVENOUS AS NEEDED
Status: DISCONTINUED | OUTPATIENT
Start: 2019-06-20 | End: 2019-06-20 | Stop reason: HOSPADM

## 2019-06-20 RX ORDER — CEFAZOLIN SODIUM/WATER 2 G/20 ML
2 SYRINGE (ML) INTRAVENOUS ONCE
Status: DISCONTINUED | OUTPATIENT
Start: 2019-06-20 | End: 2019-06-20 | Stop reason: HOSPADM

## 2019-06-20 RX ORDER — FLUTICASONE PROPIONATE 50 MCG
2 SPRAY, SUSPENSION (ML) NASAL DAILY
Status: DISCONTINUED | OUTPATIENT
Start: 2019-06-20 | End: 2019-06-21 | Stop reason: HOSPADM

## 2019-06-20 RX ORDER — GLYCOPYRROLATE 0.2 MG/ML
INJECTION INTRAMUSCULAR; INTRAVENOUS AS NEEDED
Status: DISCONTINUED | OUTPATIENT
Start: 2019-06-20 | End: 2019-06-20 | Stop reason: HOSPADM

## 2019-06-20 RX ORDER — CEFAZOLIN SODIUM/WATER 2 G/20 ML
2 SYRINGE (ML) INTRAVENOUS EVERY 8 HOURS
Status: COMPLETED | OUTPATIENT
Start: 2019-06-20 | End: 2019-06-20

## 2019-06-20 RX ORDER — FUROSEMIDE 40 MG/1
40 TABLET ORAL DAILY
Status: DISCONTINUED | OUTPATIENT
Start: 2019-06-21 | End: 2019-06-21 | Stop reason: HOSPADM

## 2019-06-20 RX ORDER — HEPARIN SODIUM 1000 [USP'U]/ML
INJECTION, SOLUTION INTRAVENOUS; SUBCUTANEOUS AS NEEDED
Status: DISCONTINUED | OUTPATIENT
Start: 2019-06-20 | End: 2019-06-20 | Stop reason: HOSPADM

## 2019-06-20 RX ORDER — ONDANSETRON 2 MG/ML
4 INJECTION INTRAMUSCULAR; INTRAVENOUS
Status: DISCONTINUED | OUTPATIENT
Start: 2019-06-20 | End: 2019-06-21 | Stop reason: HOSPADM

## 2019-06-20 RX ORDER — MIDAZOLAM HYDROCHLORIDE 1 MG/ML
2 INJECTION, SOLUTION INTRAMUSCULAR; INTRAVENOUS ONCE
Status: DISCONTINUED | OUTPATIENT
Start: 2019-06-20 | End: 2019-06-20 | Stop reason: HOSPADM

## 2019-06-20 RX ORDER — PROTAMINE SULFATE 10 MG/ML
INJECTION, SOLUTION INTRAVENOUS AS NEEDED
Status: DISCONTINUED | OUTPATIENT
Start: 2019-06-20 | End: 2019-06-20 | Stop reason: HOSPADM

## 2019-06-20 RX ORDER — CARVEDILOL 12.5 MG/1
12.5 TABLET ORAL 2 TIMES DAILY WITH MEALS
Status: DISCONTINUED | OUTPATIENT
Start: 2019-06-20 | End: 2019-06-21 | Stop reason: HOSPADM

## 2019-06-20 RX ORDER — SODIUM CHLORIDE 0.9 % (FLUSH) 0.9 %
5-40 SYRINGE (ML) INJECTION EVERY 8 HOURS
Status: DISCONTINUED | OUTPATIENT
Start: 2019-06-20 | End: 2019-06-20 | Stop reason: HOSPADM

## 2019-06-20 RX ORDER — PROPOFOL 10 MG/ML
INJECTION, EMULSION INTRAVENOUS AS NEEDED
Status: DISCONTINUED | OUTPATIENT
Start: 2019-06-20 | End: 2019-06-20 | Stop reason: HOSPADM

## 2019-06-20 RX ORDER — SODIUM CHLORIDE 0.9 % (FLUSH) 0.9 %
5-40 SYRINGE (ML) INJECTION EVERY 8 HOURS
Status: DISCONTINUED | OUTPATIENT
Start: 2019-06-20 | End: 2019-06-21 | Stop reason: HOSPADM

## 2019-06-20 RX ORDER — HYDROCODONE BITARTRATE AND ACETAMINOPHEN 5; 325 MG/1; MG/1
1 TABLET ORAL
Status: DISCONTINUED | OUTPATIENT
Start: 2019-06-20 | End: 2019-06-21 | Stop reason: HOSPADM

## 2019-06-20 RX ORDER — NALOXONE HYDROCHLORIDE 0.4 MG/ML
0.04 INJECTION, SOLUTION INTRAMUSCULAR; INTRAVENOUS; SUBCUTANEOUS
Status: DISCONTINUED | OUTPATIENT
Start: 2019-06-20 | End: 2019-06-20 | Stop reason: HOSPADM

## 2019-06-20 RX ORDER — LISINOPRIL 20 MG/1
20 TABLET ORAL
Status: DISCONTINUED | OUTPATIENT
Start: 2019-06-20 | End: 2019-06-21 | Stop reason: HOSPADM

## 2019-06-20 RX ORDER — LIDOCAINE HYDROCHLORIDE 20 MG/ML
INJECTION, SOLUTION EPIDURAL; INFILTRATION; INTRACAUDAL; PERINEURAL AS NEEDED
Status: DISCONTINUED | OUTPATIENT
Start: 2019-06-20 | End: 2019-06-20 | Stop reason: HOSPADM

## 2019-06-20 RX ADMIN — PROPOFOL 50 MG: 10 INJECTION, EMULSION INTRAVENOUS at 09:24

## 2019-06-20 RX ADMIN — OXYCODONE HYDROCHLORIDE 5 MG: 5 TABLET ORAL at 10:04

## 2019-06-20 RX ADMIN — ROCURONIUM BROMIDE 50 MG: 10 INJECTION, SOLUTION INTRAVENOUS at 07:09

## 2019-06-20 RX ADMIN — Medication 2 G: at 16:51

## 2019-06-20 RX ADMIN — INSULIN GLARGINE 10 UNITS: 100 INJECTION, SOLUTION SUBCUTANEOUS at 21:43

## 2019-06-20 RX ADMIN — SODIUM CHLORIDE, SODIUM LACTATE, POTASSIUM CHLORIDE, AND CALCIUM CHLORIDE 100 ML/HR: 600; 310; 30; 20 INJECTION, SOLUTION INTRAVENOUS at 06:03

## 2019-06-20 RX ADMIN — HYDROMORPHONE HYDROCHLORIDE 0.5 MG: 2 INJECTION INTRAMUSCULAR; INTRAVENOUS; SUBCUTANEOUS at 10:44

## 2019-06-20 RX ADMIN — DEXAMETHASONE SODIUM PHOSPHATE 4 MG: 4 INJECTION, SOLUTION INTRA-ARTICULAR; INTRALESIONAL; INTRAMUSCULAR; INTRAVENOUS; SOFT TISSUE at 07:31

## 2019-06-20 RX ADMIN — Medication 10 ML: at 21:43

## 2019-06-20 RX ADMIN — PROPOFOL 150 MG: 10 INJECTION, EMULSION INTRAVENOUS at 07:09

## 2019-06-20 RX ADMIN — GLYCOPYRROLATE 0.7 MG: 0.2 INJECTION INTRAMUSCULAR; INTRAVENOUS at 09:19

## 2019-06-20 RX ADMIN — SODIUM CHLORIDE 1 ML/KG/HR: 900 INJECTION, SOLUTION INTRAVENOUS at 11:39

## 2019-06-20 RX ADMIN — FENTANYL CITRATE 100 MCG: 50 INJECTION, SOLUTION INTRAMUSCULAR; INTRAVENOUS at 07:09

## 2019-06-20 RX ADMIN — PROTAMINE SULFATE 50 MG: 10 INJECTION, SOLUTION INTRAVENOUS at 09:05

## 2019-06-20 RX ADMIN — SODIUM CHLORIDE 1 ML/KG/HR: 900 INJECTION, SOLUTION INTRAVENOUS at 06:04

## 2019-06-20 RX ADMIN — Medication 2 G: at 23:33

## 2019-06-20 RX ADMIN — FLUTICASONE PROPIONATE 2 SPRAY: 50 SPRAY, METERED NASAL at 13:14

## 2019-06-20 RX ADMIN — HYDROMORPHONE HYDROCHLORIDE 0.5 MG: 2 INJECTION INTRAMUSCULAR; INTRAVENOUS; SUBCUTANEOUS at 10:28

## 2019-06-20 RX ADMIN — LIDOCAINE HYDROCHLORIDE 60 MG: 20 INJECTION, SOLUTION EPIDURAL; INFILTRATION; INTRACAUDAL; PERINEURAL at 07:09

## 2019-06-20 RX ADMIN — HEPARIN SODIUM 7000 UNITS: 1000 INJECTION, SOLUTION INTRAVENOUS; SUBCUTANEOUS at 08:18

## 2019-06-20 RX ADMIN — LISINOPRIL 20 MG: 20 TABLET ORAL at 21:43

## 2019-06-20 RX ADMIN — ONDANSETRON 4 MG: 2 INJECTION INTRAMUSCULAR; INTRAVENOUS at 07:31

## 2019-06-20 RX ADMIN — CARVEDILOL 12.5 MG: 12.5 TABLET, FILM COATED ORAL at 16:57

## 2019-06-20 RX ADMIN — HYDROMORPHONE HYDROCHLORIDE 0.5 MG: 2 INJECTION INTRAMUSCULAR; INTRAVENOUS; SUBCUTANEOUS at 10:05

## 2019-06-20 RX ADMIN — NEOSTIGMINE METHYLSULFATE 4.5 MG: 1 INJECTION INTRAVENOUS at 09:19

## 2019-06-20 RX ADMIN — HYDROMORPHONE HYDROCHLORIDE 0.5 MG: 2 INJECTION INTRAMUSCULAR; INTRAVENOUS; SUBCUTANEOUS at 10:22

## 2019-06-20 RX ADMIN — CEFAZOLIN SODIUM 2 G: 1 INJECTION, POWDER, FOR SOLUTION INTRAMUSCULAR; INTRAVENOUS at 07:39

## 2019-06-20 RX ADMIN — SODIUM CHLORIDE 75 ML/HR: 900 INJECTION, SOLUTION INTRAVENOUS at 13:14

## 2019-06-20 RX ADMIN — Medication 10 ML: at 14:35

## 2019-06-20 NOTE — PROGRESS NOTES
LEAPFROG PROTOCOL NOTE Kelly Chiu Sr. 
6/20/2019 The patient is currently in the critical care setting managed by Dr. Francisco Caraballo  with PVD. The patient's chart is reviewed and the patient is discussed with the staff. Patient is currently hemodynamically stable. Patient has no needs identified for Intensivist management in the critical care setting at this time. Please notify us if can be of assistance. No charge billed to the patient. Thank you.  
 
Gloria Duran MD

## 2019-06-20 NOTE — BRIEF OP NOTE
[place] [address] [address], [address] [facility]. [address] [place] [address] FAX: [phone] Op Note Template Note Pre-Op Diagnosis: PVD (peripheral vascular disease) ([facility] [I73.9] Post-Op Diagnosis:  PVD (peripheral vascular disease) ([facility] [I73.9] Procedures: Procedure(s): RIGHT COMMON FEMORAL ARTERIAL ENDARTERECTOMY/ ANGIOPLASTY POSS RIGHT ILIOFEMORAL BYPASS 3 HRS PRE AND POST HYDRATION Surgeon: Cody Valencia MD 
 
Assistants: Surgeon(s): Dorothy Hawk MD   
 
Anesthesia:  General  
 
Findings: Occluded right common femoral artery, endarterectomized embolectomized diagnostic arteriogram showed patent right iliac stent palpable anterior tibial signal postprocedure Tourniquet Time:  * No tourniquets in log * Estimated Blood Loss:       
      
Specimens: Thrombus Implants:   
Implant Name Type Inv. Item Serial No.  Lot No. LRB No. Used Action GRAFT CORMATRIX ECM 1CM X 10CM - [id number] Graft GRAFT CORMATRIX ECM 1CM X 10CM   [id number] Right 1 Implanted Complications: None Signed: Cody Valencia MD 
   
Elements of this note have been dictated using speech recognition software. As a result, errors of speech recognition may have occurred.

## 2019-06-20 NOTE — ANESTHESIA POSTPROCEDURE EVALUATION
Procedure(s): RIGHT COMMON FEMORAL ARTERIAL ENDARTERECTOMY. general 
 
Anesthesia Post Evaluation Patient location during evaluation: PACU Patient participation: complete - patient participated Level of consciousness: awake Pain management: satisfactory to patient Airway patency: patent Anesthetic complications: no 
Cardiovascular status: hemodynamically stable Respiratory status: spontaneous ventilation Hydration status: euvolemic Post anesthesia nausea and vomiting:  none Vitals Value Taken Time /65 6/20/2019 10:38 AM  
Temp 36.3 °C (97.4 °F) 6/20/2019  9:44 AM  
Pulse 76 6/20/2019 10:43 AM  
Resp 16 6/20/2019 10:28 AM  
SpO2 100 % 6/20/2019 10:43 AM  
Vitals shown include unvalidated device data.

## 2019-06-20 NOTE — OP NOTES
[facility] 
OPERATIVE REPORT Name:  Seferino Ruiz 
MR#:  [id number] :  1950 ACCOUNT #:  [de-identified] DATE OF SERVICE:  2019 CLINICAL SERVICE:  Vascular Surgery. PREOPERATIVE DIAGNOSIS:  Right lower extremity rest pain, status post arteriogram. 
 
POSTOPERATIVE DIAGNOSIS:  Right lower extremity rest pain, status post arteriogram. 
 
PROCEDURES PERFORMED: 
1. Right common femoral artery endarterectomy. 2.  Right common femoral embolectomy. 3.  Right lower extremity arteriogram. 
 
SURGEON:  Angeli Baird. Lesli Duarte MD 
 
ASSISTANT:   
 
ANESTHESIA:  General. 
 
COMPLICATIONS:  None. SPECIMENS REMOVED:  Right common femoral embolus. IMPLANTS:   
 
ESTIMATED BLOOD LOSS:  100 mL. INDICATION FOR PROCEDURE:  This is a 69-year-old male with peripheral vascular disease who is status post right iliac angioplasty and stent several months ago. He presented to my office with worsening pain in his right leg post procedure. We did ultrasound of the table, showed his right common femoral artery was occluded. He was consented for an endarterectomy. PROCEDURE IN DETAIL:  After getting informed consent, the patient was brought to the operating room. General anesthesia was then induced. Preop antibiotics were given before skin incision. The patient's right leg was all prepped and draped in normal sterile fashion. An incision was made in the femoral crease. We dissected down through the subcutaneous tissue where the femoral vessel was exposed. The profunda at its bifurcation and the SFA were all controlled with vessel loops. The common femoral artery was dissected and controlled just above the inguinal ligament. The artery was very small in nature, probably measured about 5 mm. There was a lot of scar tissue on the anterior portion secondary to the Mynx device.   We also got control of the vessel loops proximally and distally. We heparinized the patient with 7000 units of heparin. An #11 blade was then used to do arteriotomy vertically just above the bifurcation. The lumen was patent. There was good backbleeding from the profunda and SFA. We then passed a #4 Maranda catheter up into the iliac system multiple times and was able to get fresh thrombus after two passes. There was good pulsatile. At that time, we brought into the field a 6 x 11 sheath. We pushed it into the common femoral and we did a digital subtraction arteriogram, which showed the right iliac stent and the common iliac and external were all patent without any significant disease. We removed the sheath and clamped it. We passed a #4 Maranda catheter down the profunda and the SFA, was not able to get any embolus. We did extend our arteriotomy up into just above the inguinal ligament over the diseased common femoral artery and the previously accessed site, which had some inflammatory tissue. A [place] elevator was then used to do endarterectomy from the inguinal ligament down to the bifurcation of the profunda and SFA. We brought into the field a CorMatrix patch. We cut it to appropriate size and did a patch angioplasty using 6-0 Prolene proximally and 6-0 distally. Post procedure, we forward flushed the artery and back flushed the artery. There was good Doppler signal in the profunda, SFA and common, and there was a palpable pulse in the anterior tibial.  We did reverse the patient with 50 mg of protamine. ACT was 130. We closed the wound with 2-0 Vicryls, 3-0 Vicryls and 4-0 Monocryl. The patient was then extubated and returned to the PACU in stable condition. MD FITO [email or web address] 
D:  06/20/2019 9:53 
T:  06/20/2019 11:16 
JOB #:  [id number]

## 2019-06-20 NOTE — PROGRESS NOTES
Bedside shift change report given to Chaparro Carlson RN (oncoming nurse) by Afia Cooney RN (offgoing nurse). Report included the following information SBAR, Kardex, ED Summary, OR Summary, Procedure Summary, Intake/Output, MAR, Recent Results and Cardiac Rhythm NSR Post procedure site in right groin dual assessed, RLE pulses palpable.

## 2019-06-20 NOTE — PROGRESS NOTES
Pt transferred to room 3307 from PACU. Placed on CCU monitor. Pt is A/O x 4. Vitals stable. Son in waiting area. No distressed noted. Incision site to right groin with topical skin glue. No complaints or distress noted. Will continue care.

## 2019-06-20 NOTE — PROGRESS NOTES
TRANSFER - IN REPORT: 
 
Verbal report received from [facility] on [address]  being received from PACU for routine progression of care Report consisted of patients Situation, Background, Assessment and  
Recommendations(SBAR). Information from the following report(s) SBAR, Kardex, OR Summary, Procedure Summary, Intake/Output, MAR, Recent Results and Cardiac Rhythm NSR was reviewed with the receiving nurse. Opportunity for questions and clarification was provided. Assessment completed upon patients arrival to unit and care assumed.

## 2019-06-20 NOTE — PROGRESS NOTES
Attempted to see pt, sleeping. Chart reviewed and screened for possible d/c needs. Pt admitted to CCU s/p RIGHT COMMON FEMORAL ARTERIAL ENDARTERECTOMY/ ANGIOPLASTY POSS RIGHT ILIOFEMORAL BYPASS 3 HRS PRE AND POST HYDRATION by Dr. Romel Pollock. No needs currently identified. CM will follow for any needs per MD.  
 
Care Management Interventions PCP Verified by CM: Yes Transition of Care Consult (CM Consult): Discharge Planning Freedom of Choice Offered: Yes [employer] Information Provided?: [facility]. ) Discharge Location Discharge Placement: Home

## 2019-06-20 NOTE — ROUTINE PROCESS
TRANSFER - OUT REPORT: 
 
Verbal report given to Chirag Raymond RN on [employer]  being transferred to [facility] [id number] 
 for routine post - op Report consisted of patients Situation, Background, Assessment and  
Recommendations(SBAR). Information from the following report(s) SBAR, Procedure Summary and Cardiac Rhythm NSR was reviewed with the receiving nurse. Lines:  
Peripheral IV 06/20/19 Left Forearm (Active) Site Assessment Clean, dry, & intact 6/20/2019 10:53 AM  
Phlebitis Assessment 0 6/20/2019 10:53 AM  
Infiltration Assessment 0 6/20/2019 10:53 AM  
Dressing Status Clean, dry, & intact 6/20/2019 10:53 AM  
Dressing Type Transparent;Tape 6/20/2019 10:53 AM  
Hub Color/Line Status Green;Capped 6/20/2019 10:53 AM  
Alcohol Cap Used No 6/20/2019 10:53 AM  
   
Peripheral IV 06/20/19 Right Wrist (Active) Site Assessment Clean, dry, & intact 6/20/2019 10:53 AM  
Phlebitis Assessment 0 6/20/2019 10:53 AM  
Infiltration Assessment 0 6/20/2019 10:53 AM  
Dressing Status Clean, dry, & intact 6/20/2019 10:53 AM  
Dressing Type Transparent;Tape 6/20/2019 10:53 AM  
Hub Color/Line Status Green; Infusing 6/20/2019 10:53 AM  
Alcohol Cap Used No 6/20/2019 10:53 AM  
   
Arterial Line 06/20/19 Left Radial artery (Active) Site Assessment Clean, dry, & intact 6/20/2019 10:53 AM  
Dressing Status Clean, dry, & intact 6/20/2019 10:53 AM  
Dressing Type Transparent;Tape 6/20/2019 10:53 AM  
Line Status Intact and in place 6/20/2019 10:53 AM  
Treatment Zeroed or re-zeroed 6/20/2019  9:44 AM  
Affected Extremity/Extremities Color distal to insertion site pink (or appropriate for race); Pulses palpable;Range of motion performed 6/20/2019 10:53 AM  
  
 
Opportunity for questions and clarification was provided. Patient transported with: 
 O2 @ 2 liters Registered Nurse VTE prophylaxis orders have not been written for [employer] Jordan Medrano Patient and family given floor number and nurses name. Family updated re: pt status after security code verified.

## 2019-06-20 NOTE — PERIOP NOTES
GFR 46 with history of CHF-per hydration protocol patient should receive 1 mg/kg/hr hydration for 24 hours. Patient arrived to preop this morning at 0530. Notified Dr. Michelle Wilson. Ok to proceed. No new orders.

## 2019-06-21 VITALS
OXYGEN SATURATION: 97 % | SYSTOLIC BLOOD PRESSURE: 151 MMHG | HEIGHT: 63 IN | BODY MASS INDEX: 26.37 KG/M2 | HEART RATE: 100 BPM | WEIGHT: 148.81 LBS | DIASTOLIC BLOOD PRESSURE: 79 MMHG | TEMPERATURE: 98 F | RESPIRATION RATE: 24 BRPM

## 2019-06-21 LAB — GLUCOSE BLD STRIP.AUTO-MCNC: 216 MG/DL (ref 65–100)

## 2019-06-21 PROCEDURE — 74011250637 HC RX REV CODE- 250/637: Performed by: SURGERY

## 2019-06-21 PROCEDURE — 74011250636 HC RX REV CODE- 250/636: Performed by: SURGERY

## 2019-06-21 PROCEDURE — 77030020263 HC SOL INJ SOD CL0.9% LFCR 1000ML

## 2019-06-21 PROCEDURE — 82962 GLUCOSE BLOOD TEST: CPT

## 2019-06-21 RX ORDER — HYDROCODONE BITARTRATE AND ACETAMINOPHEN 5; 325 MG/1; MG/1
1 TABLET ORAL
Qty: 30 TAB | Refills: 0 | Status: SHIPPED | OUTPATIENT
Start: 2019-06-21 | End: 2019-06-26

## 2019-06-21 RX ADMIN — FUROSEMIDE 40 MG: 40 TABLET ORAL at 08:01

## 2019-06-21 RX ADMIN — CARVEDILOL 12.5 MG: 12.5 TABLET, FILM COATED ORAL at 08:02

## 2019-06-21 RX ADMIN — HYDROCODONE BITARTRATE AND ACETAMINOPHEN 1 TABLET: 5; 325 TABLET ORAL at 08:01

## 2019-06-21 RX ADMIN — SODIUM CHLORIDE 75 ML/HR: 900 INJECTION, SOLUTION INTRAVENOUS at 02:29

## 2019-06-21 RX ADMIN — Medication 10 ML: at 05:47

## 2019-06-21 NOTE — INTERDISCIPLINARY ROUNDS
Interdisciplinary team rounds were held 6/21/2019 with the following team members:Care Management, Nursing, Nurse Practitioner, Nutrition, Palliative Care, Pastoral Care, Pharmacy, Physical Therapy, Physician, Respiratory Therapy and Clinical Coordinator. Plan of care discussed. See clinical pathway and/or care plan for interventions and desired outcomes.

## 2019-06-21 NOTE — PROGRESS NOTES
[address] [facility], [facility] 404 Hospitals in [place] FAX: [phone] VASCULAR SURGERY FLOOR PROGRESS NOTE Admit Date: 2019 POD: 1 Day Post-Op Procedure:  Procedure(s): RIGHT COMMON FEMORAL ARTERIAL ENDARTERECTOMY Subjective:  
 
Patient has no new complaints. Objective:  
 
Vitals: 
Blood pressure 137/75, pulse 82, temperature 98.6 °F (37 °C), resp. rate 16, height 5' 3\" (1.6 m), weight 148 lb 13 oz (67.5 kg), SpO2 98 %. Temp (24hrs), Av.9 °F (36.6 °C), Min:97.4 °F (36.3 °C), Max:98.6 °F (37 °C) Intake / Output: 
 
Intake/Output Summary (Last 24 hours) at 2019 1708 Last data filed at 2019 4224 Gross per 24 hour Intake 3370.1 ml Output 2135 ml Net 1235.1 ml Physical Exam:   
Constitutional: he appears well-developed. No distress. HENT:  
Head: Atraumatic. Eyes: Pupils are equal, round, and reactive to light. Neck: Normal range of motion. Cardiovascular: Regular rhythm. Pulmonary/Chest: Effort normal and breath sounds normal. No respiratory distress. Abdominal: Soft. Bowel sounds are normal. he exhibits no distension. There is no tenderness. There is no guarding. No hernia. Musculoskeletal: Normal range of motion. Neurological: He is alert. CN II- XII grossly intact Vascular: incision stable, palpable AT pulses Labs: No results for input(s): HGB, WBC, K, GLU, HGBEXT in the last 72 hours. No lab exists for component:  CREA Data Review Assessment:  
 
Patient Active Problem List  
 Diagnosis Date Noted  PVD (peripheral vascular disease) ([address] 2019  Hypercholesteremia 2019  Iliac artery stenosis, left ([address] 2018  PAD (peripheral artery disease) ([address] 2018  Atherosclerosis of native arteries of extremity with intermittent claudication ([address] 2018  Abdominal aortic aneurysm (AAA) ([facility] 2018  Type 2 diabetes mellitus with nephropathy ([facility] 2018  Acquired hypothyroidism 12/04/2017  Gallop rhythm 10/26/2017  Coronary artery disease involving native coronary artery of native heart without angina pectoris 10/25/2017  Cardiomyopathy, ischemic 10/25/2017  Systolic CHF, acute ([facility] 10/13/2017  Renal mass 10/13/2017  Non compliance with medical treatment 10/12/2017  Former tobacco use 10/12/2017  Hypertriglyceridemia 07/23/2010  Benign hypertension with CKD (chronic kidney disease) stage III ([facility] 07/22/2010 Plan/Recommendations/Medical Decision Making:  
 
Discharge home continue Plavix Elements of this note have been dictated using speech recognition software. As a result, errors of speech recognition may have occurred.

## 2019-06-21 NOTE — DISCHARGE INSTRUCTIONS
Patient Education        Femoral Endarterectomy: What to Expect at [address] will have some pain from the cut (incision) the doctor made. This usually gets better after a couple of days. Your doctor will give you pain medicine for this. Your leg may be swollen at first. This may last 2 to 3 months. You will have stitches or staples in the incision. If you have stitches, they may dissolve on their own. Or your doctor may take them out 7 to 14 days after your surgery. After surgery, blood may flow better throughout your leg, which can decrease leg pain, numbness, and cramping. You may be able to walk longer distances without leg pain. This care sheet gives you a general idea about how long it will take for you to recover. But each person recovers at a different pace. Follow the steps below to get better as quickly as possible. How can you care for yourself at home? Activity    · Rest when you feel tired. Getting enough sleep will help you recover.     · Try to walk every day or as often as your doctor tells you. Start by walking a little more than you did the day before. Bit by bit, increase the amount you walk. Walking boosts blood flow and helps prevent pneumonia and constipation.     · Avoid strenuous activities, such as bicycle riding, jogging, weight lifting, or aerobic exercise, until your doctor says it is okay.     · Ask your doctor when you can drive again.     · You will probably need to take off 1 to 4 weeks from work. It depends on the type of work you do and how you feel.     · You may shower as usual. Do not take a bath for the first 2 weeks, or until your doctor tells you it is okay. Diet    · You can eat your normal diet. If your stomach is upset, try bland, low-fat foods like plain rice, broiled chicken, toast, and yogurt.     · Drink plenty of fluids (unless your doctor tells you not to).     · You may notice that your bowel movements are not regular right after your surgery. This is common. You may want to take a fiber supplement every day. If you have not had a bowel movement after a couple of days, ask your doctor about taking a mild laxative. Medicines    · Your doctor will tell you if and when you can restart your medicines. He or she will also give you instructions about taking any new medicines.     · If you take blood thinners, such as warfarin (Coumadin), clopidogrel (Plavix), or aspirin, be sure to talk to your doctor. He or she will tell you if and when to start taking those medicines again. Make sure that you understand exactly what your doctor wants you to do.     · Be safe with medicines. Take your medicines exactly as prescribed. Call your doctor if you think you are having a problem with your medicine.     · Take pain medicines exactly as directed. ? If the doctor gave you a prescription medicine for pain, take it as prescribed. ? If you are not taking a prescription pain medicine, ask your doctor if you can take an over-the-counter medicine.     · If you think your pain medicine is making you sick to your stomach:  ? Take your medicine after meals (unless your doctor has told you not to). ? Ask your doctor for a different pain medicine.     · If your doctor prescribed antibiotics, take them as directed. Do not stop taking them just because you feel better. You need to take the full course of antibiotics.     · Your doctor may prescribe a blood thinner when you go home. This helps prevent blood clots. Be sure you get instructions about how to take your medicine safely. Blood thinners can cause serious bleeding problems. Incision care    · If you have strips of tape on the cut (incision) the doctor made, leave the tape on for a week or until it falls off. Or follow your doctor's instructions for removing the tape.     · Wash the area daily with warm, soapy water, and pat it dry. Don't use hydrogen peroxide or alcohol, which can slow healing.  You may cover the area with a gauze bandage if it weeps or rubs against clothing. Change the bandage every day.     · Keep the area clean and dry. Follow-up care is a key part of your treatment and safety. Be sure to make and go to all appointments, and call your doctor if you are having problems. It's also a good idea to know your test results and keep a list of the medicines you take. When should you call for help? Call 911 anytime you think you may need emergency care. For example, call if:    · You passed out (lost consciousness).     · You have trouble breathing.    Call your doctor now or seek immediate medical care if:    · You have severe pain in your leg, or it becomes cold, pale, blue, tingly, or numb.     · You have pain that does not get better after you take pain medicine.     · You have loose stitches, or your incision comes open.     · You are bleeding a lot from the incision.     · You have signs of infection, such as:  ? Increased pain, swelling, warmth, or redness. ? Red streaks leading from the incision. ? Pus draining from the incision. ? A fever.     · You are sick to your stomach or cannot keep fluids down.    Watch closely for any changes in your health, and be sure to contact your doctor if:    · You do not get better as expected. Where can you learn more? Go to http://citlaly-john.info/. Enter P886 in the search box to learn more about \"Femoral Endarterectomy: What to Expect at Home. \"  Current as of: July 22, 2018  Content Version: 11.9  © 6271-2843 [employer]. Care instructions adapted under license by [employer] (which disclaims liability or warranty for this information). If you have questions about a medical condition or this instruction, always ask your healthcare professional. [place] [address] any warranty or liability for your use of this information.          DISCHARGE SUMMARY from Nurse    PATIENT INSTRUCTIONS:    After general anesthesia or intravenous sedation, for 24 hours or while taking prescription Narcotics:  · Limit your activities  · Do not drive and operate hazardous machinery  · Do not make important personal or business decisions  · Do  not drink alcoholic beverages  · If you have not urinated within 8 hours after discharge, please contact your surgeon on call. Report the following to your surgeon:  · Excessive pain, swelling, redness or odor of or around the surgical area  · Temperature over 100.5  · Nausea and vomiting lasting longer than 4 hours or if unable to take medications  · Any signs of decreased circulation or nerve impairment to extremity: change in color, persistent  numbness, tingling, coldness or increase pain  · Any questions    What to do at Home:  Recommended activity: Activity as tolerated,       *  Please give a list of your current medications to your Primary Care Provider. *  Please update this list whenever your medications are discontinued, doses are      changed, or new medications (including over-the-counter products) are added. *  Please carry medication information at all times in case of emergency situations. These are general instructions for a healthy lifestyle:    No smoking/ No tobacco products/ Avoid exposure to second hand smoke  Surgeon General's Warning:  Quitting smoking now greatly reduces serious risk to your health. Obesity, smoking, and sedentary lifestyle greatly increases your risk for illness    A healthy diet, regular physical exercise & weight monitoring are important for maintaining a healthy lifestyle    You may be retaining fluid if you have a history of heart failure or if you experience any of the following symptoms:  Weight gain of 3 pounds or more overnight or 5 pounds in a week, increased swelling in our hands or feet or shortness of breath while lying flat in bed.   Please call your doctor as soon as you notice any of these symptoms; do not wait until your next office visit. The discharge information has been reviewed with the patient. The patient verbalized understanding. Discharge medications reviewed with the patient and appropriate educational materials and side effects teaching were provided. ___________________________________________________________________________________________________________________________________MD Instructions:  Wound care:  - Starting Monday, wash leg wounds daily with liquid Dial soap (or other liquid antibacterial soap); use fingers or soft washcloth gently then pat area dry. - Keep incision / staples clean and dry, may cover with gauze. - Do not apply lotions, creams or ointments to incisions. - Tissue adhesive (\"skin glue\") used over incision will flake or peel off on its own. Diet:  - As tolerated before surgery. Activity:  - Don't overdo your activity throughout the day for the next 10-14 days - no prolonged standing, no lifting more than 10lb. - Keep legs propped up when not walking.  - No driving while taking narcotics. - Do not drink alcohol while taking narcotics. - Starting Monday, you may shower - no tub baths, soaking or swimming. Medications:  - Use prescription pain medications if needed; if you do not wish to use narcotic pain medication or require less pain control, you may take acetaminophen (Tylenol, for example) or naproxen (Aleve, for example) following instructions on the label.  - Resume other home medications.     Follow up in the office with Dr. Lito Lobato on 7/5/2019 at 8:45 AM.    If problems or questions arise, please call our office at [phone].

## 2019-06-21 NOTE — PROGRESS NOTES
Bedside shift change report given to Arthurine Dakins, RN (oncoming nurse) by Kiel Ulrich RN (offgoing nurse). Report included the following information SBAR, Kardex, ED Summary, OR Summary, Procedure Summary, Intake/Output, MAR, Recent Results and Cardiac Rhythm NSR. Dual assessment of R groin site

## 2019-06-24 ENCOUNTER — PATIENT OUTREACH (OUTPATIENT)
Dept: CASE MANAGEMENT | Age: 69
End: 2019-06-24

## 2019-06-24 NOTE — PROGRESS NOTES
This note will not be viewable in [address] Date/Time of Call: 06/24/19 2pm  
What was the patient hospitalized for? PVD Consent for SERA ARIZMENDI Call Does the patient understand his/her diagnosis and/or treatment and what happened during the hospitalization? Called and spoke with patient. He agrees to call and states that he is not doing great. Yes Did the patient receive discharge instructions? Yes  
CM Assessed Risk for Readmission:  
 
 
Patient stated Risk for Readmission:  Patient is a moderate risk for readmission; scheduled admit Pins and needle pain in his leg, swollen from knee up Review any discharge instructions (see discharge instructions/AVS in [facility]). Ask patient if they understand these. Do they have any questions? reviewed DC instructions Were home services ordered (nursing, PT, OT, ST, etc.)? No   
If so, has the first visit occurred? If not, why? (Assist with coordination of services if necessary. ) 
 NA Was any DME ordered? No   
If so, has it been received? If not, why?  (Assist patient in obtaining DME orders &/or equipment if necessary. ) NA Complete a review of all medications (new, continued and discontinued meds per the D/C instructions and medication tab in [address] [facility]). Medications reviewed START taking: 
HYDROcodone-acetaminophen 5-325 mg per 
tablet ([address]) Were all new prescriptions filled? If not, why?  (Assist patient in obtaining medications if necessary  escalate for CCM &/or SW if ongoing issues are verbalized by pt or anticipated) Yes Does the patient understand the purpose and dosing instructions for all medications? (If patient has questions, provide explanation and education.) Patient verbalizes understanding of medication Does the patient have any problems in performing ADLs? (If patient is unable to perform ADLs  what is the limiting factor(s)?   Do they have a support system that can assist? If no support system is present, discuss possible assistance that they may be able to obtain. Escalate for CCM/SW if ongoing issues are verbalized by pt or anticipated) Patient is independent with ADLs at baseline Does the patient have all follow-up appointments scheduled? 7 day f/up with PCP?  
(f/up with PCP may be w/in 14 days if patient has a f/up with their specialist w/in 7 days) 7-14 day f/up with specialist?  
(or per discharge instructions) If f/up has not been made  what actions has the care coordinator made to accomplish this? Has transportation been arranged? yes  
 
 
patient will call to schedule FU  
 
 
 
[facility] 07/05/19 at 845am 
 
 
Reviewed appointment information with patient Yes, no concerns Any other questions or concerns expressed by the patient? Patient expresses concerns over pins/needles pain in his leg, as well as swelling from knee up into thigh. He states that it is warm to touch and that he was about to call Dr. Shanti Estrada office to discuss with them. Advised that was a good plan and to contact Care Coordinator if he should need assistance. He agrees and again states that he will call Dr. Shanti Estrada. No further questions or concerns are voiced at this time. Care Coordinator contact information provided Schedule next appointment with SERA Hollis or refer to RN Case Manager/ per the workflow guidelines. When is care coordinators next follow-up call scheduled? If referred for CCM  what RN care manager was the referral assigned? Within 30 days Within 30 days NA  
JED Call Completed By: Salvador Corona CMA Care Coordinator

## 2019-07-12 ENCOUNTER — PATIENT OUTREACH (OUTPATIENT)
Dept: CASE MANAGEMENT | Age: 69
End: 2019-07-12

## 2019-07-12 NOTE — PROGRESS NOTES
Medication Adherence Outreach    Date/Time of Call:  7/12/2019  1:55 pm    Name of medication and dosage:   * Lisinopril 20 mg 1 every day     * Atorvastatin 40 mg 1 every day    Does the patient know the purpose and dosage of medication? * Yes    Are you getting a #30 day or #90 day supply of your medication? * 30 day supply   Discussed potential cost savings by obtaining a 90 day supply of medication. Are you still taking this medication? If not, why? * Yes ,personal medication was stopped for a short period  of time while in the hospital.   Transportation issues or any problems paying for the medication or other reason? * No    What pharmacy are you using to fill your medication and do you know the last time that you got your medication filled? * [facility]   * Last refill 5/17/2019       Are you having any side effects from taking your medication? * No          Any other questions or concerns expressed by the patient. * No    Comments:  * Medication adherence discussed with Mr. Hector Stern and he states he has no current barriers to prevent him from obtaining or taking his medication. Discussed potential cost savings by obtaining a 90 day supply of Lisinopril and Atorvastatin. Mr. Hector Stern is in agreement that this would help him financially and also aide with his adherence. Message sent to PCP with a request for a 90 day supply of medication be sent to [facility].           Call Completed By:  Nader Prieto

## 2019-07-15 PROBLEM — Z98.890 STATUS POST CAROTID ENDARTERECTOMY: Status: ACTIVE | Noted: 2019-07-01

## 2019-07-15 PROBLEM — E78.5 DYSLIPIDEMIA: Status: ACTIVE | Noted: 2019-01-14

## 2019-07-15 PROBLEM — Z85.528 HISTORY OF KIDNEY CANCER: Status: ACTIVE | Noted: 2017-12-01

## 2019-07-15 PROBLEM — Z98.890 HISTORY OF EMBOLECTOMY: Status: ACTIVE | Noted: 2019-07-01

## 2019-07-15 PROBLEM — Z90.5 HISTORY OF RIGHT NEPHRECTOMY: Status: ACTIVE | Noted: 2017-10-13

## 2019-07-15 PROBLEM — I50.9 CHF (CONGESTIVE HEART FAILURE) (HCC): Status: ACTIVE | Noted: 2017-11-21

## 2019-07-25 ENCOUNTER — PATIENT OUTREACH (OUTPATIENT)
Dept: CASE MANAGEMENT | Age: 69
End: 2019-07-25

## 2019-07-25 NOTE — PROGRESS NOTES
This note will not be viewable in [address] Transitions of Care  Follow up Outreach Note   Outreach type Phone call: Spoke with patient   Date/Time of Outreach: 07/25/19 @ 220pm     Has patient attended PCP or specialist follow-up appointments since last contact? What was outcome of appointment? When is next follow-up scheduled? Called and spoke with patient. He states that he is doing good. He has completed appointments with PCP and [facility] with FU appointments scheduled appropriately    Review medications. Any medication changes since last outreach? Does patient have any questions or issues related to their medications? Patient has medications. No significant changes. No questions or concerns       Home health active? If yes  any issue? Progress? NA     Referrals needed?  (CM, SW, HH, etc.)   NA    Other issues/Miscellaneous? (Transportation, access to meals, ability to perform ADLs, adequate caregiver support, etc.) Patient denies any questions or concerns at this time. Care Coordinator contact information provided should needs arise   Next Outreach Scheduled?     Graduation from program?   NA     Yes      Next Steps/Goals (if applicable):   NA     Outreach completed by:   Romeo Cantor 14 Coordinator

## 2019-08-27 PROBLEM — E78.00 PURE HYPERCHOLESTEROLEMIA: Status: ACTIVE | Noted: 2019-08-27

## 2019-10-15 PROBLEM — Z91.199 NON COMPLIANCE WITH MEDICAL TREATMENT: Status: RESOLVED | Noted: 2017-10-12 | Resolved: 2019-10-15

## 2019-11-20 PROBLEM — I70.201 FEMORAL ARTERY STENOSIS, RIGHT (HCC): Status: ACTIVE | Noted: 2019-11-20

## 2019-11-20 PROBLEM — I70.202 FEMORAL ARTERY STENOSIS, LEFT (HCC): Status: ACTIVE | Noted: 2019-11-20

## 2019-12-27 PROBLEM — N18.30 STAGE 3 CHRONIC KIDNEY DISEASE (HCC): Status: ACTIVE | Noted: 2019-12-27

## 2019-12-27 PROBLEM — Z98.890 STATUS POST CAROTID ENDARTERECTOMY: Status: RESOLVED | Noted: 2019-07-01 | Resolved: 2019-12-27

## 2020-01-01 ENCOUNTER — HOSPITAL ENCOUNTER (OUTPATIENT)
Dept: LAB | Age: 70
Discharge: HOME | End: 2020-07-23
Payer: MEDICARE

## 2020-01-01 ENCOUNTER — HOSPITAL ENCOUNTER (OUTPATIENT)
Dept: INFUSION THERAPY | Age: 70
Discharge: HOME | End: 2020-10-23
Payer: MEDICARE

## 2020-01-01 ENCOUNTER — HOSPITAL ENCOUNTER (OUTPATIENT)
Dept: INFUSION THERAPY | Age: 70
Discharge: HOME | End: 2020-09-18
Payer: MEDICARE

## 2020-01-01 ENCOUNTER — HOSPITAL ENCOUNTER (OUTPATIENT)
Dept: MRI IMAGING | Age: 70
Discharge: HOME | End: 2020-08-19
Attending: INTERNAL MEDICINE
Payer: MEDICARE

## 2020-01-01 ENCOUNTER — APPOINTMENT (OUTPATIENT)
Dept: GENERAL RADIOLOGY | Age: 70
End: 2020-01-01
Attending: EMERGENCY MEDICINE
Payer: MEDICARE

## 2020-01-01 ENCOUNTER — HOSPITAL ENCOUNTER (OUTPATIENT)
Dept: RADIATION ONCOLOGY | Age: 70
Discharge: HOME | End: 2020-11-18
Payer: MEDICARE

## 2020-01-01 ENCOUNTER — HOSPITAL ENCOUNTER (OUTPATIENT)
Dept: INFUSION THERAPY | Age: 70
Discharge: HOME | End: 2020-09-10
Payer: MEDICARE

## 2020-01-01 ENCOUNTER — PATIENT OUTREACH (OUTPATIENT)
Dept: CASE MANAGEMENT | Age: 70
End: 2020-01-01

## 2020-01-01 ENCOUNTER — HOSPITAL ENCOUNTER (OUTPATIENT)
Dept: INFUSION THERAPY | Age: 70
Discharge: HOME | End: 2020-10-01
Payer: MEDICARE

## 2020-01-01 ENCOUNTER — HOSPITAL ENCOUNTER (OUTPATIENT)
Dept: CT IMAGING | Age: 70
Discharge: HOME | End: 2020-07-16
Payer: MEDICARE

## 2020-01-01 ENCOUNTER — HOSPITAL ENCOUNTER (OUTPATIENT)
Dept: INFUSION THERAPY | Age: 70
End: 2020-01-01

## 2020-01-01 ENCOUNTER — HOSPITAL ENCOUNTER (OUTPATIENT)
Dept: INTERVENTIONAL RADIOLOGY/VASCULAR | Age: 70
Discharge: HOME | End: 2020-08-31
Attending: INTERNAL MEDICINE
Payer: MEDICARE

## 2020-01-01 ENCOUNTER — HOSPITAL ENCOUNTER (OUTPATIENT)
Dept: INFUSION THERAPY | Age: 70
Discharge: HOME | End: 2020-11-12
Payer: MEDICARE

## 2020-01-01 ENCOUNTER — HOSPITAL ENCOUNTER (OUTPATIENT)
Dept: PET IMAGING | Age: 70
Discharge: HOME | End: 2020-10-29
Payer: MEDICARE

## 2020-01-01 ENCOUNTER — HOSPITAL ENCOUNTER (OUTPATIENT)
Dept: INFUSION THERAPY | Age: 70
Discharge: HOME | End: 2020-12-26
Payer: MEDICARE

## 2020-01-01 ENCOUNTER — HOSPITAL ENCOUNTER (OUTPATIENT)
Dept: INFUSION THERAPY | Age: 70
Discharge: HOME | End: 2020-10-22
Payer: MEDICARE

## 2020-01-01 ENCOUNTER — HOSPITAL ENCOUNTER (OUTPATIENT)
Dept: INFUSION THERAPY | Age: 70
Discharge: HOME | End: 2020-12-13
Payer: MEDICARE

## 2020-01-01 ENCOUNTER — HOSPITAL ENCOUNTER (OUTPATIENT)
Dept: RADIATION ONCOLOGY | Age: 70
End: 2020-01-01

## 2020-01-01 ENCOUNTER — APPOINTMENT (OUTPATIENT)
Dept: INFUSION THERAPY | Age: 70
End: 2020-01-01

## 2020-01-01 ENCOUNTER — HOSPITAL ENCOUNTER (OUTPATIENT)
Dept: PET IMAGING | Age: 70
Discharge: HOME | End: 2020-07-29
Payer: MEDICARE

## 2020-01-01 ENCOUNTER — HOSPITAL ENCOUNTER (OUTPATIENT)
Dept: INFUSION THERAPY | Age: 70
Discharge: HOME | End: 2020-10-25
Payer: MEDICARE

## 2020-01-01 ENCOUNTER — HOSPITAL ENCOUNTER (OUTPATIENT)
Dept: INFUSION THERAPY | Age: 70
Discharge: HOME | End: 2020-10-03
Payer: MEDICARE

## 2020-01-01 ENCOUNTER — HOSPITAL ENCOUNTER (OUTPATIENT)
Dept: INFUSION THERAPY | Age: 70
Discharge: HOME | End: 2020-12-03
Payer: MEDICARE

## 2020-01-01 ENCOUNTER — HOSPITAL ENCOUNTER (OUTPATIENT)
Dept: MRI IMAGING | Age: 70
Discharge: HOME | End: 2020-12-30
Attending: INTERNAL MEDICINE
Payer: MEDICARE

## 2020-01-01 ENCOUNTER — ANESTHESIA EVENT (OUTPATIENT)
Dept: ENDOSCOPY | Age: 70
End: 2020-01-01
Payer: MEDICARE

## 2020-01-01 ENCOUNTER — HOSPITAL ENCOUNTER (OUTPATIENT)
Age: 70
Setting detail: OUTPATIENT SURGERY
Discharge: HOME | End: 2020-09-09
Attending: INTERNAL MEDICINE | Admitting: INTERNAL MEDICINE
Payer: MEDICARE

## 2020-01-01 ENCOUNTER — APPOINTMENT (OUTPATIENT)
Dept: GENERAL RADIOLOGY | Age: 70
End: 2020-01-01
Attending: INTERNAL MEDICINE
Payer: MEDICARE

## 2020-01-01 ENCOUNTER — HOSPITAL ENCOUNTER (OUTPATIENT)
Dept: ULTRASOUND IMAGING | Age: 70
Discharge: HOME | End: 2020-02-12
Attending: UROLOGY
Payer: MEDICARE

## 2020-01-01 ENCOUNTER — ANESTHESIA (OUTPATIENT)
Dept: ENDOSCOPY | Age: 70
End: 2020-01-01
Payer: MEDICARE

## 2020-01-01 ENCOUNTER — HOSPITAL ENCOUNTER (OUTPATIENT)
Dept: INFUSION THERAPY | Age: 70
Discharge: HOME | End: 2020-12-24
Payer: MEDICARE

## 2020-01-01 ENCOUNTER — ANESTHESIA EVENT (OUTPATIENT)
Dept: SURGERY | Age: 70
End: 2020-01-01
Payer: MEDICARE

## 2020-01-01 ENCOUNTER — HOSPITAL ENCOUNTER (OUTPATIENT)
Dept: LAB | Age: 70
Discharge: HOME | End: 2020-09-18
Payer: MEDICARE

## 2020-01-01 ENCOUNTER — HOSPITAL ENCOUNTER (EMERGENCY)
Age: 70
Discharge: HOME | End: 2020-12-21
Attending: EMERGENCY MEDICINE
Payer: MEDICARE

## 2020-01-01 ENCOUNTER — HOSPITAL ENCOUNTER (OUTPATIENT)
Dept: INFUSION THERAPY | Age: 70
Discharge: HOME | End: 2020-11-14
Payer: MEDICARE

## 2020-01-01 ENCOUNTER — HOSPITAL ENCOUNTER (OUTPATIENT)
Dept: LAB | Age: 70
Discharge: HOME | End: 2020-10-08
Payer: MEDICARE

## 2020-01-01 ENCOUNTER — HOSPITAL ENCOUNTER (OUTPATIENT)
Age: 70
Setting detail: OUTPATIENT SURGERY
Discharge: HOME | End: 2020-08-31
Attending: RADIOLOGY | Admitting: RADIOLOGY
Payer: MEDICARE

## 2020-01-01 ENCOUNTER — HOSPITAL ENCOUNTER (OUTPATIENT)
Age: 70
Setting detail: OUTPATIENT SURGERY
Discharge: HOME | End: 2020-08-20
Attending: INTERNAL MEDICINE | Admitting: INTERNAL MEDICINE
Payer: MEDICARE

## 2020-01-01 ENCOUNTER — ANESTHESIA (OUTPATIENT)
Dept: SURGERY | Age: 70
End: 2020-01-01
Payer: MEDICARE

## 2020-01-01 VITALS
OXYGEN SATURATION: 100 % | RESPIRATION RATE: 18 BRPM | TEMPERATURE: 97.4 F | DIASTOLIC BLOOD PRESSURE: 60 MMHG | HEART RATE: 85 BPM | SYSTOLIC BLOOD PRESSURE: 139 MMHG

## 2020-01-01 VITALS
TEMPERATURE: 95.5 F | DIASTOLIC BLOOD PRESSURE: 87 MMHG | OXYGEN SATURATION: 100 % | SYSTOLIC BLOOD PRESSURE: 179 MMHG | HEART RATE: 81 BPM | RESPIRATION RATE: 16 BRPM | BODY MASS INDEX: 24.53 KG/M2 | WEIGHT: 135 LBS

## 2020-01-01 VITALS
TEMPERATURE: 98.3 F | WEIGHT: 136 LBS | OXYGEN SATURATION: 100 % | HEIGHT: 62 IN | RESPIRATION RATE: 22 BRPM | HEART RATE: 94 BPM | BODY MASS INDEX: 25.03 KG/M2 | SYSTOLIC BLOOD PRESSURE: 187 MMHG | DIASTOLIC BLOOD PRESSURE: 74 MMHG

## 2020-01-01 VITALS
OXYGEN SATURATION: 95 % | HEIGHT: 62 IN | BODY MASS INDEX: 24.8 KG/M2 | WEIGHT: 134.8 LBS | SYSTOLIC BLOOD PRESSURE: 128 MMHG | HEART RATE: 77 BPM | TEMPERATURE: 96.9 F | DIASTOLIC BLOOD PRESSURE: 72 MMHG | RESPIRATION RATE: 16 BRPM

## 2020-01-01 VITALS
OXYGEN SATURATION: 96 % | HEART RATE: 111 BPM | TEMPERATURE: 97.7 F | DIASTOLIC BLOOD PRESSURE: 96 MMHG | SYSTOLIC BLOOD PRESSURE: 136 MMHG | RESPIRATION RATE: 18 BRPM

## 2020-01-01 VITALS
OXYGEN SATURATION: 93 % | HEIGHT: 62 IN | WEIGHT: 139 LBS | SYSTOLIC BLOOD PRESSURE: 160 MMHG | RESPIRATION RATE: 16 BRPM | BODY MASS INDEX: 25.58 KG/M2 | DIASTOLIC BLOOD PRESSURE: 75 MMHG | TEMPERATURE: 97.5 F | HEART RATE: 87 BPM

## 2020-01-01 VITALS
HEART RATE: 108 BPM | SYSTOLIC BLOOD PRESSURE: 114 MMHG | OXYGEN SATURATION: 93 % | RESPIRATION RATE: 14 BRPM | DIASTOLIC BLOOD PRESSURE: 60 MMHG | TEMPERATURE: 98.6 F

## 2020-01-01 VITALS
OXYGEN SATURATION: 100 % | WEIGHT: 135.5 LBS | TEMPERATURE: 96.4 F | HEART RATE: 94 BPM | DIASTOLIC BLOOD PRESSURE: 83 MMHG | BODY MASS INDEX: 24.62 KG/M2 | SYSTOLIC BLOOD PRESSURE: 139 MMHG

## 2020-01-01 VITALS
SYSTOLIC BLOOD PRESSURE: 133 MMHG | HEART RATE: 92 BPM | DIASTOLIC BLOOD PRESSURE: 77 MMHG | TEMPERATURE: 97.8 F | RESPIRATION RATE: 16 BRPM | OXYGEN SATURATION: 99 %

## 2020-01-01 VITALS — HEIGHT: 62 IN | BODY MASS INDEX: 24.97 KG/M2

## 2020-01-01 VITALS
OXYGEN SATURATION: 100 % | TEMPERATURE: 98.5 F | BODY MASS INDEX: 25.58 KG/M2 | RESPIRATION RATE: 16 BRPM | DIASTOLIC BLOOD PRESSURE: 66 MMHG | SYSTOLIC BLOOD PRESSURE: 129 MMHG | WEIGHT: 139 LBS | HEART RATE: 85 BPM | HEIGHT: 62 IN

## 2020-01-01 VITALS
RESPIRATION RATE: 15 BRPM | OXYGEN SATURATION: 99 % | HEART RATE: 90 BPM | SYSTOLIC BLOOD PRESSURE: 107 MMHG | TEMPERATURE: 98.2 F | DIASTOLIC BLOOD PRESSURE: 68 MMHG

## 2020-01-01 VITALS
DIASTOLIC BLOOD PRESSURE: 90 MMHG | HEART RATE: 81 BPM | SYSTOLIC BLOOD PRESSURE: 180 MMHG | OXYGEN SATURATION: 99 % | RESPIRATION RATE: 18 BRPM

## 2020-01-01 VITALS — BODY MASS INDEX: 23.62 KG/M2 | HEIGHT: 62 IN

## 2020-01-01 DIAGNOSIS — C34.12 MALIGNANT NEOPLASM OF UPPER LOBE OF LEFT LUNG (HCC): Primary | ICD-10-CM

## 2020-01-01 DIAGNOSIS — N18.30 BENIGN HYPERTENSION WITH CKD (CHRONIC KIDNEY DISEASE) STAGE III (HCC): ICD-10-CM

## 2020-01-01 DIAGNOSIS — C64.1 CANCER OF KIDNEY, RIGHT (HCC): ICD-10-CM

## 2020-01-01 DIAGNOSIS — D70.1 CHEMOTHERAPY INDUCED NEUTROPENIA (HCC): ICD-10-CM

## 2020-01-01 DIAGNOSIS — T45.1X5A CHEMOTHERAPY INDUCED NEUTROPENIA (HCC): ICD-10-CM

## 2020-01-01 DIAGNOSIS — R59.1 LYMPHADENOPATHY: ICD-10-CM

## 2020-01-01 DIAGNOSIS — C34.12 MALIGNANT NEOPLASM OF UPPER LOBE OF LEFT LUNG (HCC): ICD-10-CM

## 2020-01-01 DIAGNOSIS — I12.9 BENIGN HYPERTENSION WITH CKD (CHRONIC KIDNEY DISEASE) STAGE III (HCC): ICD-10-CM

## 2020-01-01 DIAGNOSIS — D70.1 CHEMOTHERAPY INDUCED NEUTROPENIA (HCC): Primary | ICD-10-CM

## 2020-01-01 DIAGNOSIS — R91.8 LUNG MASS: ICD-10-CM

## 2020-01-01 DIAGNOSIS — E03.9 ACQUIRED HYPOTHYROIDISM: ICD-10-CM

## 2020-01-01 DIAGNOSIS — R91.8 MASS OF LEFT LUNG: ICD-10-CM

## 2020-01-01 DIAGNOSIS — C79.70 MALIGNANT NEOPLASM METASTATIC TO ADRENAL GLAND, UNSPECIFIED LATERALITY (HCC): Primary | ICD-10-CM

## 2020-01-01 DIAGNOSIS — E86.0 DEHYDRATION: Primary | ICD-10-CM

## 2020-01-01 DIAGNOSIS — C79.70 MALIGNANT NEOPLASM METASTATIC TO ADRENAL GLAND, UNSPECIFIED LATERALITY (HCC): ICD-10-CM

## 2020-01-01 DIAGNOSIS — E86.0 DEHYDRATION: ICD-10-CM

## 2020-01-01 DIAGNOSIS — R91.8 ABNORMAL X-RAY OF LUNG: ICD-10-CM

## 2020-01-01 DIAGNOSIS — R47.81 SLURRED SPEECH: ICD-10-CM

## 2020-01-01 DIAGNOSIS — T45.1X5A CHEMOTHERAPY INDUCED NEUTROPENIA (HCC): Primary | ICD-10-CM

## 2020-01-01 DIAGNOSIS — R05.3 CHRONIC COUGHING: ICD-10-CM

## 2020-01-01 DIAGNOSIS — Z79.899 NEED FOR PROPHYLACTIC CHEMOTHERAPY: ICD-10-CM

## 2020-01-01 LAB
ALBUMIN SERPL-MCNC: 2.5 G/DL (ref 3.2–4.6)
ALBUMIN SERPL-MCNC: 2.9 G/DL (ref 3.2–4.6)
ALBUMIN SERPL-MCNC: 2.9 G/DL (ref 3.2–4.6)
ALBUMIN SERPL-MCNC: 3.2 G/DL (ref 3.2–4.6)
ALBUMIN SERPL-MCNC: 3.3 G/DL (ref 3.2–4.6)
ALBUMIN SERPL-MCNC: 3.4 G/DL (ref 3.2–4.6)
ALBUMIN SERPL-MCNC: 3.6 G/DL (ref 3.2–4.6)
ALBUMIN SERPL-MCNC: 3.8 G/DL (ref 3.2–4.6)
ALBUMIN/GLOB SERPL: 0.5 {RATIO} (ref 1.2–3.5)
ALBUMIN/GLOB SERPL: 0.6 {RATIO} (ref 1.2–3.5)
ALBUMIN/GLOB SERPL: 0.6 {RATIO} (ref 1.2–3.5)
ALBUMIN/GLOB SERPL: 0.7 {RATIO} (ref 1.2–3.5)
ALBUMIN/GLOB SERPL: 0.8 {RATIO} (ref 1.2–3.5)
ALBUMIN/GLOB SERPL: 0.9 {RATIO} (ref 1.2–3.5)
ALP SERPL-CCNC: 73 U/L (ref 50–136)
ALP SERPL-CCNC: 74 U/L (ref 50–136)
ALP SERPL-CCNC: 74 U/L (ref 50–136)
ALP SERPL-CCNC: 75 U/L (ref 50–136)
ALP SERPL-CCNC: 76 U/L (ref 50–136)
ALP SERPL-CCNC: 80 U/L (ref 50–136)
ALP SERPL-CCNC: 82 U/L (ref 50–136)
ALP SERPL-CCNC: 87 U/L (ref 50–136)
ALP SERPL-CCNC: 91 U/L (ref 50–136)
ALP SERPL-CCNC: 96 U/L (ref 50–136)
ALT SERPL-CCNC: 105 U/L (ref 12–65)
ALT SERPL-CCNC: 21 U/L (ref 12–65)
ALT SERPL-CCNC: 25 U/L (ref 12–65)
ALT SERPL-CCNC: 30 U/L (ref 12–65)
ALT SERPL-CCNC: 31 U/L (ref 12–65)
ALT SERPL-CCNC: 32 U/L (ref 12–65)
ALT SERPL-CCNC: 54 U/L (ref 12–65)
ALT SERPL-CCNC: 61 U/L (ref 12–65)
ANION GAP SERPL CALC-SCNC: 3 MMOL/L (ref 7–16)
ANION GAP SERPL CALC-SCNC: 4 MMOL/L (ref 7–16)
ANION GAP SERPL CALC-SCNC: 4 MMOL/L (ref 7–16)
ANION GAP SERPL CALC-SCNC: 5 MMOL/L (ref 7–16)
ANION GAP SERPL CALC-SCNC: 5 MMOL/L (ref 7–16)
ANION GAP SERPL CALC-SCNC: 6 MMOL/L (ref 7–16)
ANION GAP SERPL CALC-SCNC: 7 MMOL/L (ref 7–16)
AST SERPL-CCNC: 18 U/L (ref 15–37)
AST SERPL-CCNC: 21 U/L (ref 15–37)
AST SERPL-CCNC: 24 U/L (ref 15–37)
AST SERPL-CCNC: 25 U/L (ref 15–37)
AST SERPL-CCNC: 31 U/L (ref 15–37)
AST SERPL-CCNC: 31 U/L (ref 15–37)
AST SERPL-CCNC: 56 U/L (ref 15–37)
ATRIAL RATE: 83 BPM
BASOPHILS # BLD: 0 K/UL (ref 0–0.2)
BASOPHILS NFR BLD: 0 % (ref 0–2)
BASOPHILS NFR BLD: 1 % (ref 0–2)
BILIRUB SERPL-MCNC: 0.3 MG/DL (ref 0.2–1.1)
BILIRUB SERPL-MCNC: 0.3 MG/DL (ref 0.2–1.1)
BILIRUB SERPL-MCNC: 0.4 MG/DL (ref 0.2–1.1)
BILIRUB SERPL-MCNC: 0.5 MG/DL (ref 0.2–1.1)
BUN SERPL-MCNC: 15 MG/DL (ref 8–23)
BUN SERPL-MCNC: 16 MG/DL (ref 8–23)
BUN SERPL-MCNC: 18 MG/DL (ref 8–23)
BUN SERPL-MCNC: 19 MG/DL (ref 8–23)
BUN SERPL-MCNC: 20 MG/DL (ref 8–23)
BUN SERPL-MCNC: 22 MG/DL (ref 8–23)
BUN SERPL-MCNC: 23 MG/DL (ref 8–23)
BUN SERPL-MCNC: 27 MG/DL (ref 8–23)
BUN SERPL-MCNC: 33 MG/DL (ref 8–23)
BUN SERPL-MCNC: 37 MG/DL (ref 8–23)
BUN SERPL-MCNC: 49 MG/DL (ref 8–23)
CALCIUM SERPL-MCNC: 8.3 MG/DL (ref 8.3–10.4)
CALCIUM SERPL-MCNC: 8.5 MG/DL (ref 8.3–10.4)
CALCIUM SERPL-MCNC: 8.7 MG/DL (ref 8.3–10.4)
CALCIUM SERPL-MCNC: 8.7 MG/DL (ref 8.3–10.4)
CALCIUM SERPL-MCNC: 8.8 MG/DL (ref 8.3–10.4)
CALCIUM SERPL-MCNC: 8.9 MG/DL (ref 8.3–10.4)
CALCIUM SERPL-MCNC: 9 MG/DL (ref 8.3–10.4)
CALCIUM SERPL-MCNC: 9.1 MG/DL (ref 8.3–10.4)
CALCIUM SERPL-MCNC: 9.4 MG/DL (ref 8.3–10.4)
CALCULATED P AXIS, ECG09: 65 DEGREES
CALCULATED R AXIS, ECG10: 63 DEGREES
CALCULATED T AXIS, ECG11: 48 DEGREES
CHLORIDE SERPL-SCNC: 109 MMOL/L (ref 98–107)
CHLORIDE SERPL-SCNC: 110 MMOL/L (ref 98–107)
CHLORIDE SERPL-SCNC: 111 MMOL/L (ref 98–107)
CHLORIDE SERPL-SCNC: 112 MMOL/L (ref 98–107)
CHLORIDE SERPL-SCNC: 113 MMOL/L (ref 98–107)
CO2 SERPL-SCNC: 22 MMOL/L (ref 21–32)
CO2 SERPL-SCNC: 24 MMOL/L (ref 21–32)
CO2 SERPL-SCNC: 24 MMOL/L (ref 21–32)
CO2 SERPL-SCNC: 25 MMOL/L (ref 21–32)
CO2 SERPL-SCNC: 25 MMOL/L (ref 21–32)
CO2 SERPL-SCNC: 26 MMOL/L (ref 21–32)
CO2 SERPL-SCNC: 27 MMOL/L (ref 21–32)
CO2 SERPL-SCNC: 27 MMOL/L (ref 21–32)
CREAT BLD-MCNC: 1.5 MG/DL (ref 0.8–1.5)
CREAT SERPL-MCNC: 1.5 MG/DL (ref 0.8–1.5)
CREAT SERPL-MCNC: 1.6 MG/DL (ref 0.8–1.5)
CREAT SERPL-MCNC: 1.65 MG/DL (ref 0.8–1.5)
CREAT SERPL-MCNC: 1.7 MG/DL (ref 0.8–1.5)
CREAT SERPL-MCNC: 1.7 MG/DL (ref 0.8–1.5)
CREAT SERPL-MCNC: 1.8 MG/DL (ref 0.8–1.5)
CREAT SERPL-MCNC: 1.8 MG/DL (ref 0.8–1.5)
CREAT SERPL-MCNC: 1.98 MG/DL (ref 0.8–1.5)
CREAT SERPL-MCNC: 2 MG/DL (ref 0.8–1.5)
DIAGNOSIS, 93000: NORMAL
DIFFERENTIAL METHOD BLD: ABNORMAL
EOSINOPHIL # BLD: 0 K/UL (ref 0–0.8)
EOSINOPHIL # BLD: 0.1 K/UL (ref 0–0.8)
EOSINOPHIL NFR BLD: 0 % (ref 0.5–7.8)
EOSINOPHIL NFR BLD: 1 % (ref 0.5–7.8)
EOSINOPHIL NFR BLD: 2 % (ref 0.5–7.8)
EOSINOPHIL NFR BLD: 2 % (ref 0.5–7.8)
ERYTHROCYTE [DISTWIDTH] IN BLOOD BY AUTOMATED COUNT: 15 % (ref 11.9–14.6)
ERYTHROCYTE [DISTWIDTH] IN BLOOD BY AUTOMATED COUNT: 15.2 % (ref 11.9–14.6)
ERYTHROCYTE [DISTWIDTH] IN BLOOD BY AUTOMATED COUNT: 15.3 % (ref 11.9–14.6)
ERYTHROCYTE [DISTWIDTH] IN BLOOD BY AUTOMATED COUNT: 16.5 % (ref 11.9–14.6)
ERYTHROCYTE [DISTWIDTH] IN BLOOD BY AUTOMATED COUNT: 16.7 % (ref 11.9–14.6)
ERYTHROCYTE [DISTWIDTH] IN BLOOD BY AUTOMATED COUNT: 17.1 % (ref 11.9–14.6)
ERYTHROCYTE [DISTWIDTH] IN BLOOD BY AUTOMATED COUNT: 19.3 % (ref 11.9–14.6)
ERYTHROCYTE [DISTWIDTH] IN BLOOD BY AUTOMATED COUNT: 19.6 % (ref 11.9–14.6)
ERYTHROCYTE [DISTWIDTH] IN BLOOD BY AUTOMATED COUNT: 20.3 % (ref 11.9–14.6)
ERYTHROCYTE [DISTWIDTH] IN BLOOD BY AUTOMATED COUNT: 20.8 % (ref 11.9–14.6)
ERYTHROCYTE [DISTWIDTH] IN BLOOD BY AUTOMATED COUNT: 20.9 % (ref 11.9–14.6)
ERYTHROCYTE [DISTWIDTH] IN BLOOD BY AUTOMATED COUNT: 22.2 % (ref 11.9–14.6)
GLOBULIN SER CALC-MCNC: 3.9 G/DL (ref 2.3–3.5)
GLOBULIN SER CALC-MCNC: 3.9 G/DL (ref 2.3–3.5)
GLOBULIN SER CALC-MCNC: 4 G/DL (ref 2.3–3.5)
GLOBULIN SER CALC-MCNC: 4.3 G/DL (ref 2.3–3.5)
GLOBULIN SER CALC-MCNC: 4.5 G/DL (ref 2.3–3.5)
GLOBULIN SER CALC-MCNC: 4.6 G/DL (ref 2.3–3.5)
GLOBULIN SER CALC-MCNC: 4.7 G/DL (ref 2.3–3.5)
GLOBULIN SER CALC-MCNC: 4.8 G/DL (ref 2.3–3.5)
GLOBULIN SER CALC-MCNC: 4.9 G/DL (ref 2.3–3.5)
GLOBULIN SER CALC-MCNC: 5.1 G/DL (ref 2.3–3.5)
GLUCOSE BLD STRIP.AUTO-MCNC: 124 MG/DL (ref 65–100)
GLUCOSE BLD STRIP.AUTO-MCNC: 138 MG/DL (ref 65–100)
GLUCOSE BLD STRIP.AUTO-MCNC: 138 MG/DL (ref 65–100)
GLUCOSE BLD STRIP.AUTO-MCNC: 144 MG/DL (ref 65–100)
GLUCOSE SERPL-MCNC: 118 MG/DL (ref 65–100)
GLUCOSE SERPL-MCNC: 141 MG/DL (ref 65–100)
GLUCOSE SERPL-MCNC: 141 MG/DL (ref 65–100)
GLUCOSE SERPL-MCNC: 155 MG/DL (ref 65–100)
GLUCOSE SERPL-MCNC: 156 MG/DL (ref 65–100)
GLUCOSE SERPL-MCNC: 164 MG/DL (ref 65–100)
GLUCOSE SERPL-MCNC: 173 MG/DL (ref 65–100)
GLUCOSE SERPL-MCNC: 185 MG/DL (ref 65–100)
GLUCOSE SERPL-MCNC: 206 MG/DL (ref 65–100)
GLUCOSE SERPL-MCNC: 228 MG/DL (ref 65–100)
GLUCOSE SERPL-MCNC: 334 MG/DL (ref 65–100)
HCT VFR BLD AUTO: 29.8 % (ref 41.1–50.3)
HCT VFR BLD AUTO: 30 % (ref 41.1–50.3)
HCT VFR BLD AUTO: 30.7 % (ref 41.1–50.3)
HCT VFR BLD AUTO: 30.9 % (ref 41.1–50.3)
HCT VFR BLD AUTO: 31 % (ref 41.1–50.3)
HCT VFR BLD AUTO: 33 % (ref 41.1–50.3)
HCT VFR BLD AUTO: 33.2 % (ref 41.1–50.3)
HCT VFR BLD AUTO: 33.3 % (ref 41.1–50.3)
HCT VFR BLD AUTO: 34.2 % (ref 41.1–50.3)
HCT VFR BLD AUTO: 34.7 % (ref 41.1–50.3)
HCT VFR BLD AUTO: 37.9 % (ref 41.1–50.3)
HCT VFR BLD AUTO: 42.4 % (ref 41.1–50.3)
HGB BLD-MCNC: 10 G/DL (ref 13.6–17.2)
HGB BLD-MCNC: 10 G/DL (ref 13.6–17.2)
HGB BLD-MCNC: 10.5 G/DL (ref 13.6–17.2)
HGB BLD-MCNC: 10.5 G/DL (ref 13.6–17.2)
HGB BLD-MCNC: 10.6 G/DL (ref 13.6–17.2)
HGB BLD-MCNC: 10.9 G/DL (ref 13.6–17.2)
HGB BLD-MCNC: 11.2 G/DL (ref 13.6–17.2)
HGB BLD-MCNC: 12.2 G/DL (ref 13.6–17.2)
HGB BLD-MCNC: 13.4 G/DL (ref 13.6–17.2)
HGB BLD-MCNC: 9.7 G/DL (ref 13.6–17.2)
HGB BLD-MCNC: 9.8 G/DL (ref 13.6–17.2)
HGB BLD-MCNC: 9.8 G/DL (ref 13.6–17.2)
IMM GRANULOCYTES # BLD AUTO: 0 K/UL (ref 0–0.5)
IMM GRANULOCYTES # BLD AUTO: 0.1 K/UL (ref 0–0.5)
IMM GRANULOCYTES # BLD AUTO: 0.4 K/UL (ref 0–0.5)
IMM GRANULOCYTES NFR BLD AUTO: 1 % (ref 0–5)
IMM GRANULOCYTES NFR BLD AUTO: 3 % (ref 0–5)
IMM GRANULOCYTES NFR BLD AUTO: 4 % (ref 0–5)
IMM GRANULOCYTES NFR BLD AUTO: 4 % (ref 0–5)
LYMPHOCYTES # BLD: 0.7 K/UL (ref 0.5–4.6)
LYMPHOCYTES # BLD: 0.8 K/UL (ref 0.5–4.6)
LYMPHOCYTES # BLD: 0.8 K/UL (ref 0.5–4.6)
LYMPHOCYTES # BLD: 0.9 K/UL (ref 0.5–4.6)
LYMPHOCYTES # BLD: 0.9 K/UL (ref 0.5–4.6)
LYMPHOCYTES # BLD: 1 K/UL (ref 0.5–4.6)
LYMPHOCYTES # BLD: 1.1 K/UL (ref 0.5–4.6)
LYMPHOCYTES NFR BLD: 10 % (ref 13–44)
LYMPHOCYTES NFR BLD: 13 % (ref 13–44)
LYMPHOCYTES NFR BLD: 15 % (ref 13–44)
LYMPHOCYTES NFR BLD: 18 % (ref 13–44)
LYMPHOCYTES NFR BLD: 25 % (ref 13–44)
LYMPHOCYTES NFR BLD: 25 % (ref 13–44)
LYMPHOCYTES NFR BLD: 26 % (ref 13–44)
LYMPHOCYTES NFR BLD: 27 % (ref 13–44)
LYMPHOCYTES NFR BLD: 29 % (ref 13–44)
LYMPHOCYTES NFR BLD: 33 % (ref 13–44)
LYMPHOCYTES NFR BLD: 50 % (ref 13–44)
Lab: NORMAL
MAGNESIUM SERPL-MCNC: 2.1 MG/DL (ref 1.8–2.4)
MAGNESIUM SERPL-MCNC: 2.2 MG/DL (ref 1.8–2.4)
MAGNESIUM SERPL-MCNC: 2.2 MG/DL (ref 1.8–2.4)
MAGNESIUM SERPL-MCNC: 2.3 MG/DL (ref 1.8–2.4)
MAGNESIUM SERPL-MCNC: 2.3 MG/DL (ref 1.8–2.4)
MAGNESIUM SERPL-MCNC: 2.4 MG/DL (ref 1.8–2.4)
MAGNESIUM SERPL-MCNC: 2.7 MG/DL (ref 1.8–2.4)
MAGNESIUM SERPL-MCNC: 2.8 MG/DL (ref 1.8–2.4)
MCH RBC QN AUTO: 24.9 PG (ref 26.1–32.9)
MCH RBC QN AUTO: 25 PG (ref 26.1–32.9)
MCH RBC QN AUTO: 25 PG (ref 26.1–32.9)
MCH RBC QN AUTO: 25.1 PG (ref 26.1–32.9)
MCH RBC QN AUTO: 25.6 PG (ref 26.1–32.9)
MCH RBC QN AUTO: 25.6 PG (ref 26.1–32.9)
MCH RBC QN AUTO: 25.7 PG (ref 26.1–32.9)
MCH RBC QN AUTO: 25.9 PG (ref 26.1–32.9)
MCH RBC QN AUTO: 26.1 PG (ref 26.1–32.9)
MCH RBC QN AUTO: 26.3 PG (ref 26.1–32.9)
MCH RBC QN AUTO: 26.8 PG (ref 26.1–32.9)
MCH RBC QN AUTO: 26.8 PG (ref 26.1–32.9)
MCHC RBC AUTO-ENTMCNC: 31.5 G/DL (ref 31.4–35)
MCHC RBC AUTO-ENTMCNC: 31.6 G/DL (ref 31.4–35)
MCHC RBC AUTO-ENTMCNC: 31.6 G/DL (ref 31.4–35)
MCHC RBC AUTO-ENTMCNC: 31.8 G/DL (ref 31.4–35)
MCHC RBC AUTO-ENTMCNC: 31.9 G/DL (ref 31.4–35)
MCHC RBC AUTO-ENTMCNC: 31.9 G/DL (ref 31.4–35)
MCHC RBC AUTO-ENTMCNC: 32.2 G/DL (ref 31.4–35)
MCHC RBC AUTO-ENTMCNC: 32.3 G/DL (ref 31.4–35)
MCHC RBC AUTO-ENTMCNC: 32.3 G/DL (ref 31.4–35)
MCHC RBC AUTO-ENTMCNC: 32.4 G/DL (ref 31.4–35)
MCHC RBC AUTO-ENTMCNC: 32.7 G/DL (ref 31.4–35)
MCHC RBC AUTO-ENTMCNC: 32.9 G/DL (ref 31.4–35)
MCV RBC AUTO: 78.2 FL (ref 79.6–97.8)
MCV RBC AUTO: 78.6 FL (ref 79.6–97.8)
MCV RBC AUTO: 79.2 FL (ref 79.6–97.8)
MCV RBC AUTO: 79.3 FL (ref 79.6–97.8)
MCV RBC AUTO: 79.6 FL (ref 79.6–97.8)
MCV RBC AUTO: 80.6 FL (ref 79.6–97.8)
MCV RBC AUTO: 81.2 FL (ref 79.6–97.8)
MCV RBC AUTO: 81.6 FL (ref 79.6–97.8)
MCV RBC AUTO: 81.9 FL (ref 79.6–97.8)
MCV RBC AUTO: 84.1 FL (ref 79.6–97.8)
MONOCYTES # BLD: 0.1 K/UL (ref 0.1–1.3)
MONOCYTES # BLD: 0.1 K/UL (ref 0.1–1.3)
MONOCYTES # BLD: 0.5 K/UL (ref 0.1–1.3)
MONOCYTES # BLD: 0.6 K/UL (ref 0.1–1.3)
MONOCYTES # BLD: 0.7 K/UL (ref 0.1–1.3)
MONOCYTES # BLD: 0.7 K/UL (ref 0.1–1.3)
MONOCYTES # BLD: 1.5 K/UL (ref 0.1–1.3)
MONOCYTES NFR BLD: 11 % (ref 4–12)
MONOCYTES NFR BLD: 12 % (ref 4–12)
MONOCYTES NFR BLD: 14 % (ref 4–12)
MONOCYTES NFR BLD: 23 % (ref 4–12)
MONOCYTES NFR BLD: 24 % (ref 4–12)
MONOCYTES NFR BLD: 24 % (ref 4–12)
MONOCYTES NFR BLD: 3 % (ref 4–12)
MONOCYTES NFR BLD: 5 % (ref 4–12)
MONOCYTES NFR BLD: 9 % (ref 4–12)
NEUTS SEG # BLD: 0.9 K/UL (ref 1.7–8.2)
NEUTS SEG # BLD: 1 K/UL (ref 1.7–8.2)
NEUTS SEG # BLD: 1.3 K/UL (ref 1.7–8.2)
NEUTS SEG # BLD: 1.3 K/UL (ref 1.7–8.2)
NEUTS SEG # BLD: 2.3 K/UL (ref 1.7–8.2)
NEUTS SEG # BLD: 2.7 K/UL (ref 1.7–8.2)
NEUTS SEG # BLD: 2.8 K/UL (ref 1.7–8.2)
NEUTS SEG # BLD: 3.1 K/UL (ref 1.7–8.2)
NEUTS SEG # BLD: 4.7 K/UL (ref 1.7–8.2)
NEUTS SEG # BLD: 5.1 K/UL (ref 1.7–8.2)
NEUTS SEG # BLD: 7.9 K/UL (ref 1.7–8.2)
NEUTS SEG NFR BLD: 39 % (ref 43–78)
NEUTS SEG NFR BLD: 43 % (ref 43–78)
NEUTS SEG NFR BLD: 46 % (ref 43–78)
NEUTS SEG NFR BLD: 48 % (ref 43–78)
NEUTS SEG NFR BLD: 58 % (ref 43–78)
NEUTS SEG NFR BLD: 62 % (ref 43–78)
NEUTS SEG NFR BLD: 69 % (ref 43–78)
NEUTS SEG NFR BLD: 71 % (ref 43–78)
NEUTS SEG NFR BLD: 72 % (ref 43–78)
NEUTS SEG NFR BLD: 73 % (ref 43–78)
NEUTS SEG NFR BLD: 74 % (ref 43–78)
NRBC # BLD: 0 K/UL (ref 0–0.2)
NRBC # BLD: 0.02 K/UL (ref 0–0.2)
NRBC # BLD: 0.06 K/UL (ref 0–0.2)
NRBC # BLD: 0.15 K/UL (ref 0–0.2)
P-R INTERVAL, ECG05: 138 MS
PLATELET # BLD AUTO: 164 K/UL (ref 150–450)
PLATELET # BLD AUTO: 182 K/UL (ref 150–450)
PLATELET # BLD AUTO: 204 K/UL (ref 150–450)
PLATELET # BLD AUTO: 243 K/UL (ref 150–450)
PLATELET # BLD AUTO: 244 K/UL (ref 150–450)
PLATELET # BLD AUTO: 270 K/UL (ref 150–450)
PLATELET # BLD AUTO: 275 K/UL (ref 150–450)
PLATELET # BLD AUTO: 280 K/UL (ref 150–450)
PLATELET # BLD AUTO: 287 K/UL (ref 150–450)
PLATELET # BLD AUTO: 288 K/UL (ref 150–450)
PLATELET # BLD AUTO: 294 K/UL (ref 150–450)
PLATELET # BLD AUTO: 333 K/UL (ref 150–450)
PLATELET COMMENTS,PCOM: ADEQUATE
PLATELET COMMENTS,PCOM: ADEQUATE
PMV BLD AUTO: 10 FL (ref 9.4–12.3)
PMV BLD AUTO: 10.1 FL (ref 9.4–12.3)
PMV BLD AUTO: 10.1 FL (ref 9.4–12.3)
PMV BLD AUTO: 10.4 FL (ref 9.4–12.3)
PMV BLD AUTO: 8.9 FL (ref 9.4–12.3)
PMV BLD AUTO: 9.1 FL (ref 9.4–12.3)
PMV BLD AUTO: 9.2 FL (ref 9.4–12.3)
PMV BLD AUTO: 9.3 FL (ref 9.4–12.3)
PMV BLD AUTO: 9.3 FL (ref 9.4–12.3)
PMV BLD AUTO: 9.8 FL (ref 9.4–12.3)
PMV BLD AUTO: 9.8 FL (ref 9.4–12.3)
PMV BLD AUTO: 9.9 FL (ref 9.4–12.3)
POTASSIUM SERPL-SCNC: 4.2 MMOL/L (ref 3.5–5.1)
POTASSIUM SERPL-SCNC: 4.2 MMOL/L (ref 3.5–5.1)
POTASSIUM SERPL-SCNC: 4.4 MMOL/L (ref 3.5–5.1)
POTASSIUM SERPL-SCNC: 4.4 MMOL/L (ref 3.5–5.1)
POTASSIUM SERPL-SCNC: 4.5 MMOL/L (ref 3.5–5.1)
POTASSIUM SERPL-SCNC: 4.5 MMOL/L (ref 3.5–5.1)
POTASSIUM SERPL-SCNC: 4.6 MMOL/L (ref 3.5–5.1)
POTASSIUM SERPL-SCNC: 4.6 MMOL/L (ref 3.5–5.1)
POTASSIUM SERPL-SCNC: 4.7 MMOL/L (ref 3.5–5.1)
POTASSIUM SERPL-SCNC: 4.8 MMOL/L (ref 3.5–5.1)
POTASSIUM SERPL-SCNC: 5.5 MMOL/L (ref 3.5–5.1)
PROT SERPL-MCNC: 7.2 G/DL (ref 6.3–8.2)
PROT SERPL-MCNC: 7.2 G/DL (ref 6.3–8.2)
PROT SERPL-MCNC: 7.4 G/DL (ref 6.3–8.2)
PROT SERPL-MCNC: 7.5 G/DL (ref 6.3–8.2)
PROT SERPL-MCNC: 7.6 G/DL (ref 6.3–8.2)
PROT SERPL-MCNC: 7.6 G/DL (ref 6.3–8.2)
PROT SERPL-MCNC: 7.8 G/DL (ref 6.3–8.2)
PROT SERPL-MCNC: 8 G/DL (ref 6.3–8.2)
PROT SERPL-MCNC: 8 G/DL (ref 6.3–8.2)
PROT SERPL-MCNC: 8.6 G/DL (ref 6.3–8.2)
Q-T INTERVAL, ECG07: 362 MS
QRS DURATION, ECG06: 80 MS
QTC CALCULATION (BEZET), ECG08: 425 MS
RBC # BLD AUTO: 3.65 M/UL (ref 4.23–5.67)
RBC # BLD AUTO: 3.72 M/UL (ref 4.23–5.67)
RBC # BLD AUTO: 3.75 M/UL (ref 4.23–5.67)
RBC # BLD AUTO: 3.9 M/UL (ref 4.23–5.67)
RBC # BLD AUTO: 3.91 M/UL (ref 4.23–5.67)
RBC # BLD AUTO: 3.95 M/UL (ref 4.23–5.6)
RBC # BLD AUTO: 4.2 M/UL (ref 4.23–5.67)
RBC # BLD AUTO: 4.22 M/UL (ref 4.23–5.67)
RBC # BLD AUTO: 4.35 M/UL (ref 4.23–5.67)
RBC # BLD AUTO: 4.36 M/UL (ref 4.23–5.6)
RBC # BLD AUTO: 4.67 M/UL (ref 4.23–5.67)
RBC # BLD AUTO: 5.35 M/UL (ref 4.23–5.67)
RBC MORPH BLD: ABNORMAL
REFERENCE LAB,REFLB: NORMAL
SODIUM SERPL-SCNC: 138 MMOL/L (ref 136–145)
SODIUM SERPL-SCNC: 140 MMOL/L (ref 136–145)
SODIUM SERPL-SCNC: 141 MMOL/L (ref 136–145)
SODIUM SERPL-SCNC: 142 MMOL/L (ref 136–145)
SODIUM SERPL-SCNC: 143 MMOL/L (ref 136–145)
T4 FREE SERPL-MCNC: 1.1 NG/DL (ref 0.78–1.46)
T4 FREE SERPL-MCNC: 1.2 NG/DL (ref 0.78–1.4)
T4 FREE SERPL-MCNC: 1.2 NG/DL (ref 0.78–1.4)
T4 FREE SERPL-MCNC: 1.4 NG/DL (ref 0.78–1.4)
T4 FREE SERPL-MCNC: 1.4 NG/DL (ref 0.78–1.4)
TEST DESCRIPTION:,ATST: NORMAL
TSH SERPL DL<=0.005 MIU/L-ACNC: 1.63 UIU/ML (ref 0.36–3)
TSH SERPL DL<=0.005 MIU/L-ACNC: 1.96 UIU/ML (ref 0.36–3)
TSH SERPL DL<=0.005 MIU/L-ACNC: 2.33 UIU/ML (ref 0.36–3.74)
TSH SERPL DL<=0.005 MIU/L-ACNC: 2.45 UIU/ML (ref 0.36–3)
TSH SERPL DL<=0.005 MIU/L-ACNC: 3.31 UIU/ML (ref 0.36–3)
VENTRICULAR RATE, ECG03: 83 BPM
WBC # BLD AUTO: 10.9 K/UL (ref 4.3–11.1)
WBC # BLD AUTO: 2 K/UL (ref 4.3–11.1)
WBC # BLD AUTO: 2.7 K/UL (ref 4.3–11.1)
WBC # BLD AUTO: 2.8 K/UL (ref 4.3–11.1)
WBC # BLD AUTO: 2.8 K/UL (ref 4.3–11.1)
WBC # BLD AUTO: 3.9 K/UL (ref 4.3–11.1)
WBC # BLD AUTO: 3.9 K/UL (ref 4.3–11.1)
WBC # BLD AUTO: 4.4 K/UL (ref 4.3–11.1)
WBC # BLD AUTO: 4.5 K/UL (ref 4.3–11.1)
WBC # BLD AUTO: 5.6 K/UL (ref 4.3–11.1)
WBC # BLD AUTO: 6.4 K/UL (ref 4.3–11.1)
WBC # BLD AUTO: 7 K/UL (ref 4.3–11.1)
WBC MORPH BLD: ABNORMAL
WBC MORPH BLD: ABNORMAL

## 2020-01-01 PROCEDURE — 74011250636 HC RX REV CODE- 250/636: Performed by: INTERNAL MEDICINE

## 2020-01-01 PROCEDURE — 96374 THER/PROPH/DIAG INJ IV PUSH: CPT

## 2020-01-01 PROCEDURE — 88177 CYTP FNA EVAL EA ADDL: CPT

## 2020-01-01 PROCEDURE — 74011000250 HC RX REV CODE- 250: Performed by: NURSE ANESTHETIST, CERTIFIED REGISTERED

## 2020-01-01 PROCEDURE — 74011636320 HC RX REV CODE- 636/320: Performed by: INTERNAL MEDICINE

## 2020-01-01 PROCEDURE — 77030009046 HC CATH BRNCH BLLN OCOA -B: Performed by: INTERNAL MEDICINE

## 2020-01-01 PROCEDURE — 74011250636 HC RX REV CODE- 250/636: Performed by: NURSE PRACTITIONER

## 2020-01-01 PROCEDURE — 80053 COMPREHEN METABOLIC PANEL: CPT

## 2020-01-01 PROCEDURE — 88173 CYTOPATH EVAL FNA REPORT: CPT

## 2020-01-01 PROCEDURE — 77030010507 HC ADH SKN DERMBND J&J -B

## 2020-01-01 PROCEDURE — 83735 ASSAY OF MAGNESIUM: CPT

## 2020-01-01 PROCEDURE — 99153 MOD SED SAME PHYS/QHP EA: CPT | Performed by: INTERNAL MEDICINE

## 2020-01-01 PROCEDURE — 96413 CHEMO IV INFUSION 1 HR: CPT

## 2020-01-01 PROCEDURE — 74011000258 HC RX REV CODE- 258: Performed by: NURSE PRACTITIONER

## 2020-01-01 PROCEDURE — 82962 GLUCOSE BLOOD TEST: CPT

## 2020-01-01 PROCEDURE — 88172 CYTP DX EVAL FNA 1ST EA SITE: CPT

## 2020-01-01 PROCEDURE — 96372 THER/PROPH/DIAG INJ SC/IM: CPT

## 2020-01-01 PROCEDURE — 85025 COMPLETE CBC W/AUTO DIFF WBC: CPT

## 2020-01-01 PROCEDURE — 84443 ASSAY THYROID STIM HORMONE: CPT

## 2020-01-01 PROCEDURE — 36415 COLL VENOUS BLD VENIPUNCTURE: CPT

## 2020-01-01 PROCEDURE — 93005 ELECTROCARDIOGRAM TRACING: CPT | Performed by: EMERGENCY MEDICINE

## 2020-01-01 PROCEDURE — 74011636320 HC RX REV CODE- 636/320

## 2020-01-01 PROCEDURE — 96375 TX/PRO/DX INJ NEW DRUG ADDON: CPT

## 2020-01-01 PROCEDURE — 85027 COMPLETE CBC AUTOMATED: CPT

## 2020-01-01 PROCEDURE — 99152 MOD SED SAME PHYS/QHP 5/>YRS: CPT | Performed by: INTERNAL MEDICINE

## 2020-01-01 PROCEDURE — 77030012699 HC VLV SUC CNTRL OCOA -A: Performed by: INTERNAL MEDICINE

## 2020-01-01 PROCEDURE — 96360 HYDRATION IV INFUSION INIT: CPT

## 2020-01-01 PROCEDURE — 74011000258 HC RX REV CODE- 258: Performed by: INTERNAL MEDICINE

## 2020-01-01 PROCEDURE — 99284 EMERGENCY DEPT VISIT MOD MDM: CPT

## 2020-01-01 PROCEDURE — 76040000026: Performed by: INTERNAL MEDICINE

## 2020-01-01 PROCEDURE — 88341 IMHCHEM/IMCYTCHM EA ADD ANTB: CPT

## 2020-01-01 PROCEDURE — 74011000258 HC RX REV CODE- 258

## 2020-01-01 PROCEDURE — 88305 TISSUE EXAM BY PATHOLOGIST: CPT

## 2020-01-01 PROCEDURE — 96411 CHEMO IV PUSH ADDL DRUG: CPT

## 2020-01-01 PROCEDURE — 84439 ASSAY OF FREE THYROXINE: CPT

## 2020-01-01 PROCEDURE — 96361 HYDRATE IV INFUSION ADD-ON: CPT

## 2020-01-01 PROCEDURE — 74011250636 HC RX REV CODE- 250/636: Performed by: NURSE ANESTHETIST, CERTIFIED REGISTERED

## 2020-01-01 PROCEDURE — 74011000250 HC RX REV CODE- 250: Performed by: PHYSICIAN ASSISTANT

## 2020-01-01 PROCEDURE — A9552 F18 FDG: HCPCS

## 2020-01-01 PROCEDURE — 31652 BRONCH EBUS SAMPLNG 1/2 NODE: CPT | Performed by: INTERNAL MEDICINE

## 2020-01-01 PROCEDURE — 71045 X-RAY EXAM CHEST 1 VIEW: CPT

## 2020-01-01 PROCEDURE — 77030003406 HC NDL ASPIR BIOP OCOA -C: Performed by: INTERNAL MEDICINE

## 2020-01-01 PROCEDURE — 99211 OFF/OP EST MAY X REQ PHY/QHP: CPT

## 2020-01-01 PROCEDURE — 96417 CHEMO IV INFUS EACH ADDL SEQ: CPT

## 2020-01-01 PROCEDURE — 77030021678 HC GLIDESCP STAT DISP VERT -B: Performed by: ANESTHESIOLOGY

## 2020-01-01 PROCEDURE — 74011000250 HC RX REV CODE- 250: Performed by: INTERNAL MEDICINE

## 2020-01-01 PROCEDURE — 76770 US EXAM ABDO BACK WALL COMP: CPT

## 2020-01-01 PROCEDURE — 76060000033 HC ANESTHESIA 1 TO 1.5 HR: Performed by: INTERNAL MEDICINE

## 2020-01-01 PROCEDURE — 96367 TX/PROPH/DG ADDL SEQ IV INF: CPT

## 2020-01-01 PROCEDURE — 74011250636 HC RX REV CODE- 250/636: Performed by: ANESTHESIOLOGY

## 2020-01-01 PROCEDURE — 80048 BASIC METABOLIC PNL TOTAL CA: CPT

## 2020-01-01 PROCEDURE — 82565 ASSAY OF CREATININE: CPT

## 2020-01-01 PROCEDURE — 36561 INSERT TUNNELED CV CATH: CPT

## 2020-01-01 PROCEDURE — A9575 INJ GADOTERATE MEGLUMI 0.1ML: HCPCS | Performed by: INTERNAL MEDICINE

## 2020-01-01 PROCEDURE — 77030040361 HC SLV COMPR DVT MDII -B: Performed by: INTERNAL MEDICINE

## 2020-01-01 PROCEDURE — 74011000636 HC RX REV CODE- 636: Performed by: INTERNAL MEDICINE

## 2020-01-01 PROCEDURE — 77030002916 HC SUT ETHLN J&J -A

## 2020-01-01 PROCEDURE — 77030031131 HC SUT MXN P COVD -B

## 2020-01-01 PROCEDURE — 71260 CT THORAX DX C+: CPT

## 2020-01-01 PROCEDURE — 76060000032 HC ANESTHESIA 0.5 TO 1 HR: Performed by: RADIOLOGY

## 2020-01-01 PROCEDURE — 74011250636 HC RX REV CODE- 250/636: Performed by: PHYSICIAN ASSISTANT

## 2020-01-01 PROCEDURE — C1894 INTRO/SHEATH, NON-LASER: HCPCS

## 2020-01-01 PROCEDURE — 77030037088 HC TUBE ENDOTRACH ORAL NSL COVD-A: Performed by: ANESTHESIOLOGY

## 2020-01-01 PROCEDURE — 77030031404 HC PTCH SENS SD DISP SPDM -A: Performed by: INTERNAL MEDICINE

## 2020-01-01 PROCEDURE — 74011250636 HC RX REV CODE- 250/636: Performed by: EMERGENCY MEDICINE

## 2020-01-01 PROCEDURE — 70553 MRI BRAIN STEM W/O & W/DYE: CPT

## 2020-01-01 PROCEDURE — C1788 PORT, INDWELLING, IMP: HCPCS

## 2020-01-01 PROCEDURE — 74011000258 HC RX REV CODE- 258: Performed by: NURSE ANESTHETIST, CERTIFIED REGISTERED

## 2020-01-01 PROCEDURE — 88342 IMHCHEM/IMCYTCHM 1ST ANTB: CPT

## 2020-01-01 RX ORDER — NALOXONE HYDROCHLORIDE 0.4 MG/ML
0.1 INJECTION, SOLUTION INTRAMUSCULAR; INTRAVENOUS; SUBCUTANEOUS AS NEEDED
Status: CANCELLED | OUTPATIENT
Start: 2020-01-01

## 2020-01-01 RX ORDER — SODIUM CHLORIDE 9 MG/ML
25 INJECTION, SOLUTION INTRAVENOUS CONTINUOUS
Status: ACTIVE | OUTPATIENT
Start: 2020-01-01 | End: 2020-01-01

## 2020-01-01 RX ORDER — HYDROCORTISONE SODIUM SUCCINATE 100 MG/2ML
100 INJECTION, POWDER, FOR SOLUTION INTRAMUSCULAR; INTRAVENOUS AS NEEDED
Status: DISCONTINUED | OUTPATIENT
Start: 2020-01-01 | End: 2020-01-01 | Stop reason: HOSPADM

## 2020-01-01 RX ORDER — SODIUM CHLORIDE 9 MG/ML
25 INJECTION, SOLUTION INTRAVENOUS CONTINUOUS
Status: DISCONTINUED | OUTPATIENT
Start: 2020-01-01 | End: 2020-01-01 | Stop reason: HOSPADM

## 2020-01-01 RX ORDER — ALBUTEROL SULFATE 0.83 MG/ML
2.5 SOLUTION RESPIRATORY (INHALATION) AS NEEDED
Status: DISCONTINUED | OUTPATIENT
Start: 2020-01-01 | End: 2020-01-01 | Stop reason: HOSPADM

## 2020-01-01 RX ORDER — PROPOFOL 10 MG/ML
INJECTION, EMULSION INTRAVENOUS AS NEEDED
Status: DISCONTINUED | OUTPATIENT
Start: 2020-01-01 | End: 2020-01-01 | Stop reason: HOSPADM

## 2020-01-01 RX ORDER — LIDOCAINE HYDROCHLORIDE 20 MG/ML
1-20 INJECTION, SOLUTION INFILTRATION; PERINEURAL ONCE
Status: DISCONTINUED | OUTPATIENT
Start: 2020-01-01 | End: 2020-01-01

## 2020-01-01 RX ORDER — ROCURONIUM BROMIDE 10 MG/ML
INJECTION, SOLUTION INTRAVENOUS AS NEEDED
Status: DISCONTINUED | OUTPATIENT
Start: 2020-01-01 | End: 2020-01-01 | Stop reason: HOSPADM

## 2020-01-01 RX ORDER — SODIUM CHLORIDE 0.9 % (FLUSH) 0.9 %
10 SYRINGE (ML) INJECTION
Status: COMPLETED | OUTPATIENT
Start: 2020-01-01 | End: 2020-01-01

## 2020-01-01 RX ORDER — EPINEPHRINE 1 MG/ML
0.3 INJECTION, SOLUTION, CONCENTRATE INTRAVENOUS AS NEEDED
Status: DISCONTINUED | OUTPATIENT
Start: 2020-01-01 | End: 2020-01-01 | Stop reason: HOSPADM

## 2020-01-01 RX ORDER — SODIUM CHLORIDE 0.9 % (FLUSH) 0.9 %
5-40 SYRINGE (ML) INJECTION EVERY 8 HOURS
Status: CANCELLED | OUTPATIENT
Start: 2020-01-01

## 2020-01-01 RX ORDER — CYANOCOBALAMIN 1000 UG/ML
1000 INJECTION, SOLUTION INTRAMUSCULAR; SUBCUTANEOUS ONCE
Status: COMPLETED | OUTPATIENT
Start: 2020-01-01 | End: 2020-01-01

## 2020-01-01 RX ORDER — SODIUM CHLORIDE 0.9 % (FLUSH) 0.9 %
10 SYRINGE (ML) INJECTION AS NEEDED
Status: DISCONTINUED | OUTPATIENT
Start: 2020-01-01 | End: 2020-01-01 | Stop reason: HOSPADM

## 2020-01-01 RX ORDER — SODIUM CHLORIDE 0.9 % (FLUSH) 0.9 %
5-40 SYRINGE (ML) INJECTION AS NEEDED
Status: DISCONTINUED | OUTPATIENT
Start: 2020-01-01 | End: 2020-01-01 | Stop reason: HOSPADM

## 2020-01-01 RX ORDER — SODIUM CHLORIDE 9 MG/ML
1000 INJECTION, SOLUTION INTRAVENOUS ONCE
Status: COMPLETED | OUTPATIENT
Start: 2020-01-01 | End: 2020-01-01

## 2020-01-01 RX ORDER — SODIUM CHLORIDE, SODIUM LACTATE, POTASSIUM CHLORIDE, CALCIUM CHLORIDE 600; 310; 30; 20 MG/100ML; MG/100ML; MG/100ML; MG/100ML
INJECTION, SOLUTION INTRAVENOUS
Status: DISCONTINUED | OUTPATIENT
Start: 2020-01-01 | End: 2020-01-01 | Stop reason: HOSPADM

## 2020-01-01 RX ORDER — HYDROMORPHONE HYDROCHLORIDE 2 MG/ML
0.2 INJECTION, SOLUTION INTRAMUSCULAR; INTRAVENOUS; SUBCUTANEOUS
Status: CANCELLED | OUTPATIENT
Start: 2020-01-01

## 2020-01-01 RX ORDER — HEPARIN 100 UNIT/ML
300-500 SYRINGE INTRAVENOUS AS NEEDED
Status: DISCONTINUED | OUTPATIENT
Start: 2020-01-01 | End: 2020-01-01 | Stop reason: HOSPADM

## 2020-01-01 RX ORDER — SODIUM CHLORIDE, SODIUM LACTATE, POTASSIUM CHLORIDE, CALCIUM CHLORIDE 600; 310; 30; 20 MG/100ML; MG/100ML; MG/100ML; MG/100ML
100 INJECTION, SOLUTION INTRAVENOUS CONTINUOUS
Status: DISCONTINUED | OUTPATIENT
Start: 2020-01-01 | End: 2020-01-01 | Stop reason: HOSPADM

## 2020-01-01 RX ORDER — LIDOCAINE HYDROCHLORIDE 10 MG/ML
0.1 INJECTION INFILTRATION; PERINEURAL AS NEEDED
Status: CANCELLED | OUTPATIENT
Start: 2020-01-01

## 2020-01-01 RX ORDER — HEPARIN 100 UNIT/ML
500 SYRINGE INTRAVENOUS ONCE
Status: DISCONTINUED | OUTPATIENT
Start: 2020-01-01 | End: 2020-01-01

## 2020-01-01 RX ORDER — ONDANSETRON 2 MG/ML
8 INJECTION INTRAMUSCULAR; INTRAVENOUS AS NEEDED
Status: DISCONTINUED | OUTPATIENT
Start: 2020-01-01 | End: 2020-01-01 | Stop reason: HOSPADM

## 2020-01-01 RX ORDER — SODIUM CHLORIDE 9 MG/ML
10 INJECTION INTRAMUSCULAR; INTRAVENOUS; SUBCUTANEOUS AS NEEDED
Status: DISCONTINUED | OUTPATIENT
Start: 2020-01-01 | End: 2020-01-01 | Stop reason: HOSPADM

## 2020-01-01 RX ORDER — HYDROMORPHONE HYDROCHLORIDE 2 MG/ML
0.5 INJECTION, SOLUTION INTRAMUSCULAR; INTRAVENOUS; SUBCUTANEOUS
Status: DISCONTINUED | OUTPATIENT
Start: 2020-01-01 | End: 2020-01-01 | Stop reason: HOSPADM

## 2020-01-01 RX ORDER — ONDANSETRON 2 MG/ML
8 INJECTION INTRAMUSCULAR; INTRAVENOUS ONCE
Status: COMPLETED | OUTPATIENT
Start: 2020-01-01 | End: 2020-01-01

## 2020-01-01 RX ORDER — FLUMAZENIL 0.1 MG/ML
0.2 INJECTION INTRAVENOUS
Status: DISCONTINUED | OUTPATIENT
Start: 2020-01-01 | End: 2020-01-01 | Stop reason: HOSPADM

## 2020-01-01 RX ORDER — GADOTERATE MEGLUMINE 376.9 MG/ML
12 INJECTION INTRAVENOUS
Status: COMPLETED | OUTPATIENT
Start: 2020-01-01 | End: 2020-01-01

## 2020-01-01 RX ORDER — SODIUM CHLORIDE 0.9 % (FLUSH) 0.9 %
5-40 SYRINGE (ML) INJECTION EVERY 8 HOURS
Status: DISCONTINUED | OUTPATIENT
Start: 2020-01-01 | End: 2020-01-01 | Stop reason: HOSPADM

## 2020-01-01 RX ORDER — SODIUM CHLORIDE 0.9 % (FLUSH) 0.9 %
5-40 SYRINGE (ML) INJECTION AS NEEDED
Status: CANCELLED | OUTPATIENT
Start: 2020-01-01

## 2020-01-01 RX ORDER — NALOXONE HYDROCHLORIDE 0.4 MG/ML
0.4 INJECTION, SOLUTION INTRAMUSCULAR; INTRAVENOUS; SUBCUTANEOUS
Status: DISCONTINUED | OUTPATIENT
Start: 2020-01-01 | End: 2020-01-01 | Stop reason: HOSPADM

## 2020-01-01 RX ORDER — SODIUM CHLORIDE, SODIUM LACTATE, POTASSIUM CHLORIDE, CALCIUM CHLORIDE 600; 310; 30; 20 MG/100ML; MG/100ML; MG/100ML; MG/100ML
100 INJECTION, SOLUTION INTRAVENOUS CONTINUOUS
Status: CANCELLED | OUTPATIENT
Start: 2020-01-01 | End: 2020-01-01

## 2020-01-01 RX ORDER — MIDAZOLAM HYDROCHLORIDE 1 MG/ML
.25-5 INJECTION, SOLUTION INTRAMUSCULAR; INTRAVENOUS
Status: DISCONTINUED | OUTPATIENT
Start: 2020-01-01 | End: 2020-01-01 | Stop reason: HOSPADM

## 2020-01-01 RX ORDER — SODIUM CHLORIDE 0.9 % (FLUSH) 0.9 %
10 SYRINGE (ML) INJECTION EVERY 8 HOURS
Status: DISCONTINUED | OUTPATIENT
Start: 2020-01-01 | End: 2020-01-01 | Stop reason: HOSPADM

## 2020-01-01 RX ORDER — ONDANSETRON 2 MG/ML
8 INJECTION INTRAMUSCULAR; INTRAVENOUS ONCE
Status: DISCONTINUED | OUTPATIENT
Start: 2020-01-01 | End: 2020-01-01 | Stop reason: HOSPADM

## 2020-01-01 RX ORDER — GADOTERATE MEGLUMINE 376.9 MG/ML
13 INJECTION INTRAVENOUS
Status: COMPLETED | OUTPATIENT
Start: 2020-01-01 | End: 2020-01-01

## 2020-01-01 RX ORDER — ACETAMINOPHEN 325 MG/1
650 TABLET ORAL AS NEEDED
Status: DISCONTINUED | OUTPATIENT
Start: 2020-01-01 | End: 2020-01-01 | Stop reason: HOSPADM

## 2020-01-01 RX ORDER — FENTANYL CITRATE 50 UG/ML
INJECTION, SOLUTION INTRAMUSCULAR; INTRAVENOUS AS NEEDED
Status: DISCONTINUED | OUTPATIENT
Start: 2020-01-01 | End: 2020-01-01 | Stop reason: HOSPADM

## 2020-01-01 RX ORDER — DIPHENHYDRAMINE HYDROCHLORIDE 50 MG/ML
50 INJECTION, SOLUTION INTRAMUSCULAR; INTRAVENOUS AS NEEDED
Status: DISCONTINUED | OUTPATIENT
Start: 2020-01-01 | End: 2020-01-01 | Stop reason: HOSPADM

## 2020-01-01 RX ORDER — PROPOFOL 10 MG/ML
INJECTION, EMULSION INTRAVENOUS
Status: DISCONTINUED | OUTPATIENT
Start: 2020-01-01 | End: 2020-01-01 | Stop reason: HOSPADM

## 2020-01-01 RX ORDER — SODIUM CHLORIDE, SODIUM LACTATE, POTASSIUM CHLORIDE, CALCIUM CHLORIDE 600; 310; 30; 20 MG/100ML; MG/100ML; MG/100ML; MG/100ML
50 INJECTION, SOLUTION INTRAVENOUS CONTINUOUS
Status: DISCONTINUED | OUTPATIENT
Start: 2020-01-01 | End: 2020-01-01 | Stop reason: HOSPADM

## 2020-01-01 RX ORDER — MIDAZOLAM HYDROCHLORIDE 1 MG/ML
2 INJECTION, SOLUTION INTRAMUSCULAR; INTRAVENOUS
Status: CANCELLED | OUTPATIENT
Start: 2020-01-01 | End: 2020-01-01

## 2020-01-01 RX ORDER — SODIUM CHLORIDE 0.9 % (FLUSH) 0.9 %
10 SYRINGE (ML) INJECTION AS NEEDED
Status: ACTIVE | OUTPATIENT
Start: 2020-01-01 | End: 2020-01-01

## 2020-01-01 RX ORDER — SUCCINYLCHOLINE CHLORIDE 20 MG/ML
INJECTION INTRAMUSCULAR; INTRAVENOUS AS NEEDED
Status: DISCONTINUED | OUTPATIENT
Start: 2020-01-01 | End: 2020-01-01 | Stop reason: HOSPADM

## 2020-01-01 RX ORDER — FLUMAZENIL 0.1 MG/ML
0.2 INJECTION INTRAVENOUS
Status: CANCELLED | OUTPATIENT
Start: 2020-01-01

## 2020-01-01 RX ORDER — CEFAZOLIN SODIUM/WATER 2 G/20 ML
SYRINGE (ML) INTRAVENOUS AS NEEDED
Status: DISCONTINUED | OUTPATIENT
Start: 2020-01-01 | End: 2020-01-01 | Stop reason: HOSPADM

## 2020-01-01 RX ORDER — LIDOCAINE HYDROCHLORIDE 20 MG/ML
INJECTION, SOLUTION EPIDURAL; INFILTRATION; INTRACAUDAL; PERINEURAL AS NEEDED
Status: DISCONTINUED | OUTPATIENT
Start: 2020-01-01 | End: 2020-01-01 | Stop reason: HOSPADM

## 2020-01-01 RX ORDER — DEXAMETHASONE SODIUM PHOSPHATE 100 MG/10ML
10 INJECTION INTRAMUSCULAR; INTRAVENOUS ONCE
Status: COMPLETED | OUTPATIENT
Start: 2020-01-01 | End: 2020-01-01

## 2020-01-01 RX ORDER — DIPHENHYDRAMINE HYDROCHLORIDE 50 MG/ML
25 INJECTION, SOLUTION INTRAMUSCULAR; INTRAVENOUS AS NEEDED
Status: DISCONTINUED | OUTPATIENT
Start: 2020-01-01 | End: 2020-01-01 | Stop reason: HOSPADM

## 2020-01-01 RX ORDER — CYANOCOBALAMIN 1000 UG/ML
1000 INJECTION, SOLUTION INTRAMUSCULAR; SUBCUTANEOUS
Status: COMPLETED | OUTPATIENT
Start: 2020-01-01 | End: 2020-01-01

## 2020-01-01 RX ORDER — SODIUM CHLORIDE, SODIUM LACTATE, POTASSIUM CHLORIDE, CALCIUM CHLORIDE 600; 310; 30; 20 MG/100ML; MG/100ML; MG/100ML; MG/100ML
100 INJECTION, SOLUTION INTRAVENOUS CONTINUOUS
Status: CANCELLED | OUTPATIENT
Start: 2020-01-01

## 2020-01-01 RX ORDER — DEXAMETHASONE SODIUM PHOSPHATE 4 MG/ML
INJECTION, SOLUTION INTRA-ARTICULAR; INTRALESIONAL; INTRAMUSCULAR; INTRAVENOUS; SOFT TISSUE AS NEEDED
Status: DISCONTINUED | OUTPATIENT
Start: 2020-01-01 | End: 2020-01-01 | Stop reason: HOSPADM

## 2020-01-01 RX ORDER — EPHEDRINE SULFATE/0.9% NACL/PF 50 MG/5 ML
SYRINGE (ML) INTRAVENOUS AS NEEDED
Status: DISCONTINUED | OUTPATIENT
Start: 2020-01-01 | End: 2020-01-01 | Stop reason: HOSPADM

## 2020-01-01 RX ORDER — DIPHENHYDRAMINE HYDROCHLORIDE 50 MG/ML
12.5 INJECTION, SOLUTION INTRAMUSCULAR; INTRAVENOUS
Status: CANCELLED | OUTPATIENT
Start: 2020-01-01

## 2020-01-01 RX ORDER — ONDANSETRON 2 MG/ML
INJECTION INTRAMUSCULAR; INTRAVENOUS AS NEEDED
Status: DISCONTINUED | OUTPATIENT
Start: 2020-01-01 | End: 2020-01-01 | Stop reason: HOSPADM

## 2020-01-01 RX ORDER — OXYCODONE HYDROCHLORIDE 5 MG/1
5 TABLET ORAL
Status: CANCELLED | OUTPATIENT
Start: 2020-01-01 | End: 2020-01-01

## 2020-01-01 RX ORDER — OXYCODONE HYDROCHLORIDE 5 MG/1
5 TABLET ORAL
Status: DISCONTINUED | OUTPATIENT
Start: 2020-01-01 | End: 2020-01-01 | Stop reason: HOSPADM

## 2020-01-01 RX ORDER — FENTANYL CITRATE 50 UG/ML
100 INJECTION, SOLUTION INTRAMUSCULAR; INTRAVENOUS
Status: DISCONTINUED | OUTPATIENT
Start: 2020-01-01 | End: 2020-01-01 | Stop reason: HOSPADM

## 2020-01-01 RX ORDER — CEFAZOLIN SODIUM/WATER 2 G/20 ML
2 SYRINGE (ML) INTRAVENOUS ONCE
Status: COMPLETED | OUTPATIENT
Start: 2020-01-01 | End: 2020-01-01

## 2020-01-01 RX ORDER — DEXAMETHASONE SODIUM PHOSPHATE 4 MG/ML
8 INJECTION, SOLUTION INTRA-ARTICULAR; INTRALESIONAL; INTRAMUSCULAR; INTRAVENOUS; SOFT TISSUE ONCE
Status: COMPLETED | OUTPATIENT
Start: 2020-01-01 | End: 2020-01-01

## 2020-01-01 RX ORDER — SODIUM CHLORIDE 9 MG/ML
1000 INJECTION, SOLUTION INTRAVENOUS CONTINUOUS
Status: DISCONTINUED | OUTPATIENT
Start: 2020-01-01 | End: 2020-01-01 | Stop reason: HOSPADM

## 2020-01-01 RX ORDER — LIDOCAINE HYDROCHLORIDE 40 MG/ML
SOLUTION TOPICAL ONCE
Status: COMPLETED | OUTPATIENT
Start: 2020-01-01 | End: 2020-01-01

## 2020-01-01 RX ORDER — DEXAMETHASONE SODIUM PHOSPHATE 4 MG/ML
8 INJECTION, SOLUTION INTRA-ARTICULAR; INTRALESIONAL; INTRAMUSCULAR; INTRAVENOUS; SOFT TISSUE ONCE
Status: DISCONTINUED | OUTPATIENT
Start: 2020-01-01 | End: 2020-01-01

## 2020-01-01 RX ORDER — HEPARIN SODIUM (PORCINE) LOCK FLUSH IV SOLN 100 UNIT/ML 100 UNIT/ML
500 SOLUTION INTRAVENOUS ONCE
Status: COMPLETED | OUTPATIENT
Start: 2020-01-01 | End: 2020-01-01

## 2020-01-01 RX ADMIN — FENTANYL CITRATE 25 MCG: 50 INJECTION INTRAMUSCULAR; INTRAVENOUS at 08:31

## 2020-01-01 RX ADMIN — SODIUM CHLORIDE 25 ML/HR: 9 INJECTION, SOLUTION INTRAVENOUS at 13:30

## 2020-01-01 RX ADMIN — GADOTERATE MEGLUMINE 13 ML: 376.9 INJECTION INTRAVENOUS at 07:02

## 2020-01-01 RX ADMIN — SODIUM CHLORIDE 25 ML/HR: 900 INJECTION, SOLUTION INTRAVENOUS at 11:17

## 2020-01-01 RX ADMIN — Medication 10 ML: at 13:44

## 2020-01-01 RX ADMIN — ROCURONIUM BROMIDE 5 MG: 10 INJECTION, SOLUTION INTRAVENOUS at 08:19

## 2020-01-01 RX ADMIN — FENTANYL CITRATE 50 MCG: 50 INJECTION, SOLUTION INTRAMUSCULAR; INTRAVENOUS at 11:10

## 2020-01-01 RX ADMIN — DEXAMETHASONE SODIUM PHOSPHATE 10 MG: 10 INJECTION INTRAMUSCULAR; INTRAVENOUS at 09:49

## 2020-01-01 RX ADMIN — CARBOPLATIN 289 MG: 10 INJECTION, SOLUTION INTRAVENOUS at 13:00

## 2020-01-01 RX ADMIN — SUCCINYLCHOLINE CHLORIDE 160 MG: 20 INJECTION, SOLUTION INTRAMUSCULAR; INTRAVENOUS at 08:20

## 2020-01-01 RX ADMIN — SODIUM CHLORIDE 25 ML/HR: 900 INJECTION, SOLUTION INTRAVENOUS at 14:31

## 2020-01-01 RX ADMIN — SODIUM CHLORIDE 200 MG: 900 INJECTION, SOLUTION INTRAVENOUS at 14:53

## 2020-01-01 RX ADMIN — Medication 10 ML: at 11:08

## 2020-01-01 RX ADMIN — ONDANSETRON 8 MG: 2 INJECTION INTRAMUSCULAR; INTRAVENOUS at 10:09

## 2020-01-01 RX ADMIN — FENTANYL CITRATE 50 MCG: 50 INJECTION, SOLUTION INTRAMUSCULAR; INTRAVENOUS at 10:48

## 2020-01-01 RX ADMIN — Medication 10 ML: at 14:40

## 2020-01-01 RX ADMIN — SODIUM CHLORIDE 25 ML/HR: 9 INJECTION, SOLUTION INTRAVENOUS at 10:57

## 2020-01-01 RX ADMIN — MIDAZOLAM 2 MG: 1 INJECTION INTRAMUSCULAR; INTRAVENOUS at 10:45

## 2020-01-01 RX ADMIN — SODIUM CHLORIDE 150 MG: 900 INJECTION, SOLUTION INTRAVENOUS at 12:47

## 2020-01-01 RX ADMIN — SODIUM CHLORIDE 1000 ML: 900 INJECTION, SOLUTION INTRAVENOUS at 09:51

## 2020-01-01 RX ADMIN — Medication 10 ML: at 14:54

## 2020-01-01 RX ADMIN — Medication 10 ML: at 15:23

## 2020-01-01 RX ADMIN — SODIUM CHLORIDE 25 ML/HR: 900 INJECTION, SOLUTION INTRAVENOUS at 15:40

## 2020-01-01 RX ADMIN — DEXAMETHASONE SODIUM PHOSPHATE 4 MG: 4 INJECTION, SOLUTION INTRAMUSCULAR; INTRAVENOUS at 08:53

## 2020-01-01 RX ADMIN — SODIUM CHLORIDE 1000 ML: 900 INJECTION, SOLUTION INTRAVENOUS at 10:52

## 2020-01-01 RX ADMIN — SODIUM CHLORIDE 200 MG: 9 INJECTION, SOLUTION INTRAVENOUS at 11:45

## 2020-01-01 RX ADMIN — DEXAMETHASONE SODIUM PHOSPHATE 12 MG: 4 INJECTION, SOLUTION INTRAMUSCULAR; INTRAVENOUS at 15:02

## 2020-01-01 RX ADMIN — Medication 10 ML: at 13:30

## 2020-01-01 RX ADMIN — ONDANSETRON 4 MG: 2 INJECTION INTRAMUSCULAR; INTRAVENOUS at 08:53

## 2020-01-01 RX ADMIN — CARBOPLATIN 301 MG: 10 INJECTION, SOLUTION INTRAVENOUS at 14:14

## 2020-01-01 RX ADMIN — SODIUM CHLORIDE 1000 ML: 900 INJECTION, SOLUTION INTRAVENOUS at 14:10

## 2020-01-01 RX ADMIN — CYANOCOBALAMIN 1000 MCG: 1000 INJECTION, SOLUTION INTRAMUSCULAR; SUBCUTANEOUS at 11:17

## 2020-01-01 RX ADMIN — DEXAMETHASONE SODIUM PHOSPHATE 12 MG: 4 INJECTION, SOLUTION INTRAMUSCULAR; INTRAVENOUS at 12:31

## 2020-01-01 RX ADMIN — Medication 10 ML: at 11:34

## 2020-01-01 RX ADMIN — SODIUM CHLORIDE 1000 ML: 900 INJECTION, SOLUTION INTRAVENOUS at 10:44

## 2020-01-01 RX ADMIN — SODIUM CHLORIDE 489 MG: 9 INJECTION, SOLUTION INTRAVENOUS at 16:01

## 2020-01-01 RX ADMIN — SODIUM CHLORIDE 150 MG: 900 INJECTION, SOLUTION INTRAVENOUS at 14:33

## 2020-01-01 RX ADMIN — Medication 10 ML: at 20:28

## 2020-01-01 RX ADMIN — SODIUM CHLORIDE, SODIUM LACTATE, POTASSIUM CHLORIDE, AND CALCIUM CHLORIDE 100 ML/HR: 600; 310; 30; 20 INJECTION, SOLUTION INTRAVENOUS at 07:19

## 2020-01-01 RX ADMIN — SODIUM CHLORIDE 150 MG: 900 INJECTION, SOLUTION INTRAVENOUS at 11:48

## 2020-01-01 RX ADMIN — SODIUM CHLORIDE, SODIUM LACTATE, POTASSIUM CHLORIDE, AND CALCIUM CHLORIDE: 600; 310; 30; 20 INJECTION, SOLUTION INTRAVENOUS at 08:53

## 2020-01-01 RX ADMIN — SODIUM CHLORIDE 495 MG: 9 INJECTION, SOLUTION INTRAVENOUS at 13:21

## 2020-01-01 RX ADMIN — PHENYLEPHRINE HYDROCHLORIDE 160 MCG: 10 INJECTION INTRAVENOUS at 09:46

## 2020-01-01 RX ADMIN — Medication 10 ML: at 17:30

## 2020-01-01 RX ADMIN — ONDANSETRON 8 MG: 2 INJECTION INTRAMUSCULAR; INTRAVENOUS at 12:28

## 2020-01-01 RX ADMIN — FENTANYL CITRATE 50 MCG: 50 INJECTION, SOLUTION INTRAMUSCULAR; INTRAVENOUS at 11:15

## 2020-01-01 RX ADMIN — SODIUM CHLORIDE 1000 ML: 900 INJECTION, SOLUTION INTRAVENOUS at 16:26

## 2020-01-01 RX ADMIN — CARBOPLATIN 308 MG: 10 INJECTION, SOLUTION INTRAVENOUS at 17:30

## 2020-01-01 RX ADMIN — LIDOCAINE HYDROCHLORIDE 15 ML: 10; .005 INJECTION, SOLUTION EPIDURAL; INFILTRATION; INTRACAUDAL; PERINEURAL at 09:58

## 2020-01-01 RX ADMIN — ONDANSETRON 8 MG: 2 INJECTION INTRAMUSCULAR; INTRAVENOUS at 09:50

## 2020-01-01 RX ADMIN — DEXAMETHASONE SODIUM PHOSPHATE 10 MG: 10 INJECTION INTRAMUSCULAR; INTRAVENOUS at 10:54

## 2020-01-01 RX ADMIN — FENTANYL CITRATE 50 MCG: 50 INJECTION, SOLUTION INTRAMUSCULAR; INTRAVENOUS at 10:45

## 2020-01-01 RX ADMIN — LIDOCAINE HYDROCHLORIDE: 40 SOLUTION TOPICAL at 10:43

## 2020-01-01 RX ADMIN — CYANOCOBALAMIN 1000 MCG: 1000 INJECTION, SOLUTION INTRAMUSCULAR at 11:30

## 2020-01-01 RX ADMIN — IOPAMIDOL 70 ML: 755 INJECTION, SOLUTION INTRAVENOUS at 15:22

## 2020-01-01 RX ADMIN — Medication 10 ML: at 07:02

## 2020-01-01 RX ADMIN — ONDANSETRON 8 MG: 2 INJECTION INTRAMUSCULAR; INTRAVENOUS at 10:51

## 2020-01-01 RX ADMIN — PHENYLEPHRINE HYDROCHLORIDE 80 MCG: 10 INJECTION INTRAVENOUS at 09:59

## 2020-01-01 RX ADMIN — LIDOCAINE HYDROCHLORIDE 80 MG: 20 INJECTION, SOLUTION EPIDURAL; INFILTRATION; INTRACAUDAL; PERINEURAL at 08:19

## 2020-01-01 RX ADMIN — SODIUM CHLORIDE 200 MG: 9 INJECTION, SOLUTION INTRAVENOUS at 16:34

## 2020-01-01 RX ADMIN — ONDANSETRON 8 MG: 2 INJECTION INTRAMUSCULAR; INTRAVENOUS at 15:41

## 2020-01-01 RX ADMIN — SODIUM CHLORIDE 492 MG: 900 INJECTION, SOLUTION INTRAVENOUS at 12:44

## 2020-01-01 RX ADMIN — Medication 10 ML: at 15:06

## 2020-01-01 RX ADMIN — FENTANYL CITRATE 50 MCG: 50 INJECTION, SOLUTION INTRAMUSCULAR; INTRAVENOUS at 10:55

## 2020-01-01 RX ADMIN — Medication 10 ML: at 10:56

## 2020-01-01 RX ADMIN — SODIUM CHLORIDE 1000 ML: 900 INJECTION, SOLUTION INTRAVENOUS at 09:25

## 2020-01-01 RX ADMIN — SODIUM CHLORIDE 200 MG: 900 INJECTION, SOLUTION INTRAVENOUS at 14:05

## 2020-01-01 RX ADMIN — DIATRIZOATE MEGLUMINE AND DIATRIZOATE SODIUM 10 ML: 660; 100 LIQUID ORAL; RECTAL at 14:45

## 2020-01-01 RX ADMIN — FAMOTIDINE: 10 INJECTION INTRAVENOUS at 11:00

## 2020-01-01 RX ADMIN — PROPOFOL 150 MG: 10 INJECTION, EMULSION INTRAVENOUS at 08:19

## 2020-01-01 RX ADMIN — SODIUM CHLORIDE 1000 ML: 900 INJECTION, SOLUTION INTRAVENOUS at 16:24

## 2020-01-01 RX ADMIN — TBO-FILGRASTIM 300 MCG: 300 INJECTION, SOLUTION SUBCUTANEOUS at 10:53

## 2020-01-01 RX ADMIN — Medication 10 ML: at 18:10

## 2020-01-01 RX ADMIN — HEPARIN SODIUM (PORCINE) LOCK FLUSH IV SOLN 100 UNIT/ML 500 UNITS: 100 SOLUTION at 10:20

## 2020-01-01 RX ADMIN — SODIUM CHLORIDE 10 ML: 9 INJECTION INTRAMUSCULAR; INTRAVENOUS; SUBCUTANEOUS at 14:30

## 2020-01-01 RX ADMIN — PHENYLEPHRINE HYDROCHLORIDE 80 MCG: 10 INJECTION INTRAVENOUS at 09:37

## 2020-01-01 RX ADMIN — Medication 10 ML: at 12:40

## 2020-01-01 RX ADMIN — CARBOPLATIN 306 MG: 10 INJECTION, SOLUTION INTRAVENOUS at 17:00

## 2020-01-01 RX ADMIN — Medication 10 MG: at 08:41

## 2020-01-01 RX ADMIN — DEXAMETHASONE SODIUM PHOSPHATE 12 MG: 4 INJECTION, SOLUTION INTRAMUSCULAR; INTRAVENOUS at 11:31

## 2020-01-01 RX ADMIN — CEFAZOLIN SODIUM 2 G: 100 INJECTION, POWDER, LYOPHILIZED, FOR SOLUTION INTRAVENOUS at 09:39

## 2020-01-01 RX ADMIN — MIDAZOLAM 1 MG: 1 INJECTION INTRAMUSCULAR; INTRAVENOUS at 10:55

## 2020-01-01 RX ADMIN — GADOTERATE MEGLUMINE 12 ML: 376.9 INJECTION INTRAVENOUS at 20:28

## 2020-01-01 RX ADMIN — SODIUM CHLORIDE 200 MG: 900 INJECTION, SOLUTION INTRAVENOUS at 11:32

## 2020-01-01 RX ADMIN — DEXAMETHASONE SODIUM PHOSPHATE 8 MG: 4 INJECTION, SOLUTION INTRAMUSCULAR; INTRAVENOUS at 10:58

## 2020-01-01 RX ADMIN — Medication 2 G: at 09:30

## 2020-01-01 RX ADMIN — SODIUM CHLORIDE 150 MG: 900 INJECTION, SOLUTION INTRAVENOUS at 16:08

## 2020-01-01 RX ADMIN — DIATRIZOATE MEGLUMINE AND DIATRIZOATE SODIUM 10 ML: 660; 100 LIQUID ORAL; RECTAL at 11:34

## 2020-01-01 RX ADMIN — FENTANYL CITRATE 25 MCG: 50 INJECTION INTRAMUSCULAR; INTRAVENOUS at 08:36

## 2020-01-01 RX ADMIN — FENTANYL CITRATE 50 MCG: 50 INJECTION INTRAMUSCULAR; INTRAVENOUS at 08:18

## 2020-01-01 RX ADMIN — SODIUM CHLORIDE 492 MG: 900 INJECTION, SOLUTION INTRAVENOUS at 12:10

## 2020-01-01 RX ADMIN — ONDANSETRON 8 MG: 2 INJECTION INTRAMUSCULAR; INTRAVENOUS at 14:54

## 2020-01-01 RX ADMIN — SODIUM CHLORIDE 1000 ML: 900 INJECTION, SOLUTION INTRAVENOUS at 10:40

## 2020-01-01 RX ADMIN — Medication 10 ML: at 14:45

## 2020-01-01 RX ADMIN — DEXAMETHASONE SODIUM PHOSPHATE 12 MG: 4 INJECTION, SOLUTION INTRAMUSCULAR; INTRAVENOUS at 15:43

## 2020-01-01 RX ADMIN — SODIUM CHLORIDE 1000 ML: 900 INJECTION, SOLUTION INTRAVENOUS at 09:58

## 2020-01-01 RX ADMIN — ONDANSETRON 8 MG: 2 INJECTION INTRAMUSCULAR; INTRAVENOUS at 11:17

## 2020-01-01 RX ADMIN — SODIUM CHLORIDE 200 MG: 900 INJECTION, SOLUTION INTRAVENOUS at 12:12

## 2020-01-01 RX ADMIN — Medication 10 ML: at 10:45

## 2020-01-01 RX ADMIN — SODIUM CHLORIDE 492 MG: 9 INJECTION, SOLUTION INTRAVENOUS at 17:08

## 2020-01-01 RX ADMIN — PROPOFOL 100 MCG/KG/MIN: 10 INJECTION, EMULSION INTRAVENOUS at 09:32

## 2020-01-01 RX ADMIN — Medication 10 MG: at 08:38

## 2020-01-01 RX ADMIN — SODIUM CHLORIDE 100 ML: 900 INJECTION, SOLUTION INTRAVENOUS at 15:23

## 2020-01-01 RX ADMIN — Medication 10 ML: at 11:55

## 2020-01-01 RX ADMIN — SODIUM CHLORIDE, SODIUM LACTATE, POTASSIUM CHLORIDE, AND CALCIUM CHLORIDE: 600; 310; 30; 20 INJECTION, SOLUTION INTRAVENOUS at 09:22

## 2020-06-23 NOTE — PROGRESS NOTES
Yellow alert noted in remote symptom monitoring program. Messaged patient to notify Marbin Mullins if symptoms have worsened since yesterday or if they would like to have a nurse reach out.

## 2020-08-11 PROBLEM — R59.1 LYMPHADENOPATHY: Status: ACTIVE | Noted: 2020-01-01

## 2020-08-11 PROBLEM — R91.8 LUNG MASS: Status: ACTIVE | Noted: 2020-01-01

## 2020-08-11 NOTE — H&P (VIEW-ONLY)
[address] [facility]. [address], 322 [facility]  [phone]    Patient Name:  Cuco Jimenez. YOB: 1950    Office Visit 8/11/2020    CHIEF COMPLAINT:    Chief Complaint   Patient presents with    Lung Mass         HISTORY OF PRESENT ILLNESS:    I had the pleasure of seeing Mr. Cuca Kemp in our clinic today. Mr. Arlette Duarte is a 69 y.o. male who presents with a history of former tobacco abuse (quit 3 years ago, 36 pk/yrs prior) presenting for new patient evaluation of lung mass and mediastinal LAD. He was seen for cough with CXR in July. His cough had been going on  For over a year. He began having dizziness, chills in early July. He was tested for covid and was negative. Then had a CXR which prompted CT chest and PET scan. This showed a left sided lung mass as well as mediastinal LAD that was PET positive as well as an adrenal met. Cough is dry cough. Any sputum is clear. Last week he had an episode of vomiting. No hemoptysis. Was seen by heme/onc and given abx. No unintentional weight, chest pain, fever, chills, night sweats, palpable lad, fatigue. No worsening shortness of breath. Some KC when walking up steps. Never diagnosed with any lung disease. On plavix. No cardiac stents. Stents in leg over a year ago.          Past Medical History:   Diagnosis Date    Cardiomyopathy [facility])     CHF (congestive heart failure) ([facility] [address] 11/21/2017    55-60% 4/2019    CKD (chronic kidney disease), stage III (HCC)     Stage 3    Coronary artery disease involving native coronary artery of native heart without angina pectoris 10/25/2017    followed by [address] Card last cath 10/16/2017 no stents    Foot drop, left     Former cigarette smoker     History of embolectomy 07/2019    right common femoral embolectomy    History of kidney cancer 12/2017    kidney- right removed    Long term current use of anticoagulant     Plavix    Sickle cell trait ([facility]     Sickle cell trait    Status post carotid endarterectomy 07/2019    Right side       Problem List  Date Reviewed: 8/11/2020          Codes Class Noted    Lung mass ICD-10-CM: R91.8  ICD-9-CM: 786.6  8/11/2020        Lymphadenopathy ICD-10-CM: R59.1  ICD-9-CM: 785.6  8/11/2020        Femoral artery stenosis, right ([facility] ICD-10-CM: I70.201  ICD-9-CM: 440.20  11/20/2019    Overview Addendum 11/20/2019  5:40 PM by MERNA Dodd     50-99%             Femoral artery stenosis, left ([facility] ICD-10-CM: G85.251  ICD-9-CM: 440.20  11/20/2019    Overview Signed 11/20/2019  5:40 PM by MERNA Dodd     20-49%             Foot drop, left ICD-10-CM: W72.181  ICD-9-CM: 736.79  Unknown        Former cigarette smoker ICD-10-CM: Z87.891  ICD-9-CM: V15.82  Unknown        Long term current use of anticoagulant ICD-10-CM: Z79.01  ICD-9-CM: V58.61  Unknown    Overview Signed 7/15/2019  3:01 PM by MERNA Dodd     Plavix             History of embolectomy ICD-10-CM: Z98.890  ICD-9-CM: V45.89  7/1/2019    Overview Signed 7/15/2019  3:01 PM by MERNA Dodd     right common femoral embolectomy             PVD (peripheral vascular disease) ([facility] ICD-10-CM: I73.9  ICD-9-CM: 443.9  6/20/2019        Dyslipidemia ICD-10-CM: E78.5  ICD-9-CM: 272.4  1/14/2019        PAD (peripheral artery disease) ([facility] ICD-10-CM: I73.9  ICD-9-CM: 443.9  2/19/2018        Atherosclerosis of native arteries of extremity with intermittent claudication ([facility] ICD-10-CM: O94.783  ICD-9-CM: 440.21  2/19/2018        Abdominal aortic aneurysm (AAA) ([facility] ICD-10-CM: I71.4  ICD-9-CM: 441.4  1/31/2018    Overview Addendum 1/19/2020  1:49 PM by MERNA Dodd     2.4 cm; had surgery in June '19--has 3 stents ; followed by Dr Martha Mohan.               Type 2 diabetes mellitus with nephropathy ([facility] ICD-10-CM: E11.21  ICD-9-CM: 250.40, 583.81  1/8/2018        Acquired hypothyroidism ICD-10-CM: E03.9  ICD-9-CM: 244.9  12/4/2017        History of kidney cancer ICD-10-CM: Z85.528  ICD-9-CM: V10.52  12/1/2017    Overview Signed 7/15/2019  3:01 PM by MERNA Rojas     kidney- right removed             Gallop rhythm ICD-10-CM: R00.8  ICD-9-CM: 427.89  10/26/2017        Coronary artery disease involving native coronary artery of native heart without angina pectoris ICD-10-CM: I25.10  ICD-9-CM: 414.01  10/25/2017    Overview Signed 11/27/2017  8:30 PM by MERNA Rojas     Followed by Dr. La Nena Leonard (Cardiology)             Cardiomyopathy, ischemic ICD-10-CM: I25.5  ICD-9-CM: 414.8  10/25/2017    Overview Signed 11/27/2017  8:29 PM by MERNA Rojas     Followed by Dr La Nena Leonard (Cardiology)             Systolic CHF, acute [facility]) ICD-10-CM: I50.21  ICD-9-CM: 428.21, 428.0  10/13/2017    Overview Addendum 11/27/2017  8:31 PM by MERNA Rojas     Followed by Dr. La Nena Leonard (Cardiology); appears well compensated at this time. His echo dated 11/21/17 shows improvement in LV function. History of right nephrectomy ICD-10-CM: Z90.5  ICD-9-CM: V45.73  10/13/2017    Overview Signed 10/13/2017  1:13 PM by Son Kaiser NP     Right             Benign hypertension with CKD (chronic kidney disease) stage III ([facility] [address] ICD-10-CM: I12.9, N18.3  ICD-9-CM: 403.10, 585.3  7/22/2010              Past Surgical History:   Procedure Laterality Date    HX APPENDECTOMY  1963    HX NEPHRECTOMY Right 12/2017    MT LEFT HEART CATH,PERCUTANEOUS  10/16/2017    no PCI- medical management    VASCULAR SURGERY PROCEDURE UNLIST Bilateral 10/11/2018    iliac angio and stent    VASCULAR SURGERY PROCEDURE UNLIST  05/13/2019    Right lower extremity arteriogram with balloon angioplasty and stent of the right external iliac artery with 8 x 4 S. M.A.R.T. stent.     VASCULAR SURGERY PROCEDURE UNLIST Right 06/20/2019     Right common femoral artery endarterectomy,Right common femoral embolectomy,Right lower extremity arteriogram       No flowsheet data found.       Social History     Socioeconomic History    Marital status: LEGALLY      Spouse name: Not on file    Number of children: Not on file    Years of education: Not on file    Highest education level: Not on file   Occupational History    Not on file   Social Needs    Financial resource strain: Not on file    Food insecurity     Worry: Not on file     Inability: Not on file    Transportation needs     Medical: Not on file     Non-medical: Not on file   Tobacco Use    Smoking status: Former Smoker     Packs/day: 1.00     Years: 40.00     Pack years: 40.00     Last attempt to quit: 2017     Years since quitting: 3.6    Smokeless tobacco: Never Used   Substance and Sexual Activity    Alcohol use: Yes     Comment: socially    Drug use: Not Currently     Types: Marijuana     Comment: none since 2017    Sexual activity: Not on file   Lifestyle    Physical activity     Days per week: Not on file     Minutes per session: Not on file    Stress: Not on file   Relationships    Social connections     Talks on phone: Not on file     Gets together: Not on file     Attends Alevism service: Not on file     Active member of club or organization: Not on file     Attends meetings of clubs or organizations: Not on file     Relationship status: Not on file    Intimate partner violence     Fear of current or ex partner: Not on file     Emotionally abused: Not on file     Physically abused: Not on file     Forced sexual activity: Not on file   Other Topics Concern    Not on file   Social History Narrative    Not on file       Family History   Problem Relation Age of Onset    Diabetes Other     Cancer Mother         Ovarian CA    Heart Disease Mother     Hypertension Mother     Sickle Cell Trait Mother     Heart Disease Father     Sickle Cell Trait Father     Sickle Cell Anemia Brother     Heart Disease Brother    [place] Hypertension Brother     Cancer Cousin         prostate       Allergies   Allergen Reactions    Aspirin Nausea and Vomiting       Current Outpatient Medications   Medication Sig    Olamide Pen Needle 32 gauge x 5/32\" ndle use once daily    dapagliflozin (Farxiga) 10 mg tab tablet Take 1 Tab by mouth daily.  Unithroid 50 mcg tablet Take 1 Tab by mouth Daily (before breakfast).  carvediloL (COREG) 12.5 mg tablet Take 1 Tab by mouth two (2) times daily (with meals).  atorvastatin (LIPITOR) 80 mg tablet TAKE 1 TABLET BY MOUTH EVERY NIGHT    insulin degludec [place] Motto FlexTouch U-100) 100 unit/mL (3 mL) inpn 20 units sq daily  Indications: type 2 diabetes mellitus    clopidogreL (PLAVIX) 75 mg tab Take 1 Tab by mouth daily.  valsartan (DIOVAN) 80 mg tablet Take 1 Tab by mouth daily.  Victoza 3-Denis 0.6 mg/0.1 mL (18 mg/3 mL) pnij ADMINISTER 1.8 MG UNDER THE SKIN DAILY    fluticasone propionate (FLONASE ALLERGY RELIEF) 50 mcg/actuation nasal spray 2 Sprays by Both Nostrils route daily.  CheckInPageTOUCH ULTRA BLUE TEST STRIP strip      No current facility-administered medications for this visit. REVIEW OF SYSTEMS:    Review of Systems   Constitutional: Negative for chills, diaphoresis, fever, malaise/fatigue and weight loss. HENT: Negative for congestion, ear discharge, ear pain, hearing loss, nosebleeds, sinus pain, sore throat and tinnitus. Eyes: Negative for blurred vision, pain and redness. Respiratory: Positive for cough. Negative for hemoptysis, sputum production, shortness of breath and wheezing. Cardiovascular: Negative for chest pain, palpitations, orthopnea, leg swelling and PND. Gastrointestinal: Negative for abdominal pain, blood in stool, constipation, diarrhea, heartburn, melena, nausea and vomiting. Genitourinary: Negative for dysuria, frequency, hematuria and urgency. Musculoskeletal: Negative for back pain, joint pain, myalgias and neck pain.    Skin: Negative for itching and rash. Neurological: Negative for dizziness, tremors, focal weakness, seizures and headaches. Endo/Heme/Allergies: Negative for environmental allergies. Does not bruise/bleed easily. Psychiatric/Behavioral: Negative for depression, hallucinations, memory loss and suicidal ideas. The patient is not nervous/anxious. PHYSICAL EXAM:  Visit Vitals  /70   Pulse 92   Temp 98 °F (36.7 °C) (Temporal)   Resp 20   Ht 5' 2\" (1.575 m)   Wt 139 lb (63 kg)   SpO2 97%   BMI 25.42 kg/m²       GENERAL APPEARANCE:  The patient is normal weight and in no respiratory distress. HEENT:  PERRL. Conjunctivae unremarkable. Nasal mucosa is without epistaxis, exudate, or polyps. Gums and dentition are unremarkable. There is no oropharyngeal narrowing. NECK/LYMPHATIC:  Symmetrical with no elevation of jugular venous pulsation. Trachea midline. No thyroid enlargement. No cervical adenopathy. LUNGS:  Normal respiratory effort with symmetrical lung expansion. Breath sounds are clear bilaterally. HEART:  There is a regular rate and rhythm. No murmur, rub, or gallop. ABDOMEN:  Soft and non-tender. No hepatosplenomegaly. Bowel sounds are normal.    NEURO/PSYCH:  The patient is alert and oriented to person, place, and time. Memory appears intact and mood is normal.  No gross sensorimotor deficits are present. MS/SKIN:  There is no edema in the lower extremities. No rashes, bruises, lesions, ulcers visible. DIAGNOSTIC TESTS:  CT of chest:    Results from Hospital Encounter encounter on 07/16/20   CT CHEST W CONT    Impression IMPRESSION:    1. Left hilar mass concerning for primary lung/bronchial malignancy which  extends into the left upper lobe where there is consolidation along the left  major fissure that may represent additional mass and/or post obstructive  collapse. 2. Prominent subcarinal and left lower paratracheal lymph nodes are  indeterminate.   3. Moderate centrilobular emphysema. DC4  The significant findings in this report have been referred to the Imaging  Navigator in order to communicate to the referring provider or his/her designee  as outlined in Section II.C.2.a.ii-iii of the ACR Practice Guideline for  Communication of Diagnostic Imaging Findings. No results found for this or any previous visit. Results for orders placed during the hospital encounter of 07/16/20   CT CHEST W CONT    Narrative EXAMINATION: CT CHEST WITH IV CONTRAST 7/16/2020 3:29 PM    ACCESSION NUMBER: [id number]    INDICATION: Chronic coughing and shortness of breath    COMPARISON: CT chest, 10/12/2017    TECHNIQUE: Multiple contiguous axial CT images of the chest were obtained from  the lung apices to the lung bases after the intravenous administration of 70 cc  of intravenous Isovue 370 contrast material .     Radiation dose reduction techniques were used for this study:  Our CT scanners  use one or all of the following: Automated exposure control, adjustment of the  mA and/or kVp according to patient's size, iterative reconstruction. FINDINGS:    MEDIASTINUM: The thoracic aorta is normal in caliber and patent but with severe  calcific and fibrofatty atherosclerotic plaque throughout. Pulmonary vasculature  is normal caliber. The heart is normal in size without significant pericardial  effusion. The visualized thoracic esophagus and thyroid gland are normal. A  subcarinal lymph node measures 12 mm (series 2 image 25), upper limits of  normal. A left lower paratracheal lymph node is upper limits of normal size,  measuring 1 cm (series 2 image 20). LUNGS:Ill-defined left hilar masslike opacity resulting in left upper segmental  bronchial wall thickening and narrowing, most notable involving a left upper  lobe bronchus which is obliterated.  There is a spiculated nodular opacity in the  left upper lobe abutting the left major fissure, measuring approximately 4.5 x  2.5 x 2.4 cm in its greatest extent. Moderate upper lobe predominant centrilobular emphysema. Scattered areas of  scarring in the lower lobes. SOFT TISSUES: No axillary adenopathy. Visualized subcutaneous tissues are  unremarkable. UPPER ABDOMEN: Hepatic steatosis. No acute abnormality. BONES: No acute or aggressive osseous abnormalities. Impression IMPRESSION:    1. Left hilar mass concerning for primary lung/bronchial malignancy which  extends into the left upper lobe where there is consolidation along the left  major fissure that may represent additional mass and/or post obstructive  collapse. 2. Prominent subcarinal and left lower paratracheal lymph nodes are  indeterminate. 3. Moderate centrilobular emphysema. DC4  The significant findings in this report have been referred to the Imaging  Navigator in order to communicate to the referring provider or his/her designee  as outlined in Section II.C.2.a.ii-iii of the ACR Practice Guideline for  Communication of Diagnostic Imaging Findings. No results found for this or any previous visit. CXR:    Results for orders placed in visit on 07/09/20   XR CHEST PA LAT         PET/CT:   Results from [place] encounter on 07/29/20   PET/CT TUMOR IMAGE SKULL THIGH W (INI)    Impression IMPRESSION:   1. Left hilar mass extends into the mediastinum and interfaces with the left  main pulmonary artery. There is interstitial thickening and groundglass  opacification in the left upper lobe associated with a hypervascular mass,  suspicious for lymphangitic carcinomatosis. 2.  Multistation mediastinal and contralateral disease in the right lung hilum. 3.  Right adrenal metastasis. Results for orders placed during the hospital encounter of 07/29/20   PET/CT TUMOR IMAGE SKULL THIGH W (INI)    Narrative PET/CT      Indication: Lung mass on chest CT    Radiopharmaceutical: 14.99 mCi F18-FDG, intravenously.     Technique: Imaging was performed from the skull through the proximal thighs  using routine PET/CT acquisition protocol. Imaging was performed approximately  60 minutes post injection. Oral contrast was administered. Radiation dose  reduction techniques were used for this study:  Our CT scanners use one or all  of the following: Automated exposure control, adjustment of the mA and/or kVp  according to patient's size, iterative reconstruction. Serum glucose: 108 mg/dL prior to injection. Comparison studies: Chest CT 7/16/2020    Findings:    Head and Neck: No enlarged or hypermetabolic cervical lymph nodes. Chest: Ill-defined hypermetabolic left hilar mass measuring approximately 6.8 x  2.8 cm which interfaces with the left main pulmonary artery. Metastatic  mediastinal adenopathy, including contralateral lesions. Lesions are indexed as  follows: 1.4 cm left paratracheal/subaortic node, maximum SUV of 11.3. Right  paratracheal node measuring 6 cm in short axis, maximum SUV 3.5. Uptake in the  right hilum indicating contralateral adenopathy, but it discrete lesion is  difficult to discern. Hypermetabolic subcarinal lymph node. There is very faint  uptake associated with prevascular mediastinal lymph nodes which are  nonenlarged. Pulmonary emphysema. Abdomen/Pelvis: Hypermetabolic right adrenal nodule measuring 1.7 cm, maximum  SUV 5.0. Mild nodular thickening of the left adrenal without a definite focal  lesion. No enlarged or hypermetabolic lymph nodes within the abdomen or pelvis. Remainder of the abdominal viscera is without focal abnormality or worrisome  uptake. Aortoiliac atherosclerotic disease. Bones and soft tissues: No aggressive osseous lesion or worrisome focal FDG  uptake. Impression IMPRESSION:   1. Left hilar mass extends into the mediastinum and interfaces with the left  main pulmonary artery.  There is interstitial thickening and groundglass  opacification in the left upper lobe associated with a hypervascular mass,  suspicious for lymphangitic carcinomatosis. 2.  Multistation mediastinal and contralateral disease in the right lung hilum. 3.  Right adrenal metastasis. Exercise oximetry:          Spirometry:   Date:    08/11/20        FVC    2.31-83        FEV1    1.63-74        FEV1/FVC    71        FEF 25-75%    1.15-44        Bronchodilator Response            TLC            RV            DLCO              Echo:  No results found for this or any previous visit. ASSESSMENT:  (Medical Decision Making)    ICD-10-CM ICD-9-CM    1. Lung mass  R91.8 786.6 AMB POC SPIROMETRY      MRI BRAIN W CONT      SCHEDULE PROCEDURE   2. Lymphadenopathy  R59.1 785.6    3. Encounter for laboratory testing for COVID-19 virus  Z11.59 V73.89 NOVEL CORONAVIRUS (COVID-19)        Patient with a history of former tobacco abuse presenting with left-sided PET avid lung mass and mediastinal lymphadenopathy as well as possible adrenal mass. Overall picture concerning for stage IV lung cancer. PLAN:  -We will set him up for KRISHNA and possible navigation bronchoscopy. -He will need to hold his Plavix for 5 to 7 days prior to the procedure.  -We will forward results to Dr. obrien. -May need adrenal biopsy to confirm stage 4 disease.  -We will need MRI brain if cancer diagnosis is confirmed. F/u in 1 month after tumor board    Portions of this note were created using voice recognition software. As such, error of speech recognition may have occurred. Orders Placed This Encounter    AMB POC SPIROMETRY    SCHEDULE PROCEDURE     EBUS with angelique    MRI BRAIN W CONT     Standing Status:   Future     Standing Expiration Date:   9/11/2021     Order Specific Question:   STAT Creatinine as indicated     Answer:    Yes    NOVEL CORONAVIRUS (COVID-19)     Scheduling Instructions:      1) Due to current limited availability of the COVID-19 PCR test, tests will be prioritized and may not be completed.              2) Order only if the test result will change clinical management or necessary for a return to mission-critical employment decision.              3) Print and instruct patient to adhere to CDC home isolation program. (Link Above)              4) Set up or refer patient for a monitoring program.              5) Have patient sign up for and leverage MyChart (if not previously done). Order Specific Question:   Is this test for diagnosis or screening? Answer:   Screening     Order Specific Question:   Symptomatic for COVID-19 as defined by CDC? Answer:   No     Order Specific Question:   Hospitalized for COVID-19? Answer:   No     Order Specific Question:   Admitted to ICU for COVID-19? Answer:   No     Order Specific Question:   Employed in healthcare setting? Answer:   No     Order Specific Question:   Resident in a congregate (group) care setting? Answer:   No     Order Specific Question:   Previously tested for COVID-19?      Answer:   Elkin Witt MD  Electronically signed

## 2020-08-18 NOTE — PROGRESS NOTES
Patient's son called to confirm bronch procedure time.  Confirmed with son that procedure is at 0800 and arrival time is 12

## 2020-08-20 PROBLEM — C34.12 MALIGNANT NEOPLASM OF UPPER LOBE OF LEFT LUNG (HCC): Status: ACTIVE | Noted: 2020-01-01

## 2020-08-20 NOTE — PROCEDURES
PROCEDURE  Bronchoscopy with endobronchial ultrasound guided fine needle aspiration of hilar/mediastinal lymph nodes and airway inspection. INDICATION   Diagnosis of Mediastinal Lymphadenopathy /Staging of Lung Cancer    IMAGING  PET 7/29/20            POST OP DIAGNOSIS:  Station 11Ri was biopsied and was Positive for malignancy on RODY. Based on imaging and EBUS pt is a STAGE [id number](possibly M1b) either stage IIIB or IV non-small cell lung cancer. ANESTHESIA  General sedation and intubation provided by anesthesia    AIRWAY INSPECTION  After obtaining informed consent, using a bite block, an Olympus Q 180 video bronchoscope was  introduced into the trachea through the vocal cords without complications. RIGHT  LOCATION NORM/ABNORMAL DESCRIPTION   VOCAL CORDS ETT    TRACHEA NL    JOSELO NL    RMSB NL    RUL ABNL Early endobronchial invasion vs external compression   BI NL    RML NL    RLL NL    SUP SEGM RLL NL    MED BASAL NL    ANTERIOR BASAL NL    LATERAL BASAL NL    POSTERIOR BASAL NL                    LEFT  LOCATION NORM/ABNORMAL DESCRIPTION   LMSB NL    CHRIS ABNL Extremely vascular with likely early endobronchial tumor invasion   LINGULA NL    LLL NL    SUPERIOR SEG LLL NL    KARL-MEDIAL LLL NL    LATERAL LLL NL    POSTERIOR LLL NL              EBUS  After completing the airway inspection an Olympus  F EBUS bronchoscope was introduced into the trachea through the vocal chords without complication.   The balloon was inflated with saline and a mediastinal inspection commenced:      STATION SIZE IN CM   11R inf 0.8cm   11R sup 0.8cm   10R 1cm   4R 1cm   2R No target   7 2cm   2L No target   4L 1.5cm   10L 2cm/tumor   11L 0.5cm         After identifying targets the following samples were obtained:    STATION PASS# LYMPHOCYTES ATYPIA GRANULOMA DIAGNOSIS   11Ri 1    Malignant    2    Malignant    3 Cell block       4 Cell block       5 Cell block       6 Cell block 7 Cell block       8 Cell block       9 Cell block       10 Cell block          The procedure was completed without complication and the patient tolerated the procedure well. EBL: <20cc    Diagnosis: At least stage IIIB if not stage IV lung cancer. Plan:  Follow up final pathology. Present at tumor board. May need adrenal gland biopsy to confirm stage IV disease.      Lizz Davis MD

## 2020-08-20 NOTE — PROGRESS NOTES
SCDS on patient at this time. PACU called to let them know procedure is starting. Will call back when procedure is complete.

## 2020-08-20 NOTE — DISCHARGE INSTRUCTIONS
RESPIRATORY CARE - BRONCHOSCOPY/ EBUS/ BIOPSIES - DISCHARGE INSTRUCTIONS      You received a lot of numbing medication for your throat and nose, and you also received medication to make you sleepy during your procedure. Because of this and because the bronchoscopy may have irritated your airways, we ask that you follow these directions:    1. Do not eat or drink until  1045AM .   After that, you may have what you please. You may want to try some liquids first, because your throat may be a little sore. 2. Medication may cause drowsiness for several hours, therefore:  · Do not drive or operate machinery for remainder of the day. · No alcohol today  · Do not make any important or legal decisions for 24 hours  · Do not sign any legal documents for 24 hours    3. You may cough up more mucus than usual and you may see some blood, but this is expected and should subside by the following day. 4. If severe throat irritation, coughing, or bleeding continue, call your doctor. 5.         If you run a fever greater than 102, take tylenol and motrin then call [facility] at [phone]. 6.         Dr. Michelet Thomas has asked that you:                A. Call the doctor's office at [phone] for any questions or problems that may occur. B. Oncology will be in touch with you regarding follow up from today's procedure. Discharge Medications:  Any additional instructions:  Resume all medication including your plavix tomorrow as before procedure using caution with any pain medication.     Instructions given to [employer] and other family members    After general anesthesia or intravenous sedation, for 24 hours or while taking prescription Narcotics:  · Limit your activities  · A responsible adult needs to be with you for the next 24 hours  · Do not drive and operate hazardous machinery  · Do not make important personal or business decisions  · Do not drink alcoholic beverages  · If you have not urinated within 8 hours after discharge, please contact your surgeon on call. · If you have sleep apnea and have a CPAP machine, please use it for all naps and sleeping. · Please use caution when taking narcotics and any of your home medications that may cause drowsiness. *  Please give a list of your current medications to your Primary Care Provider. *  Please update this list whenever your medications are discontinued, doses are      changed, or new medications (including over-the-counter products) are added. *  Please carry medication information at all times in case of emergency situations. These are general instructions for a healthy lifestyle:  No smoking/ No tobacco products/ Avoid exposure to second hand smoke  Surgeon General's Warning:  Quitting smoking now greatly reduces serious risk to your health. Obesity, smoking, and sedentary lifestyle greatly increases your risk for illness  A healthy diet, regular physical exercise & weight monitoring are important for maintaining a healthy lifestyle    You may be retaining fluid if you have a history of heart failure or if you experience any of the following symptoms:  Weight gain of 3 pounds or more overnight or 5 pounds in a week, increased swelling in our hands or feet or shortness of breath while lying flat in bed. Please call your doctor as soon as you notice any of these symptoms; do not wait until your next office visit.

## 2020-08-20 NOTE — ANESTHESIA PREPROCEDURE EVALUATION
Relevant Problems No relevant active problems Anesthetic History No history of anesthetic complications Review of Systems / Medical History Patient summary reviewed and pertinent labs reviewed Pulmonary Within defined limits Comments: Left hilar mass which appears to extend into left upper fissure on CT scan- worrisome for primary malignancy Neuro/Psych Within defined limits Cardiovascular Hypertension CAD (nonobstructive disease on cath 2017) and PAD Exercise tolerance: <4 METS: Limited by leg pain. Comments: History of CHF 20-25% in 2017, recent ECHO with normal LV function 4/2019 GI/Hepatic/Renal 
  
 
 
Renal disease (s/p right nephrectomy): CRI Endo/Other Diabetes (last HgA1C 8.5): type 2, using insulin Hypothyroidism Cancer (kidney) Other Findings Comments: Sickle cell trait Physical Exam 
 
Airway Mallampati: II 
TM Distance: > 6 cm Neck ROM: normal range of motion Mouth opening: Normal 
 
 Cardiovascular Rhythm: regular Rate: normal 
 
 
 
 Dental 
 
Dentition: Full upper dentures Pulmonary Breath sounds clear to auscultation Abdominal 
GI exam deferred Other Findings Anesthetic Plan ASA: 4 Anesthesia type: general 
 
 
 
 
Induction: Intravenous Anesthetic plan and risks discussed with: Patient

## 2020-08-20 NOTE — INTERVAL H&P NOTE
Update History & Physical    The Patient's History and Physical of August 11,    was reviewed with the patient and I examined the patient. There was no change. The surgical site was confirmed by the patient and me. Plan:  The risk, benefits, expected outcome, and alternative to the recommended procedure have been discussed with the patient. Patient understands and wants to proceed with the procedure.     Electronically signed by Jolanta Davila MD on 8/20/2020 at 8:04 AM

## 2020-08-20 NOTE — ANESTHESIA POSTPROCEDURE EVALUATION
Procedure(s): ENDOSCOPIC BRONCHOSCOPY ULTRASOUND (EBUS) FINE NEEDLE ASPIRATION 
BRONCHOSCOPY. general 
 
Anesthesia Post Evaluation Multimodal analgesia: multimodal analgesia used between 6 hours prior to anesthesia start to PACU discharge Patient location during evaluation: PACU Patient participation: complete - patient participated Level of consciousness: awake Pain management: adequate Airway patency: patent Anesthetic complications: no 
Cardiovascular status: acceptable Respiratory status: acceptable, spontaneous ventilation and nonlabored ventilation Hydration status: acceptable Post anesthesia nausea and vomiting:  none INITIAL Post-op Vital signs:  
Vitals Value Taken Time /79 8/20/2020  9:59 AM  
Temp 36.7 °C (98 °F) 8/20/2020  9:17 AM  
Pulse 95 8/20/2020 10:03 AM  
Resp 14 8/20/2020  9:17 AM  
SpO2 94 % 8/20/2020 10:03 AM  
Vitals shown include unvalidated device data.

## 2020-08-25 NOTE — PROGRESS NOTES
Pt to hold his Sherron Dent any vitamins, supplements,nsaids on morning of procedure. May take other meds with sip of water. Pt states he will take his last dose of PLAVIX on 8/26/2020 as instructed by his

## 2020-08-26 NOTE — PROGRESS NOTES
Because of the COVID-19 pandemic, I spoke with Arti Frey via the telephone regarding his current [employer], potential oral medication authorizations, enrollment in the [address] (ACS) and the [employer] (GCCS), and assistance organization resource sheet. Next, I spoke with Arti Frey regarding his insurance questions. I answered questions about medical treatments and services. Next, I spoke with Arti Frey about deductibles, copayments, and out of pocket maximums regarding his current [employer]. Next, I spoke with Arti Frey regarding the financial assistance application. The patient stated he will bring his bank statements to his next visit to complete the application. Next, I spoke with Arti Frey regarding the [employer] [place] [employer] document. After reading the form over the telephone to Arti Frey, the patient stated he will sigg the [address] form at his next visit. After receiving verbal conformation from Arti Frey, I applied for the Patient Assistance Programs for the [facility] and the [facility]. The patient stated he would like to apply for these programs to help pay for his treatments because he is low-income. Next, I spoke with Arti Frey regarding potential oral medication authorizations. I told him that if he ever had any problems getting his oral medications filled, to give the dedicated [facility]  a call. Most of the time, it is simply an authorization that needs to be done with the insurance company. Next, I spoke with Arti Frey regarding enrolling with [employer]. I went over some of the services that [employer] and [employer] offers and the enrollment process.   Next, I spoke with Arti Frey regarding information on flyers about the free Yoga classes for cancer survivors and the Oncology Massage program.  I let him know that she can get a free 30 minute massage. Lastly, I spoke with Raf Morillo regarding a form with various resource organizations that could assist with specific needs (example:  transportation, lodging, preparing meals, home cleaning)     I am scheduled to follow-up with Raf Morillo at his next visit. Raf Morillo expressed understanding of the information above and all questions were answered to his satisfaction.

## 2020-08-31 NOTE — ANESTHESIA PREPROCEDURE EVALUATION
Relevant Problems No relevant active problems Anesthetic History No history of anesthetic complications Review of Systems / Medical History Patient summary reviewed and pertinent labs reviewed Pulmonary Within defined limits Pertinent negatives: Smoker: quit 3 years ago. Comments: Left hilar mass which appears to extend into left upper fissure on CT scan- worrisome for primary malignancy Neuro/Psych Within defined limits Cardiovascular Hypertension CAD (nonobstructive disease on cath 2017) and PAD Exercise tolerance[de-identified] Limited by leg pain. Comments: History of CHF 20-25% in 2017, recent ECHO with normal LV function 4/2019 GI/Hepatic/Renal 
  
 
 
Renal disease (s/p right nephrectomy): CRI Endo/Other Diabetes (last HgA1C 8.5): type 2, using insulin Hypothyroidism Cancer (kidney. lung - Likely NSCLC) Other Findings Comments: Sickle cell trait Physical Exam 
 
Airway Mallampati: II 
TM Distance: > 6 cm Neck ROM: normal range of motion Mouth opening: Normal 
 
 Cardiovascular Rhythm: regular Rate: normal 
 
 
 
 Dental 
 
Dentition: Full upper dentures Comments: Upper dentures removed. Pulmonary Breath sounds clear to auscultation Abdominal 
GI exam deferred Other Findings Anesthetic Plan ASA: 3 Anesthesia type: total IV anesthesia Induction: Intravenous Anesthetic plan and risks discussed with: Patient

## 2020-08-31 NOTE — PROGRESS NOTES
Recovery period without difficulty. Pt alert and oriented and denies pain. Dressing is clean, dry, and intact. Reviewed discharge instructions with patient, verbalized understanding. Pt escorted to lobby discharge area via wheelchair. Vital signs and Onesimo score completed.

## 2020-08-31 NOTE — PROGRESS NOTES
Diverticulitis: Care Instructions  Your Care Instructions    Diverticulitis occurs when pouches form in the wall of the colon and become inflamed or infected. It can be very painful. Doctors aren't sure what causes diverticulitis. There is no proof that foods such as nuts, seeds, or berries cause it or make it worse. A low-fiber diet may cause the colon to work harder to push stool forward. Pouches may form because of this extra work. It may be hard to think about healthy eating while you're in pain. But as you recover, you might think about how you can use healthy eating for overall better health. Healthy eating may help you avoid future attacks. Follow-up care is a key part of your treatment and safety. Be sure to make and go to all appointments, and call your doctor if you are having problems. It's also a good idea to know your test results and keep a list of the medicines you take. How can you care for yourself at home? · Drink plenty of fluids, enough so that your urine is light yellow or clear like water. If you have kidney, heart, or liver disease and have to limit fluids, talk with your doctor before you increase the amount of fluids you drink. · Stick to liquids or a bland diet (plain rice, bananas, dry toast or crackers, applesauce) until you are feeling better. Then you can return to regular foods and gradually increase the amount of fiber in your diet. · Use a heating pad set on low on your belly to relieve mild cramps and pain. · Get extra rest until you are feeling better. · Be safe with medicines. Read and follow all instructions on the label. ¨ If the doctor gave you a prescription medicine for pain, take it as prescribed. ¨ If you are not taking a prescription pain medicine, ask your doctor if you can take an over-the-counter medicine. · If your doctor prescribed antibiotics, take them as directed. Do not stop taking them just because you feel better.  You need to take the full [facility] course of antibiotics. To prevent future attacks of diverticulitis  · Avoid constipation:  ¨ Include fruits, vegetables, beans, and whole grains in your diet each day. These foods are high in fiber. ¨ Drink plenty of fluids, enough so that your urine is light yellow or clear like water. If you have kidney, heart, or liver disease and have to limit fluids, talk with your doctor before you increase the amount of fluids you drink. ¨ Get some exercise every day. Build up slowly to 30 to 60 minutes a day on 5 or more days of the week. ¨ Take a fiber supplement, such as Citrucel or Metamucil, every day if needed. Read and follow all instructions on the label. ¨ Schedule time each day for a bowel movement. Having a daily routine may help. Take your time and do not strain when having a bowel movement. When should you call for help? Call your doctor now or seek immediate medical care if:  ? · You have a fever. ? · You are vomiting. ? · You have new or worse belly pain. ? · You cannot pass stools or gas. ? Watch closely for changes in your health, and be sure to contact your doctor if you have any problems. Where can you learn more? Go to http://gemini-hernan.info/. Enter H901 in the search box to learn more about \"Diverticulitis: Care Instructions. \"  Current as of: May 12, 2017  Content Version: 11.4  © 6865-0944 [employer]. Care instructions adapted under license by [employer] (which disclaims liability or warranty for this information). If you have questions about a medical condition or this instruction, always ask your healthcare professional. [place] [address] any warranty or liability for your use of this information.

## 2020-08-31 NOTE — DISCHARGE INSTRUCTIONS
[address]  Department of Interventional Radiology  [facility]  [phone] Office  [phone] Fax  Implanted Port Discharge Instructions      General Instructions:   A port is like an implanted IV. They are usually ordered for patients who will be getting chemotherapy, but can also be used as an IV for long term antibiotics, large amounts of fluids, and/or blood products. Your blood can be drawn from your port for labs also. Those patients who do not have good veins find the ports convenient as they can get the IV they need with one stick. The port can be used long term, and the care is easy. The device is under the skin, and once the skin heals, care is minimal. All that is required is the nurse who accesses the port will need to flush it with heparinized saline after each use. Ports are usually placed in the chest wall, usually on the right side. But they can be place in the arms and in the abdomen. Home Care Instructions: If your port is in your arm, do not allow blood pressure or other IVs to be place in that arm. Do not allow bra straps or any clothing to rub the skin over the port. Do not bathe or swim until the skin has healed and if the port is accessed. Once it is healed, and when the port is not accessed, it is okay to bathe and swim. Restrict yourself to light activity for the first 5 days after getting the port put in, after that, resume normal activity slowly. You may resume your normal diet and medications. Follow-Up Instructions: Please see your oncologist, or whatever physician ordered the port as he/she has requested of you. Call If: You should call your Physician and/or the Radiology Nurse if you notice redness, pus, swelling, or pain from the area of your incision. Call if you should develop a fever. The nurses who access your port will know to call your doctor if the port does not seem to be working properly. You need to tell the nurses who use the port if you should have any pain or swelling at the site during an infusion. To Reach Us: If you have any questions about your procedure, please call the Interventional Radiology department at [phone]. After business hours (5pm) and weekends, call the answering service at [phone] and ask for the Radiologist on call to be paged. Si tiene Preguntas acerca del procedimiento, por favor llame al departamento de Radiología Intervencional al [phone]. Después de horas de oficina (5 pm) y los fines de [place], llamar al [facility] al [phone] y pregunte por el Radiologo de Alfonzo Hu. Interventional Radiology General Nurse Discharge    After general anesthesia or intravenous sedation, for 24 hours or while taking prescription Narcotics:  · Limit your activities  · Do not drive and operate hazardous machinery  · Do not make important personal or business decisions  · Do  not drink alcoholic beverages  · If you have not urinated within 8 hours after discharge, please contact your surgeon on call. * Please give a list of your current medications to your Primary Care Provider. * Please update this list whenever your medications are discontinued, doses are     changed, or new medications (including over-the-counter products) are added. * Please carry medication information at all times in case of emergency situations. These are general instructions for a healthy lifestyle:    No smoking/ No tobacco products/ Avoid exposure to second hand smoke  Surgeon General's Warning:  Quitting smoking now greatly reduces serious risk to your health.     Obesity, smoking, and sedentary lifestyle greatly increases your risk for illness  A healthy diet, regular physical exercise & weight monitoring are important for maintaining a healthy lifestyle    You may be retaining fluid if you have a history of heart failure or if you experience any of the following symptoms:  Weight gain of 3 pounds or more overnight or 5 pounds in a week, increased swelling in our hands or feet or shortness of breath while lying flat in bed. Please call your doctor as soon as you notice any of these symptoms; do not wait until your next office visit. Recognize signs and symptoms of STROKE:  F-face looks uneven    A-arms unable to move or move unevenly    S-speech slurred or non-existent    T-time-call 911 as soon as signs and symptoms begin-DO NOT go       Back to bed or wait to see if you get better-TIME IS BRAIN.       Patient Signature:  Date: 8/31/2020  Discharging Nurse: Hardeep Rivera RN

## 2020-08-31 NOTE — PROCEDURES
Department of Interventional Radiology  [phone]        Interventional Radiology Brief Procedure Note    Patient: Fab Rodriguez Sr. MRN: [id number]  SSN: [id number]    YOB: 1950  Age: 69 y.o.   Sex: male      Date of Procedure: 8/31/2020    Pre-Procedure Diagnosis: lung cancer    Post-Procedure Diagnosis: SAME    Procedure(s): Venous Chest Port Placement    Brief Description of Procedure: US, fluoro guided right IJ venous chest port placed    Performed By: Janine Allen PA-C     Assistants: None    Anesthesia:TIVS/MAC    Estimated Blood Loss: Less than 10ml    Specimens:  None    Implants:  Chest Port Placement    Findings: catheter tip in right atrium     Complications: None    Recommendations: ok to use port     Follow Up: referring MD    Signed By: Janine Allen PA-C     August 31, 2020

## 2020-08-31 NOTE — PROGRESS NOTES
Spiritual Care visit. Initial Visit, Pre Surgery Consult. Visit and prayer before patient goes to surgery.     Visit by Michele Mullins M.Ed., Th.B. ,Staff

## 2020-08-31 NOTE — H&P
Department of Interventional Radiology  [phone]    History and Physical    Patient:  Fab Rodriguez Sr. MRN:  [id number]  SSN:  [id number]    YOB: 1950  Age:  69 y.o. Sex:  male      Primary Care Provider:  Anabelle Alcaraz  Referring Physician:  Elza Calderon MD    Subjective:     Chief Complaint: port    History of the Present Illness: The patient is a 69 y.o. male with lung cancer and hx renal cancer who presents for venous chest port placement. NPO. Plavix held. Past Medical History:   Diagnosis Date    Cancer [facility])     kidney ca    Cardiomyopathy [facility]     CHF (congestive heart failure) ([facility] [address] [facility] 11/21/2017    55-60% 4/2019    Chronic kidney disease     CKD (chronic kidney disease), stage III (HCC)     Stage 3    Coronary artery disease involving native coronary artery of native heart without angina pectoris 10/25/2017    followed by [address] last cath 10/16/2017 no stents    Diabetes ([facility] [address]     abg fbs 115 last A1c 7.3 1/20/2020    Foot drop, left     Former cigarette smoker     History of embolectomy 07/2019    right common femoral embolectomy    History of kidney cancer 12/2017    kidney- right removed    Long term current use of anticoagulant     Plavix    Sickle cell trait (HCC)     Sickle cell trait    Status post carotid endarterectomy 07/2019    Right side    Thyroid disease      Past Surgical History:   Procedure Laterality Date    HX APPENDECTOMY  1963    HX CAROTID ENDARTERECTOMY      HX NEPHRECTOMY Right 12/2017    NH LEFT HEART CATH,PERCUTANEOUS  10/16/2017    no PCI- medical management    VASCULAR SURGERY PROCEDURE UNLIST Bilateral 10/11/2018    iliac angio and stent    VASCULAR SURGERY PROCEDURE UNLIST  05/13/2019    Right lower extremity arteriogram with balloon angioplasty and stent of the right external iliac artery with 8 x 4 S. M.A.R.T. stent.     VASCULAR SURGERY PROCEDURE UNLIST Right 06/20/2019     Right common femoral artery endarterectomy,Right common femoral embolectomy,Right lower extremity arteriogram        Review of Systems:    Pertinent items are noted in the History of Present Illness. Prior to Admission medications    Medication Sig Start Date End Date Taking? Authorizing Provider   valsartan (DIOVAN) 80 mg tablet TAKE 1 TABLET BY MOUTH DAILY  Patient taking differently: nightly. 8/18/20  Yes Mukesh High MD   Olamide Pen Needle 32 gauge x 5/32\" ndle use once daily 8/4/20  Yes Roya Escalante, Chantal18 Hayley Prieto   dapagliflozin Susie Sellar) 10 mg tab tablet Take 1 Tab by mouth daily. 6/23/20  Yes Roya Escalante PA   Unithroid 50 mcg tablet Take 1 Tab by mouth Daily (before breakfast). 6/23/20  Yes Roya Escalante PA   carvediloL (COREG) 12.5 mg tablet Take 1 Tab by mouth two (2) times daily (with meals). 6/23/20  Yes Roya Escalante PA   atorvastatin (LIPITOR) 80 mg tablet TAKE 1 TABLET BY MOUTH EVERY NIGHT 6/23/20  Yes Roya Escalante PA   insulin degludec Mcmullen Norris FlexTouch U-100) 100 unit/mL (3 mL) inpn 20 units sq daily  Indications: type 2 diabetes mellitus  Patient taking differently: 20 Units nightly. 20 units sq daily  Indications: type 2 diabetes mellitus 5/1/20  Yes Roya Escalante PA   clopidogreL (PLAVIX) 75 mg tab Take 1 Tab by mouth daily. 4/29/20  Yes Mukesh High MD   Victoza 3-Denis 0.6 mg/0.1 mL (18 mg/3 mL) pnij ADMINISTER 1.8 MG UNDER THE SKIN DAILY 3/24/20  Yes William Wise MD   [facility] ULTRA BLUE TEST STRIP strip  8/11/18  Yes Provider, Historical   prochlorperazine (Compazine) 10 mg tablet Take 1 Tab by mouth every six (6) hours as needed for Nausea. Indications: nausea and vomiting caused by cancer drugs, nausea and vomiting 8/25/20   Amelia Healy MD   ondansetron hcl (Zofran) 8 mg tablet Take 1 Tab by mouth every eight (8) hours as needed for Nausea.  Indications: nausea and vomiting caused by cancer drugs 8/25/20   Amelia Healy MD lidocaine-prilocaine (EMLA) topical cream Apply  to affected area as needed for Pain. 8/25/20   Andrea Palmer MD   fluticasone propionate [facility] ALLERGY RELIEF) 50 mcg/actuation nasal spray 2 Sprays by Both Nostrils route daily.     Provider, Historical        Allergies   Allergen Reactions    Aspirin Nausea and Vomiting       Family History   Problem Relation Age of Onset    Diabetes Other     Cancer Mother         Ovarian CA    Heart Disease Mother     Hypertension Mother     Sickle Cell Trait Mother     Heart Disease Father     Sickle Cell Trait Father     Sickle Cell Anemia Brother     Heart Disease Brother     Hypertension Brother     Cancer Cousin         prostate     Social History     Tobacco Use    Smoking status: Former Smoker     Packs/day: 1.00     Years: 40.00     Pack years: 40.00     Last attempt to quit: 2017     Years since quitting: 3.6    Smokeless tobacco: Never Used   Substance Use Topics    Alcohol use: Not Currently        Objective:       Physical Examination:    Vitals:    08/31/20 0738 08/31/20 0743   BP: 122/63    Pulse: 94    Resp: 18    Temp: 98.5 °F (36.9 °C)    SpO2: 96%    Weight:  63 kg (139 lb)   Height:  5' 2\" (1.575 m)       Pain Assessment  Pain Intensity 1: 0 (08/31/20 0739)        Patient Stated Pain Goal: 0      HEART: regular rate and rhythm  LUNG: clear to auscultation bilaterally  ABDOMEN: normal findings: soft, non-tender  EXTREMITIES: warm, no edema    Laboratory:     Lab Results   Component Value Date/Time    Sodium 142 08/31/2020 07:49 AM    Sodium 143 07/23/2020 03:14 PM    Potassium 4.4 08/31/2020 07:49 AM    Potassium 4.8 07/23/2020 03:14 PM    Chloride 113 (H) 08/31/2020 07:49 AM    Chloride 112 (H) 07/23/2020 03:14 PM    CO2 22 08/31/2020 07:49 AM    CO2 27 07/23/2020 03:14 PM    Anion gap 7 08/31/2020 07:49 AM    Anion gap 4 (L) 07/23/2020 03:14 PM    Glucose 141 (H) 08/31/2020 07:49 AM    Glucose 155 (H) 07/23/2020 03:14 PM    BUN 37 (H) 08/31/2020 07:49 AM    BUN 15 07/23/2020 03:14 PM    Creatinine 1.65 (H) 08/31/2020 07:49 AM    Creatinine 1.50 07/23/2020 03:14 PM    GFR est AA 53 (L) 08/31/2020 07:49 AM    GFR est AA 59 (L) 07/23/2020 03:14 PM    GFR est non-AA 44 (L) 08/31/2020 07:49 AM    GFR est non-AA 49 (L) 07/23/2020 03:14 PM    Calcium 8.7 08/31/2020 07:49 AM    Calcium 9.0 07/23/2020 03:14 PM    Magnesium 2.2 10/14/2017 04:48 AM    Magnesium 2.2 10/12/2017 01:25 AM    Phosphorus 3.7 07/22/2010 01:52 PM    Albumin 3.3 07/23/2020 03:14 PM    Albumin 4.1 07/08/2019 09:14 AM    Protein, total 8.0 07/23/2020 03:14 PM    Protein, total 7.1 07/08/2019 09:14 AM    Globulin 4.7 (H) 07/23/2020 03:14 PM    Globulin 4.4 (H) 10/12/2017 11:25 AM    A-G Ratio 0.7 (L) 07/23/2020 03:14 PM    A-G Ratio 1.4 07/08/2019 09:14 AM    ALT (SGPT) 32 07/23/2020 03:14 PM    ALT (SGPT) 23 07/08/2019 09:14 AM     Lab Results   Component Value Date/Time    WBC 5.6 08/31/2020 07:49 AM    WBC 4.4 07/23/2020 03:14 PM    HGB 11.2 (L) 08/31/2020 07:49 AM    HGB 12.2 (L) 07/23/2020 03:14 PM    HCT 34.7 (L) 08/31/2020 07:49 AM    HCT 37.9 (L) 07/23/2020 03:14 PM    PLATELET 273 58/80/8090 07:49 AM    PLATELET 081 67/59/2995 03:14 PM     Lab Results   Component Value Date/Time    aPTT 25.4 10/12/2017 01:25 AM    Prothrombin time 10.6 10/12/2017 01:25 AM    INR 1.0 10/12/2017 01:25 AM       Assessment:     Lung cancer    Hospital Problems  Date Reviewed: 8/27/2020    None          Plan:     Planned Procedure:  Port placement    Risks, benefits, and alternatives reviewed with patient and he agrees to proceed with the procedure.       Signed By: Lazarus Ip, PA-C     August 31, 2020

## 2020-08-31 NOTE — ANESTHESIA POSTPROCEDURE EVALUATION
Procedure(s): 
IR ANESTHESIA- PORT INSERT. total IV anesthesia Anesthesia Post Evaluation Multimodal analgesia: multimodal analgesia used between 6 hours prior to anesthesia start to PACU discharge Patient location during evaluation: bedside Patient participation: complete - patient participated Level of consciousness: awake and alert Pain management: adequate Airway patency: patent Anesthetic complications: no 
Cardiovascular status: acceptable Respiratory status: acceptable Hydration status: acceptable Post anesthesia nausea and vomiting:  controlled Final Post Anesthesia Temperature Assessment:  Normothermia (36.0-37.5 degrees C) INITIAL Post-op Vital signs: No vitals data found for the desired time range.

## 2020-09-07 NOTE — PROGRESS NOTES
I saw patient in office with Dr Virginia Woods on 9-3-20. Pt supposed to start chemotherapy tomorrow but will delay and have EBUS for more tissue since enough not obtained with last bx. This was explained to patient today and his son and they are in agreement. Pt will have EBUS on 9-9. Last dose of Plavix will be 9-4-20. Spoke with Tesha Duke and this was explained with her as well and cleared by [facility]. Pt will return 9-10-20 to start Carbo/Keytruda/Alimta 9-10-20. First dose Vit B12 9-10. Called pt and left voicemail on 0-9-70 to start Folic Acid tabs and sent to pharmacy. Pt states has Compazine and Zofran. Navigation will follow.

## 2020-09-09 NOTE — PROCEDURES
PROCEDURE  Bronchoscopy with Endobronchial Ultrasound Guided Fine Needle Aspiration Of Medistinal Lymph nodes and airway inspection. INDICATION   Additional tissue for JEN, in a patient with known stage IIIB pulmonary adenocarcinoma      POST OP DIAGNOSIS:  Station 4L was biopsied and positive for malignancy on RODY. ANESTHESIA  Concious sedation with: Fentanyl 250 mcg IV; Versed 3 mg IV; Lidocaine 200 mg to tracheo-bronchial tree and vocal cords    AIRWAY INSPECTION  After obtaining informed consent, using a bite block, an Olympus T 180 video bronchoscope was  introduced into the trachea through the vocal cords without complications. RIGHT  LOCATION NORM/ABNORMAL DESCRIPTION   VOCAL CORDS NL    TRACHEA NL    JOSELO NL    RMSB NL    RUL NL    BI NL    RML NL    SUP SEGM RLL NL    MED BASAL NL    ANTERIOR BASAL NL    LATERAL BASAL NL    POSTERIOR BASAL NL                                               LEFT  LOCATION NORMAL/ABNORMAL TYPE   LMSB NL    CHRIS NL    LINGULA NL    SUPERIOR DIVISION ABNL Extrinsic compression   SUPERIOR SEG LLL NL    KARL-MEDIAL LLL NL    LATERAL LLL NL    POSTERIOR LLL NL                         EBUS  After completing the airway inspection an Olympus  F EBUS bronchoscope was introduced into the trachea through the vocal chords without complication.   The balloon was inflated with saline and a mediastinal inspection commenced:      STATION SIZE IN CM   4L 1.2/1.0         After identifying targets the following samples were obtained:    STATION PASS# LYMPHOCYTES MALIGNANT ATYPICAL GRANULOMA NONDGNSTC   4L 1 - - - - BE and blood    2 - ++++ - -              3 -- -- -- -- In toto for JEN    4 -- -- -- -- \"    5 -- -- -- -- \"    6 -- -- -- -- \"    7 -- -- -- -- \"    8 -- -- -- -- \"    9 -- -- -- -- \"    10 -- -- -- -- \"    11 -- -- -- -- \"    12 -- -- -- -- \"                  The procedure was completed without complication and the patient tolerated the procedure well.    EBL: 10 ml    Ronal Ragsdale MD.

## 2020-09-09 NOTE — DISCHARGE INSTRUCTIONS
RESPIRATORY CARE - BRONCHOSCOPY - DISCHARGE INSTRUCTIONS      You received a lot of numbing medication for your throat and nose, and you also received medication to make you sleepy during your procedure. Because of this and because the bronchoscopy may have irritated your airways, we ask that you follow these directions:    1. Do not eat or drink until  1:00 pm .   After that, you may have what you please. You may want to try some liquids first, because your throat may be a little sore. 2. Medication may cause drowsiness for several hours, therefore:  · Do not drive or operate machinery for remainder of the day. · No alcohol today  · Do not make any important or legal decisions for 24 hours  · Do not sign any legal documents for 24 hours    3. You may cough up more mucus than usual and you may see some blood, but                   this is expected and should subside by the following day. 4. If severe throat irritation, coughing, or bleeding continue, call your doctor. 5.         If you run a fever greater than 102, call [facility] at [phone]. 6.         Follow up with oncology as scheduled    Discharge Medications: continue current medications; resume plavix on Friday, September 11, 2020.        Instructions given to [employer] Sr. and other family members

## 2020-09-09 NOTE — ROUTINE PROCESS
VSS. No complaints noted. Education given and reviewed with son who voiced understanding. Pt wheeled out via wheelchair by Katia Mijares.

## 2020-09-09 NOTE — INTERVAL H&P NOTE
Update History & Physical  Date of Surgery Update:  Aislinn Woods  was seen and examined. History and physical has been reviewed. The patient has been examined.  There have been no significant clinical changes since the completion of the originally dated History and Physical.    Signed By: Berna Cr MD     September 9, 2020 11:25 AM               Electronically signed by Berna Cr MD on 9/9/2020 at 11:25 AM

## 2020-09-10 NOTE — PROGRESS NOTES
Arrived to the [facility]. Assessment and labs reviewed and completed. D1C1 Alimta/Keytruda/Carbo completed. Chemo Education Provided and Consent signed. Patient instructed to take Rx Zofran as prescribed and to drink adequate fluids. Handouts provided to patient. Per Treatment plan, B12 injection given in right arm. Educated patient on prescription of Folic Acid and to take everyday until MD states otherwise; patient knows to pick RX up after appt. Patient tolerated very well. Any issues or concerns during appointment: none. Patient aware of next infusion appointment on 10/1/2020 @ 1400; print off schedule provided. Discharged ambulatory to private residence.

## 2020-09-10 NOTE — PROGRESS NOTES
Clinical Social Work Note  Name: Natalie Roque Sr.    : 1950    MRN: [id number]    Date of Service: 9/10/2020    Type of Service: Case Management & Health and Behavior Intervention - Initial Assessment (60762)    Length of Service: 15 minutes    Patient Diagnosis:   1. Malignant neoplasm of upper lobe of left lung [facility])         Referral Source: Infusion RN    Reason for Visit: D1C1    CM Note: Seen patient at Infusion on Pod 3.  SW introduced self, presented [employer] and others. At this moment, pt denies any psychosocial and economic needs.  Note: LISW-CP discussed Distress by using NCCN Guidelines for Patients, Distress with patient. LISW-CP overviewed relaxation and practiced breathing with the patient. Booklet addressing relaxation and breathing techniques were given. Mini Mental Status Exam: Natalie Roque Sr. was dressed properly. No abnormal psychomotor movements observed. Intellectual functioning appeared to be intact. Insight was adequate. Judgment was adequate. Patient did not report suicidal ideations, intent or plans. Speech was coherent. Thought process was clear. Patient did not report homicidal ideations, intent or plans. Patient was oriented to self, place, time and situation. Protective Factors: Current care for physical and mental illness, adequate insight and judgment, family support, cultural and Restorationism beliefs and values that support self-care. Next Steps: LISW-CP gave contact information and encouraged pt to call should any needs arise. Pt verbalized understanding. SW intends to follow up as needed.       3 most recent [facility] Screens 9/10/2020 9/3/2020 2020   Little interest or pleasure in doing things Not at all Not at all Not at all   Feeling down, depressed, irritable, or hopeless Not at all Not at all Not at all   Total Score PHQ 2 0 0 0           Electronically Signed By:  Tamiko Lozano

## 2020-09-10 NOTE — PROGRESS NOTES
9/10/2020  Patient seen with Jeanice Epley NP for pre-chemo day one cycle one Keytruda/Alimta/Carbo. Son with patient for visit. Chemo Education provided to patient per NP. Encouraged patient to take nausea medications as ordered. Patient encouraged to call for any fevers and nausea that is not relieved with current prescriptions. Patient wishes to continue with treatment plan. Patient will continue to be followed by navigation.

## 2020-09-10 NOTE — PROGRESS NOTES
I met with Faviola He regarding follow-up with our telephone financial counseling session. Faviola He signed the [address] form, the [address] and the [address] forms. I answered questions about insurance and billing services. Faviola He signed and completed the financial assistance application. The patient stated he will obtain his bank statements and send to me within the week for processing. Faviola He was given the financial counseling folder with my contact information and the insurance health benefits sheet. Faviola He stated he was approved for Full Medicaid within the last week. After checking the Medicaid Portal and Epic, the approval is not currently showing. I will continue to monitor his account for an updated approval status on [facility] Eligibility. We discussed the Patient Assistance Programs for the Keytruda, Alimta, and Emend Medications. The folder also contained information regarding the benefits specialists contact information, cancer center resources, and informational flyers. Faxed Patient Referral form to the [address] at [phone]. Phone [phone]. Form scanned into chart. Faxed Physician's Statement to the [address] at [phone]. Phone [phone]. Form scanned into chart. Faviola He expressed understanding of the information above and all questions were answered to his satisfaction.

## 2020-09-18 PROBLEM — E86.0 DEHYDRATION: Status: ACTIVE | Noted: 2020-01-01

## 2020-09-18 NOTE — PROGRESS NOTES
Arrived to the [facility]. IVF and pepcid completed. Patient tolerated well. Any issues or concerns during appointment: none. Patient aware of next infusion appointment on 10/01/20  (date) at 1400 (time). Discharged via wheelchair.

## 2020-09-21 NOTE — PROGRESS NOTES
Saw patient on 9-18-20 for follow up tox chk with Layne Tomlin NP. He is fatigued and not able to eat or drink vvery much due to burning of throat and esophageal area. Pepcid give along w fluids IV on 9-18 and pt started on Protonix daily. [facility] [address] referral sent and pt and son encouraged to obtain Ensure/supplemental drinks and start on those 3-4 times a day. Pt states he will improve eating if the burning subsides. Pt encouraged to call with any further questions or concerns.  Nurse navigation will continue to follow

## 2020-10-01 NOTE — PROGRESS NOTES
Arrived to the [facility]. Chemo completed. Patient tolerated well. Any issues or concerns during appointment: No. 
Discharged home in stable condition.

## 2020-10-01 NOTE — PROGRESS NOTES
10/1/2020  Pt/son seen today with Dr Ashish Neri follow up lung cancer. Due for C2 Keytruda/Alimta/Carbo today, tolerating well. Dr Ashish Neri reviewed treatment plan with pt, will order Guardant 360, pt will have labs done today. Will schedule pt for fluids/antiemetics D3 of each treatment, pt aware. Will return next week for tox check. Will schedule Pet scan with follow up after. Continue to Infusion for treatment. Encouraged to call with any questions/concerns. Navigation will continue to follow.

## 2020-10-03 NOTE — PROGRESS NOTES
Arrived to the [facility]. Assessment completed. 1 liter of NS, Zofran and Decadron completed. Patient tolerated without problems. Any issues or concerns during appointment: None  Instructed to call Dr Tea Howard with any side effects or concerns  Patient aware of next infusion appointment on 10/22/20 (date) at 6 AM (time).   Discharged ambulatory

## 2020-10-09 NOTE — PROGRESS NOTES
I saw patient on 10-8-20 with Jg Hatch for tox check. He states he felt much better with the addition of anti nausea meds on D3 and now has included antinausea meds daily. C2 has been more easily tolerated than C1 due to this. His son is present and agrees. We discussed continuing to practice hand washing and low lab counts today as expected and pt will return on 10-22 for C3 and call us in the meantime for any questions or concerns.  Navigation is following

## 2020-10-22 PROBLEM — T45.1X5A CHEMOTHERAPY INDUCED NEUTROPENIA (HCC): Status: ACTIVE | Noted: 2020-01-01

## 2020-10-22 PROBLEM — D70.1 CHEMOTHERAPY INDUCED NEUTROPENIA (HCC): Status: ACTIVE | Noted: 2020-01-01

## 2020-10-22 NOTE — PROGRESS NOTES
[facility] Granix completed. Patient tolerated well. Observed patient x 30 minutes, no reactions. Any issues or concerns during appointment: none. Patient aware of next infusion appointment on 10/23 (date) at [phone] (time). Discharged ambulatory in stable condition.

## 2020-10-22 NOTE — PROGRESS NOTES
10/22/2020  Patient seen today with [place] NP for pre-chemo office visit. Granix added to treatment plan for today and chemo rescheduled for 10/23 and fluids to 10/25. Patient encouraged to monitor constipation when taking anti-emetics. Patient encouraged to purchase over the counter cortisone for rash area. Patient agrees to continue on treatment plan and will continue to be followed by navigation.

## 2020-10-23 NOTE — PROGRESS NOTES
[facility] Assessment completed, labs drawn and reviewed. Pre meds, Keytruda, Alimta and Carboplatin infusion completed. Patient tolerated well. Any issues or concerns during appointment: none. Patient aware of next infusion appointment on 10/25/2020 at 0930. Discharged ambulatory.

## 2020-10-23 NOTE — ADDENDUM NOTE
Encounter addended by: Tomer Crook RPH on: 10/23/2020 3:13 PM 
 Actions taken: i-Jelena created or edited

## 2020-10-25 NOTE — PROGRESS NOTES
Arrived to the [facility]. Hydration completed. Patient tolerated without problems. Any issues or concerns during appointment: no.  Patient aware of next infusion appointment on 11/12/20 (date) at 56 (time). Discharged ambulatory.

## 2020-11-12 NOTE — ADDENDUM NOTE
Encounter addended by: Aida Lopez [facility] on: 11/12/2020 11:07 AM 
 Actions taken: i-Jelena created or edited done

## 2020-11-12 NOTE — PROGRESS NOTES
Arrived to the [facility]. B-12 injection, Keytruda/Alimta and Carboplatin infusion completed. Patient tolerated well. Any issues or concerns during appointment: none. Patient aware of next infusion appointment on 11/14/20 at 10  Discharged ambulatory to home.

## 2020-11-14 NOTE — PROGRESS NOTES
Arrived to the [facility]. Assessment completed. 1 liter of NS and Zofran IV completed. Patient tolerated without problems. Any issues or concerns during appointment: None  Instructed to call Dr Baljeet Woodall  with any side effects or concerns  Patient aware of next infusion appointment on 12/3/20 (date) at 2 PM (time).   Discharged ambulatory with son

## 2020-11-16 NOTE — PROGRESS NOTES
I saw patient on 11-12-20 with Dr Josef Beaver after C4 Carbo/Alimta/Keytruda. He received scan report which was good. Pt will continue every 3 weeks without Carbo. Pt and son are here today and they are in agreement with plan.  Nurse navigation is following

## 2020-11-18 NOTE — NURSE NAVIGATOR
Consult lung cancer, adrenal mets. Presented with son for consult. Chemo scheulded 12-3-20. Pet scan 10-29-20. Pt denies pain. Pt is to get 2 more cycles of Alimta/Keytruda before RT per Dr. Ashish Neri. NN notified. See/Sim scheduled 1-4-21.      Melanie Valentine RN

## 2020-11-18 NOTE — CONSULTS
Patient: Karly Contreras Sr. MRN: [id number]  SSN: [id number]    YOB: 1950  Age: 69 y.o. Sex: male      Other Providers:  Dr. Vicki Rolle: cough    DIAGNOSIS: oligometastatic NSCLC    PREVIOUS TREATMENT:  1) Currently on carbo/Alimta and pembro    HISTORY OF PRESENT ILLNESS:  Karly Contreras Sr. is a 69 y.o. male who I am seeing at the request of Dr. Laura Estevez. His oncologic history began this summer when he presented with shortness of breath. CT was done and this revealed CHRIS mass as well as hilar and mediastinal LNs. PET/CT was done and confirmed the above findings but also showed a R adrenal mass as well. EBUS/bx was done and this revealed NSCLC. Of note, he has a prior history of RCC in 2017 for a pT1pNx tumor. He then started carbo/Alimta and pembro and repeat PET scan after 4 cycles revealed a partial response with no new mets. Symptomatically, he is doing well overall. Occasionally gets short of breath upon exertion. Denies weight loss.     PAST MEDICAL HISTORY:    Past Medical History:   Diagnosis Date    Cancer [facility])     kidney ca    Cardiomyopathy ([address]     CHF (congestive heart failure) ([address] 11/21/2017    55-60% 4/2019    Chronic kidney disease     CKD (chronic kidney disease), stage III     Stage 3    Coronary artery disease involving native coronary artery of native heart without angina pectoris 10/25/2017    followed by [facility] Card last cath 10/16/2017 no stents    Diabetes ([address]     abg fbs 115 last A1c 7.3 1/20/2020    Foot drop, left     Former cigarette smoker     History of embolectomy 07/2019    right common femoral embolectomy    History of kidney cancer 12/2017    kidney- right removed    Long term current use of anticoagulant     Plavix    Sickle cell trait (HCC)     Sickle cell trait    Status post carotid endarterectomy 07/2019    Right side    Thyroid disease        The patient denies history of collagen vascular diseases, pacemaker insertion, prior radiation or prior chemotherapy. PAST SURGICAL HISTORY:   Past Surgical History:   Procedure Laterality Date    HX APPENDECTOMY  1963    HX CAROTID ENDARTERECTOMY      HX NEPHRECTOMY Right 12/2017    IR INSERT TUNL CVC W PORT OVER 5 YEARS  8/31/2020    NV LEFT HEART CATH,PERCUTANEOUS  10/16/2017    no PCI- medical management    VASCULAR SURGERY PROCEDURE UNLIST Bilateral 10/11/2018    iliac angio and stent    VASCULAR SURGERY PROCEDURE UNLIST  05/13/2019    Right lower extremity arteriogram with balloon angioplasty and stent of the right external iliac artery with 8 x 4 S. M.A.R.T. stent.  VASCULAR SURGERY PROCEDURE UNLIST Right 06/20/2019     Right common femoral artery endarterectomy,Right common femoral embolectomy,Right lower extremity arteriogram       MEDICATIONS:     Current Outpatient Medications:     insulin degludec [facility] FlexTouch U-100) 100 unit/mL (3 mL) inpn, 20 Units by SubCUTAneous route nightly. 20 units sq daily  Indications: type 2 diabetes mellitus, Disp: 1 Pen, Rfl: 5    lidocaine-prilocaine (EMLA) topical cream, Apply  to affected area as needed for Pain. Apply to port site 30-45 min prior to port needle stick, Disp: 30 g, Rfl: 4    pantoprazole (PROTONIX) 40 mg tablet, Take 1 Tab by mouth daily. Indications: gastroesophageal reflux disease, Disp: 30 Tab, Rfl: 3    sucralfate (Carafate) 1 gram tablet, Take 1 Tab by mouth four (4) times daily. Mix tab in 10 ml warm water  Indications: heartburn, Disp: 120 Tab, Rfl: 2    Victoza 3-Denis 0.6 mg/0.1 mL (18 mg/3 mL) pnij, ADMINISTER 1.8 MG UNDER THE SKIN DAILY, Disp: 9 mL, Rfl: 3    folic acid (FOLVITE) 1 mg tablet, Take 1 Tab by mouth daily. , Disp: 90 Tab, Rfl: 3    prochlorperazine (Compazine) 10 mg tablet, Take 1 Tab by mouth every six (6) hours as needed for Nausea.  Indications: nausea and vomiting caused by cancer drugs, nausea and vomiting, Disp: 90 Tab, Rfl: 3    ondansetron hcl (Zofran) 8 mg tablet, Take 1 Tab by mouth every eight (8) hours as needed for Nausea. Indications: nausea and vomiting caused by cancer drugs, Disp: 60 Tab, Rfl: 3    valsartan (DIOVAN) 80 mg tablet, TAKE 1 TABLET BY MOUTH DAILY (Patient taking differently: nightly.), Disp: 30 Tab, Rfl: 0    Olamide Pen Needle 32 gauge x 5/32\" ndle, use once daily, Disp: 100 Pen Needle, Rfl: 11    dapagliflozin (Farxiga) 10 mg tab tablet, Take 1 Tab by mouth daily. , Disp: 90 Tab, Rfl: 1    Unithroid 50 mcg tablet, Take 1 Tab by mouth Daily (before breakfast). , Disp: 90 Tab, Rfl: 1    carvediloL (COREG) 12.5 mg tablet, Take 1 Tab by mouth two (2) times daily (with meals). , Disp: 180 Tab, Rfl: 1    atorvastatin (LIPITOR) 80 mg tablet, TAKE 1 TABLET BY MOUTH EVERY NIGHT, Disp: 90 Tab, Rfl: 3    clopidogreL (PLAVIX) 75 mg tab, Take 1 Tab by mouth daily. , Disp: 90 Tab, Rfl: 3    ONETOUCH ULTRA BLUE TEST STRIP strip, , Disp: , Rfl: 1    ALLERGIES:   Allergies   Allergen Reactions    Aspirin Nausea and Vomiting       SOCIAL HISTORY:   Social History     Socioeconomic History    Marital status: LEGALLY      Spouse name: Not on file    Number of children: Not on file    Years of education: Not on file    Highest education level: Not on file   Occupational History    Not on file   Social Needs    Financial resource strain: Not on file    Food insecurity     Worry: Not on file     Inability: Not on file    Transportation needs     Medical: Not on file     Non-medical: Not on file   Tobacco Use    Smoking status: Former Smoker     Packs/day: 1.00     Years: 40.00     Pack years: 40.00     Last attempt to quit: 2017     Years since quitting: 3.8    Smokeless tobacco: Never Used   Substance and Sexual Activity    Alcohol use: Not Currently    Drug use: Not Currently     Comment: none since 2017    Sexual activity: Not on file   Lifestyle    Physical activity     Days per week: Not on file     Minutes per session: Not on file    Stress: Not on file   Relationships    Social connections     Talks on phone: Not on file     Gets together: Not on file     Attends Jain service: Not on file     Active member of club or organization: Not on file     Attends meetings of clubs or organizations: Not on file     Relationship status: Not on file    Intimate partner violence     Fear of current or ex partner: Not on file     Emotionally abused: Not on file     Physically abused: Not on file     Forced sexual activity: Not on file   Other Topics Concern    Not on file   Social History Narrative    Not on file       FAMILY HISTORY:   Family History   Problem Relation Age of Onset    Diabetes Other     Cancer Mother         Ovarian CA    Heart Disease Mother     Hypertension Mother     Sickle Cell Trait Mother     Heart Disease Father     Sickle Cell Trait Father     Sickle Cell Anemia Brother     Heart Disease Brother     Hypertension Brother     Cancer Cousin         prostate       REVIEW OF SYSTEMS: Please see the completed review of systems sheet in the chart that I have reviewed today.       PHYSICAL EXAMINATION:   ECOG Performance status 1  VITAL SIGNS:   Visit Vitals  /83 (BP 1 Location: Left arm, BP Patient Position: Sitting)   Pulse 94   Temp (!) 96.4 °F (35.8 °C)   Wt 61.5 kg (135 lb 8 oz)   SpO2 100%   BMI 24.62 kg/m²      General: well developed/nourished adult Male in no acute distress; appears stated age  [de-identified]: normocephalic, atraumatic; EOMI  Neck: supple with full ROM; no cervical lymphadenopathy  Cardiovascular: normal S1 and S2, RRR, no murmurs  Respiratory: normal inspiratory effort, no audible wheezes  Extremities: no cyanosis, clubbing, or edema  Musculoskeletal: mobility intact x4; normal ROM in all joints  Skin: no skin lesions identified  Neuro: AOx3; sensation intact x 4; CNII-XII grossly intact  Psych: appropriate affect, insight, and judgement  GI: abdomen soft, non-distended    PATHOLOGY:    Pathology report reviewed - see HPI    LABORATORY:   Lab Results   Component Value Date/Time    Sodium 142 11/12/2020 09:15 AM    Potassium 4.5 11/12/2020 09:15 AM    Chloride 111 (H) 11/12/2020 09:15 AM    CO2 25 11/12/2020 09:15 AM    Anion gap 6 (L) 11/12/2020 09:15 AM    Glucose 228 (H) 11/12/2020 09:15 AM    BUN 16 11/12/2020 09:15 AM    Creatinine 1.80 (H) 11/12/2020 09:15 AM    GFR est AA 48 (L) 11/12/2020 09:15 AM    GFR est non-AA 40 (L) 11/12/2020 09:15 AM    Calcium 8.7 11/12/2020 09:15 AM    Magnesium 2.4 11/12/2020 09:15 AM    Phosphorus 3.7 07/22/2010 01:52 PM    Albumin 3.3 11/12/2020 09:15 AM    Protein, total 7.2 11/12/2020 09:15 AM    Globulin 3.9 (H) 11/12/2020 09:15 AM    A-G Ratio 0.8 (L) 11/12/2020 09:15 AM    ALT (SGPT) 30 11/12/2020 09:15 AM     Lab Results   Component Value Date/Time    WBC 2.8 (L) 11/12/2020 09:15 AM    HGB 9.8 (L) 11/12/2020 09:15 AM    HCT 30.0 (L) 11/12/2020 09:15 AM    PLATELET 901 73/70/9025 09:15 AM       RADIOLOGY:    Ir Insert Tunl Cvc W Port Over 5 Years    Result Date: 8/31/2020  Title: Chest port placement through the right internal jugular vein using ultrasound and fluoroscopic guidance with maximal sterile barrier appropriately documented and fluoroscopy time and images documented in report. History: 60-year-old male with lung cancer, history of renal cancer. :  Esdras Jeffers PA-C Supervising Physician: Alycia Dorsey M.D. Consent: Informed written and oral consent was obtained from the patient after explanation of benefits and risks (including, but not limited to: infection, vascular injury, lung injury, and thrombus formation) of procedure. The patient's questions were answered to their satisfaction. They stated understanding and requested that we proceed. Procedure:  This port was inserted with all elements of maximal sterile barrier technique, cap and mask and sterile gown and sterile gloves and sterile full body drape and hygiene and 2% chlorhexidine for cutaneous antisepsis and sterile ultrasound gel and sterile ultrasound probe cover. Following routine prep and drape of the right neck and chest, a local field block with lidocaine was achieved. Ultrasound evaluation of all potential access sites was performed due to lack of a palpable vein. The vein was not palpable due to overlying adipose tissue. Using real-time ultrasound guidance, with appropriate image recording, the patent right,left internal jugular vein was accessed. Using fluoroscopy, a peel-away sheath was placed over a wire. A 1-inch incision was made on the right chest wall and a subcutaneous pocket created. The catheter was tunneled subcutaneously and passed down the peel-away sheath positioning the tip in the right atrium, confirmed fluoroscopically. The catheter was cut to the appropriate length and connected to the [facility] which was then placed in the pocket. The Port-A-Cath was accessed, aspirated easily, and was filled with heparinized saline. All incisions were closed with absorbable suture. Sterile surgical glue was placed on the skin. Complications: None. Radiation dose: Fluoroscopy time: 6 seconds. Reference air kerma (mGy): 1 Kerma area product (cGy.cm2):  18.16 Fluoroscopic images: 1 Contrast:  0 milliliters. Medications:  MAC. Ancef was given for prophylactic antibiotic therapy. Impression: Uncomplicated right internal jugular vein tunneled power injectable chest port placement. Plan: The patient will recover for 1 hour. The chest port is ready for use. Duplex Low Ext Arteries With Lowell Granados    Result Date: 11/10/2020  Final Vascular Lab ultrasound report scanned under the imaging tab. Click RESULTS link to view. Pet/ct Tumor Image Skull Thigh (sub)    Result Date: 10/29/2020  NUCLEAR MEDICINE WHOLE BODY CT / PET- FDG Study. INDICATION: Lung cancer, restaging. COMPARISONS: July 2020 TECHNIQUE:   Radiopharmaceutical: 15.2 mCi F18-FDG IV.   This is a whole-body PET- FDG study performed on a dedicated full- ring scanner. Low dose, nondiagnostic CT axial source images are also acquired for PET attenuation correction and are utilized for PET-CT fusion. The patient underwent adequate fasting of at least 4 hours. Blood glucose at the time of tracer administration is 145 mg/dl, which is adequate for imaging. Approximately 90 minutes after administration of the radiopharmaceutical, imaging was initiated covering the skull to the thighs. FINDINGS:  HEAD AND NECK: No abnormal hypermetabolic activity in the head or neck. Normal physiologic activity in brain and salivary glands. CHEST: Left suprahilar mass is smaller, 6.1 x 1.8 cm. There is persistent hypermetabolic activity albeit slightly decreased intensity. The hips and lateral and contralateral hilar lymph nodes remain hypermetabolic although decreased in intensity. ABDOMEN: Hypermetabolic right adrenal metastasis is stable in size. No new intra-abdominal hypermetabolic lesions. Right kidney is absent. There is aortoiliac atherosclerosis. PELVIS: Normal physiologic activity in the GI tract and  tract. Prostate is enlarged. SKELETON: No abnormal hypermetabolic bony foci to suggest metastatic disease. There is L5 spondylolysis and grade II spondylolisthesis. IMPRESSION: Decreasing size and degree of hypermetabolic activity in the left upper lobe mass and hilar and mediastinal adenopathy and right adrenal metastasis. No new abnormalities. IMPRESSION:  Gio Cardenas is a 69 y.o. male with stage 3 NSCLC in the chest with a solitary R adrenal metastasis. PLAN:    Will discuss his case with Dr. Paresh Alonso. Recommend SBRT, 50 Gy in 5 fractions to the R adrenal followed by 60 Gy in 30 fractions to the CHRIS/mediastinum/L hilum with concurrent chemo. Will set up simulation once timing is finalized.     Clemente Leigh MD   November 18, 2020

## 2020-12-03 NOTE — PROGRESS NOTES
Arrived to the [facility]. Keytruda completed. Patient tolerated well. Any issues or concerns during appointment: Patient's CrCl 28. Initially, NP gave okay to treat order, but later called back after clarification with Dr. Ericka Jo. Per Deondre Wood NP, Alimta will be held today. Keytruda only to be administered. Patient notified. Patient aware of next infusion appointment on 12/24/2020 (date) at 8:15 am (time). Discharged ambulatory with self.

## 2020-12-07 NOTE — PROGRESS NOTES
I saw patient on 12-3 with Dorethea Punches prior to [facility]. Pt will continue until mid jan according to Dr Mya Gardner and then will start course of concurrent chemo radiation and we will arrange. Pt will come also for next on 12-24 w [facility] and arrange changes after. Pt feels good tpday without complaints. Slight raised rash to arms and recommended Hydrocortisone cream. Pt is in agreement w plan.

## 2020-12-13 NOTE — PROGRESS NOTES
Pt arrived ambulatory. 1 liter NS infused, pt tolerated well. Pt aware of next appt 12/24 at 1000. Discharged ambulatory. No distress noted.

## 2020-12-21 NOTE — ED NOTES
I have reviewed discharge instructions with the patient. The patient verbalized understanding. Patient left ED via Discharge Method: ambulatory to Home with FAMILY. Opportunity for questions and clarification provided. Patient given 0 scripts. To continue your aftercare when you leave the hospital, you may receive an automated call from our care team to check in on how you are doing. This is a free service and part of our promise to provide the best care and service to meet your aftercare needs.  If you have questions, or wish to unsubscribe from this service please call [phone]. Thank you for Choosing our [facility] Emergency Department.

## 2020-12-21 NOTE — DISCHARGE INSTRUCTIONS
Patient Education        Dehydration: Care Instructions  Your Care Instructions  Dehydration happens when your body loses too much fluid. This might happen when you do not drink enough water or you lose large amounts of fluids from your body because of diarrhea, vomiting, or sweating. Severe dehydration can be life-threatening. Water and minerals called electrolytes help put your body fluids back in balance. Learn the early signs of fluid loss, and drink more fluids to prevent dehydration. Follow-up care is a key part of your treatment and safety. Be sure to make and go to all appointments, and call your doctor if you are having problems. It's also a good idea to know your test results and keep a list of the medicines you take. How can you care for yourself at home? · To prevent dehydration, drink plenty of fluids, enough so that your urine is light yellow or clear like water. Choose water and other caffeine-free clear liquids until you feel better. If you have kidney, heart, or liver disease and have to limit fluids, talk with your doctor before you increase the amount of fluids you drink. · If you do not feel like eating or drinking, try taking small sips of water, sports drinks, or other rehydration drinks. · Get plenty of rest.  To prevent dehydration  · Add more fluids to your diet and daily routine, unless your doctor has told you not to. · During hot weather, drink more fluids. Drink even more fluids if you exercise a lot. Stay away from drinks with alcohol or caffeine. · Watch for the symptoms of dehydration. These include:  ? A dry, sticky mouth. ? Dark yellow urine, and not much of it. ? Dry and sunken eyes. ? Feeling very tired. · Learn what problems can lead to dehydration. These include:  ? Diarrhea, fever, and vomiting. ? Any illness with a fever, such as pneumonia or the flu. ?  Activities that cause heavy sweating, such as endurance races and heavy outdoor work in hot or humid weather. ? Alcohol or drug use or problems caused by quitting their use (withdrawal). ? Certain medicines, such as cold and allergy pills (antihistamines), diet pills (diuretics), and laxatives. ? Certain diseases, such as diabetes, cancer, and heart or kidney disease. When should you call for help? Call 911 anytime you think you may need emergency care. For example, call if:    · You passed out (lost consciousness). Call your doctor now or seek immediate medical care if:    · You are confused and cannot think clearly.     · You are dizzy or lightheaded, or you feel like you may faint.     · You have signs of needing more fluids. You have sunken eyes and a dry mouth, and you pass only a little dark urine.     · You cannot keep fluids down. Watch closely for changes in your health, and be sure to contact your doctor if:    · You are not making tears.     · Your skin is very dry and sags slowly back into place after you pinch it.     · Your mouth and eyes are very dry. Where can you learn more? Go to http://www.gray.com/  Enter Q814 in the search box to learn more about \"Dehydration: Care Instructions. \"  Current as of: June 26, 2019               Content Version: 12.6  © 4021-0889 [employer]. Care instructions adapted under license by [employer] (which disclaims liability or warranty for this information). If you have questions about a medical condition or this instruction, always ask your healthcare professional. [place] [address] any warranty or liability for your use of this information. Patient Education        Oral Rehydration: Care Instructions  Your Care Instructions     Dehydration occurs when your body loses too much water. This can happen if you do not drink enough fluids or lose a lot of fluid due to diarrhea, vomiting, or sweating. Being dehydrated can cause health problems and can even be life-threatening.   To replace lost fluids, you need to drink liquid that contains special chemicals called electrolytes. Electrolytes keep your body working well. Plain water does not have electrolytes. You also need to rest to prevent more fluid loss. Replacing water and electrolytes (oral rehydration) completely takes about 36 hours. But you should feel better within a few hours. Follow-up care is a key part of your treatment and safety. Be sure to make and go to all appointments, and call your doctor if you are having problems. It's also a good idea to know your test results and keep a list of the medicines you take. How can you care for yourself at home? · Take frequent sips of a drink such as Gatorade, Powerade, or other rehydration drinks that your doctor suggests. These replace both fluid and important chemicals (electrolytes) you need for balance in your blood. · Drink 2 quarts of cool liquid over 2 to 4 hours. You should have at least 10 glasses of liquid a day to replace lost fluid. If you have kidney, heart, or liver disease and have to limit fluids, talk with your doctor before you increase the amount of fluids you drink. · Make your own drink. Measure everything carefully. The drink may not work well or may even be harmful if the amounts are off. ? 1 quart water  ? ½ teaspoon salt  ? 6 teaspoons sugar  · Do not drink liquid with caffeine, such as coffee and david. · Do not drink any alcohol. It can make you dehydrated. · Drink plenty of fluids, enough so that your urine is light yellow or clear like water. If you have kidney, heart, or liver disease and have to limit fluids, talk with your doctor before you increase the amount of fluids you drink. When should you call for help? Call 911 anytime you think you may need emergency care. For example, call if:    · You have signs of severe dehydration, such as:  ? You are confused or unable to stay awake.  ? You passed out (lost consciousness).    Call your doctor now or seek immediate medical care if:    · You still have signs of dehydration. You have sunken eyes and a dry mouth, and you pass only a little dark urine.     · You are dizzy or lightheaded, or you feel like you may faint.     · You are not able to keep down fluids. Watch closely for changes in your health, and be sure to contact your doctor if:    · You do not get better as expected. Where can you learn more? Go to http://www.gray.com/  Enter I040 in the search box to learn more about \"Oral Rehydration: Care Instructions. \"  Current as of: June 26, 2019               Content Version: 12.6  © 1175-5294 [employer]. Care instructions adapted under license by [employer] (which disclaims liability or warranty for this information). If you have questions about a medical condition or this instruction, always ask your healthcare professional. [address] any warranty or liability for your use of this information.

## 2020-12-21 NOTE — PROGRESS NOTES
Patient's son called and stated patient has been acting disoriented at times and dizzy upon standing. He is also off balance when he is walking and having difficulty with questions. This has been going on for 4 days according to son. Spoke with Dr Eileen Solorio and he recommended [address].

## 2020-12-21 NOTE — ED PROVIDER NOTES
Meño Harp is a 69 y.o. male seen on 12/21/2020 at 2:45 PM in the [facility] EMERGENCY DEPT in room ER08/08. CC: Dizziness HPI:   
 
Per nurse's notes: \"Pt currently being tx for stage 3 lung CA, dizziness upon changing position and while walking, for approx 4 days 
  
Pt has decrease appt due to chemo and having difficultly with his BS\" The history is provided by the patient. Dizziness This is a new problem. The current episode started more than 2 days ago. The problem has been gradually worsening. There was no focality noted. Pertinent negatives include no focal weakness, no loss of sensation, no loss of balance, no slurred speech, no speech difficulty, no memory loss, no movement disorder, no agitation, no visual change, no auditory change, no mental status change, no unresponsiveness and no disorientation. There has been no fever. Pertinent negatives include no shortness of breath, no chest pain, no vomiting, no altered mental status, no confusion, no headaches, no choking, no nausea, no bowel incontinence and no bladder incontinence. There were no medications administered prior to arrival. Associated medical issues do not include trauma, mood changes, bleeding disorder, seizures, dementia, CVA or clotting disorder. REVIEW OF SYSTEMS Review of Systems Constitutional: Positive for activity change, appetite change and fatigue. Negative for chills and fever. HENT: Negative for congestion. Respiratory: Negative for choking and shortness of breath. Cardiovascular: Negative for chest pain. Gastrointestinal: Negative for abdominal pain, bowel incontinence, constipation, diarrhea, nausea and vomiting. Genitourinary: Negative for bladder incontinence, dysuria and frequency. Neurological: Positive for dizziness, weakness (Generalized) and light-headedness. Negative for focal weakness, speech difficulty, headaches and loss of balance. Psychiatric/Behavioral: Negative for agitation, confusion and memory loss. All other systems reviewed and are negative. PAST MEDICAL HISTORY Past Medical History:  
Diagnosis Date  Cancer ([facility] [address]   
 kidney ca  Cardiomyopathy ([facility] [address]  CHF (congestive heart failure) ([facility] [address] 11/21/2017 55-60% 4/2019  Chronic kidney disease  CKD (chronic kidney disease), stage III Stage 3  Coronary artery disease involving native coronary artery of native heart without angina pectoris 10/25/2017  
 followed [facility] last cath 10/16/2017 no stents  Diabetes ([facility] [address]   
 abg fbs 115 last A1c 7.3 1/20/2020  Foot drop, left  Former cigarette smoker  History of embolectomy 07/2019  
 right common femoral embolectomy  History of kidney cancer 12/2017  
 kidney- right removed  Long term current use of anticoagulant Plavix  Sickle cell trait ([facility] [address] Sickle cell trait  Status post carotid endarterectomy 07/2019 Right side  Thyroid disease Past Surgical History:  
Procedure Laterality Date [address]  HX CAROTID ENDARTERECTOMY  HX NEPHRECTOMY Right 12/2017  IR INSERT TUNL CVC W PORT OVER 5 YEARS  8/31/2020  [place] LEFT HEART CATH,PERCUTANEOUS  10/16/2017  
 no PCI- medical management  VASCULAR SURGERY PROCEDURE UNLIST Bilateral 10/11/2018  
 iliac angio and stent  VASCULAR SURGERY PROCEDURE UNLIST  05/13/2019 Right lower extremity arteriogram with balloon angioplasty and stent of the right external iliac artery with 8 x 4 S. M.A.R.T. stent.  VASCULAR SURGERY PROCEDURE UNLIST Right 06/20/2019 Right common femoral artery endarterectomy,Right common femoral embolectomy,Right lower extremity arteriogram  
 
Social History Socioeconomic History  Marital status: LEGALLY  Spouse name: Not on file  Number of children: Not on file  Years of education: Not on file  Highest education level: Not on file Tobacco Use  Smoking status: Former Smoker Packs/day: 1.00 Years: 40.00 Pack years: 40.00 Quit date:  Years since quitting: 3.9  Smokeless tobacco: Never Used Substance and Sexual Activity  Alcohol use: Not Currently  Drug use: Not Currently Comment: none since  Prior to Admission Medications Prescriptions Last Dose Informant Patient Reported? Taking? Olamide Pen Needle 32 gauge x \" ndle   No No  
Sig: use once daily ONETOUCH ULTRA BLUE TEST STRIP strip   Yes No  
Unithroid 50 mcg tablet   No No  
Sig: Take 1 Tab by mouth Daily (before breakfast). Victoza 3-Denis 0.6 mg/0.1 mL (18 mg/3 mL) pnij   No No  
Sig: ADMINISTER 1.8 MG UNDER THE SKIN DAILY  
atorvastatin (LIPITOR) 80 mg tablet   No No  
Sig: TAKE 1 TABLET BY MOUTH EVERY NIGHT  
carvediloL (COREG) 12.5 mg tablet   No No  
Sig: Take 1 Tab by mouth two (2) times daily (with meals). clopidogreL (PLAVIX) 75 mg tab   No No  
Sig: Take 1 Tab by mouth daily. dapagliflozin (Farxiga) 10 mg tab tablet   No No  
Sig: Take 1 Tab by mouth daily. folic acid (FOLVITE) 1 mg tablet   No No  
Sig: Take 1 Tab by mouth daily. insulin degludec Rick Roa FlexTouch U-100) 100 unit/mL (3 mL) inpn   No No  
Si Units by SubCUTAneous route nightly. 20 units sq daily  Indications: type 2 diabetes mellitus  
lidocaine-prilocaine (EMLA) topical cream   No No  
Sig: Apply  to affected area as needed for Pain. Apply to port site 30-45 min prior to port needle stick  
ondansetron hcl (Zofran) 8 mg tablet   No No  
Sig: Take 1 Tab by mouth every eight (8) hours as needed for Nausea.  Indications: nausea and vomiting caused by cancer drugs  
pantoprazole (PROTONIX) 40 mg tablet   No No  
 Sig: Take 1 Tab by mouth daily. Indications: gastroesophageal reflux disease  
prochlorperazine (Compazine) 10 mg tablet   No No  
Sig: Take 1 Tab by mouth every six (6) hours as needed for Nausea. Indications: nausea and vomiting caused by cancer drugs, nausea and vomiting  
sucralfate (Carafate) 1 gram tablet   No No  
Sig: Take 1 Tab by mouth four (4) times daily. Mix tab in 10 ml warm water  Indications: heartburn  
valsartan (DIOVAN) 80 mg tablet   No No  
Sig: TAKE 1 TABLET BY MOUTH DAILY Patient taking differently: nightly. Facility-Administered Medications: None Allergies Allergen Reactions  Aspirin Nausea and Vomiting PHYSICAL EXAM    
 
Vitals:  
 12/21/20 1342 12/21/20 1420 12/21/20 1421 12/21/20 1422 BP: 112/75 (!) 142/74 (!) 147/75 115/66 Pulse: 88 84 87 100 Resp: 18 Temp: 98.3 °F (36.8 °C) SpO2: 99% Vital signs were reviewed. Physical Exam 
Vitals signs and nursing note reviewed. Constitutional:   
   General: He is not in acute distress. Appearance: He is well-developed. HENT:  
   Head: Normocephalic and atraumatic. Right Ear: External ear normal.  
   Left Ear: External ear normal.  
   Nose: Nose normal.  
   Mouth/Throat:  
   Mouth: Mucous membranes are dry. Pharynx: No oropharyngeal exudate or posterior oropharyngeal erythema. Eyes:  
   Extraocular Movements: Extraocular movements intact. Conjunctiva/sclera: Conjunctivae normal.  
   Pupils: Pupils are equal, round, and reactive to light. Neck: Musculoskeletal: Normal range of motion and neck supple. Cardiovascular:  
   Rate and Rhythm: Normal rate and regular rhythm. Heart sounds: Normal heart sounds. No murmur. Pulmonary:  
   Effort: Pulmonary effort is normal.  
   Breath sounds: Wheezing (Mild scattered) present. Abdominal:  
   General: Bowel sounds are normal. There is no distension. Palpations: Abdomen is soft. There is no mass. Tenderness: There is no abdominal tenderness. There is no right CVA tenderness, left CVA tenderness, guarding or rebound. Hernia: No hernia is present. Musculoskeletal: Normal range of motion. Skin: 
   General: Skin is warm and dry. Capillary Refill: Capillary refill takes less than 2 seconds. Neurological:  
   General: No focal deficit present. Mental Status: He is alert and oriented to person, place, and time. Psychiatric:     
   Mood and Affect: Mood normal.     
   Behavior: Behavior normal.  
 
  
 
MEDICAL DECISION MAKING  
 
MDM Number of Diagnoses or Management Options Dehydration: new, needed workup Amount and/or Complexity of Data Reviewed Clinical lab tests: ordered and reviewed Obtain history from someone other than the patient: yes Review and summarize past medical records: yes (Of the patient's last outpatient oncology note from Dr. Travon Flores: 
 
ASSESSMENT: 
69 male, retired from [employer], ex-smoker (1 pack/day x 40 years, quit in 2017) history of CAD, CHF, CKD (baseline Cr 1.9), PAD, RT common femoral embolectomy, status post multiple LE arterial stents on Plavix, appendectomy, sickle cell trait, right kidney clear cell carcinoma [id number], measuring 5.1 x 4.6 cm status post right nephrectomy 12/17 with negative margins and simply monitor thereafter. More recently patient seen by primary care with shortness of breath, initially tested for COVID 19 which was negative. Further imaging led to a chest x-ray which was abnormal, this was followed by contrasted CT chest 7/16/2020 showing a spiculated nodular opacity in CHRIS measuring 4.5 x 2.4 cm, also note made of prominent mediastinal lymph nodes including a subcarinal lymph node measuring 12 mm. He is now referred to oncology for further work-up and management. Reports coughing has been ongoing for over a year but it has gotten worse.  Reports coughs up small amounts of clear phlegm.  
  
 Obvious concern is for an underlying malignant process, either recurrence of his kidney cancer versus new lung primary. Imaging personally reviewed, mass itself is mostly linear along left major fissure. Will investigate further as below. Not unreasonable to consider trial of antibiotic, will proceed with levaquin 500 mg daily x 5 days.  
  
8/25/2020: Continues to report ongoing cough with clear phlegm. Denies any shortness of breath. Recent brain MRI negative. Since last visit underwent PET CT scan consistent with uptake in lung mass, mediastinal LAD including contralateral side, as well as right adrenal lesion uptake consistent with metastatic disease. He is status post EBUS/endobronchial biopsy of 11R with prelim results showing carcinoma. Per report RUL as well as CHRIS endobronchial tumor invasion seen. Case discussed with pathology, likely non-small cell lung cancer though additional immunostains being sent to rule out kidney cancer. Will also send for Carris NGS thereafter. Case is to be discussed at [facility] 9/2/2020. Will likely plan for systemic therapy thereafter (if non-small lung cancer, then will plan for carbo/pemetrexed plus pembrolizumab while awaiting Carris NGS). Port placement in such a case. Navigation following. RTC within 2 weeks with above, repeat labs. 
  
9/3/20: Pt seen in f/u. Denies worsening SOB. Sating well on RA (in 90s). Reports cough about the same. Recently case was discussed at [facility]: felt to have metastatic lung adenocarcinoma, not sufficient tissue for Caris NGS, repeat EBUS biopsy scheduled for 9/9/20. Will plan for systemic therapy initiation (Carbo/Pemetrexed/Pembrolizumab) thereafter on 9/10/20 while awaiting results of NGS. RTC in 1 week (9/10/20 for C1) and in 2 weeks (tox check) w NP, and in 4 weeks w MD w/ labs.   
  
 10/1/20: Repeat EBUS biopsy without sufficient tissue  for NGS. Will check Guardant 360. He has since start dose reduced Carbo/pemetrexed/Pembo. Here for C2. Reports coughing has mostly resolved, signifying likely response to therapy. He did receive additional IV NS on day 7 and attributed to ongoing nausea. Reports planning to be more regular with his antiemetics with the cycle. Will bring him in day 3 for IV NS, Dex as well as Zofran. We will need to be cautious with fluids as he does report history of CHF. He will continue with tox check a week after each cycle. Will rescan him with PET prior to cycle 4.    
  
11/12/2020: Subsequent cycles tolerated very well with addition of day 3 IV NS, Dex and Zofran. Clinically patient himself feels much better, cough has mostly resolved. CT scan prior to cycle 4 with OH  (RT adrenal lesion improved as well). Imaging personally reviewed and shared with patient. Given oligometastatic disease outside of chest, will get radiation oncology's opinion about adding XRT to sites of disease. Okay to proceed with cycle 4 (pemetrexed is dose reduced). We will plan to proceed with maintenance after the cycle with pemetrexed plus pembrolizumab alone (without further carboplatin).  
  
 12/3/2020:  He is here today for follow up and pembro/alimta. He has been well since last seen. He denies any nausea, vomiting, diarrhea, or constipation. His energy level is up and down but overall good. He is getting around well at home, denies any falls. His appetite is reasonable, eating/drinking okay. He is drinking mostly caffienated beverages with some water. He denies any difficulties with breathing. He denies pain. He denies fevers, chills, or other infectious symptoms. He has seen radiation for consult and planning CT sim early January with plans for concurrent chemo/XRT at that time. Labs reviewed and adequate to proceed with treatment today, WBC 2.8, Hgb 9.7, Plt 182k, ANC 1.3.  K 4.6, Cr slightly up to 2.00 from 1.80--will increase oral fluids and try to limit caffeine, also extra 250 mL of IVF today. Cr Clearance slightly lower than previous (28 from 31)--will hold Alimta today and discussed with Dr. Noreen Saunders. Okay to proceed with Keytruda alone.               
  
1.  NSCLC, adenocarcinoma, RT adrenal uptake concerning for stage 4 disease 2. H/o kidney cancer 
  PLAN: 
- As above. On Pemetrexed (dose reduced) plus pembro, previously 4 cycles of Carbo/Alimta/Keytruda. D3 IV NS, IV dex & zofran. Re-scan prior to C4 w AL 
- Will re-biopsy down the line at time of progression for NGS. In interim checked Guardant 360 without actionable mutation. 
  
RTC in 3 weeks with MD with labs, thyroid function.   ) Risk of Complications, Morbidity, and/or Mortality Presenting problems: moderate Diagnostic procedures: minimal 
Management options: moderate Patient Progress Patient progress: improved Procedures ED Course: The patient was observed in the ED. Results Reviewed: 
 
 
Recent Results (from the past 24 hour(s)) CBC WITH AUTOMATED DIFF Collection Time: 12/21/20  2:20 PM  
Result Value Ref Range WBC 7.0 4.3 - 11.1 K/uL  
 RBC 3.95 (L) 4.23 - 5.6 M/uL HGB 10.6 (L) 13.6 - 17.2 g/dL HCT 33.2 (L) 41.1 - 50.3 % MCV 84.1 79.6 - 97.8 FL  
 MCH 26.8 26.1 - 32.9 PG  
 MCHC 31.9 31.4 - 35.0 g/dL RDW 20.9 (H) 11.9 - 14.6 % PLATELET 174 530 - 879 K/uL MPV 10.1 9.4 - 12.3 FL ABSOLUTE NRBC 0.00 0.0 - 0.2 K/uL  
 DF AUTOMATED NEUTROPHILS 74 43 - 78 % LYMPHOCYTES 13 13 - 44 % MONOCYTES 11 4.0 - 12.0 % EOSINOPHILS 1 0.5 - 7.8 % BASOPHILS 0 0.0 - 2.0 % IMMATURE GRANULOCYTES 1 0.0 - 5.0 %  
 ABS. NEUTROPHILS 5.1 1.7 - 8.2 K/UL  
 ABS. LYMPHOCYTES 0.9 0.5 - 4.6 K/UL  
 ABS. MONOCYTES 0.7 0.1 - 1.3 K/UL  
 ABS. EOSINOPHILS 0.1 0.0 - 0.8 K/UL  
 ABS. BASOPHILS 0.0 0.0 - 0.2 K/UL  
 ABS. IMM. GRANS. 0.1 0.0 - 0.5 K/UL METABOLIC PANEL, COMPREHENSIVE Collection Time: 12/21/20  2:20 PM  
Result Value Ref Range Sodium 142 136 - 145 mmol/L Potassium 4.4 3.5 - 5.1 mmol/L Chloride 109 (H) 98 - 107 mmol/L  
 CO2 26 21 - 32 mmol/L Anion gap 7 7 - 16 mmol/L Glucose 141 (H) 65 - 100 mg/dL BUN 33 (H) 8 - 23 MG/DL Creatinine 1.98 (H) 0.8 - 1.5 MG/DL  
 GFR est AA 43 (L) >60 ml/min/1.73m2 GFR est non-AA 36 (L) >60 ml/min/1.73m2 Calcium 9.4 8.3 - 10.4 MG/DL Bilirubin, total 0.5 0.2 - 1.1 MG/DL  
 ALT (SGPT) 25 12 - 65 U/L  
 AST (SGOT) 21 15 - 37 U/L Alk. phosphatase 80 50 - 136 U/L Protein, total 8.6 (H) 6.3 - 8.2 g/dL Albumin 3.8 3.2 - 4.6 g/dL Globulin 4.8 (H) 2.3 - 3.5 g/dL A-G Ratio 0.8 (L) 1.2 - 3.5 Disposition: Discharge Diagnosis: Dehydration, dizziness 
____________________________________________________________________ I discussed the results of all labs, procedures, radiographs, and treatments with the patient and available family. Treatment plan is agreed upon and the patient is ready for discharge. All voiced understanding of the discharge plan and medication instructions or changes as appropriate. Questions about treatment in the ED were answered. All were encouraged to return should symptoms worsen or new problems develop. A portion of this note was generated using voice recognition dictation software. While the note has been reviewed for accuracy, please note certain words and phrases may not be transcribed as intended and some grammatical and/or typographical errors may be present.

## 2020-12-21 NOTE — ED TRIAGE NOTES
Pt currently being tx for stage 3 lung CA, dizziness upon changing position and while walking, for approx 4 days Pt has decrease appt due to chemo and having difficultly with his BS

## 2020-12-24 NOTE — PROGRESS NOTES
Arrived to the [facility]. Chase Nielson, 1L IVF completed. Patient tolerated well. Any issues or concerns during appointment: denies Patient aware of next infusion appointment on 12/26/20 at [facility] remains accessed. Discharged via w/c.

## 2020-12-24 NOTE — ADDENDUM NOTE
Encounter addended by: Grace Mary [facility] on: 12/24/2020 10:37 AM 
 Actions taken: [employer]ent created or edited

## 2020-12-24 NOTE — PROGRESS NOTES
Patient here today. Will receive brain MRI for recent symptoms of forgetfulness and unsteady gait.  Chemo today on 12-24

## 2020-12-26 NOTE — PROGRESS NOTES
Pt finished 1L fluid NS bolus. No problems occurred during the visit. Needle removed from port and pt taken downstairs via wc to be discharged home with family.

## 2021-01-01 ENCOUNTER — APPOINTMENT (OUTPATIENT)
Dept: GENERAL RADIOLOGY | Age: 71
DRG: 025 | End: 2021-01-01
Attending: INTERNAL MEDICINE
Payer: MEDICARE

## 2021-01-01 ENCOUNTER — APPOINTMENT (OUTPATIENT)
Dept: MRI IMAGING | Age: 71
DRG: 025 | End: 2021-01-01
Attending: NEUROLOGICAL SURGERY
Payer: MEDICARE

## 2021-01-01 ENCOUNTER — APPOINTMENT (OUTPATIENT)
Dept: CT IMAGING | Age: 71
DRG: 025 | End: 2021-01-01
Attending: NEUROLOGICAL SURGERY
Payer: MEDICARE

## 2021-01-01 ENCOUNTER — APPOINTMENT (OUTPATIENT)
Dept: RADIATION ONCOLOGY | Age: 71
End: 2021-01-01

## 2021-01-01 ENCOUNTER — HOSPITAL ENCOUNTER (OUTPATIENT)
Dept: SURGERY | Age: 71
Discharge: HOME | End: 2021-01-15
Payer: MEDICARE

## 2021-01-01 ENCOUNTER — ANESTHESIA (OUTPATIENT)
Dept: SURGERY | Age: 71
DRG: 025 | End: 2021-01-01
Payer: MEDICARE

## 2021-01-01 ENCOUNTER — HOSPITAL ENCOUNTER (INPATIENT)
Age: 71
LOS: 2 days | DRG: 025 | End: 2021-01-23
Attending: NEUROLOGICAL SURGERY | Admitting: NEUROLOGICAL SURGERY
Payer: MEDICARE

## 2021-01-01 ENCOUNTER — ANESTHESIA EVENT (OUTPATIENT)
Dept: SURGERY | Age: 71
DRG: 025 | End: 2021-01-01
Payer: MEDICARE

## 2021-01-01 VITALS
OXYGEN SATURATION: 79 % | TEMPERATURE: 97.4 F | RESPIRATION RATE: 5 BRPM | DIASTOLIC BLOOD PRESSURE: 67 MMHG | WEIGHT: 129.63 LBS | SYSTOLIC BLOOD PRESSURE: 129 MMHG | HEIGHT: 62 IN | BODY MASS INDEX: 23.85 KG/M2

## 2021-01-01 VITALS
TEMPERATURE: 97.1 F | BODY MASS INDEX: 21.97 KG/M2 | WEIGHT: 119.4 LBS | DIASTOLIC BLOOD PRESSURE: 62 MMHG | HEIGHT: 62 IN | SYSTOLIC BLOOD PRESSURE: 112 MMHG | HEART RATE: 77 BPM | RESPIRATION RATE: 16 BRPM | OXYGEN SATURATION: 99 %

## 2021-01-01 DIAGNOSIS — J43.9 PULMONARY EMPHYSEMA, UNSPECIFIED EMPHYSEMA TYPE (HCC): Chronic | ICD-10-CM

## 2021-01-01 DIAGNOSIS — D49.6 CEREBELLAR TUMOR (HCC): Primary | ICD-10-CM

## 2021-01-01 DIAGNOSIS — Z85.528 HISTORY OF KIDNEY CANCER: Chronic | ICD-10-CM

## 2021-01-01 DIAGNOSIS — Z98.890 S/P CRANIOTOMY: ICD-10-CM

## 2021-01-01 DIAGNOSIS — G93.6 CEREBRAL EDEMA (HCC): ICD-10-CM

## 2021-01-01 DIAGNOSIS — C34.90 NON-SMALL CELL LUNG CANCER, UNSPECIFIED LATERALITY (HCC): Chronic | ICD-10-CM

## 2021-01-01 LAB
ALBUMIN SERPL-MCNC: 2.1 G/DL (ref 3.2–4.6)
ALBUMIN/GLOB SERPL: 0.8 {RATIO} (ref 1.2–3.5)
ALP SERPL-CCNC: 55 U/L (ref 50–136)
ALT SERPL-CCNC: 15 U/L (ref 12–65)
ANION GAP SERPL CALC-SCNC: 10 MMOL/L (ref 7–16)
ANION GAP SERPL CALC-SCNC: 10 MMOL/L (ref 7–16)
ANION GAP SERPL CALC-SCNC: 6 MMOL/L (ref 7–16)
ANION GAP SERPL CALC-SCNC: 9 MMOL/L (ref 7–16)
APTT PPP: 24.2 SEC (ref 24.1–35.1)
ARTERIAL PATENCY WRIST A: ABNORMAL
AST SERPL-CCNC: 21 U/L (ref 15–37)
BACTERIA SPEC CULT: NORMAL
BASE DEFICIT BLD-SCNC: 3 MMOL/L
BASE DEFICIT BLD-SCNC: 4 MMOL/L
BASE DEFICIT BLD-SCNC: 6 MMOL/L
BASE DEFICIT BLD-SCNC: 6 MMOL/L
BASE DEFICIT BLD-SCNC: 9 MMOL/L
BASE DEFICIT BLD-SCNC: 9 MMOL/L
BASE EXCESS BLD CALC-SCNC: 0 MMOL/L
BDY SITE: ABNORMAL
BILIRUB SERPL-MCNC: 0.8 MG/DL (ref 0.2–1.1)
BLD PROD TYP BPU: NORMAL
BLD PROD TYP BPU: NORMAL
BODY TEMPERATURE: 40
BPU ID: NORMAL
BPU ID: NORMAL
BUN SERPL-MCNC: 34 MG/DL (ref 8–23)
BUN SERPL-MCNC: 40 MG/DL (ref 8–23)
BUN SERPL-MCNC: 46 MG/DL (ref 8–23)
BUN SERPL-MCNC: 55 MG/DL (ref 8–23)
CA-I BLD-MCNC: 1.04 MMOL/L (ref 1.12–1.32)
CA-I BLD-MCNC: 1.11 MMOL/L (ref 1.12–1.32)
CA-I BLD-MCNC: 1.11 MMOL/L (ref 1.12–1.32)
CA-I BLD-MCNC: 1.12 MMOL/L (ref 1.12–1.32)
CA-I BLD-MCNC: 1.17 MMOL/L (ref 1.12–1.32)
CA-I BLD-MCNC: 1.22 MMOL/L (ref 1.12–1.32)
CA-I BLD-MCNC: 1.31 MMOL/L (ref 1.12–1.32)
CALCIUM SERPL-MCNC: 8 MG/DL (ref 8.3–10.4)
CALCIUM SERPL-MCNC: 8.1 MG/DL (ref 8.3–10.4)
CALCIUM SERPL-MCNC: 8.2 MG/DL (ref 8.3–10.4)
CALCIUM SERPL-MCNC: 9.4 MG/DL (ref 8.3–10.4)
CHLORIDE SERPL-SCNC: 109 MMOL/L (ref 98–107)
CHLORIDE SERPL-SCNC: 114 MMOL/L (ref 98–107)
CHLORIDE SERPL-SCNC: 117 MMOL/L (ref 98–107)
CHLORIDE SERPL-SCNC: 136 MMOL/L (ref 98–107)
CITRATED FUNCTIONAL FIBRINOGEN MAXIMUM AMPLITUDE: 19.6 MM (ref 15–32)
CITRATED KAOLIN ANGLE: 75.3 DEG (ref 63–78)
CITRATED KAOLIN K-TIME: 1.2 MINS (ref 0.8–2.1)
CITRATED KAOLIN MAXIMUM AMPLITUDE: 59 MM (ref 52–69)
CITRATED KAOLIN R-TIME: 2.6 MINS (ref 4.6–9.1)
CITRATED KAOLIN/HEPARINASE R-TIME: 2.6 MINS (ref 4.3–8.3)
CITRATED RAPIDTEG MAXIMUM AMPLITUDE: 56.7 MM (ref 52–70)
CO2 BLD-SCNC: 16 MMOL/L
CO2 BLD-SCNC: 19 MMOL/L
CO2 BLD-SCNC: 24 MMOL/L
CO2 SERPL-SCNC: 15 MMOL/L (ref 21–32)
CO2 SERPL-SCNC: 18 MMOL/L (ref 21–32)
CO2 SERPL-SCNC: 20 MMOL/L (ref 21–32)
CO2 SERPL-SCNC: 22 MMOL/L (ref 21–32)
COLLECT TIME,HTIME: 2100
COLLECT TIME,HTIME: 400
COLLECT TIME,HTIME: 540
CREAT SERPL-MCNC: 1.28 MG/DL (ref 0.8–1.5)
CREAT SERPL-MCNC: 1.47 MG/DL (ref 0.8–1.5)
CREAT SERPL-MCNC: 1.54 MG/DL (ref 0.8–1.5)
CREAT SERPL-MCNC: 1.97 MG/DL (ref 0.8–1.5)
ERYTHROCYTE [DISTWIDTH] IN BLOOD BY AUTOMATED COUNT: 17 % (ref 11.9–14.6)
ERYTHROCYTE [DISTWIDTH] IN BLOOD BY AUTOMATED COUNT: 17.3 % (ref 11.9–14.6)
ERYTHROCYTE [DISTWIDTH] IN BLOOD BY AUTOMATED COUNT: 18.1 % (ref 11.9–14.6)
ERYTHROCYTE [DISTWIDTH] IN BLOOD BY AUTOMATED COUNT: 18.6 % (ref 11.9–14.6)
EXHALED MINUTE VOLUME, VE: 6.7 L/MIN
EXHALED MINUTE VOLUME, VE: 6.74 L/MIN
EXHALED MINUTE VOLUME, VE: 8.1 L/MIN
FIBRINOGEN PPP-MCNC: 227 MG/DL (ref 190–501)
FUNCTIONAL FIBRINOGEN LEVEL: 357.7 MG/DL (ref 278–581)
GAS FLOW.O2 O2 DELIVERY SYS: ABNORMAL L/MIN
GAS FLOW.O2 SETTING OXYMISER: 16 BPM
GAS FLOW.O2 SETTING OXYMISER: 18 BPM
GAS FLOW.O2 SETTING OXYMISER: 18 BPM
GLOBULIN SER CALC-MCNC: 2.6 G/DL (ref 2.3–3.5)
GLUCOSE BLD STRIP.AUTO-MCNC: 106 MG/DL (ref 65–100)
GLUCOSE BLD STRIP.AUTO-MCNC: 106 MG/DL (ref 65–100)
GLUCOSE BLD STRIP.AUTO-MCNC: 119 MG/DL (ref 65–100)
GLUCOSE BLD STRIP.AUTO-MCNC: 139 MG/DL (ref 65–100)
GLUCOSE BLD STRIP.AUTO-MCNC: 150 MG/DL (ref 65–100)
GLUCOSE BLD STRIP.AUTO-MCNC: 158 MG/DL (ref 65–100)
GLUCOSE BLD STRIP.AUTO-MCNC: 163 MG/DL (ref 65–100)
GLUCOSE BLD STRIP.AUTO-MCNC: 166 MG/DL (ref 65–100)
GLUCOSE BLD STRIP.AUTO-MCNC: 171 MG/DL (ref 65–100)
GLUCOSE BLD STRIP.AUTO-MCNC: 173 MG/DL (ref 65–100)
GLUCOSE BLD STRIP.AUTO-MCNC: 187 MG/DL (ref 65–100)
GLUCOSE BLD STRIP.AUTO-MCNC: 207 MG/DL (ref 65–100)
GLUCOSE BLD STRIP.AUTO-MCNC: 213 MG/DL (ref 65–100)
GLUCOSE BLD STRIP.AUTO-MCNC: 228 MG/DL (ref 65–100)
GLUCOSE SERPL-MCNC: 105 MG/DL (ref 65–100)
GLUCOSE SERPL-MCNC: 107 MG/DL (ref 65–100)
GLUCOSE SERPL-MCNC: 149 MG/DL (ref 65–100)
GLUCOSE SERPL-MCNC: 175 MG/DL (ref 65–100)
HCO3 BLD-SCNC: 15.3 MMOL/L (ref 22–26)
HCO3 BLD-SCNC: 16.3 MMOL/L (ref 22–26)
HCO3 BLD-SCNC: 18.2 MMOL/L (ref 22–26)
HCO3 BLD-SCNC: 19 MMOL/L (ref 22–26)
HCO3 BLD-SCNC: 19 MMOL/L (ref 22–26)
HCO3 BLD-SCNC: 19.1 MMOL/L (ref 22–26)
HCO3 BLD-SCNC: 20.4 MMOL/L (ref 22–26)
HCO3 BLD-SCNC: 20.6 MMOL/L (ref 22–26)
HCO3 BLD-SCNC: 20.9 MMOL/L (ref 22–26)
HCO3 BLD-SCNC: 23.1 MMOL/L (ref 22–26)
HCT VFR BLD AUTO: 27.4 % (ref 41.1–50.3)
HCT VFR BLD AUTO: 27.8 % (ref 41.1–50.3)
HCT VFR BLD AUTO: 28.3 % (ref 41.1–50.3)
HCT VFR BLD AUTO: 40 % (ref 41.1–50.3)
HGB BLD-MCNC: 12.6 G/DL (ref 13.6–17.2)
HGB BLD-MCNC: 9.1 G/DL (ref 13.6–17.2)
HGB BLD-MCNC: 9.5 G/DL (ref 13.6–17.2)
HGB BLD-MCNC: 9.5 G/DL (ref 13.6–17.2)
HISTORY CHECKED?,CKHIST: NORMAL
INR PPP: 1.3
INSPIRATION.DURATION SETTING TIME VENT: 0.85 SEC
MAGNESIUM SERPL-MCNC: 2.1 MG/DL (ref 1.8–2.4)
MAGNESIUM SERPL-MCNC: 2.4 MG/DL (ref 1.8–2.4)
MCH RBC QN AUTO: 27.2 PG (ref 26.1–32.9)
MCH RBC QN AUTO: 28.3 PG (ref 26.1–32.9)
MCH RBC QN AUTO: 28.4 PG (ref 26.1–32.9)
MCH RBC QN AUTO: 28.7 PG (ref 26.1–32.9)
MCHC RBC AUTO-ENTMCNC: 31.5 G/DL (ref 31.4–35)
MCHC RBC AUTO-ENTMCNC: 32.7 G/DL (ref 31.4–35)
MCHC RBC AUTO-ENTMCNC: 33.6 G/DL (ref 31.4–35)
MCHC RBC AUTO-ENTMCNC: 34.7 G/DL (ref 31.4–35)
MCV RBC AUTO: 81.5 FL (ref 79.6–97.8)
MCV RBC AUTO: 84.5 FL (ref 79.6–97.8)
MCV RBC AUTO: 86.2 FL (ref 79.6–97.8)
MCV RBC AUTO: 87.7 FL (ref 79.6–97.8)
NRBC # BLD: 0 K/UL (ref 0–0.2)
O2/TOTAL GAS SETTING VFR VENT: 100 %
O2/TOTAL GAS SETTING VFR VENT: 30 %
OSMOLALITY SERPL: 334 MOSM/KG H2O (ref 280–301)
OSMOLALITY SERPL: 337 MOSM/KG H2O (ref 280–301)
OSMOLALITY SERPL: 337 MOSM/KG H2O (ref 280–301)
OSMOLALITY SERPL: 344 MOSM/KG H2O (ref 280–301)
PCO2 BLD: 25.7 MMHG (ref 35–45)
PCO2 BLD: 26.5 MMHG (ref 35–45)
PCO2 BLD: 29.8 MMHG (ref 35–45)
PCO2 BLD: 29.8 MMHG (ref 35–45)
PCO2 BLD: 31 MMHG (ref 35–45)
PCO2 BLD: 32.4 MMHG (ref 35–45)
PCO2 BLD: 33.8 MMHG (ref 35–45)
PCO2 BLD: 36.6 MMHG (ref 35–45)
PCO2 BLD: 37.1 MMHG (ref 35–45)
PCO2 BLDCO: 27 MMHG (ref 32–68)
PEEP RESPIRATORY: 8 CMH2O
PH BLD: 7.32 [PH] (ref 7.35–7.45)
PH BLD: 7.33 [PH] (ref 7.35–7.45)
PH BLD: 7.36 [PH] (ref 7.35–7.45)
PH BLD: 7.38 [PH] (ref 7.35–7.45)
PH BLD: 7.38 [PH] (ref 7.35–7.45)
PH BLD: 7.39 [PH] (ref 7.35–7.45)
PH BLD: 7.45 [PH] (ref 7.35–7.45)
PH BLD: 7.47 [PH] (ref 7.35–7.45)
PH BLD: 7.5 [PH] (ref 7.35–7.45)
PH BLDCO: 7.45 [PH] (ref 7.15–7.38)
PHOSPHATE SERPL-MCNC: 2.3 MG/DL (ref 2.3–3.7)
PHOSPHATE SERPL-MCNC: 2.4 MG/DL (ref 2.3–3.7)
PLATELET # BLD AUTO: 164 K/UL (ref 150–450)
PLATELET # BLD AUTO: 274 K/UL (ref 150–450)
PLATELET # BLD AUTO: 285 K/UL (ref 150–450)
PLATELET # BLD AUTO: 97 K/UL (ref 150–450)
PMV BLD AUTO: 10.6 FL (ref 9.4–12.3)
PMV BLD AUTO: 9.6 FL (ref 9.4–12.3)
PMV BLD AUTO: 9.8 FL (ref 9.4–12.3)
PMV BLD AUTO: 9.8 FL (ref 9.4–12.3)
PO2 BLD: 153 MMHG (ref 75–100)
PO2 BLD: 259 MMHG (ref 75–100)
PO2 BLD: 268 MMHG (ref 75–100)
PO2 BLD: 274 MMHG (ref 75–100)
PO2 BLD: 317 MMHG (ref 75–100)
PO2 BLD: 405 MMHG (ref 75–100)
PO2 BLD: 476 MMHG (ref 75–100)
PO2 BLD: 529 MMHG (ref 75–100)
PO2 BLD: 531 MMHG (ref 75–100)
PO2 BLDCO: 208 MMHG
POTASSIUM BLD-SCNC: 4.8 MMOL/L (ref 3.5–5.1)
POTASSIUM BLD-SCNC: 4.9 MMOL/L (ref 3.5–5.1)
POTASSIUM BLD-SCNC: 5 MMOL/L (ref 3.5–5.1)
POTASSIUM BLD-SCNC: 5 MMOL/L (ref 3.5–5.1)
POTASSIUM BLD-SCNC: 5.1 MMOL/L (ref 3.5–5.1)
POTASSIUM BLD-SCNC: 5.1 MMOL/L (ref 3.5–5.1)
POTASSIUM BLD-SCNC: 5.4 MMOL/L (ref 3.5–5.1)
POTASSIUM SERPL-SCNC: 4 MMOL/L (ref 3.5–5.1)
POTASSIUM SERPL-SCNC: 4.5 MMOL/L (ref 3.5–5.1)
POTASSIUM SERPL-SCNC: 4.7 MMOL/L (ref 3.5–5.1)
POTASSIUM SERPL-SCNC: 5.3 MMOL/L (ref 3.5–5.1)
PRESSURE CONTROL, IPC: 12
PROT SERPL-MCNC: 4.7 G/DL (ref 6.3–8.2)
PROTHROMBIN TIME: 16.6 SEC (ref 12.5–14.7)
RBC # BLD AUTO: 3.17 M/UL (ref 4.23–5.6)
RBC # BLD AUTO: 3.35 M/UL (ref 4.23–5.6)
RBC # BLD AUTO: 3.36 M/UL (ref 4.23–5.6)
RBC # BLD AUTO: 4.64 M/UL (ref 4.23–5.6)
SAO2 % BLD: 100 % (ref 95–98)
SAO2 % BLD: 99 % (ref 95–98)
SERVICE CMNT-IMP: ABNORMAL
SERVICE CMNT-IMP: NORMAL
SODIUM BLD-SCNC: 132 MMOL/L (ref 136–145)
SODIUM BLD-SCNC: 133 MMOL/L (ref 136–145)
SODIUM BLD-SCNC: 134 MMOL/L (ref 136–145)
SODIUM BLD-SCNC: 136 MMOL/L (ref 136–145)
SODIUM BLD-SCNC: 137 MMOL/L (ref 136–145)
SODIUM SERPL-SCNC: 141 MMOL/L (ref 136–145)
SODIUM SERPL-SCNC: 141 MMOL/L (ref 136–145)
SODIUM SERPL-SCNC: 143 MMOL/L (ref 136–145)
SODIUM SERPL-SCNC: 152 MMOL/L (ref 136–145)
SODIUM SERPL-SCNC: 157 MMOL/L (ref 136–145)
SODIUM SERPL-SCNC: 158 MMOL/L (ref 136–145)
SODIUM SERPL-SCNC: 159 MMOL/L (ref 136–145)
SODIUM SERPL-SCNC: 161 MMOL/L (ref 136–145)
SODIUM SERPL-SCNC: 161 MMOL/L (ref 138–145)
SPECIMEN TYPE: ABNORMAL
STATUS OF UNIT,%ST: NORMAL
STATUS OF UNIT,%ST: NORMAL
UNIT DIVISION, %UDIV: 0
UNIT DIVISION, %UDIV: 0
VENTILATION MODE VENT: ABNORMAL
WBC # BLD AUTO: 13.6 K/UL (ref 4.3–11.1)
WBC # BLD AUTO: 13.7 K/UL (ref 4.3–11.1)
WBC # BLD AUTO: 15.1 K/UL (ref 4.3–11.1)
WBC # BLD AUTO: 8.2 K/UL (ref 4.3–11.1)

## 2021-01-01 PROCEDURE — 77030012393 HC DRN CSF INLC -C: Performed by: NEUROLOGICAL SURGERY

## 2021-01-01 PROCEDURE — 84295 ASSAY OF SERUM SODIUM: CPT

## 2021-01-01 PROCEDURE — 77030040106 HC FCPS BIPOLAR DISP INLC -D: Performed by: NEUROLOGICAL SURGERY

## 2021-01-01 PROCEDURE — 85730 THROMBOPLASTIN TIME PARTIAL: CPT

## 2021-01-01 PROCEDURE — 70552 MRI BRAIN STEM W/DYE: CPT

## 2021-01-01 PROCEDURE — 85027 COMPLETE CBC AUTOMATED: CPT

## 2021-01-01 PROCEDURE — 70450 CT HEAD/BRAIN W/O DYE: CPT

## 2021-01-01 PROCEDURE — 74011000250 HC RX REV CODE- 250: Performed by: NEUROLOGICAL SURGERY

## 2021-01-01 PROCEDURE — 80048 BASIC METABOLIC PNL TOTAL CA: CPT

## 2021-01-01 PROCEDURE — 84100 ASSAY OF PHOSPHORUS: CPT

## 2021-01-01 PROCEDURE — 2709999900 HC NON-CHARGEABLE SUPPLY

## 2021-01-01 PROCEDURE — 77030029099 HC BN WAX SSPC -A: Performed by: NEUROLOGICAL SURGERY

## 2021-01-01 PROCEDURE — 0NB70ZZ EXCISION OF OCCIPITAL BONE, OPEN APPROACH: ICD-10-PCS | Performed by: NEUROLOGICAL SURGERY

## 2021-01-01 PROCEDURE — 77030005401 HC CATH RAD ARRO -A: Performed by: ANESTHESIOLOGY

## 2021-01-01 PROCEDURE — 77030013292 HC BOWL MX PRSM J&J -A: Performed by: ANESTHESIOLOGY

## 2021-01-01 PROCEDURE — 85610 PROTHROMBIN TIME: CPT

## 2021-01-01 PROCEDURE — 74011000258 HC RX REV CODE- 258: Performed by: NEUROLOGICAL SURGERY

## 2021-01-01 PROCEDURE — C1729 CATH, DRAINAGE: HCPCS | Performed by: NEUROLOGICAL SURGERY

## 2021-01-01 PROCEDURE — 74011000272 HC RX REV CODE- 272: Performed by: NEUROLOGICAL SURGERY

## 2021-01-01 PROCEDURE — 83735 ASSAY OF MAGNESIUM: CPT

## 2021-01-01 PROCEDURE — 84132 ASSAY OF SERUM POTASSIUM: CPT

## 2021-01-01 PROCEDURE — 85384 FIBRINOGEN ACTIVITY: CPT

## 2021-01-01 PROCEDURE — 74011000250 HC RX REV CODE- 250: Performed by: NURSE ANESTHETIST, CERTIFIED REGISTERED

## 2021-01-01 PROCEDURE — 94002 VENT MGMT INPAT INIT DAY: CPT

## 2021-01-01 PROCEDURE — 77030025006 HC BUR STRY -C: Performed by: NEUROLOGICAL SURGERY

## 2021-01-01 PROCEDURE — 94003 VENT MGMT INPAT SUBQ DAY: CPT

## 2021-01-01 PROCEDURE — 71045 X-RAY EXAM CHEST 1 VIEW: CPT

## 2021-01-01 PROCEDURE — 76060000055 HC ANESTHESIA 12 TO 12.5 HR: Performed by: NEUROLOGICAL SURGERY

## 2021-01-01 PROCEDURE — C9113 INJ PANTOPRAZOLE SODIUM, VIA: HCPCS | Performed by: NEUROLOGICAL SURGERY

## 2021-01-01 PROCEDURE — 30233N1 TRANSFUSION OF NONAUTOLOGOUS RED BLOOD CELLS INTO PERIPHERAL VEIN, PERCUTANEOUS APPROACH: ICD-10-PCS | Performed by: NEUROLOGICAL SURGERY

## 2021-01-01 PROCEDURE — 80053 COMPREHEN METABOLIC PANEL: CPT

## 2021-01-01 PROCEDURE — 74011250636 HC RX REV CODE- 250/636: Performed by: NEUROLOGICAL SURGERY

## 2021-01-01 PROCEDURE — 77030013138 HC MRK XR SPHR IZIM -B: Performed by: NEUROLOGICAL SURGERY

## 2021-01-01 PROCEDURE — 77030014008 HC SPNG HEMSTAT J&J -C: Performed by: NEUROLOGICAL SURGERY

## 2021-01-01 PROCEDURE — 70490 CT SOFT TISSUE NECK W/O DYE: CPT

## 2021-01-01 PROCEDURE — 65610000006 HC RM INTENSIVE CARE

## 2021-01-01 PROCEDURE — 74011636637 HC RX REV CODE- 636/637: Performed by: NEUROLOGICAL SURGERY

## 2021-01-01 PROCEDURE — 77030020480 HC CLP SCLP RANY DISP J&J -A: Performed by: NEUROLOGICAL SURGERY

## 2021-01-01 PROCEDURE — 88305 TISSUE EXAM BY PATHOLOGIST: CPT

## 2021-01-01 PROCEDURE — P9035 PLATELET PHERES LEUKOREDUCED: HCPCS

## 2021-01-01 PROCEDURE — 82803 BLOOD GASES ANY COMBINATION: CPT

## 2021-01-01 PROCEDURE — 99233 SBSQ HOSP IP/OBS HIGH 50: CPT | Performed by: INTERNAL MEDICINE

## 2021-01-01 PROCEDURE — 76010000184 HC OR TIME 8.5 TO 9 HR INTENSV-TIER 1: Performed by: NEUROLOGICAL SURGERY

## 2021-01-01 PROCEDURE — 74011000250 HC RX REV CODE- 250: Performed by: REGISTERED NURSE

## 2021-01-01 PROCEDURE — 77030025420 HC BUR NEUR TPS STRY -C: Performed by: NEUROLOGICAL SURGERY

## 2021-01-01 PROCEDURE — 83930 ASSAY OF BLOOD OSMOLALITY: CPT

## 2021-01-01 PROCEDURE — 77030008462 HC STPLR SKN PROX J&J -A: Performed by: NEUROLOGICAL SURGERY

## 2021-01-01 PROCEDURE — 82962 GLUCOSE BLOOD TEST: CPT

## 2021-01-01 PROCEDURE — 30233R1 TRANSFUSION OF NONAUTOLOGOUS PLATELETS INTO PERIPHERAL VEIN, PERCUTANEOUS APPROACH: ICD-10-PCS | Performed by: NEUROLOGICAL SURGERY

## 2021-01-01 PROCEDURE — 77030020751 HC FLTR TBNG TRNSFUS HAEM -A: Performed by: ANESTHESIOLOGY

## 2021-01-01 PROCEDURE — 76010000173 HC OR TIME 3 TO 3.5 HR INTENSV-TIER 1: Performed by: NEUROLOGICAL SURGERY

## 2021-01-01 PROCEDURE — 77030019908 HC STETH ESOPH SIMS -A: Performed by: ANESTHESIOLOGY

## 2021-01-01 PROCEDURE — 99291 CRITICAL CARE FIRST HOUR: CPT | Performed by: INTERNAL MEDICINE

## 2021-01-01 PROCEDURE — 77030004416 HC BUR PERF J&J -C: Performed by: NEUROLOGICAL SURGERY

## 2021-01-01 PROCEDURE — 74011250636 HC RX REV CODE- 250/636: Performed by: ANESTHESIOLOGY

## 2021-01-01 PROCEDURE — 87641 MR-STAPH DNA AMP PROBE: CPT

## 2021-01-01 PROCEDURE — 77030030722 HC PIN SKULL MAYFLD INLC -B: Performed by: NEUROLOGICAL SURGERY

## 2021-01-01 PROCEDURE — P9016 RBC LEUKOCYTES REDUCED: HCPCS

## 2021-01-01 PROCEDURE — 74011000250 HC RX REV CODE- 250

## 2021-01-01 PROCEDURE — 77030040361 HC SLV COMPR DVT MDII -B: Performed by: NEUROLOGICAL SURGERY

## 2021-01-01 PROCEDURE — 85576 BLOOD PLATELET AGGREGATION: CPT

## 2021-01-01 PROCEDURE — 74011250636 HC RX REV CODE- 250/636: Performed by: REGISTERED NURSE

## 2021-01-01 PROCEDURE — 74011000258 HC RX REV CODE- 258: Performed by: NURSE ANESTHETIST, CERTIFIED REGISTERED

## 2021-01-01 PROCEDURE — 77030003029 HC SUT VCRL J&J -B: Performed by: NEUROLOGICAL SURGERY

## 2021-01-01 PROCEDURE — 2709999900 HC NON-CHARGEABLE SUPPLY: Performed by: NEUROLOGICAL SURGERY

## 2021-01-01 PROCEDURE — 009630Z DRAINAGE OF CEREBRAL VENTRICLE WITH DRAINAGE DEVICE, PERCUTANEOUS APPROACH: ICD-10-PCS | Performed by: NEUROLOGICAL SURGERY

## 2021-01-01 PROCEDURE — 77030011267 HC ELECTRD BLD COVD -A: Performed by: NEUROLOGICAL SURGERY

## 2021-01-01 PROCEDURE — 74011250636 HC RX REV CODE- 250/636: Performed by: NURSE PRACTITIONER

## 2021-01-01 PROCEDURE — 86901 BLOOD TYPING SEROLOGIC RH(D): CPT

## 2021-01-01 PROCEDURE — 77030013951 HC SEAL TISS ADH DURASL KT INLC -G1: Performed by: NEUROLOGICAL SURGERY

## 2021-01-01 PROCEDURE — 77030003158 HC GRFT DURA COLGN INLC -G1: Performed by: NEUROLOGICAL SURGERY

## 2021-01-01 PROCEDURE — 8E09XBZ COMPUTER ASSISTED PROCEDURE OF HEAD AND NECK REGION: ICD-10-PCS | Performed by: NEUROLOGICAL SURGERY

## 2021-01-01 PROCEDURE — 77030020407 HC IV BLD WRMR ST 3M -A: Performed by: ANESTHESIOLOGY

## 2021-01-01 PROCEDURE — 77030040830 HC CATH URETH FOL MDII -A: Performed by: NEUROLOGICAL SURGERY

## 2021-01-01 PROCEDURE — 77030028270 HC SRGFL HEMSTAT MTRX J&J -C: Performed by: NEUROLOGICAL SURGERY

## 2021-01-01 PROCEDURE — 77030002946 HC SUT NRLN J&J -B: Performed by: NEUROLOGICAL SURGERY

## 2021-01-01 PROCEDURE — 77030002916 HC SUT ETHLN J&J -A: Performed by: NEUROLOGICAL SURGERY

## 2021-01-01 PROCEDURE — 77030014007 HC SPNG HEMSTAT J&J -B: Performed by: NEUROLOGICAL SURGERY

## 2021-01-01 PROCEDURE — 77030040922 HC BLNKT HYPOTHRM STRY -A: Performed by: ANESTHESIOLOGY

## 2021-01-01 PROCEDURE — 74011250636 HC RX REV CODE- 250/636: Performed by: INTERNAL MEDICINE

## 2021-01-01 PROCEDURE — 77030008542 HC TBNG MON PRSS EDWD -A: Performed by: STUDENT IN AN ORGANIZED HEALTH CARE EDUCATION/TRAINING PROGRAM

## 2021-01-01 PROCEDURE — 74011000258 HC RX REV CODE- 258: Performed by: INTERNAL MEDICINE

## 2021-01-01 PROCEDURE — 5A1945Z RESPIRATORY VENTILATION, 24-96 CONSECUTIVE HOURS: ICD-10-PCS | Performed by: NEUROLOGICAL SURGERY

## 2021-01-01 PROCEDURE — 74011250636 HC RX REV CODE- 250/636: Performed by: NURSE ANESTHETIST, CERTIFIED REGISTERED

## 2021-01-01 PROCEDURE — 77030016683 HC BUR ZYPHR1 STRY -C: Performed by: NEUROLOGICAL SURGERY

## 2021-01-01 PROCEDURE — 77030025623 HC BUR RND PRECIS STRY -D: Performed by: NEUROLOGICAL SURGERY

## 2021-01-01 PROCEDURE — 77030013705 HC HK ELAS STAY COOP -B: Performed by: NEUROLOGICAL SURGERY

## 2021-01-01 PROCEDURE — 77030021907 HC KT URIN FOL O&M -A: Performed by: NEUROLOGICAL SURGERY

## 2021-01-01 PROCEDURE — 0BH17EZ INSERTION OF ENDOTRACHEAL AIRWAY INTO TRACHEA, VIA NATURAL OR ARTIFICIAL OPENING: ICD-10-PCS | Performed by: NEUROLOGICAL SURGERY

## 2021-01-01 PROCEDURE — 77030021678 HC GLIDESCP STAT DISP VERT -B: Performed by: ANESTHESIOLOGY

## 2021-01-01 PROCEDURE — 77030019557 HC ELECTRD VES SEAL MEDT -F: Performed by: NEUROLOGICAL SURGERY

## 2021-01-01 PROCEDURE — 86923 COMPATIBILITY TEST ELECTRIC: CPT

## 2021-01-01 PROCEDURE — A9575 INJ GADOTERATE MEGLUMI 0.1ML: HCPCS | Performed by: NEUROLOGICAL SURGERY

## 2021-01-01 PROCEDURE — 77030037088 HC TUBE ENDOTRACH ORAL NSL COVD-A: Performed by: ANESTHESIOLOGY

## 2021-01-01 PROCEDURE — 77030038600 HC TU BPLR IRR DISP STRY -B: Performed by: NEUROLOGICAL SURGERY

## 2021-01-01 PROCEDURE — 88331 PATH CONSLTJ SURG 1 BLK 1SPC: CPT

## 2021-01-01 PROCEDURE — 74011250637 HC RX REV CODE- 250/637: Performed by: NEUROLOGICAL SURGERY

## 2021-01-01 PROCEDURE — 00BC0ZZ EXCISION OF CEREBELLUM, OPEN APPROACH: ICD-10-PCS | Performed by: NEUROLOGICAL SURGERY

## 2021-01-01 PROCEDURE — 77030033965: Performed by: NEUROLOGICAL SURGERY

## 2021-01-01 PROCEDURE — 77030013794 HC KT TRNSDUC BLD EDWD -B: Performed by: STUDENT IN AN ORGANIZED HEALTH CARE EDUCATION/TRAINING PROGRAM

## 2021-01-01 DEVICE — Z INACTIVE USE 2653176 KIT CRAN ACCS NDL 18GA L1.5IN FEN DRP RETRCT SCALP ADSN: Type: IMPLANTABLE DEVICE | Site: CRANIAL | Status: FUNCTIONAL

## 2021-01-01 DEVICE — DURAGEN® DURAL GRAFT MATRIX 4 IN X 5 IN (10CM X12.5CM)
Type: IMPLANTABLE DEVICE | Site: BRAIN | Status: FUNCTIONAL
Brand: DURAGEN®

## 2021-01-01 DEVICE — DURASEAL® DURAL SEALANT SYSTEM 5ML 5 PACK
Type: IMPLANTABLE DEVICE | Site: BRAIN | Status: FUNCTIONAL
Brand: DURASEAL®

## 2021-01-01 RX ORDER — ESMOLOL HYDROCHLORIDE 10 MG/ML
INJECTION INTRAVENOUS AS NEEDED
Status: DISCONTINUED | OUTPATIENT
Start: 2021-01-01 | End: 2021-01-01 | Stop reason: HOSPADM

## 2021-01-01 RX ORDER — FENTANYL CITRATE 50 UG/ML
INJECTION, SOLUTION INTRAMUSCULAR; INTRAVENOUS AS NEEDED
Status: DISCONTINUED | OUTPATIENT
Start: 2021-01-01 | End: 2021-01-01 | Stop reason: HOSPADM

## 2021-01-01 RX ORDER — GLYCOPYRROLATE 0.2 MG/ML
0.1 INJECTION INTRAMUSCULAR; INTRAVENOUS 3 TIMES DAILY
Status: DISCONTINUED | OUTPATIENT
Start: 2021-01-01 | End: 2021-01-01 | Stop reason: HOSPADM

## 2021-01-01 RX ORDER — VANCOMYCIN HYDROCHLORIDE 1 G/20ML
INJECTION, POWDER, LYOPHILIZED, FOR SOLUTION INTRAVENOUS AS NEEDED
Status: DISCONTINUED | OUTPATIENT
Start: 2021-01-01 | End: 2021-01-01 | Stop reason: HOSPADM

## 2021-01-01 RX ORDER — CALCIUM CHLORIDE INJECTION 100 MG/ML
INJECTION, SOLUTION INTRAVENOUS AS NEEDED
Status: DISCONTINUED | OUTPATIENT
Start: 2021-01-01 | End: 2021-01-01 | Stop reason: HOSPADM

## 2021-01-01 RX ORDER — SODIUM CHLORIDE 0.9 % (FLUSH) 0.9 %
5-40 SYRINGE (ML) INJECTION EVERY 8 HOURS
Status: DISCONTINUED | OUTPATIENT
Start: 2021-01-01 | End: 2021-01-01 | Stop reason: HOSPADM

## 2021-01-01 RX ORDER — SODIUM CHLORIDE, SODIUM LACTATE, POTASSIUM CHLORIDE, CALCIUM CHLORIDE 600; 310; 30; 20 MG/100ML; MG/100ML; MG/100ML; MG/100ML
100 INJECTION, SOLUTION INTRAVENOUS CONTINUOUS
Status: DISCONTINUED | OUTPATIENT
Start: 2021-01-01 | End: 2021-01-01 | Stop reason: SDUPTHER

## 2021-01-01 RX ORDER — CEFAZOLIN SODIUM/WATER 2 G/20 ML
2 SYRINGE (ML) INTRAVENOUS EVERY 8 HOURS
Status: DISCONTINUED | OUTPATIENT
Start: 2021-01-01 | End: 2021-01-01

## 2021-01-01 RX ORDER — PROPOFOL 10 MG/ML
INJECTION, EMULSION INTRAVENOUS AS NEEDED
Status: DISCONTINUED | OUTPATIENT
Start: 2021-01-01 | End: 2021-01-01 | Stop reason: HOSPADM

## 2021-01-01 RX ORDER — SODIUM CHLORIDE 9 MG/ML
100 INJECTION, SOLUTION INTRAVENOUS CONTINUOUS
Status: DISCONTINUED | OUTPATIENT
Start: 2021-01-01 | End: 2021-01-01

## 2021-01-01 RX ORDER — ACETAMINOPHEN 325 MG/1
650 TABLET ORAL
Status: DISCONTINUED | OUTPATIENT
Start: 2021-01-01 | End: 2021-01-01

## 2021-01-01 RX ORDER — DEXAMETHASONE SODIUM PHOSPHATE 4 MG/ML
4 INJECTION, SOLUTION INTRA-ARTICULAR; INTRALESIONAL; INTRAMUSCULAR; INTRAVENOUS; SOFT TISSUE EVERY 6 HOURS
Status: DISCONTINUED | OUTPATIENT
Start: 2021-01-01 | End: 2021-01-01

## 2021-01-01 RX ORDER — CEFAZOLIN SODIUM/WATER 2 G/20 ML
2 SYRINGE (ML) INTRAVENOUS ONCE
Status: COMPLETED | OUTPATIENT
Start: 2021-01-01 | End: 2021-01-01

## 2021-01-01 RX ORDER — SODIUM CHLORIDE 0.9 % (FLUSH) 0.9 %
10 SYRINGE (ML) INJECTION
Status: COMPLETED | OUTPATIENT
Start: 2021-01-01 | End: 2021-01-01

## 2021-01-01 RX ORDER — SODIUM CHLORIDE 0.9 % (FLUSH) 0.9 %
5-40 SYRINGE (ML) INJECTION EVERY 8 HOURS
Status: DISCONTINUED | OUTPATIENT
Start: 2021-01-01 | End: 2021-01-01

## 2021-01-01 RX ORDER — ONDANSETRON 2 MG/ML
INJECTION INTRAMUSCULAR; INTRAVENOUS AS NEEDED
Status: DISCONTINUED | OUTPATIENT
Start: 2021-01-01 | End: 2021-01-01 | Stop reason: HOSPADM

## 2021-01-01 RX ORDER — GLYCOPYRROLATE 0.2 MG/ML
INJECTION INTRAMUSCULAR; INTRAVENOUS AS NEEDED
Status: DISCONTINUED | OUTPATIENT
Start: 2021-01-01 | End: 2021-01-01 | Stop reason: HOSPADM

## 2021-01-01 RX ORDER — SODIUM CHLORIDE 9 MG/ML
250 INJECTION, SOLUTION INTRAVENOUS AS NEEDED
Status: DISCONTINUED | OUTPATIENT
Start: 2021-01-01 | End: 2021-01-01

## 2021-01-01 RX ORDER — LORAZEPAM 2 MG/ML
1 INJECTION INTRAMUSCULAR
Status: DISCONTINUED | OUTPATIENT
Start: 2021-01-01 | End: 2021-01-01 | Stop reason: HOSPADM

## 2021-01-01 RX ORDER — SODIUM CHLORIDE 234 MG/ML
30 INJECTION, SOLUTION INTRAVENOUS ONCE
Status: COMPLETED | OUTPATIENT
Start: 2021-01-01 | End: 2021-01-01

## 2021-01-01 RX ORDER — LIDOCAINE HYDROCHLORIDE 10 MG/ML
0.1 INJECTION INFILTRATION; PERINEURAL AS NEEDED
Status: DISCONTINUED | OUTPATIENT
Start: 2021-01-01 | End: 2021-01-01 | Stop reason: HOSPADM

## 2021-01-01 RX ORDER — INSULIN LISPRO 100 [IU]/ML
INJECTION, SOLUTION INTRAVENOUS; SUBCUTANEOUS EVERY 6 HOURS
Status: DISCONTINUED | OUTPATIENT
Start: 2021-01-01 | End: 2021-01-01

## 2021-01-01 RX ORDER — FENTANYL CITRATE-0.9 % NACL/PF 25 MCG/ML
0-200 PLASTIC BAG, INJECTION (ML) INJECTION
Status: DISCONTINUED | OUTPATIENT
Start: 2021-01-01 | End: 2021-01-01 | Stop reason: SDUPTHER

## 2021-01-01 RX ORDER — MORPHINE SULFATE 2 MG/ML
1 INJECTION, SOLUTION INTRAMUSCULAR; INTRAVENOUS
Status: DISCONTINUED | OUTPATIENT
Start: 2021-01-01 | End: 2021-01-01 | Stop reason: HOSPADM

## 2021-01-01 RX ORDER — 3% SODIUM CHLORIDE 3 G/100ML
40 INJECTION, SOLUTION INTRAVENOUS CONTINUOUS
Status: DISCONTINUED | OUTPATIENT
Start: 2021-01-01 | End: 2021-01-01

## 2021-01-01 RX ORDER — INSULIN LISPRO 100 [IU]/ML
INJECTION, SOLUTION INTRAVENOUS; SUBCUTANEOUS
Status: DISCONTINUED | OUTPATIENT
Start: 2021-01-01 | End: 2021-01-01

## 2021-01-01 RX ORDER — OXYCODONE HYDROCHLORIDE 5 MG/1
5 TABLET ORAL
Status: DISCONTINUED | OUTPATIENT
Start: 2021-01-01 | End: 2021-01-01 | Stop reason: SDUPTHER

## 2021-01-01 RX ORDER — HYDROCODONE BITARTRATE AND ACETAMINOPHEN 10; 325 MG/1; MG/1
1 TABLET ORAL
Status: DISCONTINUED | OUTPATIENT
Start: 2021-01-01 | End: 2021-01-01

## 2021-01-01 RX ORDER — NICARDIPINE HYDROCHLORIDE 0.1 MG/ML
INJECTION INTRAVENOUS
Status: DISCONTINUED | OUTPATIENT
Start: 2021-01-01 | End: 2021-01-01 | Stop reason: HOSPADM

## 2021-01-01 RX ORDER — GADOTERATE MEGLUMINE 376.9 MG/ML
11 INJECTION INTRAVENOUS
Status: COMPLETED | OUTPATIENT
Start: 2021-01-01 | End: 2021-01-01

## 2021-01-01 RX ORDER — SODIUM CHLORIDE, SODIUM LACTATE, POTASSIUM CHLORIDE, CALCIUM CHLORIDE 600; 310; 30; 20 MG/100ML; MG/100ML; MG/100ML; MG/100ML
INJECTION, SOLUTION INTRAVENOUS
Status: DISCONTINUED | OUTPATIENT
Start: 2021-01-01 | End: 2021-01-01 | Stop reason: HOSPADM

## 2021-01-01 RX ORDER — NOREPINEPHRINE BITARTRATE/D5W 4MG/250ML
PLASTIC BAG, INJECTION (ML) INTRAVENOUS
Status: COMPLETED
Start: 2021-01-01 | End: 2021-01-01

## 2021-01-01 RX ORDER — SODIUM CHLORIDE 9 MG/ML
INJECTION, SOLUTION INTRAVENOUS
Status: DISCONTINUED | OUTPATIENT
Start: 2021-01-01 | End: 2021-01-01 | Stop reason: HOSPADM

## 2021-01-01 RX ORDER — HYDROMORPHONE HYDROCHLORIDE 1 MG/ML
1-2 INJECTION, SOLUTION INTRAMUSCULAR; INTRAVENOUS; SUBCUTANEOUS
Status: DISCONTINUED | OUTPATIENT
Start: 2021-01-01 | End: 2021-01-01 | Stop reason: HOSPADM

## 2021-01-01 RX ORDER — HYDROMORPHONE HYDROCHLORIDE 1 MG/ML
1 INJECTION, SOLUTION INTRAMUSCULAR; INTRAVENOUS; SUBCUTANEOUS
Status: DISCONTINUED | OUTPATIENT
Start: 2021-01-01 | End: 2021-01-01

## 2021-01-01 RX ORDER — ROCURONIUM BROMIDE 10 MG/ML
INJECTION, SOLUTION INTRAVENOUS AS NEEDED
Status: DISCONTINUED | OUTPATIENT
Start: 2021-01-01 | End: 2021-01-01 | Stop reason: HOSPADM

## 2021-01-01 RX ORDER — PROPOFOL 10 MG/ML
0-50 VIAL (ML) INTRAVENOUS
Status: DISCONTINUED | OUTPATIENT
Start: 2021-01-01 | End: 2021-01-01

## 2021-01-01 RX ORDER — HYDROMORPHONE HYDROCHLORIDE 1 MG/ML
0.5 INJECTION, SOLUTION INTRAMUSCULAR; INTRAVENOUS; SUBCUTANEOUS
Status: DISCONTINUED | OUTPATIENT
Start: 2021-01-01 | End: 2021-01-01 | Stop reason: SDUPTHER

## 2021-01-01 RX ORDER — SODIUM CHLORIDE 450 MG/100ML
150 INJECTION, SOLUTION INTRAVENOUS CONTINUOUS
Status: DISCONTINUED | OUTPATIENT
Start: 2021-01-01 | End: 2021-01-01 | Stop reason: HOSPADM

## 2021-01-01 RX ORDER — NALOXONE HYDROCHLORIDE 0.4 MG/ML
0.04 INJECTION, SOLUTION INTRAMUSCULAR; INTRAVENOUS; SUBCUTANEOUS
Status: DISCONTINUED | OUTPATIENT
Start: 2021-01-01 | End: 2021-01-01 | Stop reason: SDUPTHER

## 2021-01-01 RX ORDER — ONDANSETRON 2 MG/ML
4 INJECTION INTRAMUSCULAR; INTRAVENOUS
Status: DISCONTINUED | OUTPATIENT
Start: 2021-01-01 | End: 2021-01-01 | Stop reason: HOSPADM

## 2021-01-01 RX ORDER — NICARDIPINE HYDROCHLORIDE 0.1 MG/ML
5 INJECTION INTRAVENOUS
Status: DISCONTINUED | OUTPATIENT
Start: 2021-01-01 | End: 2021-01-01 | Stop reason: HOSPADM

## 2021-01-01 RX ORDER — SODIUM CHLORIDE 0.9 % (FLUSH) 0.9 %
5-40 SYRINGE (ML) INJECTION AS NEEDED
Status: DISCONTINUED | OUTPATIENT
Start: 2021-01-01 | End: 2021-01-01 | Stop reason: SDUPTHER

## 2021-01-01 RX ORDER — MIDAZOLAM HYDROCHLORIDE 5 MG/ML
3 INJECTION INTRAMUSCULAR; INTRAVENOUS
Status: DISCONTINUED | OUTPATIENT
Start: 2021-01-01 | End: 2021-01-01

## 2021-01-01 RX ORDER — ACETAMINOPHEN 325 MG/1
650 TABLET ORAL
Status: DISCONTINUED | OUTPATIENT
Start: 2021-01-01 | End: 2021-01-01 | Stop reason: HOSPADM

## 2021-01-01 RX ORDER — EPHEDRINE SULFATE 50 MG/ML
INJECTION, SOLUTION INTRAVENOUS AS NEEDED
Status: DISCONTINUED | OUTPATIENT
Start: 2021-01-01 | End: 2021-01-01 | Stop reason: HOSPADM

## 2021-01-01 RX ORDER — DESMOPRESSIN ACETATE 4 UG/ML
2 INJECTION, SOLUTION INTRAVENOUS; SUBCUTANEOUS
Status: DISCONTINUED | OUTPATIENT
Start: 2021-01-01 | End: 2021-01-01 | Stop reason: HOSPADM

## 2021-01-01 RX ORDER — NALOXONE HYDROCHLORIDE 0.4 MG/ML
0.4 INJECTION, SOLUTION INTRAMUSCULAR; INTRAVENOUS; SUBCUTANEOUS AS NEEDED
Status: DISCONTINUED | OUTPATIENT
Start: 2021-01-01 | End: 2021-01-01

## 2021-01-01 RX ORDER — ONDANSETRON 2 MG/ML
4 INJECTION INTRAMUSCULAR; INTRAVENOUS
Status: DISCONTINUED | OUTPATIENT
Start: 2021-01-01 | End: 2021-01-01

## 2021-01-01 RX ORDER — NOREPINEPHRINE BITARTRATE/D5W 4MG/250ML
.5-3 PLASTIC BAG, INJECTION (ML) INTRAVENOUS
Status: DISCONTINUED | OUTPATIENT
Start: 2021-01-01 | End: 2021-01-01 | Stop reason: HOSPADM

## 2021-01-01 RX ORDER — MANNITOL 20 G/100ML
INJECTION, SOLUTION INTRAVENOUS
Status: DISCONTINUED | OUTPATIENT
Start: 2021-01-01 | End: 2021-01-01 | Stop reason: HOSPADM

## 2021-01-01 RX ORDER — CEFAZOLIN SODIUM/WATER 2 G/20 ML
2 SYRINGE (ML) INTRAVENOUS EVERY 8 HOURS
Status: COMPLETED | OUTPATIENT
Start: 2021-01-01 | End: 2021-01-01

## 2021-01-01 RX ORDER — LIDOCAINE HYDROCHLORIDE 20 MG/ML
INJECTION, SOLUTION EPIDURAL; INFILTRATION; INTRACAUDAL; PERINEURAL AS NEEDED
Status: DISCONTINUED | OUTPATIENT
Start: 2021-01-01 | End: 2021-01-01 | Stop reason: HOSPADM

## 2021-01-01 RX ORDER — NICARDIPINE HYDROCHLORIDE 0.1 MG/ML
5 INJECTION INTRAVENOUS
Status: DISCONTINUED | OUTPATIENT
Start: 2021-01-01 | End: 2021-01-01

## 2021-01-01 RX ORDER — SODIUM CHLORIDE 0.9 % (FLUSH) 0.9 %
5-40 SYRINGE (ML) INJECTION AS NEEDED
Status: DISCONTINUED | OUTPATIENT
Start: 2021-01-01 | End: 2021-01-01 | Stop reason: HOSPADM

## 2021-01-01 RX ORDER — DEXAMETHASONE SODIUM PHOSPHATE 100 MG/10ML
INJECTION INTRAMUSCULAR; INTRAVENOUS AS NEEDED
Status: DISCONTINUED | OUTPATIENT
Start: 2021-01-01 | End: 2021-01-01 | Stop reason: HOSPADM

## 2021-01-01 RX ORDER — NICARDIPINE HYDROCHLORIDE 0.1 MG/ML
INJECTION INTRAVENOUS AS NEEDED
Status: DISCONTINUED | OUTPATIENT
Start: 2021-01-01 | End: 2021-01-01 | Stop reason: HOSPADM

## 2021-01-01 RX ADMIN — DEXAMETHASONE SODIUM PHOSPHATE 4 MG: 4 INJECTION, SOLUTION INTRAMUSCULAR; INTRAVENOUS at 18:21

## 2021-01-01 RX ADMIN — PHENYLEPHRINE HYDROCHLORIDE 30 MCG/MIN: 10 INJECTION INTRAVENOUS at 21:01

## 2021-01-01 RX ADMIN — PHENYLEPHRINE HYDROCHLORIDE 200 MCG: 10 INJECTION INTRAVENOUS at 17:11

## 2021-01-01 RX ADMIN — PHENYLEPHRINE HYDROCHLORIDE 120 MCG: 10 INJECTION INTRAVENOUS at 13:13

## 2021-01-01 RX ADMIN — MANNITOL: 20 INJECTION, SOLUTION INTRAVENOUS at 10:12

## 2021-01-01 RX ADMIN — ROCURONIUM BROMIDE 10 MG: 10 INJECTION, SOLUTION INTRAVENOUS at 14:15

## 2021-01-01 RX ADMIN — PHENYLEPHRINE HYDROCHLORIDE 30 MCG/MIN: 10 INJECTION INTRAVENOUS at 08:49

## 2021-01-01 RX ADMIN — DEXAMETHASONE SODIUM PHOSPHATE 4 MG: 4 INJECTION, SOLUTION INTRAMUSCULAR; INTRAVENOUS at 00:01

## 2021-01-01 RX ADMIN — ROCURONIUM BROMIDE 10 MG: 10 INJECTION, SOLUTION INTRAVENOUS at 10:59

## 2021-01-01 RX ADMIN — Medication 3 AMPULE: at 06:01

## 2021-01-01 RX ADMIN — PHENYLEPHRINE HYDROCHLORIDE 120 MCG: 10 INJECTION INTRAVENOUS at 13:37

## 2021-01-01 RX ADMIN — NICARDIPINE HYDROCHLORIDE 50 MCG: 0.1 INJECTION, SOLUTION INTRAVENOUS at 16:00

## 2021-01-01 RX ADMIN — EPHEDRINE SULFATE 10 MG: 50 INJECTION, SOLUTION INTRAVENOUS at 08:04

## 2021-01-01 RX ADMIN — SODIUM CHLORIDE: 900 INJECTION, SOLUTION INTRAVENOUS at 10:10

## 2021-01-01 RX ADMIN — EPHEDRINE SULFATE 10 MG: 50 INJECTION, SOLUTION INTRAVENOUS at 09:07

## 2021-01-01 RX ADMIN — DESMOPRESSIN ACETATE 16.2 MCG: 4 SOLUTION INTRAVENOUS at 00:28

## 2021-01-01 RX ADMIN — SODIUM CHLORIDE, SODIUM LACTATE, POTASSIUM CHLORIDE, AND CALCIUM CHLORIDE: 600; 310; 30; 20 INJECTION, SOLUTION INTRAVENOUS at 15:00

## 2021-01-01 RX ADMIN — EPHEDRINE SULFATE 5 MG: 50 INJECTION, SOLUTION INTRAVENOUS at 13:34

## 2021-01-01 RX ADMIN — SODIUM CHLORIDE 120 MEQ: 234 INJECTION INTRAMUSCULAR; INTRAVENOUS; SUBCUTANEOUS at 06:00

## 2021-01-01 RX ADMIN — CEFAZOLIN 2 G: 10 INJECTION, POWDER, FOR SOLUTION INTRAVENOUS at 10:11

## 2021-01-01 RX ADMIN — Medication 10 ML: at 22:22

## 2021-01-01 RX ADMIN — DEXAMETHASONE SODIUM PHOSPHATE 4 MG: 4 INJECTION, SOLUTION INTRAMUSCULAR; INTRAVENOUS at 06:05

## 2021-01-01 RX ADMIN — PHENYLEPHRINE HYDROCHLORIDE 100 MCG: 10 INJECTION INTRAVENOUS at 08:36

## 2021-01-01 RX ADMIN — ROCURONIUM BROMIDE 50 MG: 10 INJECTION, SOLUTION INTRAVENOUS at 07:58

## 2021-01-01 RX ADMIN — SODIUM CHLORIDE 500 MG: 900 INJECTION, SOLUTION INTRAVENOUS at 22:22

## 2021-01-01 RX ADMIN — MORPHINE SULFATE 1 MG: 2 INJECTION, SOLUTION INTRAMUSCULAR; INTRAVENOUS at 12:00

## 2021-01-01 RX ADMIN — PHENYLEPHRINE HYDROCHLORIDE 25 MCG/MIN: 10 INJECTION INTRAVENOUS at 13:30

## 2021-01-01 RX ADMIN — LEVETIRACETAM 500 MG: 100 INJECTION, SOLUTION INTRAVENOUS at 09:00

## 2021-01-01 RX ADMIN — ROCURONIUM BROMIDE 10 MG: 10 INJECTION, SOLUTION INTRAVENOUS at 13:06

## 2021-01-01 RX ADMIN — EPHEDRINE SULFATE 10 MG: 50 INJECTION, SOLUTION INTRAVENOUS at 08:00

## 2021-01-01 RX ADMIN — SODIUM CHLORIDE 100 ML/HR: 900 INJECTION, SOLUTION INTRAVENOUS at 22:23

## 2021-01-01 RX ADMIN — DEXAMETHASONE SODIUM PHOSPHATE 4 MG: 4 INJECTION, SOLUTION INTRAMUSCULAR; INTRAVENOUS at 06:55

## 2021-01-01 RX ADMIN — PHENYLEPHRINE HYDROCHLORIDE 120 MCG: 10 INJECTION INTRAVENOUS at 14:03

## 2021-01-01 RX ADMIN — PHENYLEPHRINE HYDROCHLORIDE 120 MCG: 10 INJECTION INTRAVENOUS at 15:27

## 2021-01-01 RX ADMIN — VASOPRESSIN 0.04 UNITS/MIN: 20 INJECTION INTRAVENOUS at 05:36

## 2021-01-01 RX ADMIN — SODIUM CHLORIDE, SODIUM LACTATE, POTASSIUM CHLORIDE, AND CALCIUM CHLORIDE: 600; 310; 30; 20 INJECTION, SOLUTION INTRAVENOUS at 12:15

## 2021-01-01 RX ADMIN — PHENYLEPHRINE HYDROCHLORIDE 100 MCG: 10 INJECTION INTRAVENOUS at 17:05

## 2021-01-01 RX ADMIN — SODIUM CHLORIDE 60 ML/HR: 3 INJECTION, SOLUTION INTRAVENOUS at 06:26

## 2021-01-01 RX ADMIN — ROCURONIUM BROMIDE 25 MG: 10 INJECTION, SOLUTION INTRAVENOUS at 17:20

## 2021-01-01 RX ADMIN — ROCURONIUM BROMIDE 25 MG: 10 INJECTION, SOLUTION INTRAVENOUS at 17:26

## 2021-01-01 RX ADMIN — PHENYLEPHRINE HYDROCHLORIDE 120 MCG: 10 INJECTION INTRAVENOUS at 15:34

## 2021-01-01 RX ADMIN — LIDOCAINE HYDROCHLORIDE 30 MG: 20 INJECTION, SOLUTION EPIDURAL; INFILTRATION; INTRACAUDAL; PERINEURAL at 07:58

## 2021-01-01 RX ADMIN — EPHEDRINE SULFATE 10 MG: 50 INJECTION, SOLUTION INTRAVENOUS at 09:21

## 2021-01-01 RX ADMIN — PHENYLEPHRINE HYDROCHLORIDE 100 MCG: 10 INJECTION INTRAVENOUS at 16:39

## 2021-01-01 RX ADMIN — SODIUM CHLORIDE, SODIUM LACTATE, POTASSIUM CHLORIDE, AND CALCIUM CHLORIDE: 600; 310; 30; 20 INJECTION, SOLUTION INTRAVENOUS at 17:00

## 2021-01-01 RX ADMIN — PHENYLEPHRINE HYDROCHLORIDE 100 MCG: 10 INJECTION INTRAVENOUS at 16:59

## 2021-01-01 RX ADMIN — NOREPINEPHRINE-DEXTROSE IV SOLUTION 4 MG/250ML-5% 30 MCG/MIN: 4-5/250 SOLUTION at 05:37

## 2021-01-01 RX ADMIN — SODIUM CHLORIDE 100 ML/HR: 900 INJECTION, SOLUTION INTRAVENOUS at 13:14

## 2021-01-01 RX ADMIN — CEFAZOLIN 2 G: 10 INJECTION, POWDER, FOR SOLUTION INTRAVENOUS at 00:28

## 2021-01-01 RX ADMIN — PHENYLEPHRINE HYDROCHLORIDE 120 MCG: 10 INJECTION INTRAVENOUS at 09:21

## 2021-01-01 RX ADMIN — PHENYLEPHRINE HYDROCHLORIDE 120 MCG: 10 INJECTION INTRAVENOUS at 12:48

## 2021-01-01 RX ADMIN — PHENYLEPHRINE HYDROCHLORIDE 120 MCG: 10 INJECTION INTRAVENOUS at 09:24

## 2021-01-01 RX ADMIN — PHENYLEPHRINE HYDROCHLORIDE 120 MCG: 10 INJECTION INTRAVENOUS at 08:25

## 2021-01-01 RX ADMIN — FENTANYL CITRATE 50 MCG: 50 INJECTION INTRAMUSCULAR; INTRAVENOUS at 17:20

## 2021-01-01 RX ADMIN — PROPOFOL 150 MG: 10 INJECTION, EMULSION INTRAVENOUS at 07:58

## 2021-01-01 RX ADMIN — EPHEDRINE SULFATE 20 MG: 50 INJECTION, SOLUTION INTRAVENOUS at 17:13

## 2021-01-01 RX ADMIN — PHENYLEPHRINE HYDROCHLORIDE 240 MCG: 10 INJECTION INTRAVENOUS at 08:08

## 2021-01-01 RX ADMIN — ESMOLOL HYDROCHLORIDE 20 MG: 10 INJECTION, SOLUTION INTRAVENOUS at 16:00

## 2021-01-01 RX ADMIN — INSULIN LISPRO 2 UNITS: 100 INJECTION, SOLUTION INTRAVENOUS; SUBCUTANEOUS at 22:08

## 2021-01-01 RX ADMIN — SODIUM CHLORIDE 150 ML/HR: 450 INJECTION, SOLUTION INTRAVENOUS at 08:00

## 2021-01-01 RX ADMIN — PHENYLEPHRINE HYDROCHLORIDE 100 MCG: 10 INJECTION INTRAVENOUS at 16:17

## 2021-01-01 RX ADMIN — LORAZEPAM 1 MG: 2 INJECTION, SOLUTION INTRAMUSCULAR; INTRAVENOUS at 12:00

## 2021-01-01 RX ADMIN — ONDANSETRON 4 MG: 2 INJECTION INTRAMUSCULAR; INTRAVENOUS at 15:20

## 2021-01-01 RX ADMIN — PHENYLEPHRINE HYDROCHLORIDE 100 MCG: 10 INJECTION INTRAVENOUS at 17:26

## 2021-01-01 RX ADMIN — SODIUM CHLORIDE, SODIUM LACTATE, POTASSIUM CHLORIDE, AND CALCIUM CHLORIDE: 600; 310; 30; 20 INJECTION, SOLUTION INTRAVENOUS at 13:50

## 2021-01-01 RX ADMIN — ESMOLOL HYDROCHLORIDE 20 MG: 10 INJECTION, SOLUTION INTRAVENOUS at 16:03

## 2021-01-01 RX ADMIN — SODIUM CHLORIDE, SODIUM LACTATE, POTASSIUM CHLORIDE, AND CALCIUM CHLORIDE 100 ML/HR: 600; 310; 30; 20 INJECTION, SOLUTION INTRAVENOUS at 06:18

## 2021-01-01 RX ADMIN — PHENYLEPHRINE HYDROCHLORIDE 120 MCG: 10 INJECTION INTRAVENOUS at 14:07

## 2021-01-01 RX ADMIN — NICARDIPINE HYDROCHLORIDE 50 MCG: 0.1 INJECTION, SOLUTION INTRAVENOUS at 16:02

## 2021-01-01 RX ADMIN — EPHEDRINE SULFATE 10 MG: 50 INJECTION, SOLUTION INTRAVENOUS at 09:55

## 2021-01-01 RX ADMIN — PHENYLEPHRINE HYDROCHLORIDE 100 MCG: 10 INJECTION INTRAVENOUS at 17:20

## 2021-01-01 RX ADMIN — ROCURONIUM BROMIDE 20 MG: 10 INJECTION, SOLUTION INTRAVENOUS at 11:31

## 2021-01-01 RX ADMIN — ROCURONIUM BROMIDE 20 MG: 10 INJECTION, SOLUTION INTRAVENOUS at 18:32

## 2021-01-01 RX ADMIN — PANTOPRAZOLE SODIUM 40 MG: 40 INJECTION, POWDER, FOR SOLUTION INTRAVENOUS at 09:00

## 2021-01-01 RX ADMIN — Medication 30 MCG/MIN: at 05:37

## 2021-01-01 RX ADMIN — EPHEDRINE SULFATE 10 MG: 50 INJECTION, SOLUTION INTRAVENOUS at 09:36

## 2021-01-01 RX ADMIN — FENTANYL CITRATE 25 MCG: 50 INJECTION INTRAMUSCULAR; INTRAVENOUS at 15:26

## 2021-01-01 RX ADMIN — EPHEDRINE SULFATE 10 MG: 50 INJECTION, SOLUTION INTRAVENOUS at 14:07

## 2021-01-01 RX ADMIN — PROPOFOL 30 MG: 10 INJECTION, EMULSION INTRAVENOUS at 17:18

## 2021-01-01 RX ADMIN — EPHEDRINE SULFATE 10 MG: 50 INJECTION, SOLUTION INTRAVENOUS at 09:39

## 2021-01-01 RX ADMIN — PHENYLEPHRINE HYDROCHLORIDE 100 MCG: 10 INJECTION INTRAVENOUS at 16:57

## 2021-01-01 RX ADMIN — PHENYLEPHRINE HYDROCHLORIDE 120 MCG: 10 INJECTION INTRAVENOUS at 15:25

## 2021-01-01 RX ADMIN — SODIUM CHLORIDE, SODIUM LACTATE, POTASSIUM CHLORIDE, AND CALCIUM CHLORIDE: 600; 310; 30; 20 INJECTION, SOLUTION INTRAVENOUS at 11:37

## 2021-01-01 RX ADMIN — FENTANYL CITRATE 100 MCG: 50 INJECTION INTRAMUSCULAR; INTRAVENOUS at 07:58

## 2021-01-01 RX ADMIN — DEXAMETHASONE SODIUM PHOSPHATE 4 MG: 4 INJECTION, SOLUTION INTRAMUSCULAR; INTRAVENOUS at 00:34

## 2021-01-01 RX ADMIN — PHENYLEPHRINE HYDROCHLORIDE 120 MCG: 10 INJECTION INTRAVENOUS at 14:50

## 2021-01-01 RX ADMIN — CALCIUM CHLORIDE 500 MG: 100 INJECTION INTRAVENOUS; INTRAVENTRICULAR at 14:17

## 2021-01-01 RX ADMIN — EPHEDRINE SULFATE 10 MG: 50 INJECTION, SOLUTION INTRAVENOUS at 10:39

## 2021-01-01 RX ADMIN — PHENYLEPHRINE HYDROCHLORIDE 120 MCG: 10 INJECTION INTRAVENOUS at 09:08

## 2021-01-01 RX ADMIN — EPHEDRINE SULFATE 10 MG: 50 INJECTION, SOLUTION INTRAVENOUS at 09:38

## 2021-01-01 RX ADMIN — PHENYLEPHRINE HYDROCHLORIDE 100 MCG: 10 INJECTION INTRAVENOUS at 16:13

## 2021-01-01 RX ADMIN — LEVETIRACETAM 500 MG: 100 INJECTION, SOLUTION INTRAVENOUS at 21:14

## 2021-01-01 RX ADMIN — Medication 10 ML: at 07:17

## 2021-01-01 RX ADMIN — SODIUM CHLORIDE 100 ML/HR: 900 INJECTION, SOLUTION INTRAVENOUS at 23:52

## 2021-01-01 RX ADMIN — SODIUM CHLORIDE, SODIUM LACTATE, POTASSIUM CHLORIDE, AND CALCIUM CHLORIDE 100 ML/HR: 600; 310; 30; 20 INJECTION, SOLUTION INTRAVENOUS at 20:49

## 2021-01-01 RX ADMIN — SODIUM CHLORIDE 100 ML/HR: 900 INJECTION, SOLUTION INTRAVENOUS at 23:05

## 2021-01-01 RX ADMIN — Medication 10 ML: at 21:11

## 2021-01-01 RX ADMIN — ROCURONIUM BROMIDE 10 MG: 10 INJECTION, SOLUTION INTRAVENOUS at 15:02

## 2021-01-01 RX ADMIN — GLYCOPYRROLATE 0.2 MG: 0.2 INJECTION, SOLUTION INTRAMUSCULAR; INTRAVENOUS at 17:13

## 2021-01-01 RX ADMIN — PHENYLEPHRINE HYDROCHLORIDE 120 MCG: 10 INJECTION INTRAVENOUS at 12:29

## 2021-01-01 RX ADMIN — LEVETIRACETAM 500 MG: 100 INJECTION, SOLUTION INTRAVENOUS at 10:22

## 2021-01-01 RX ADMIN — SODIUM CHLORIDE 40 ML/HR: 3 INJECTION, SOLUTION INTRAVENOUS at 14:46

## 2021-01-01 RX ADMIN — EPHEDRINE SULFATE 10 MG: 50 INJECTION, SOLUTION INTRAVENOUS at 09:22

## 2021-01-01 RX ADMIN — PHENYLEPHRINE HYDROCHLORIDE 120 MCG: 10 INJECTION INTRAVENOUS at 12:10

## 2021-01-01 RX ADMIN — Medication 10 ML: at 06:01

## 2021-01-01 RX ADMIN — NICARDIPINE HYDROCHLORIDE 10 MG/HR: 0.1 INJECTION, SOLUTION INTRAVENOUS at 16:05

## 2021-01-01 RX ADMIN — PHENYLEPHRINE HYDROCHLORIDE 60 MCG: 10 INJECTION INTRAVENOUS at 14:30

## 2021-01-01 RX ADMIN — EPHEDRINE SULFATE 10 MG: 50 INJECTION, SOLUTION INTRAVENOUS at 09:56

## 2021-01-01 RX ADMIN — PHENYLEPHRINE HYDROCHLORIDE 120 MCG: 10 INJECTION INTRAVENOUS at 09:07

## 2021-01-01 RX ADMIN — EPHEDRINE SULFATE 5 MG: 50 INJECTION, SOLUTION INTRAVENOUS at 15:24

## 2021-01-01 RX ADMIN — Medication 2 G: at 13:34

## 2021-01-01 RX ADMIN — FENTANYL CITRATE 25 MCG: 50 INJECTION INTRAMUSCULAR; INTRAVENOUS at 15:14

## 2021-01-01 RX ADMIN — PHENYLEPHRINE HYDROCHLORIDE 120 MCG: 10 INJECTION INTRAVENOUS at 09:36

## 2021-01-01 RX ADMIN — NICARDIPINE HYDROCHLORIDE 100 MCG: 0.1 INJECTION, SOLUTION INTRAVENOUS at 11:20

## 2021-01-01 RX ADMIN — PHENYLEPHRINE HYDROCHLORIDE 120 MCG: 10 INJECTION INTRAVENOUS at 09:55

## 2021-01-01 RX ADMIN — CALCIUM CHLORIDE 500 MG: 100 INJECTION INTRAVENOUS; INTRAVENTRICULAR at 13:46

## 2021-01-01 RX ADMIN — SUGAMMADEX 200 MG: 100 INJECTION, SOLUTION INTRAVENOUS at 15:48

## 2021-01-01 RX ADMIN — PANTOPRAZOLE SODIUM 40 MG: 40 INJECTION, POWDER, FOR SOLUTION INTRAVENOUS at 10:32

## 2021-01-01 RX ADMIN — Medication 10 ML: at 08:07

## 2021-01-01 RX ADMIN — SODIUM CHLORIDE 0.5 G: 900 INJECTION, SOLUTION INTRAVENOUS at 08:44

## 2021-01-01 RX ADMIN — GADOTERATE MEGLUMINE 11 ML: 376.9 INJECTION INTRAVENOUS at 07:17

## 2021-01-01 RX ADMIN — DEXAMETHASONE SODIUM PHOSPHATE 10 MG: 10 INJECTION INTRAMUSCULAR; INTRAVENOUS at 08:15

## 2021-01-01 RX ADMIN — Medication 2 G: at 17:38

## 2021-01-01 RX ADMIN — SODIUM CHLORIDE: 900 INJECTION, SOLUTION INTRAVENOUS at 11:14

## 2021-01-01 RX ADMIN — Medication 2 G: at 10:00

## 2021-01-01 RX ADMIN — Medication 10 ML: at 15:08

## 2021-01-01 RX ADMIN — NICARDIPINE HYDROCHLORIDE 100 MCG: 0.1 INJECTION, SOLUTION INTRAVENOUS at 11:17

## 2021-01-01 RX ADMIN — PHENYLEPHRINE HYDROCHLORIDE 120 MCG: 10 INJECTION INTRAVENOUS at 08:07

## 2021-01-01 RX ADMIN — SODIUM CHLORIDE: 900 INJECTION, SOLUTION INTRAVENOUS at 12:55

## 2021-01-01 RX ADMIN — PHENYLEPHRINE HYDROCHLORIDE 120 MCG: 10 INJECTION INTRAVENOUS at 09:53

## 2021-01-01 RX ADMIN — Medication 10 ML: at 22:10

## 2021-01-01 RX ADMIN — PHENYLEPHRINE HYDROCHLORIDE 120 MCG: 10 INJECTION INTRAVENOUS at 13:32

## 2021-01-01 RX ADMIN — PHENYLEPHRINE HYDROCHLORIDE 100 MCG: 10 INJECTION INTRAVENOUS at 15:59

## 2021-01-01 RX ADMIN — PHENYLEPHRINE HYDROCHLORIDE 120 MCG: 10 INJECTION INTRAVENOUS at 09:52

## 2021-01-01 RX ADMIN — DEXAMETHASONE SODIUM PHOSPHATE 4 MG: 4 INJECTION, SOLUTION INTRAMUSCULAR; INTRAVENOUS at 13:14

## 2021-01-05 NOTE — H&P (VIEW-ONLY)
[facility] NOTE:   History of Present Illness:    CC: Problems with balance and a newly found vermian and right cerebellar hemisphere metastatic tumor    Lisa Maki is a 69 y.o. male with a PMH of being ex-smoker (1 pack/day x 40 years, quit in 2017) history of CAD, CHF, CKD (baseline Cr 1.9), PAD, r right common femoral embolectomy, status post multiple LE arterial stents on Plavix, appendectomy, sickle cell trait, right kidney clear cell carcinoma pT1pNX, measuring 5.1 x 4.6 cm status post right nephrectomy 12/17 who presents today for evaluation of new problem with balance of the present for approximately 2 weeks. He recently underwent a MRI of the brain that demonstrated a new peripherally enhancing cystic and nodular midline and right cerebellar hemisphere tumor. He is referred here for evaluation and discussion regarding possible surgical treatment. He was started on Decadron by his oncologist.  He is accompanied to today's visit by his son and his other son is joining us via telephone as well. He also has a CT chest 7/16/2020 showing a spiculated nodular opacity in CHRIS measuring 4.5 x 2.4 cm, also note made of prominent mediastinal lymph nodes including a subcarinal lymph node measuring 12 mm. The patient has an MRI brain with and without contrast demonstrating a 3.5 x 3.6 cystic and solid enhancing right cerebellar hemispheric and midline mass with associated surrounding vasogenic edema. Patient is currently on Decadron and Keppra. He has a history of clear cell carcinoma or the right kidney s/p nephrectomy and NSCLC to the lungs.      Past Medical History:   Diagnosis Date    Cancer [facility])     kidney ca    Cardiomyopathy [facility] [address]     CHF (congestive heart failure) ([facility] [address] 11/21/2017    55-60% 4/2019    Chronic kidney disease     CKD (chronic kidney disease), stage III     Stage 3    Coronary artery disease involving native coronary artery of native heart without angina pectoris 10/25/2017    followed by [facility] last cath 10/16/2017 no stents    Diabetes ([facility] [address]     abg fbs 115 last A1c 7.3 1/20/2020    Foot drop, left     Former cigarette smoker     History of embolectomy 07/2019    right common femoral embolectomy    History of kidney cancer 12/2017    kidney- right removed    Long term current use of anticoagulant     Plavix    Sickle cell trait (HCC)     Sickle cell trait    Status post carotid endarterectomy 07/2019    Right side    Thyroid disease       Past Surgical History:   Procedure Laterality Date    HX APPENDECTOMY  1963    HX CAROTID ENDARTERECTOMY      HX NEPHRECTOMY Right 12/2017    IR INSERT TUNL CVC W PORT OVER 5 YEARS  8/31/2020    ME LEFT HEART CATH,PERCUTANEOUS  10/16/2017    no PCI- medical management    VASCULAR SURGERY PROCEDURE UNLIST Bilateral 10/11/2018    iliac angio and stent    VASCULAR SURGERY PROCEDURE UNLIST  05/13/2019    Right lower extremity arteriogram with balloon angioplasty and stent of the right external iliac artery with 8 x 4 S. M.A.R.T. stent.     VASCULAR SURGERY PROCEDURE UNLIST Right 06/20/2019     Right common femoral artery endarterectomy,Right common femoral embolectomy,Right lower extremity arteriogram     Allergies   Allergen Reactions    Aspirin Nausea and Vomiting      Family History   Problem Relation Age of Onset    Diabetes Other     Cancer Mother         Ovarian CA    Heart Disease Mother     Hypertension Mother     Sickle Cell Trait Mother     Heart Disease Father     Sickle Cell Trait Father     Sickle Cell Anemia Brother     Heart Disease Brother     Hypertension Brother     Cancer Cousin         prostate      Social History     Socioeconomic History    Marital status: LEGALLY      Spouse name: Not on file    Number of children: Not on file    Years of education: Not on file    Highest education level: Not on file   Occupational History    Not on file   Social Needs    Financial resource strain: Not on file    Food insecurity     Worry: Not on file     Inability: Not on file    Transportation needs     Medical: Not on file     Non-medical: Not on file   Tobacco Use    Smoking status: Former Smoker     Packs/day: 1.00     Years: 40.00     Pack years: 40.00     Quit date: 2017     Years since quittin.0    Smokeless tobacco: Never Used   Substance and Sexual Activity    Alcohol use: Not Currently    Drug use: Not Currently     Comment: none since     Sexual activity: Not on file   Lifestyle    Physical activity     Days per week: Not on file     Minutes per session: Not on file    Stress: Not on file   Relationships    Social connections     Talks on phone: Not on file     Gets together: Not on file     Attends Restorationism service: Not on file     Active member of club or organization: Not on file     Attends meetings of clubs or organizations: Not on file     Relationship status: Not on file    Intimate partner violence     Fear of current or ex partner: Not on file     Emotionally abused: Not on file     Physically abused: Not on file     Forced sexual activity: Not on file   Other Topics Concern    Not on file   Social History Narrative    Not on file     Current Outpatient Medications   Medication Sig Dispense Refill    dexAMETHasone (Decadron) 6 mg tablet Every 8 hours 90 Tab 0    levETIRAcetam (KEPPRA) 500 mg tablet Take 1 Tab by mouth two (2) times a day. 60 Tab 2    insulin degludec Phuong Merlin FlexTouch U-100) 100 unit/mL (3 mL) inpn 20 Units by SubCUTAneous route nightly. 20 units sq daily  Indications: type 2 diabetes mellitus 1 Pen 5    lidocaine-prilocaine (EMLA) topical cream Apply  to affected area as needed for Pain. Apply to port site 30-45 min prior to port needle stick 30 g 4    pantoprazole (PROTONIX) 40 mg tablet Take 1 Tab by mouth daily.  Indications: gastroesophageal reflux disease 30 Tab 3    sucralfate (Carafate) 1 gram tablet Take 1 Tab by mouth four (4) times daily. Mix tab in 10 ml warm water  Indications: heartburn 120 Tab 2    Victoza 3-Denis 0.6 mg/0.1 mL (18 mg/3 mL) pnij ADMINISTER 1.8 MG UNDER THE SKIN DAILY 9 mL 3    folic acid (FOLVITE) 1 mg tablet Take 1 Tab by mouth daily. 90 Tab 3    prochlorperazine (Compazine) 10 mg tablet Take 1 Tab by mouth every six (6) hours as needed for Nausea. Indications: nausea and vomiting caused by cancer drugs, nausea and vomiting 90 Tab 3    ondansetron hcl (Zofran) 8 mg tablet Take 1 Tab by mouth every eight (8) hours as needed for Nausea. Indications: nausea and vomiting caused by cancer drugs 60 Tab 3    valsartan (DIOVAN) 80 mg tablet TAKE 1 TABLET BY MOUTH DAILY (Patient taking differently: nightly.) 30 Tab 0    Olamide Pen Needle 32 gauge x 5/32\" ndle use once daily 100 Pen Needle 11    dapagliflozin (Farxiga) 10 mg tab tablet Take 1 Tab by mouth daily. 90 Tab 1    Unithroid 50 mcg tablet Take 1 Tab by mouth Daily (before breakfast). 90 Tab 1    carvediloL (COREG) 12.5 mg tablet Take 1 Tab by mouth two (2) times daily (with meals). 180 Tab 1    atorvastatin (LIPITOR) 80 mg tablet TAKE 1 TABLET BY MOUTH EVERY NIGHT 90 Tab 3    clopidogreL (PLAVIX) 75 mg tab Take 1 Tab by mouth daily.  80 Tab 3    ONETOUCH ULTRA BLUE TEST STRIP strip   1     Patient Active Problem List   Diagnosis Code    Benign hypertension with CKD (chronic kidney disease) stage III I12.9, H11.14    Systolic CHF, acute ([facility]) I50.21    History of right nephrectomy Z90.5    Coronary artery disease involving native coronary artery of native heart without angina pectoris I25.10    Cardiomyopathy, ischemic I25.5    Gallop rhythm R00.8    Acquired hypothyroidism E03.9    Type 2 diabetes mellitus with nephropathy ([facility] [address] E11.21    Abdominal aortic aneurysm (AAA) ([facility]) I71.4    PAD (peripheral artery disease) ([facility]) I73.9    Atherosclerosis of native arteries of extremity with intermittent claudication ([facility] [address] I70.219    Dyslipidemia E78.5    PVD (peripheral vascular disease) (HCC) I73.9    Foot drop, left M21.372    Former cigarette smoker Z87.891    History of embolectomy Z98.890    History of kidney cancer Z80.1    Long term current use of anticoagulant Z79.01    Femoral artery stenosis, right (HCC) I70.201    Femoral artery stenosis, left (HCC) I70.202    Lung mass R91.8    Lymphadenopathy R59.1    Malignant neoplasm of upper lobe of left lung (HCC) C34.12    Dehydration E86.0    Chemotherapy induced neutropenia (HCC) D70.1, T45.1X5A        Review of Systems: A complete ROS was done and as stated in the HPI or otherwise negative. Review of Systems    Constitutional:                    No recent weight changes, fever, fatigue, sleep difficulties, loss of appetite   ENT/Mouth:  No hearing loss, ringing in the ears, chronic sinus problem, nose bleeds,sore throat, voice change, hoarseness, swollen glands in neck, or difficulties with chewing and swallowing. Cardiovascular:  No chest pain/angina pectoris, palpitations, swelling of feet/ankles/hands, or calf pain while walking. Respiratory: No chronic or frequent coughs, spitting up blood, shortness of breath, asthma, or wheezing. Gastrointestinal: No a bdominal pain, heartburn, nausea, vomiting, constipation, or frequent diarrhea     Genitourinary: No frequent urination, burning or painful urination, or blood in urine     Musculoskeletal:   No joint pain, stiffness/swelling, weakness of muscles, or muscle pain/cramp     Integument:   No rash/itching     Neurological:  No dizziness/vertigo, numbness/tingling sensation, tremors, weakness in limbs, frequent or recurring headaches, memory loss or confusion. Endocrine: No excessive thirst or urination, heat or cold intolerance.                  Physical Exam:  Vitals:    01/05/21 1035   BP: (!) 136/91   Pulse: (!) 115   Temp: 98.1 °F (36.7 °C)   TempSrc: Temporal   SpO2: 100%   Height: 5' 2\" (1.575 m)   PainSc:   0 - No pain     General: No acute distress  Head normocephalic and atraumatic  Mood and affect appropriate  CV: Fast rate   Resp: No increased work of breathing  Skin:  warm and dry  Awake, alert, and oriented to person, place, time, and situation   Eyes open spontaneously   PERRL, EOMI, visual fields grossly intact to light and confrontation   Hearing grossly intact   Face symmetric and tongue mid-line on protrusion   Facial sensation intact  Palate elevation symmetric bilaterally without deviation of the uvula  Sternocleidomastoid and trapezius strength 5/5  No pronation or drift on exam   Patient with strength exam as follows:   Upper Extremities: Right Left      Deltoid  5 5    Biceps  5 5    Triceps 5 5      5 5     Lower Extremities:      Hip Flex 5 5    Quads  5 5    Hamstrings 5 5    Dorsiflex 5 5    Plantarflex 5 5    EHL  5 5  Sensation intact to light touch and pin-prick   DTR 2+  Mild right >> left cerebellar dysmetria   Gait Deferred secondary to balance issues    Assessment & Plan:  Elliot Branch Sr. is a 69 y.o. male with a PMH of being ex-smoker (1 pack/day x 40 years, quit in 2017) history of CAD, CHF, CKD (baseline Cr 1.9), PAD, r right common femoral embolectomy, status post multiple LE arterial stents on Plavix, appendectomy, sickle cell trait, right kidney clear cell carcinoma pT1pNX, measuring 5.1 x 4.6 cm status post right nephrectomy 12/17 who presents today for evaluation of new problem with balance of the present for approximately 2 weeks. I have independently reviewed and interpreted the patient's MRI brain with and without contrast demonstrating a 3.5 x 3.6 cystic and solid enhancing right cerebellar hemispheric and midline mass with associated surrounding vasogenic edema.  At this time I will discuss the patient's case with Dr. Winter Cano to see if he is a good candidate for possible pre-op resection embolization given the location of the tumor and concern possibility of being possible renal clear cell carcinoma. I discussed the risk and benefits of the treatment options with the patient extensively and discussed with him the highest risk are centered around a posterior fossa craniectomy for resection and debulking of the midline and right cerebellar hemispheres nodular and cystic tumor. Said given the solitary appearance of intracranial metastatic tumor would be reasonable to proceed with a craniectomy and resection/debulking of the tumor. Craniotomy Consent: The relative risks of complication from craniotomy include but are not limited to infection, bleeding, spinal  fluid leak, major vascular injury, increased pain, incontinence, dysfunction in speaking and understanding speech, possible development of seizure disorder, impairment of cognitive function, partial or complete blindness, post-operative hematoma that may require surgical evacuation, possible need for re-operation, heart attack ,stroke and death were discussed with patient  and family members present. They have been provided the opportunity to ask any questions regarding the surgical procedure. The patient and family members present agreed and understand the procedure, the relative risks and benefits involved and agree to proceed with surgery. We will work to obtain cardiology approval for clearance to hold the Plavix antiplatelet agents for 7 days prior to the surgery. The patient will require an ICU bed admission following surgery. ICD-10-CM ICD-9-CM    1. Cerebellar tumor ([facility] [address]  D49.6 239.6    2. History of renal cell carcinoma  Z85.528 V10.52    3. Non-small cell carcinoma of left lung (HCC)  C34.92 162.9    4. Cerebral edema (HCC)  G93.6 348.5      Juwan Funes, [address] [place] [address]     Notes are transcribed with Tammy Weber, a medical voice recording dictation service, and may contain minor errors.

## 2021-01-15 NOTE — PERIOP NOTES
PLEASE CONTINUE TAKING ALL PRESCRIPTION MEDICATIONS UP TO THE DAY OF SURGERY UNLESS OTHERWISE DIRECTED BELOW. DISCONTINUE all vitamins and supplements 7 days prior to surgery. DISCONTINUE Non-Steriodal Anti-Inflammatory (NSAIDS) such as Advil and Aleve 5 days prior to surgery. Home Medications to take  the day of surgery   Unithroid   Carvedilol (Coreg)   Dexamethasone (Decadron)  Levetiracetam (Keppra)     Pantoprazole (Protonix)      Home Medications   to Hold     Hold Plavix as instructed by surgeon's office. Comments         Hibiclens shower the night before surgery and the morning of surgery. *Visitor policy of 1 visitor per patient discussed. Please do not bring home medications with you on the day of surgery unless otherwise directed by your nurse. If you are instructed to bring home medications, please give them to your nurse as they will be administered by the nursing staff. If you have any questions, please call [facility] [phone] or [facility] [phone]. A copy of this note was provided to the patient for reference.

## 2021-01-15 NOTE — ANESTHESIA PREPROCEDURE EVALUATION
Relevant Problems No relevant active problems Anesthetic History No history of anesthetic complications Review of Systems / Medical History Patient summary reviewed and pertinent labs reviewed Pulmonary Pertinent negatives: Smoker: quit 3 years ago. Comments: Left hilar mass which appears to extend into left upper fissure on CT scan- worrisome for primary malignancy Neuro/Psych Within defined limits Cardiovascular Hypertension CHF 
 
CAD (nonobstructive disease on cath 2017) and PAD (R CEA, bilateral LE stents) Exercise tolerance: <4 METS: Limited by leg pain. Comments: History of CHF 20-25% in 2017, echo with normal LV function 4/2019. GI/Hepatic/Renal 
  
 
 
Renal disease (s/p right nephrectomy): CRI Endo/Other Diabetes: type 2, using insulin Hypothyroidism Cancer (kidney, lung, mets to brain) Other Findings Comments: Sickle cell trait Physical Exam 
 
Airway Mallampati: III 
TM Distance: 4 - 6 cm Neck ROM: normal range of motion Mouth opening: Normal 
 
 Cardiovascular Rhythm: regular Rate: normal 
 
 
 
 Dental 
 
Dentition: Full upper dentures Comments: Upper dentures removed. Pulmonary Breath sounds clear to auscultation Abdominal 
GI exam deferred Other Findings Anesthetic Plan ASA: 4 Anesthesia type: general 
 
Monitoring Plan: Arterial line Induction: Intravenous Anesthetic plan and risks discussed with: Patient and Son / Daughter Optimized from CV standpoint and OK to hold plavix per cardiology note. Pt has ASA allergy. Poss post-op vent discussed. Glidescope for intubation.

## 2021-01-15 NOTE — PERIOP NOTES
Patient verified name and     Order for consent not found in EHR and unable to match case posting; patient verified. Type 3 surgery, walk-in assessment complete. Labs per surgeon: NONE. Labs per anesthesia protocol: BMP, CBC, T/S DOS, POC Glucose; results pending  EKG: EKG-2020    Instructed Patient that if blood sugar 300 or > , surgery may be cancelled. Pt voice understanding. SN instruct pt to contact [phone] for any complications. Dr. Dorisann Hodgkin reviewed chart and assessed. Pt has two iliac stents and takes plavix, has an allergy to aspirin, clearance in EHR to hold plavix 7 days prior to surgery. Dr. Dorisann Hodgkin aware, no new orders received. Patient COVID test date scheduled on 2021 at [place] [address], [place]. If appointment is needed patient provided telephone number of [phone]. Hospital approved surgical skin cleanser and instructions given per hospital policy. Patient provided with and instructed on educational handouts including Guide to Surgery, Pain Management, Hand Hygiene, Blood Transfusion Education, and [facility] Brochure. Patient answered medical/surgical history questions at their best of ability. All prior to admission medications documented in [facility]. Original medication prescription bottle were not  visualized during patient appointment. Patient instructed to hold all vitamins 7 days prior to surgery and NSAIDS 5 days prior to surgery, patient verbalized understanding. Patient teach back successful and patient demonstrates knowledge of instructions.

## 2021-01-18 NOTE — PERIOP NOTES
Dr. Mayte Escalante reviewed anesthesia summary, abnormal lab results 1/15/21 with comparison of 12/21 and 12/24/20 labs. Aware surgeon's office has been notified. No orders given by Dr. Mayte Escalante.

## 2021-01-21 PROBLEM — D49.6 CEREBELLAR TUMOR (HCC): Status: ACTIVE | Noted: 2021-01-01

## 2021-01-21 NOTE — INTERVAL H&P NOTE
Update History & Physical    The Patient's History and Physical of January 05, 2021,   this was reviewed with the patient and I examined the patient. There was no change. The surgical site was confirmed by the patient and me. Plan:  The risk, benefits, expected outcome, and alternative to the recommended procedure have been discussed with the patient. Patient understands and wants to proceed with the procedure.     Electronically signed by Kali Mckeon MD on 1/21/2021 at 7:45 AM

## 2021-01-21 NOTE — PROGRESS NOTES
's pre-procedure visit requested by patient. Conveyed care and concern for patient and family. Offered prayer as requested for patient, family, and staff.     Sunita Lopez MDiv, BS  Board Certified

## 2021-01-21 NOTE — OP NOTES
NEUROSURGERY OPERATIVE REPORT:      Patient Name:Rajesh Melvin Sr.     Patient MRN: [id number]     Patient YOB: 1950     Date of Procedure:1/21/2021     Pre-Procedure Diagnosis:   1. 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 Attempted Right approach occipital external ventricular drain placement   2. Posterior fossa via suboccipital craniectomy for resection of right cerebellar nodular and cystic tumor with mass-effect on the fourth ventricle  3. Medtronic Stealth Intraoperative Neuronavigation      Anesthesia: General Endotracheal anesthesia      Blood Loss: 900 cc     Surgeon: Charlotte Fitzgerald. Allan Carver MD     Anesthesiologist: Nishant Romeo. Princess Hebert MD     Surgical Staff:   See Operative Record     Specimen: Frozen and permanent      Procedure in Detail:   The patient was transported to the OR and placed under general endotracheal anesthesia. The [place] three-point head fixation was applied to the patient and tightened to 60 pounds per square inch. Patient was then flipped to the prone position and secured to the [place] head frame affixed to the OR bed in a [place] position. The patient was then papoosed and padded appropriately taking care to protect all bony prominences. The OR table was then turned 90 degrees to anesthesia was the patient was safely secured to the OR table and padded appropriately. The the hair overlying the occipital region and neck was removed with clippers. The patient's preoperative MRI was then registered to the patient's bony landmarks with the Medtronic Stealth neuro navigation the accuracy was confirmed by checking landmarks. The occipital region and neck was then prepped and draped in a standard sterile fashion.   A surgical pause time-out was performed at the beginning of the case prior to incision confirming procedure, sterility and functionality of instruments, surgical site, stability of patient, and anti-biotic administration. A midline incision from just above the inion to just below C2 was drawn out. A small linear incision overlying Barahona's point on the right was then drawn out as well. At this time a small incision over freighter point was made and a small Craney ostomy was performed for an attempted right occipital approach EVD placement. The EVD catheter was placed and CSF flow was obtained however after the EVD was tunneled and secured to the skin CSF flow was lost so therefore the EVD was removed. Our attention was then turned to the main portion of the case the skin from just above inion to just below C2 was incised with a 15 blade dissection was carried out down to the skull and localizing the bony inion and then in a dorsal midline raphe using Bovie electrocautery down to the C2 spinous process and the C1 lamina this was carried out laterally in a very safe process. The occipital bone and posterior fossa was exposed in a subperiosteal dissection. Self-retaining retractors were placed. At this time a bisuboccipital craniectomy with extension on the right was performed using a high-speed drill and Kerrison and Leksell rongeurs. Care was taken to preserve the dura without dural violation. At this time we used the Stealth neuro navigation to confirm entry point for accessing the right cerebellar tumor. A cruciate dural opening was performed using a 15 blade and bipolar electrocautery of the dura overlying the right cerebellar hemisphere. The occipital sinuses were engorge and dilated bilaterally in the dura and care was take during dural opening not to violate the occipital sinus. These were tacked back with dural stay sutures and the cerebellum at this point was protruding out through the dura. Using the neuro Stealth neuro navigation we localized our trajectory to the tumor target and we began to perform a corticectomy and resection of the tumor. The tumor was immediately encountered and was hemorrhagic throughout the entirety of the case. The tumor was soft and suckable and specimen was sent for frozen. The specimen sent was consistent with a poorly differentiated malignancy. After resecting the tumor with suction and bipolar electrocautery for a period of time the cystic component of the tumor was entered and a gush of necrotic appearing fluid was evacuated from the cavity. Frozen and permanent specimens were sent and collected. There was likely residual tumor remaining along the tentorium however the caudal lateral and medial planes all demonstrated adequate resection of tumor with findings consistent with margins of white matter. At this time I discussed the case intraoperatively with my  who viewed imaging and the resection cavity and  agreed adequate decompression and specimen had been obtained and recommending obtaining hemostasis and concluding the case. Pre-liminary pathology was consistent with a \"poorly differentiated malignancy. \" Then hemostasis was then obtained using multiple hemostatic agents including SurgiFlow, Gelfoam and Surgicel to line the resection cavity. The Gelfoam was removed and the Surgicel was used to line the resection cavity. The tiny dural leaflets could not be adequately sutured back together nor could a watertight dural closure so therefore a dural patch onlay was performed with DuraGen dural substitute over the dural defect and all the dura of the posterior fossa this was then covered with DuraSeal dural sealant. The wound was irrigated with copious antibiotic irrigation. The wound was then closed in a layered fashion with 0 Vicryl sutures closing the muscle and fascial layers of the wound hemostasis was taken care along the way. The galea and subcutaneous subdermal tissues were closed with 2-0 Vicryl interrupted inverted sutures. The skin was closed simple running 2-0 nylon suture.   The small incision overlying Barahona's point was closed with 2 interrupted inverted 2-0 Vicryl sutures. I then removed the patient from [place] three-point fixation and helped transfer him to the ICU bed carefully maintaining control of his head and neck at all times. There is bleeding from the left frontal pin site and two staples were applied for hemostasis. All counts were correct at the end of the case and throughout the case. Disposition: The patient had a prolonged recovery and was not able to be extubated in OR or taken to recovery. He had a labile blood pressure with a short elevation to low 200s while emerging from anesthesia. This was treated with anti-hypertensive medications and controlled quickly. At this time his pupils were reactive and he was breathing spontaneously but not responding to noxious stimuli after receiving adequate anesthesia reversal. Since the patient was not emerging from anesthesia as anticipated,  I elected that the patient should undergo a STAT CT Head WO Contrast prior to going to the ICU. I accompanied the patient the CT scanner to view his CT at the time of completion. The CT Head demonstrated a sizable posterior fossa hemorrhage filling and expanding the craniotomy cavity with extension to the fourth, third and lateral ventricles with ventricomegaly concerning for hydrocephalus. There was also hemorrhage along the right posterior approach EVD tract. I made the decision that the patient must go back to the OR emergently for hematoma evacuation and placement of a right frontal external ventricular drain placement prior to the hematoma evacuation. I discussed the aforementioned findings and plan with the patient's eldest son who was in agreement. Ivonne Agustin.  Abdi De La Garza, [address] [place] [address]

## 2021-01-21 NOTE — ANESTHESIA PROCEDURE NOTES
Arterial Line Placement Start time: 1/21/2021 8:12 AM 
End time: 1/21/2021 8:17 AM 
Performed by: Sheila Caraballo CRNA Authorized by: Therese Moyer MD  
 
Pre-Procedure Indications:  Arterial pressure monitoring and blood sampling Preanesthetic Checklist: patient identified, risks and benefits discussed, anesthesia consent, site marked, patient being monitored, timeout performed and patient being monitored Timeout Time: 08:12 Procedure:  
Prep:  ChloraPrep Seldinger Technique?: Yes Orientation:  Right Location:  Radial artery Catheter size:  20 G Number of attempts:  1 Cont Cardiac Output Sensor: No   
 
Assessment:  
Post-procedure:  Line secured and sterile dressing applied Patient Tolerance:  Patient tolerated the procedure well with no immediate complications Comment:  
Placed by Sahil MURDOCK using ultrasound.

## 2021-01-22 PROBLEM — Z85.528 HISTORY OF KIDNEY CANCER: Chronic | Status: ACTIVE | Noted: 2017-12-01

## 2021-01-22 PROBLEM — Z98.890 S/P CRANIOTOMY: Status: ACTIVE | Noted: 2021-01-01

## 2021-01-22 PROBLEM — Z98.890 HISTORY OF EMBOLECTOMY: Chronic | Status: ACTIVE | Noted: 2019-07-01

## 2021-01-22 PROBLEM — C34.12 MALIGNANT NEOPLASM OF UPPER LOBE OF LEFT LUNG (HCC): Chronic | Status: ACTIVE | Noted: 2020-01-01

## 2021-01-22 PROBLEM — Z99.11 ENCOUNTER FOR WEANING FROM VENTILATOR (HCC): Status: ACTIVE | Noted: 2021-01-01

## 2021-01-22 PROBLEM — C34.90 NON-SMALL CELL LUNG CANCER (NSCLC) (HCC): Chronic | Status: ACTIVE | Noted: 2021-01-01

## 2021-01-22 PROBLEM — J43.9 EMPHYSEMA LUNG (HCC): Chronic | Status: ACTIVE | Noted: 2021-01-01

## 2021-01-22 PROBLEM — J43.9 EMPHYSEMA LUNG (HCC): Status: ACTIVE | Noted: 2021-01-01

## 2021-01-22 PROBLEM — C34.90 NON-SMALL CELL LUNG CANCER (NSCLC) (HCC): Status: ACTIVE | Noted: 2021-01-01

## 2021-01-22 PROBLEM — Z90.5 HISTORY OF RIGHT NEPHRECTOMY: Chronic | Status: ACTIVE | Noted: 2017-10-13

## 2021-01-22 NOTE — PROGRESS NOTES
Lisa Amor. Admission Date: 1/21/2021             Daily Progress Note: 1/22/2021     Patient is a 69 y.o.  male seen and evaluated at the request of Dr. Lisy Torres from neurosurgery. Patient coming out of the OR after he had craniotomy for frontal external ventricular drain posterior fossa craniotomy reexploration of a for evacuation of the hemorrhage  Patient came in and beginning of January with loss of balance he was found to have vermian and right cerebellar hemisphere metastatic tumor with history of renal cell carcinoma he was given steroids came back for elective surgery. Subjective:     Over the past 24 hours:  Post op on vent. On 3% and sandra. LT prognosis poor and NS discussed with family today. He is not on sedation. Review of Systems  Review of systems not obtained due to patient factors.  sedated on vent    Current Facility-Administered Medications   Medication Dose Route Frequency    vasopressin (VASOSTRICT) 20 Units in 0.9% sodium chloride 100 mL infusion  0-0.04 Units/min IntraVENous TITRATE    3% sodium chloride (*HIGH ALERT*CONCENTRATED IV*) infusion  60 mL/hr IntraVENous CONTINUOUS    EPINEPHrine (ADRENALIN) 4 mg in 0.9% sodium chloride 250 mL infusion  1-10 mcg/min IntraVENous TITRATE    insulin lispro (HUMALOG) injection   SubCUTAneous Q6H    NOREPINephrine (LEVOPHED) 4 mg in 5% dextrose 250 mL infusion  0.5-30 mcg/min IntraVENous TITRATE    levETIRAcetam (KEPPRA) 500 mg in 0.9% sodium chloride (MBP/ADV) 100 mL MBP  500 mg IntraVENous Q12H    lidocaine (XYLOCAINE) 10 mg/mL (1 %) injection 0.1 mL  0.1 mL SubCUTAneous PRN    sodium chloride (NS) flush 5-40 mL  5-40 mL IntraVENous Q8H    sodium chloride (NS) flush 5-40 mL  5-40 mL IntraVENous PRN    0.9% sodium chloride infusion 250 mL  250 mL IntraVENous PRN    0.9% sodium chloride infusion 250 mL  250 mL IntraVENous PRN    HYDROmorphone (PF) (DILAUDID) injection 1 mg  1 mg IntraVENous Q4H PRN    naloxone (NARCAN) injection 0.4 mg  0.4 mg IntraVENous PRN    midazolam (VERSED) injection 3 mg  3 mg IntraVENous Q2H PRN    PHENYLephrine (BRENDA-SYNEPHRINE) 30 mg in 0.9% sodium chloride 250 mL infusion   mcg/min IntraVENous TITRATE    propofol (DIPRIVAN) 10 mg/mL infusion  0-50 mcg/kg/min IntraVENous TITRATE    NUTRITIONAL SUPPORT ELECTROLYTE PRN ORDERS   Does Not Apply PRN    dexamethasone (DECADRON) 4 mg/mL injection 4 mg  4 mg IntraVENous Q6H    0.9% sodium chloride infusion  100 mL/hr IntraVENous CONTINUOUS    niCARdipine in Saline (CARDENE) 0.1mg/mL 200 ml premix infusion  5 mg/hr IntraVENous TITRATE    pantoprazole (PROTONIX) 40 mg in 0.9% sodium chloride 10 mL injection  40 mg IntraVENous DAILY    acetaminophen (TYLENOL) tablet 650 mg  650 mg Oral Q4H PRN    ondansetron (ZOFRAN) injection 4 mg  4 mg IntraVENous Q6H PRN    promethazine (PHENERGAN) with saline injection 6.25 mg  6.25 mg IntraVENous Q15MIN PRN    HYDROcodone-acetaminophen (NORCO)  mg tablet 1 Tab  1 Tab Oral Q4H PRN         Objective:     Vitals:    01/22/21 0719 01/22/21 0733 01/22/21 0748 01/22/21 0835   BP: (!) 147/73 (!) 148/75 (!) 147/76    Pulse: 84 86 89 75   Resp: 17 17 17 18   Temp:       SpO2: 100% 100% 100% 100%   Weight:       Height:         Intake and Output:   01/20 1901 - 01/22 0700  In: 8970 [I.V.:7900]  Out: 6100 [Urine:3025; Drains:79]  No intake/output data recorded. Intake/Output Summary (Last 24 hours) at 1/22/2021 1022  Last data filed at 1/22/2021 0600  Gross per 24 hour   Intake 7970 ml   Output 3704 ml   Net 4266 ml       Physical Exam:          Constitutional: the patient is critically ill on vent  HEENT: ETT, cranial drain  Lungs:  Synchronous on vent  Cardiovascular: RRR without M,G,R  Abd/GI: soft; with positive bowel sounds. Ext: warm without cyanosis. There is trace lower leg edema.   Musculoskeletal: unable on vent, unrepsonsive  Skin: no jaundice or rashes, no wounds   Neuro: does not follow commands, track      Lines/Drains: venous access/port, PIV, art line, CSF drain, ETT, begum  Drips: sandra, 3%  Nutrition: NPO    Ventilator Settings  Mode FIO2 Rate Tidal Volume Pressure PEEP   Pressure control  30 %          8 cm H20      Peak airway pressure: 20 cm H2O   Minute ventilation: 6.81 l/min       CHEST XRAY:       CT neck, chest    IMPRESSION  1. Postoperative changes in the neck, as above. 2. Stable left lung mass. 3. Emphysema and coronary artery disease. Head CT:    FINDINGS: Again noted is a prior occipital craniectomy. The previously described  6.0 x 5.3 cm posterior fossa hemorrhage has increased in size to 6.6 x 5.5 cm. Intraventricular hemorrhage, as well as a tract of intraparenchymal hemorrhage  in the right frontal and parietal lobes have also minimally progressed. There  has been minimal progressive enlargement of the ventricles. A new right frontal  approach ventriculostomy catheter projects into the anterior horn of the right  lateral ventricle, with a small amount of associated pneumocephalus. No  significant supratentorial midline shift.     IMPRESSION  Slight progression of right frontal and parietal parenchymal,  intraventricular and posterior fossa hemorrhages. There is also slight  progressive dilatation of the ventricles.       LAB  Recent Labs     01/22/21  0307 01/21/21  1709   WBC 13.7* 15.1*   HGB 9.5* 9.5*   HCT 28.3* 27.4*    97*     Recent Labs     01/22/21  0806 01/22/21  0307 01/21/21  1709   * 143 141   K  --  4.5 5.3*   CL  --  117* 114*   CO2  --  20* 18*   GLU  --  149* 175*   BUN  --  40* 46*   CREA  --  1.54* 1.47   MG  --  2.1  --    PHOS  --  2.4  --    INR  --   --  1.3     ABG:    Lab Results   Component Value Date/Time    PHI 7.50 (H) 01/21/2021 09:01 PM    PCO2I 29.8 (L) 01/21/2021 09:01 PM    PO2I 531 (H) 01/21/2021 09:01 PM    HCO3I 18.2 (L) 01/22/2021 05:43 AM    FIO2I 100 01/21/2021 09:01 PM             Assessment:     Hospital Problems  Date Reviewed: 12/3/2020          Codes Class Noted POA    Non-small cell lung cancer (NSCLC) ([facility] [address] (Chronic) ICD-10-CM: C34.90  ICD-9-CM: 162.9  1/22/2021 Yes        Encounter for weaning from ventilator [facility]) ICD-10-CM: Z99.11  ICD-9-CM: V46.13  1/22/2021 Yes    Not weanable due to CNS disease    Cerebellar tumor ([facility] [address] ICD-10-CM: D49.6  ICD-9-CM: 239.6  1/21/2021 Yes    Post Op. Hemorrhage on CT    Malignant neoplasm of upper lobe of left lung (HCC) (Chronic) ICD-10-CM: C34.12  ICD-9-CM: 162.3  8/20/2020 Yes        History of right nephrectomy (Chronic) ICD-10-CM: Z90.5  ICD-9-CM: V45.73  10/13/2017 Yes    Overview Signed 10/13/2017  1:13 PM by Gregg Swartz NP     Right                 69 y.o. 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 PLAN:     3%, pressors, POC per neurosurgery  Supportive care, unable to wean while acutely ill. Unable to follow commands/track. Ongoing conversations with family per PCP, LT prognosis poor. HAYDER Patel    More than 50% of time documented was spent in face-to-face contact with the patient and in the care of the patient on the floor/unit where the patient is located. Lungs:  Few scattered rhonchi; not overbreathing vent  Heart:  RRR with no Murmur/Rubs/Gallops  Neuro: no corneals, dolls, gag, or cough reflex. Moves to pain. Toes upgoing bilaterally. Additional Comments:  Neuro exam very bad. Most recent CT with some progression of hemorrhage. Px dismal. Family has decided on DNR with compassionate extubation tomorrow. I have examined the patient. I agree with the above assessment and plan as documented.     Romy Tate MD

## 2021-01-22 NOTE — PROGRESS NOTES
Bedside and Verbal shift change report given to Nany Degroot  (oncoming nurse) by Jessica Cushing  (offgoing nurse). Report included the following information SBAR, Kardex, ED Summary, Procedure Summary, Intake/Output, MAR, Med Rec Status, Cardiac Rhythm Sinus , Alarm Parameters , Quality Measures and Dual Neuro Assessment.

## 2021-01-22 NOTE — PERIOP NOTES
TRANSFER - OUT REPORT:    Verbal report given to [address] on [employer] [address]  being transferred to 2224(unit) for routine progression of care       Report consisted of patients Situation, Background, Assessment and   Recommendations(SBAR). Information from the following report(s) SBAR, OR Summary and Intake/Output was reviewed with the receiving nurse. Lines:   Venous Access Device power port 08/31/20 Upper chest (subclavicular area, right (Active)       Venous Access Device Power port 01/21/21 Upper chest (subclavicular area, right (Active)       Peripheral IV 01/21/21 Left Wrist (Active)       Peripheral IV 01/21/21 Right Antecubital (Active)       Arterial Line 01/21/21 Right Radial artery (Active)        Opportunity for questions and clarification was provided.       Patient transported with:   Monitor  O2 @ 6 liters

## 2021-01-22 NOTE — PROGRESS NOTES
Ventilator check complete; patient has a #8. 0 ET tube secured at the 24 at the lip. Patient is sedated. Patient is not able to follow commands. Breath sounds are clear and diminished. Trachea is midline, Negative for subcutaneous air, and chest excursion is symmetric. Patient is also Negative for cyanosis and is Negative for pitting edema. All alarms are set and audible. Resuscitation bag is at the head of the bed. Ventilator Settings  Mode FIO2 Rate Tidal Volume Pressure PEEP I:E Ratio   Pressure Control  100% 16    12 cm H2O  8 cm H20  1:2.9      Peak airway pressure: 20 cm H2O   Minute ventilation: 6.7 l/min     **Oxygen weaned to 40% and Rate increased to 18 post ABG, per Dr. Von Rojas.     Pierce Troncoso, RT

## 2021-01-22 NOTE — ANESTHESIA POSTPROCEDURE EVALUATION
Procedure(s): POSTERIOR FOSSA CRANIECTOMY FOR RESECTION OF TUMOR WITH STEALTH NAVIGATION / STEALTH/REP MRI @ 0630. general 
 
Anesthesia Post Evaluation Multimodal analgesia: multimodal analgesia used between 6 hours prior to anesthesia start to PACU discharge Patient location during evaluation: ICU Patient participation: complete - patient cannot participate Level of consciousness: obtunded/minimal responses Pain management: adequate Airway patency: patent (ETT) Anesthetic complications: no 
Cardiovascular status: acceptable (requiring low dose neosynephrine infusion) Respiratory status: ETT, intubated and ventilator Hydration status: acceptable Comments: Patient transported to ICU with monitors in place. He is still requiring a low dose neosynephrine infusion to obtain goal systolic [id number]. I spoke with Dr. Maryan Sandifer and the plan is to attempt to keep the patient hemodynamically stable overnight with plan for likely repeat scan tomorrow. The patient was breathing spontaneously in the OR prior to leaving however with a rate of 4-6 and remains unresponsive to any stimulation. Patient remains hypothermic. Patient was covered appropriately with agustín hugger and use of warmed fluids in the OR. Post anesthesia nausea and vomiting:  none Final Post Anesthesia Temperature Assessment:  Hypothermia (<36 degrees C) INITIAL Post-op Vital signs:  
Vitals Value Taken Time /89 01/21/21 2020 Temp 34.9 °C (94.8 °F) 01/21/21 2020 Pulse 92 01/21/21 2041 Resp 15 01/21/21 2041 SpO2 100 % 01/21/21 2041 Vitals shown include unvalidated device data.

## 2021-01-22 NOTE — PROGRESS NOTES
Spiritual Care Visit, follow up visit. Visited with patient and family at bedside after receiving a request from staff. Prayed for patient's health and recovery. Patient has limited brain function following surgery. Physician spoke to them about patient's condition and possible outcomes, asking them to determine how they want to procede. Family wants to continue treatment at this time maintaining optimism for recovery.  prayed with them on behalf of patient, and for recovery if possible. Invited family to call for  as needed. Visit by Tyson Zelaya, Staff .  Brunilda, Th.B., B.A.

## 2021-01-22 NOTE — PROGRESS NOTES
NEUROSURGERY PROGRESS NOTE:   Admit Date: 1/21/2021  Subjective:   Patient's ICPs have ranged 5-7 overnight. Patient demonstrated the right pupil dilating and loosing reactivity at approximately 0500. Hypertonic 23.4% saline was administered and 3% was started at 60 cc per hour. Patient remains on the vent and is not breathing spontaneously. His EVD has been patent at 10 cmH2O and draining. Objective:  Visit Vitals  BP (!) 147/76   Pulse 75   Temp 97.7 °F (36.5 °C)   Resp 18   Ht 5' 2\" (1.575 m)   Wt 119 lb (54 kg)   SpO2 100%   BMI 21.77 kg/m²   GCS: 3T  Eyes closed to noxious stimuli   Intubated, on a rate of 18 and not initiating spontaneous respiration and therefore non-verbal, therefore cannot answer orientation questions   Does not follow commands or respond to noxious stimuli  Does not cooperate with formal cranial nerve assessment as he is not currently following commands   Does not cooperate with formal motor examination as he is not currently following commands  Patient does demonstrate triple flexion in the right lower extremity to deep peripheral noxious stimuli   Corneal reflexes absent bilaterally   Right pupil 5 mm and non-reactive to light   Left pupil 4 mm and sluggishly reactive to light  Right frontal EVD in place, patent with good pulsatility with ICP 5 to 7 at bedside with a good waveform   Right frontal incision with stables in place, EVD dressing clean, dry and intact   Mid-line posterior fossa incision Dressing in place      Assessment and Plan:   Yamini Henry Sr. 69 y.o. male who is now 1 Day Post-Op from a bisuboccipital craniectomy for resection of a mid-line and right eccentric posterior fossa nodular and cystic enhancing tumor which was followed by emergent right frontal external ventricular drain placement and posterior fossa re-exploration for evacuation of post-operative posterior fossa hematoma with mass effect and brain compression.  CT Head WO contrast this am demonstrates interval evacuation of posterior fossa hematoma with residual/recurrent hemorrhage in the posterior fossa as well as intraventricular hemorrhage in the fourth, third and lateral ventricles with interval decrease in the size of the ventricular system with a right frontal approach ventricular catheter terminating at the right foramen of Thompson. There is still hemorrhage along the right occipital catheter approach tract appears unchanged. The patient's neurological examination has not improved despite treatment of his hydrocephalus and evacuation of hematoma. He has an ICH score of 5 which portends to a 30 day morality of 100 percent. He does not currently meet death by neurological criteria (e.g. brain death) but given his examinatio and imaging findings which culminate in an [address] score he portends and extremely poor prognosis. I have discussed goals of care extensively with the patient's fourth son and one of the patient's daughter-in-laws and as this time they wish to not make any changes to his goals of care. I was accompanied by a member of a [address] [place] [address] team.   - Continue ICU Level Care   - Continue EVD open and drain at 10 cmH2O, drop and drain 10 cc is ICP is greater than 20 for 20 minutes and contact neurosurgery on call   - Continue sedation and vasoactive IV medications as necessary   - Continue electrolytes replacement with daily lab  - Monitor Na q6H with Na goal of 145 to 155 mEq   - Continue SCDs for DVT prophylaxis as chemical prophylaxis is contraindicated   - Appreciate Pulmonary Critical Care Medicine Co-management for pulmonary and medical co-management   - Monitor Cr: 1.54 currently   - WBC 13.7 secondary to steroids will monitor for signs of infection   - Continue Ancef for surgical drain prophylaxis while EVD is in place   - Family will continue to assess the situation and notify care teams of any changes in directives and goals of care.      Giovana Dawson MD     Addendum: After further discussion the family elects to move forward with comfort care measures and compassionate extubation at approximately 24 hours post-op from surgery given his poor prognosis and know that the patient would not want to live in a persistent vegetative state. Lisandra Tabor.  Luli Toledo, [address] [place] [address]

## 2021-01-22 NOTE — CONSULTS
CONSULT NOTE    Sarah Wu .    1/21/2021    Date of Admission:  1/21/2021    The patient's chart is reviewed and the patient is discussed with the staff. Subjective:     Patient is a 69 y.o.  male seen and evaluated at the request of Dr. Kari Du from neurosurgery. Patient coming out of the OR after he had craniotomy for frontal external ventricular drain posterior fossa craniotomy reexploration of a for evacuation of the hemorrhage  Patient came in and beginning of January with loss of balance he was found to have vermian and right cerebellar hemisphere metastatic tumor with history of renal cell carcinoma he was given steroids came back for elective surgery today. Review of Systems  Review of systems not obtained due to patient factors.     Patient Active Problem List   Diagnosis Code    Benign hypertension with CKD (chronic kidney disease) stage III I12.9, E96.57    Systolic CHF, acute ([facility]) I50.21    History of right nephrectomy Z90.5    Coronary artery disease involving native coronary artery of native heart without angina pectoris I25.10    Cardiomyopathy, ischemic I25.5    Gallop rhythm R00.8    Acquired hypothyroidism E03.9    Type 2 diabetes mellitus with nephropathy ([facility] E11.21    Abdominal aortic aneurysm (AAA) ([facility] I71.4    PAD (peripheral artery disease) ([facility]) I73.9    Atherosclerosis of native arteries of extremity with intermittent claudication ([facility]) I70.219    Dyslipidemia E78.5    PVD (peripheral vascular disease) ([facility]) I73.9    Foot drop, left M21.12    Former cigarette smoker Z87.891    History of embolectomy Z98.890    History of kidney cancer Z80.1    Long term current use of anticoagulant Z79.01    Femoral artery stenosis, right ([facility]) I70.201    Femoral artery stenosis, left ([facility]) I70.202    Lung mass R91.8    Lymphadenopathy R59.1    Malignant neoplasm of upper lobe of left lung ([facility]) C34.12    Dehydration E86.0  Chemotherapy induced neutropenia (HCC) D70.1, T45.1X5A    Cerebellar tumor ([address] D49.6       [facility] . Prior to Admission Medications   Prescriptions Last Dose Informant Patient Reported? Taking? Unithroid 50 mcg tablet 2021 at 0300  No Yes   Sig: Take 1 Tab by mouth Daily (before breakfast). Victoza 3-Denis 0.6 mg/0.1 mL (18 mg/3 mL) pnij 2021 at Unknown time  No Yes   Sig: ADMINISTER 1.8 MG UNDER THE SKIN DAILY   atorvastatin (LIPITOR) 80 mg tablet 2021 at Unknown time  No Yes   Sig: TAKE 1 TABLET BY MOUTH EVERY NIGHT   carvediloL (COREG) 12.5 mg tablet 2021 at 2030  No Yes   Sig: Take 1 Tab by mouth two (2) times daily (with meals). clopidogreL (PLAVIX) 75 mg tab 2021 at Unknown time  No Yes   Sig: Take 1 Tab by mouth daily. dapagliflozin (Farxiga) 10 mg tab tablet 2021 at Unknown time  No Yes   Sig: Take 1 Tab by mouth daily. dexAMETHasone (Decadron) 6 mg tablet 2021 at 0300  No Yes   Sig: Every 8 hours   folic acid (FOLVITE) 1 mg tablet 2021 at Unknown time  No Yes   Sig: Take 1 Tab by mouth daily. insulin degludec Marlene Swati FlexTouch U-100) 100 unit/mL (3 mL) inpn 2021 at Unknown time  No Yes   Si Units by SubCUTAneous route nightly. 20 units sq daily  Indications: type 2 diabetes mellitus   Patient taking differently: 20 Units by SubCUTAneous route nightly. Indications: type 2 diabetes mellitus   levETIRAcetam (KEPPRA) 500 mg tablet 2021 at 0300  No Yes   Sig: Take 1 Tab by mouth two (2) times a day. lidocaine-prilocaine (EMLA) topical cream Not Taking at Unknown time  No No   Sig: Apply  to affected area as needed for Pain. Apply to port site 30-45 min prior to port needle stick   ondansetron hcl (Zofran) 8 mg tablet Unknown at Unknown time  No No   Sig: Take 1 Tab by mouth every eight (8) hours as needed for Nausea.  Indications: nausea and vomiting caused by cancer drugs   pantoprazole (PROTONIX) 40 mg tablet 2021 at Unknown time  No Yes   Sig: Take 1 Tab by mouth daily. Indications: gastroesophageal reflux disease   prochlorperazine (Compazine) 10 mg tablet Unknown at Unknown time  No No   Sig: Take 1 Tab by mouth every six (6) hours as needed for Nausea. Indications: nausea and vomiting caused by cancer drugs, nausea and vomiting   sucralfate (Carafate) 1 gram tablet 1/20/2021 at Unknown time  No Yes   Sig: Take 1 Tab by mouth four (4) times daily. Mix tab in 10 ml warm water  Indications: heartburn   valsartan (DIOVAN) 80 mg tablet 1/20/2021 at Unknown time  No Yes   Sig: TAKE 1 TABLET BY MOUTH DAILY   Patient taking differently: nightly.       Facility-Administered Medications: None       Past Medical History:   Diagnosis Date    Cancer ([facility]     kidney ca    Cardiomyopathy ([facility]     Cerebellar tumor ([facility] 2020    CHF (congestive heart failure) ([facility] [facility] 11/21/2017    55-60% 4/2019    Chronic kidney disease     CKD (chronic kidney disease), stage III     Stage 3    Coronary artery disease involving native coronary artery of native heart without angina pectoris 10/25/2017    followed by [facility] last cath 10/16/2017 no stents    Diabetes ([facility] [address] [facility]     oral and insulin reliant; abg fbs 115; last A1c 7.3 1/20/2020; s.s of hypoglycemia but doesn't know what BS was at the time    Foot drop, left     Former cigarette smoker     History of embolectomy 07/2019    right common femoral embolectomy    History of kidney cancer 12/2017    kidney- right removed    Hypertension     managed with med    Long term current use of anticoagulant     Plavix; stent in bilateral LE    PAD (peripheral artery disease) (HCC)     stents in bilateral LE    Sickle cell trait ([facility] [address] [facility]     Sickle cell trait    Status post carotid endarterectomy 07/2019    Right side    Thyroid disease     managed with med     Past Surgical History:   Procedure Laterality Date    HX APPENDECTOMY  1963    HX CAROTID ENDARTERECTOMY      HX COLONOSCOPY      HX NEPHRECTOMY Right 2017    IR INSERT TUNL CVC W PORT OVER 5 YEARS  2020    right side of chest    MI LEFT HEART CATH,PERCUTANEOUS  10/16/2017    no PCI- medical management    VASCULAR SURGERY PROCEDURE UNLIST Bilateral 10/11/2018    iliac angio and stent    VASCULAR SURGERY PROCEDURE UNLIST  2019    Right lower extremity arteriogram with balloon angioplasty and stent of the right external iliac artery with 8 x 4 S. M.A.R.T. stent.     VASCULAR SURGERY PROCEDURE UNLIST Right 2019     Right common femoral artery endarterectomy,Right common femoral embolectomy,Right lower extremity arteriogram     Social History     Socioeconomic History    Marital status: LEGALLY      Spouse name: Not on file    Number of children: Not on file    Years of education: Not on file    Highest education level: Not on file   Occupational History    Not on file   Social Needs    Financial resource strain: Not on file    Food insecurity     Worry: Not on file     Inability: Not on file    Transportation needs     Medical: Not on file     Non-medical: Not on file   Tobacco Use    Smoking status: Former Smoker     Packs/day: 1.00     Years: 40.00     Pack years: 40.00     Quit date:      Years since quittin.0    Smokeless tobacco: Never Used   Substance and Sexual Activity    Alcohol use: Not Currently    Drug use: Not Currently    Sexual activity: Not on file   Lifestyle    Physical activity     Days per week: Not on file     Minutes per session: Not on file    Stress: Not on file   Relationships    Social connections     Talks on phone: Not on file     Gets together: Not on file     Attends Protestant service: Not on file     Active member of club or organization: Not on file     Attends meetings of clubs or organizations: Not on file     Relationship status: Not on file    Intimate partner violence     Fear of current or ex partner: Not on file     Emotionally abused: Not on file Physically abused: Not on file     Forced sexual activity: Not on file   Other Topics Concern    Not on file   Social History Narrative    Not on file     Family History   Problem Relation Age of Onset    Diabetes Other     Cancer Mother         Ovarian CA    Heart Disease Mother     Hypertension Mother     Sickle Cell Trait Mother     Heart Disease Father     Sickle Cell Trait Father     Sickle Cell Anemia Brother     Heart Disease Brother     Hypertension Brother     Cancer Cousin         prostate     Allergies   Allergen Reactions    Aspirin Nausea and Vomiting       Current Facility-Administered Medications   Medication Dose Route Frequency    lidocaine (XYLOCAINE) 10 mg/mL (1 %) injection 0.1 mL  0.1 mL SubCUTAneous PRN    lactated Ringers infusion  100 mL/hr IntraVENous CONTINUOUS    lactated Ringers infusion  100 mL/hr IntraVENous CONTINUOUS    oxyCODONE IR (ROXICODONE) tablet 5 mg  5 mg Oral ONCE PRN    HYDROmorphone (PF) (DILAUDID) injection 0.5 mg  0.5 mg IntraVENous Multiple    naloxone (NARCAN) injection 0.04 mg  0.04 mg IntraVENous EVERY 2 MINUTES AS NEEDED    promethazine (PHENERGAN) with saline injection 6.25 mg  6.25 mg IntraVENous Q15MIN PRN    sodium chloride (NS) flush 5-40 mL  5-40 mL IntraVENous Q8H    sodium chloride (NS) flush 5-40 mL  5-40 mL IntraVENous PRN    0.9% sodium chloride infusion 250 mL  250 mL IntraVENous PRN    0.9% sodium chloride infusion 250 mL  250 mL IntraVENous PRN    sodium chloride (NS) flush 5-40 mL  5-40 mL IntraVENous Q8H    sodium chloride (NS) flush 5-40 mL  5-40 mL IntraVENous PRN    0.9% sodium chloride infusion  100 mL/hr IntraVENous CONTINUOUS    acetaminophen (TYLENOL) tablet 650 mg  650 mg Oral Q4H PRN    HYDROcodone-acetaminophen (NORCO)  mg tablet 1 Tab  1 Tab Oral Q4H PRN    HYDROmorphone (PF) (DILAUDID) injection 1 mg  1 mg IntraVENous Q4H PRN    naloxone (NARCAN) injection 0.4 mg  0.4 mg IntraVENous PRN    [START ON 1/22/2021] ceFAZolin (ANCEF) 2 g/20 mL in sterile water IV syringe  2 g IntraVENous Q8H    levETIRAcetam (KEPPRA) 500 mg in 0.9% sodium chloride 100 mL IVPB  500 mg IntraVENous Q12H    ondansetron (ZOFRAN) injection 4 mg  4 mg IntraVENous Q6H PRN    niCARdipine in Saline (CARDENE) 0.1mg/mL 200 ml premix infusion  5 mg/hr IntraVENous TITRATE    insulin lispro (HUMALOG) injection   SubCUTAneous AC&HS    [START ON 1/22/2021] dexamethasone (DECADRON) 4 mg/mL injection 4 mg  4 mg IntraVENous Q6H    [START ON 1/22/2021] pantoprazole (PROTONIX) 40 mg in 0.9% sodium chloride 10 mL injection  40 mg IntraVENous DAILY    fentaNYL in normal saline (pf) 25 mcg/mL infusion  0-200 mcg/hr IntraVENous TITRATE    midazolam (VERSED) injection 3 mg  3 mg IntraVENous Q2H PRN    PHENYLephrine (BRENDA-SYNEPHRINE) 30 mg in 0.9% sodium chloride 250 mL infusion   mcg/min IntraVENous TITRATE         Objective:     Vitals:    01/21/21 2100 01/21/21 2101 01/21/21 2110 01/21/21 2115   BP:  114/71     Pulse: 91  93 92   Resp: (!) 4  17 17   Temp:       SpO2: 100%  100% 100%   Weight:       Height:           PHYSICAL EXAM     Constitutional: Coming out of the OR pupils dilated he is not having any movements reflexes could be paralytics could be branded too soon to tell  EENMT:  Sclera clear, no pupillary reflex  Respiratory: Vented good bilateral air entry  Cardiovascular:  RRR without M,G,R  Gastrointestinal: soft and non-tender; with positive bowel sounds. Musculoskeletal: warm without cyanosis. There is no lower extremity edema. Skin:  no jaundice or rashes, no wounds   Neurologic: GCS 3 of the sedation pupils dilated no Babinski could be brain dead or still paralytics on board.     CXR:        Recent Labs     01/21/21  1709   WBC 15.1*   HGB 9.5*   HCT 27.4*   PLT 97*   INR 1.3     Recent Labs     01/21/21  1709      K 5.3*   *   *   CO2 18*   BUN 46*   CREA 1.47   CA 8.2*   ALB 2.1*   TBILI 0.8   ALT 15 Recent Labs     01/21/21  2101 01/21/21  1752 01/21/21  1610   PHI 7.50* 7.39 7.47*   PCO2I 29.8* 33.8* 26.5*   PO2I 531* 529* 476*   HCO3I 23.1 20.4* 19.1*     No results for input(s): LCAD, LAC in the last 72 hours. Assessment:  (Medical Decision Making)     Hospital Problems  Date Reviewed: 12/3/2020          Codes Class Noted POA    Cerebellar tumor [facility]) ICD-10-CM: D49.6  ICD-9-CM: 239.6  1/21/2021 Unknown              Plan:  (Medical Decision Making)     --Intracranial hemorrhage status post craniotomy drain placement 10 systolic blood pressure above 120 and map of 70 at least even if he has to go on pressors he has a port will utilize that  --Patient very well could be brain dead or still paralytics hanging around prognosis remains to be very grim from discussion with neurosurgery family is discussing end-of-life issues  --Permissive hypercapnia to decrease any intracranial pressure    --No anticoagulation Versed and fentanyl as needed  --Full neuro exam in the morning to decide if the patient is brain dead or not  --Care time spent the care of this patient 38 minutes. More than 50% of the time documented was spent in face-to-face contact with the patient and in the care of the patient on the floor/unit where the patient is located. Thank you very much for this referral.  We appreciate the opportunity to participate in this patient's care. Will follow along with above stated plan.     Jesus Catalan MD

## 2021-01-22 NOTE — PROGRESS NOTES
Discussed pt goals of care with Noreen Dueñas, with Dr. Juanita Gaspar via phone. Family expresses desire to give pt one more day to see a turn around and will withdraw care at that time. Family further expresses to make pt a DNR at this time.

## 2021-01-22 NOTE — ROUTINE PROCESS
Guideline Guideline Number: [id number] Title: Management of the Patient with Mechanical Ventilation (including weaning) and ABCDE Bundle Effective Date:  03/00 Revised Date: 02/09, 03/10, 7/12, 5/13,10/13, 8/14, 11/17, 5/18, 2/19 Reviewed Date: 07/2015, 04/2016, 06/2017, 7/19, 10/2020 I. Policy:  Management of the patient requiring mechanical ventilation, including readiness to wean and weaning protocol. The information provided serves as a guideline for patient management. Included in this guidelines is the ABCDE Bundle to provide guidance to staff for evidence based management of pain, agitation/anxiety and delirium in the intensive care unit. The goals of critical care analgesia and sedation are to facilitate mechanical ventilation, to prevent patient and caregiver injury, and to avoid the psychological and physiologic consequences of inadequate treatment of pain, anxiety, agitation, and delirium by maintaining a light level of sedation. Pain occurs commonly in adult ICU patients, regardless of admitting diagnosis. Therefore, pain should be frequently assessed and analgesic medications titrated to prevent adverse effects associated with either inadequate or excessive analgesia. Once pain has been addressed, anxiolytic and/or antipsychotic medications can be utilized to treat unresolved agitation/anxiety and delirium, with the goal to prevent over- or under sedation by using the Becerril Agitation Sedation Scale (RASS). Assertive management of these issues has been shown to reduce costs, improve ICU outcomes such as successful extubation and ICU length of stay, and allow for patients to participate in their own care. II. Purpose: The respiratory care practitioner and the critical care RN will utilize the following guideline to provide the most efficient and effective management of mechanical ventilators and weaning and extubation processes. Goals of Treatment: 1. Adequate management of patients pain and discomfort while maintaining a light level of sedation (RASS score of 0 to -1) 2. Both chronic and acute sources of pain should be identified and treated. 3. Sedative agents should be considered if patient still expresses discomfort and/or is not at RASS goal of 0 to -1 despite adequate management of pain. 4. Patients requiring neuromuscular blockage must have continuous infusions of analgesic and sedative agents. III. Responsibility: Director Respiratory Care Services and all Respiratory Care Practitioners with documented competency as well as Critical Care RN staff. General Guidelines 1. Introduction to Ventilator Plan Phase 
a. Ventilator Management Phase, General Statement -  The plan should be initiated in patients who have a secure airway/require invasive mechanical ventilation (endotracheal or tracheostomy) only -   The provider determines the appropriate medications used for analgesia and agitation/anxiety along with the clinical pharmacist 
 
2. Monitoring Levels of Comfort 
a. Pain Assessment 
- Pain is monitored using the numerical scales - A pain assessment should be conducted, at a minimum, every 4 hours and as needed and per guidelines. - The level of pain should be determined as satisfactory by the patient based on patients baseline level of pain , considering any chronic pain that the patient may have. - If the patient is unable to communicate pain level, the nurse can assess for nonverbal indicators including facial grimacing, moaning, tachypnea, tachycardia, hypertension, diaphoresis, etc as a cue to begin further pain assessment. b.  Sedation Assessment - Sedation is monitored using the Becerril Agitation Sedation Scale (RASS) Target RASS RASS Description +4 Combative, violent, danger to staff 
  
+3 Pulls or removes tubes(s) or catheters; aggressive +2 Frequent nonpurposeful movement, fights ventilator +1 Anxious, apprehensive, but not aggressive  
0 Alert and calm  
-1 Awakens to voice (eye opening/contact) > 10 sec  
-2 Light sedation, briefly awakens to voice (eye opening/contact) < 10 sec  
-3 Moderate sedation, movement or eye opening. No eye contact  
-4 Deep sedation, no response to voice, but movement or eye opening to physical stimulation  
-5 Unarousable, no response to voice or physical stimulation  
 
-  Goal RASS is 0 to -1, unless otherwise specified by providers order. 
- Nursing staff should conduct the RASS every 4 hours and as needed to maintain goal RASS of 0 to -1. 
- If RASS is outside of goal range, discuss treatment options with provider. 
- A RASS score of +2 to +4 requires further assessment by the nurse. Causes of agitation/anxiety that should be considered include: 
a. Pulmonary -   endotracheal tube malposition or patency, mode of ventilation, pneumothorax, hypoxemia, hypercarbia 
b. Metabolic  hypoglycemia, hyponatremia, acute renal or hepatic failure 
c. Emotional upset  with information and awareness of critical condition, prognosis, need for surgical or invasive procedures, other interventions or complications, family or personal stressors 
- C. Sedation Assessment while using Neuromusclar Blocking Agents 
- D. Delirium Assessment 
a. the ICU ([facility]  ICU) 3. Analgesia The incidence of significant pain has been reported to be 50% or higher in both medical and surgical ICU patients. These patients also experience discomfort during routine/procedural ICU care and at rest.  However, patients may be unable to self-report their pain (either verbally or with other signs) because of an altered level of consciousness, the use of mechanical ventilation, or high doses of sedative agents or neuromuscular blocking agents. The short and long term consequences of unrelieved or inadequately treated pain are significant and include patient discomfort, decreased satisfaction with care by family and patient, delirium, agitation/anxiety, post traumatic stress disorder and depression. Therefore, routine assessment and treatment of pain should occur in all ICU patients. Causes and Treatment of Pain in the ICU 
a. Acute pain (post-operative, procedural pain, discomfort with usual ICU care or other acute episodes of pain-related to underlying disease) 1. Consider use of PCA for alert and oriented patients with pain needs not met by PRN dosing or opoids. 2. Preemptive analgesia should occur prior to chest tube removal, and should be considered for other procedural pain such as turning and repositioning, would drain removal, wound dressing change, tracheal suctioning, femoral catheter removal or place of central venous catheter. 3. Appropriate analgesic medications for preemptive analgesia are short acting intravenous (IV) agents (i.e. fentanyl, morphine, hydromorphone) a. Administration of analgesia before patient experiences noxious stimuli prevents amplification and hyperexcitability of the central nervous system. b. Analgesia for Mechanically Ventilated Patients: 
1. The approach to sedation and analgesia management for mechanically ventilated patients favors use of analgesia first sedation. The primary goal of this strategy is to address pain and discomfort first, and then if necessary, add anxiolytic agent. 2. Analgesia first sedation reduces dose escalation of medications, decreases the duration of mechanical ventilation and the incidence of VAP, improves the probability of successful extubation, and ultimately shortens ICU length of stay. 3. For pain management, analgesic medications are determined by the provider.    Intermittent dosing of the analgesic should be attempted first.   
 If the patient requires more than 3 doses within 1 hour then provider should be contacted to consider continuous infusion. 4.  Analgesic options for mechanically ventilated patients include: 
a. Fentanyl which is considered the drug of choice for patients requiring continuous infusion. b.Morphine may be considered for those patients without renal dysfunction who are hemodynamically stable and require intermittent pain medication. Continuous infusions of morphine may be used for patientl who are receiving comfort care as part of end of life care. c.Hydromorphone is reserved for patients who are refractory to fentanyl or morphine and is typically admininstered by intermittent dosing. 4.  Agitation/Anxiety Background Agitation and anxiety frequently occur in ICU patients. Anxiolytic/sedation agents may be indicated to help relieve discomfort, improve synchrony with mechanical ventilation, and decrease the overall work of breathing. Pain control alone may be sufficient to make patients comfortable enough to require no anxiolytic/sedative agent. In addition, non-pharmacologic interventions such as repositioning or verbal assurance may be helpful to comfort or redirect an agitated patient. If these methods are unsuccessful, then anxiolytic/sedative medications such as propofol, dexmedetomidine, or benzodiazepines can be used. Selection of an anxiolytic should be based on the pharmacokinetic properties of the medication, patient specific characteristics, and sedation goal.   However, nonbenzodiazepine sedatives (ie propofol or dexmedetomidine) may be preferable over benzodiazepines (ie midazolam or larazepam) due to more favorable outcomes such as delirum. Causes and Treatment of Agitation/Anxiety 
a. Possible underlying causes of agitation and anxiety include pain, delirium, hypoxemia, hypoglycemia, hypotension, or withdrawal from alcohol  and other drugs. b. Analgesia first sedation should be attempted initially to manage pain and provide sedation in appropriate patients. Analgesia alone may be adequate to reach RASS goal of 0 to -1. If patient remains agitated or anxious despite adequate analgesia (ie RASS +2 to +4) then anxiolytic/sedative should be considered. c. The choice of anxiolytic should be based on desired levels of sedation (ie light sedation or deep sedation) with preference for the use of nonbenzodiazepines such as propofol or dexmedetomidine if appropriate. While light sedation (ie RASS 0 to -1) is preferred for most patients, there are instances when deep sedation (ie RASS -4 to -5) is desired. For example, in the setting of ventilator dysynchrony due to ARDS or for patients receiving NMB agents. d. Medications to maintain light sedation (ie RASS 0 to -1) include 1. Propofol continuous infusion can be considered for hemodynamically stable (ie SBP = 100, MAP = 65 and/or not requiring vasopressor support) patients requiring light sedation. Propofol has a quick onset (1-2 minutes) and offset of action, making it a good agent to assess neurological status and facilitate liberation from the mechanical ventilator. 2.Dexmedetomidine continuous infusion is a good option for hemodynamically stable patients requiring light sedation as it allows for a more awake, interactive patient is associated with less delirium. It has an intermediate onset of action (5-10 min). Therefore, abrupt titrations should be avoided, but use of prn haloperidol or benzodiazepine may be useful to manage agitation until the medication takes effect. 3. Antipsychotics are another option. In particular, haloperidol intermittently dosed may be useful for patients with symptoms of agitation/anxiety and delirium. 4.Benzodiazapines can also be considered for light sedation, but should be intermittently doses. Midazolam is an option for patients without renal dysfunction. It has a short onset of action (2-5 minutes) making it a good agent for acute agitation/anxiety, but short duration of action resulting in frequent dosing. Lorazepam is another option. It has a longer onset of action (15-20 minutes) in comparison to midazolam, but longer duration of action. e. Medications to maintain deep sedation (RASS -4 to -5) include: 
1) Propofol continuous infusion should be considered as a first line option for hemodynamically stable patients. 2)Benzodiazepines can be considered as second line options for deep sedation. Studies comparing these agents to other sedatives have shown that they lead to worse outcomes including delirium, oversedation, delayed extubation, and longer time to discharge. Midazolam is one option for patients without renal dysfunction and lorazepam is another options. If patient requires more than 3 doses within 1 hour then contact provider  to consider initiation of continuous infusion. 5.  Daily Sedation Awakening Trial (SAT) from IV Continuous Analgesia/Sedation 
a. Patients are to have daily awakening from sedation while on continuous IV analgesia and/or sedation in the ICU. Continuous analgesia infusions may be maintained only if needed for active pain and RASS is at goal 0 - -1. Unit guideline is for the SAT to occur following ICP rounds each morning.   
b. The sedation awakening trial (SAT) is done regardless if the patient meets criteria for spontaneous breathing trial (SBT). c. SAT safety screen is assessed and SAT should not be performed if sedation is being used for active seizures, alcohol withdrawal, hemodynamically unstable or requiring support of vasoactive medications , in conjunction with NMB agents, if ICP is greater than 20mmHg or if sedation is being used to control ICP, patients RASS is +3 or +4 (very agitated or combative). Other exclusion criteria are:  if there is documentation of myocardial ischemia in the past 24 hours; or patient is receiving high frequency oscillator ventilation (HFOV) , if the patient has an open chest /abdomen or is receiving comfort care. d. Criteria for passing the SAT are the patient opened their eyes to verbal stimuli or tolerated sedative interruption without exhibiting failure criteria. 
e. Patients fail the SAT if the develop sustained anxiety, agitation, or pain; a respiratory rate of 35 per minutes for 5 minutes or longer, an SpO2 less than 88% for 5 minutes or longer; an acute cardiac dysrhythmia; two or more signs of respiratory distress including tachycardia, bradycardia; use of accessory muscles; diaphoresis; marked dyspnea; or myocardial ischemia. f. Respiratory therapy staff must verify with the nurse that continuous IV analgesia (unless being use  for active pain) and sedation (unless patient is receiving dexmedetomidine) is off prior to placing patient on SBT. g. DO NOT interrupt infusion of analgesia and sedation medications if patient is receiving neuromuscular blockade. 
h. Monitor level of wakefulness unless patient is awake and follows commands (RASS 0 to -1) or patient becomes uncomfortable or agitated (RASS +3 to +4) 
i. If agitation prevents successful awakening , administer bolus of analgesia and/or sedation then resume infusion of the medication at ½ previous dose and titrate as needed. 
j. If oversedation prevents successful awakening, hole infusion until at goal and resume ½ of prior infusion rate/dose if clinically indicated. 6. Delirium Background Delirium is characterized by the acute onset of cerebral dysfunction with a change or fluctuation baseline 
mental status, inattention, and either disorganized thinking or an altered level of consciousness. It affects up to 80% of mechanically ventilated adult ICU patients, and is associated with increased mortality,  
and treatment is important and may in turn allow for a patient to be conscious yet cooperative enough to participate  
in ventilator weaning trials and early mobilization efforts. Delirium can only be assessed in patients who are able to sufficiently interact and communicate with bedside 
clinicians (ie RASS -3 to +4). IV. Procedure: A. Assessment: The following criteria must be assessed prior to the initiation of weaning from mechanical ventilation. Note: The criteria are general guidelines and must be individualized for each patient. The patients primary nurse will be responsible, in coordination with the RT, the Spontaneous Awakening Trial). The RT will perform the SBT. B. Spontaneous Awakening Trials (SATs  also referred to as Sedation Vacation) and Spontaneous Breathing Trials (SBTs) performed to determine readiness to wean. 1. For patients who meet established criteria, such as those without active seizures, alcohol withdrawal and agitation, myocardial ischemia or those requiring cardiac support devices, without increased intracranial pressure and those not receiving neuromuscular blockade, the nurse will reduce the infusions of sedative by 50% of current used for sedation that was used to achieve a level of light sedation (Barton Score 2 or RASS score of 0 to -1) and evaluate patient response to reduction of sedation. Analgesics required for pain control are continued during the test.  Obtain MD order to cover no SAT for that time period if patient has any exclusion criteria as described above. 2. Failure of the spontaneous awakening trial occurs when the patient shows symptoms such as increased agitation, anxiety, pain or signs of respiratory distress including respiratory rate >35/min or oxygen saturation <88% as well as development of acute cardiac arrhythmias. If these symptoms develop during the SAT, the nurse then restarts sedation at 75% of the previous dose and titrates the medications until the patient is comfortable and/or symptoms have abated. 3.  If the patient passes the SAT then the patient moves on to the Spontaneous Breathing Trials as performed by the RT. The SBT Safety Screen included the following:  No agitation, oxygen saturation > 88%, FIO2 < 50%, PEEP < 7.5 cm H20, no myocardial ischemia, no vasopressor use, and with inspiratory efforts. 4. Patients who pass the spontaneous awakening trial but fail the spontaneous breathing trial are placed back on full ventilator support and reassessed the next day. 5. Failure of the SBT (spontaneous breathing trail) includes any of the following:  Respiratory rate > 35/min, respiratory rate < 8/min, oxygen saturation < 88%, respiratory distress, mental status change, acute cardiac arrhythmia. 6. Extubation is considered for patients who tolerate the spontaneous awakening trial and pass the spontaneous breathing trial.   
C. Can the cause of respiratory failure be reversed (i.e. absence of high spinal cord injury or advanced ALS)? D. Is gas exchange adequate? 1. PaO2/FIO2 ratio > 150  200, 2. PEEP < 8 cm H20 
3. FIO2 < 50 
4. pH > 7.30 
5. Rapid shallow breathing index (f/VT) < 105 
E. Is patient hemodynamically stable? 1. Absence of clinically significant hypotension (minimal vasopressors such as Dopamine < 5mcg/kg/minute)? F. Is there evidence of intact respiratory drive (NIP/NIF >-03 [id number], stable VC02)? G. Does patient have an adequate cough, airway clearance ability? H. Is there absence of excessive secretions? V. Initiation: A. The therapist shall consult with RN to determine if sedation can be discontinued or significantly decreased. If this can be achieved, the therapist shall implement the  
                  followin. Identify patient and verify name and account number via ID bracelet. 2. Perform hand hygiene per hospital policy utilizing Standard Precautions for all patients and following transmission-based isolation as indicated per hospital policy. 3. Perform a ONE-MINUTE SPONTANEOUS TRIAL AND ASSESSMENT. 4. Measure Rapid Shallow Breathing Index (RSBI) and monitor SpO2 and cardiovascular parameters during the spontaneous breathing assessment. 5. If SpO2 and cardiovascular parameters are stable, continue spontaneous breathing trial for at least 30 minutes and up to 120 minutes, as patient tolerates. 6. Monitor ventilatory status, SpO2, and cardiovascular status during spontaneous breathing trial. 
7. If patient has a successful trial, consider patient as a candidate for extubation and obtain order. 8. If patient fails the weaning trial, place back on ventilator and adjust settings to provide a non-fatiguing form of ventilatory support for the remainder of the day and night. 9. One attempt at weaning shall be performed each day until successful weaning occurs. The RCP will make every attempt to begin the spontaneous breathing trials between 0500 and 0600 to provide documentation of the trial when the pulmonologist makes rounds. B.  Assessment of SBT or PST: 
1. Is gas exchange acceptable? 2. PaO2 > 60 mm Hg. 3. PH > 7.30 
4. Increase in PaCO2  < 10 mm Hg C. Is patient hemodynamically stable? 1. HR < 120 beats/minute 2. HR  < 20% 3. Systolic BP < 687 and > 90 mmHg 4. BP  < 20%, no vasopressors required D. Does patient have stable ventilatory pattern? 1. Sustained RR < 30 breaths per minute 2. Normal and stable VCO2 3. Patient is not demonstrating any signs of increased work of breathing, such as increased use of accessory muscles. E. Mental status stable throughout trial? 
1. Absence of changes such as somnolence, excessive agitation or anxiety 2. Absence of diaphoresis during trial? 
IV. Safety: A. The RCP shall monitor patient according to the above guidelines. If at any time during the weaning process, the respiratory therapist or nurse feels that the patient is not tolerating weaning, the therapist shall place patient back on previous ventilator settings. B. The patient shall be reassessed and the weaning process should be continued the following day. V. Reportable Conditions: A. The therapist shall notify the physician, as appropriate, for any of the following         conditions: 1. FIO2 increase (sustained) at 10% or greater 2. Poor patient/ventilator interface in spite of adjustments 3. Need for increased sedation for respiratory distress 4. Need for increasing ventilating pressures (i.e. PEEP, PIP, MAP) 5. ABG results meeting panic value criteria or other clinical signs indicating deterioration of patients condition. 6. Unplanned extubation. 7. Unexplained sustained increase in PIP greater than 10 cm H2O. 
8. Assessment results regarding ventilator discontinuance process. VI. Ventilator protocol management A. The following items should be maintained for patients who are being mechanically           ventilated. 1. Obtain STAT Chest X-Ray for ET tube placement after insertion. 2. Chest X-Ray q a.m. while on ventilator. 3. ABGs 30 - 60 minutes after being stable on the ventilator. 4. ABG's q a.m. while on ventilator and prn. 
5. Do spontaneous breathing trials when patient is hemodynamically stable, responsive, and without fever. 6. Terminate trials if patient exhibits signs of respiratory distress. 7. Therapists should maintain ABG s as follows: a. pH -  7.30 - 7.50              
    b. PaO2 -   60  100  
     8. Racemic Epinephrine (0.5cc) for post extubation stridor (2 UA treatments max.) 
 
VII. Early Mobilization Mobility Level Criteria Start at Level 1 if:  
PaO2/FIO2 <250 Positive end-expiratory Pressure (PEEP) >=10 cm H2O  
O2 saturation <90% Respiratory Rate (RR) Not within 10-30 per min Cardiac arrhythmias or ischemia New onset Heart Rate  (HR) <60 or >120 beats per min Mean arterial pressure (MAP) <55 or >140 mmHg Systolic blood pressure (SBP) <90 or > 180 mmHg Vasopressor infusion New or increasing Becerril Agitation Scale (RASS) < - 3 Level I:   Breathe  (Rass -5 to -3) HOB Angle  improve VAP protocol compliance ? Visually confirm the [facility] is elevated >= 30 degrees to comply with VAP prevention protocols ? The Centers for Disease Control and Prevention recommends an HOB angle of 30-45 degrees , unless contraindicated Additional activities to be implemented ? Every 2 hour turning ? Passive range of motion ? Up to 20 degrees reverse trendelenburg with lower extremity exercise/retracting footboard ? Continuous lateral rotation therapy can be considered part of early mobility therapy in patients who are at high risk for pulmonary complications Move to Level 2 when the patient 
- Has acceptable oxygenation/hemodynamics - Tolerates q 2 turning - Tolerates HOB > 30 degrees or up to 20 degrees reverse trendelenburg Level 2 :Tilt  Patient Assessment Rass > -3  (eg, opens eyes, may have profound weakness) Up to 20 degrees Reverse Trendelenburg position and 10 degrees minimum HOB 
- Reverse Trendelenburg positioning allows for orthostatic position in fragile patients - If available , use in conjunction with retracting foot section to allow for partial weight bearing prior to sitting up in the bed or getting out of bed Additional activities to be implemented -  Maintain HOB >/= 30 degrees - Q 2 hour turning - Passive/active range of motion - Legs dependent - PT consultation Move to Level 3 when the patient . Florence Hilt -Tolerates active- assistance exercises 2 times per day 
  -Tolerates lower extremity exercises against footboard/Up to 20 degrees Reverse Trendelenburg 
  -Tolerates legs dependent /HOB 45 degrees Level 3 :  SIT  (Rass >- 1 (eg , weak but may move arms/legs independently) Full chair position (footboard on) ? Full upright positioning allows for diaphragmatic excursion and lung expansion ? Sitting with legs in a dependent position facilitates gas exchange Additional activities to be implemented - Maintain HOB >= 30 degrees - Q 2 hour turning (assisted) - Active range of motion  PT/OT actively involved - Encourage activities of daily living 
- Dangling, if patient can move arm against gravity Move to Level 4 when the patient . Florence Hilt - Tolerates increasing active exercise in bed - Actively assists with every- 2- hour turning or turns independently - Tolerates full chair position 3 times/day Level 4:  Stand ( RASS >0 (eg, weak but may tolerate increased activity) Stand Attempts ? Full chair position (footboard off/feet on the floor) ? Consider using a sit-to-stand lift ? Pivot to chair, it tolerates partial weight bearing Additional activities to be implemented - Maintain head of bed >= 30 degrees - Q 2 hr turning (self/assisted) - Active range of motion - Encourage activities of daily living 
- PT/OT actively involved Move to Level 5 when the patient . 
- Can successfully comply with all activities - Tolerates trial periods of full chair position (footboard off/feet on floor) 3 times per day - Tolerates partial weight-bearing stand and pivots to chair Level 5 :  Move  (RASS > 0    (eg, weak but may tolerate increased activity) Achieve out of bed ? Utilize mobile floor life to ambulate to bedside chair Additional activities to be implemented - Maintain HOB > = 30 degrees - Q 2 hour turning (self/assisted) - Active range of motion - Patient stands/bears weight > 1 minute - Patient marches in place 
- PT/OT actively involved Patient continues to ambulate progressively longer distances as tolerated until they consistently participate and move independently. E Approved by Critical Care Committee 2-19-09 [facility] Clinical Guidelines ABCDE DOC Flowsheet Content Variables to select when addressing section Comments ABCDE Initiated ? Yes/No  RN to address minimum q 24 hours (day shift) Target RASS ? 0 = alert and oriented ? -1 = drowsy ? -2 = light sedation ? -3= moderate sedation ? -4= deep sedation Target on standard ventilator setting should be -2; -4 with oscillator CAM -ICU ? Positive ? Negative ? Unable to assess Delirium assessment SAT Safety Screen Passed ? Yes 
? No Select yes if proceeding on to the sedation vacation (reduction of continuous sedative drip by directed by MD) Select no if your patient has any of the below reasons for not proceeding on to the daily awakening sedation vacation trial  
SAT Screen for Failure ? Active seizures ? Acute delirium tremors ? Agitation that threatens accidental line/tube removal 
? On paralytics ? MI (24-48hr) ? Abnormal ICP ? Open abdomen Select one of the options when the patient will not undergo the sedation vacation  ALSO MUST OBTAIN AN ORDER FOR saleem Abrams written under nursing miscellaneous for now by either the NP or Intensivist  
Daily sedation Vacation/assessment of  ? Yes 
? No 
? Not applicable If yes, MUST see the reduction in sedation on the [facility] and please place in the comment section of the sedative sedation vacation started SBT Safety Screen Passed ? Yes 
? No Select Yes if the patient has none of the below listed reasons for not proceeding on to the SBT following reduction of sedation SBT Screen Reason for Failure ? Agitation 
? O2 Sat < or = 88% 
? FIO2 > 50% 
? PEEP >7 
? MI 
? Vasopressor Use 
? Bilevel setting on Vent 
? Oscillator in use 
? Increased resp effort Select reason as appropriate for NOT proceeding on to the SBT  
 
 
 
 
 
 
 
 
 
 
 
 
 
 
 
 
 
 
 
 
 
 
Wake Up and Breathe Protocol 
 
 
05/2013

## 2021-01-23 NOTE — PROGRESS NOTES
Contacted by the nursing team that the family has opted for comfort care and wishes to compassionate extubate. Upon removal of the ventilator support, monitoring of the patient continued. At [phone] the patient displayed no electrical demonstration of cardiac function. NP completed assessment to confirm death. Auscultated apical region for audible heart breath for 1 minute. No palpable respiratory effort. Confirmed death at [phone]. Family at bedside with spiritual support / .

## 2021-01-23 NOTE — PROGRESS NOTES
Lisa Amor. Admission Date: 1/21/2021             Daily Progress Note: 1/23/2021     Patient is a 69 y.o.  male seen and evaluated at the request of Dr. Chaya Ballesteros from neurosurgery. Patient coming out of the OR after he had craniotomy for frontal external ventricular drain posterior fossa craniotomy reexploration of a for evacuation of the hemorrhage. Patient came in and beginning of January with loss of balance he was found to have vermian and right cerebellar hemisphere metastatic tumor with history of renal cell carcinoma he was given steroids came back for elective surgery. Subjective:     Over the past 24 hours:  Post op on vent. No significant changes at this point. No spontaneous respiratory effort. Does not require any sedation. LT prognosis is poor. NS communicating with the family. Review of Systems  Review of systems not obtained due to patient factors.      Current Facility-Administered Medications   Medication Dose Route Frequency    0.45% sodium chloride infusion  150 mL/hr IntraVENous CONTINUOUS    HYDROmorphone (PF) (DILAUDID) injection 1-2 mg  1-2 mg IntraVENous Q4H PRN    desmopressin (DDAVP) 4 mcg/mL injection 2 mcg  2 mcg SubCUTAneous Q8H PRN    EPINEPHrine (ADRENALIN) 4 mg in 0.9% sodium chloride 250 mL infusion  1-10 mcg/min IntraVENous TITRATE    insulin lispro (HUMALOG) injection   SubCUTAneous Q6H    NOREPINephrine (LEVOPHED) 4 mg in 5% dextrose 250 mL infusion  0.5-30 mcg/min IntraVENous TITRATE    levETIRAcetam (KEPPRA) 500 mg in 0.9% sodium chloride (MBP/ADV) 100 mL MBP  500 mg IntraVENous Q12H    lidocaine (XYLOCAINE) 10 mg/mL (1 %) injection 0.1 mL  0.1 mL SubCUTAneous PRN    sodium chloride (NS) flush 5-40 mL  5-40 mL IntraVENous Q8H    sodium chloride (NS) flush 5-40 mL  5-40 mL IntraVENous PRN    naloxone (NARCAN) injection 0.4 mg  0.4 mg IntraVENous PRN    PHENYLephrine (BRENDA-SYNEPHRINE) 30 mg in 0.9% sodium chloride 250 mL infusion  mcg/min IntraVENous TITRATE    NUTRITIONAL SUPPORT ELECTROLYTE PRN ORDERS   Does Not Apply PRN    dexamethasone (DECADRON) 4 mg/mL injection 4 mg  4 mg IntraVENous Q6H    niCARdipine in Saline (CARDENE) 0.1mg/mL 200 ml premix infusion  5 mg/hr IntraVENous TITRATE    pantoprazole (PROTONIX) 40 mg in 0.9% sodium chloride 10 mL injection  40 mg IntraVENous DAILY    acetaminophen (TYLENOL) tablet 650 mg  650 mg Oral Q4H PRN    ondansetron (ZOFRAN) injection 4 mg  4 mg IntraVENous Q6H PRN    promethazine (PHENERGAN) with saline injection 6.25 mg  6.25 mg IntraVENous Q15MIN PRN         Objective:     Vitals:    01/23/21 0700 01/23/21 0707 01/23/21 0730 01/23/21 0734   BP:       Pulse: 94  (!) 106 95   Resp: 20  16 19   Temp:       SpO2: 100%  98% 99%   Weight:  129 lb 10.1 oz (58.8 kg)     Height:         Intake and Output:   01/21 1901 - 01/23 0700  In: 5216.1 [I.V.:5216.1]  Out: 5620 [Urine:3100; Drains:199]  No intake/output data recorded. Intake/Output Summary (Last 24 hours) at 1/23/2021 1016  Last data filed at 1/23/2021 0700  Gross per 24 hour   Intake 4916.12 ml   Output 1388 ml   Net 3528.12 ml       Physical Exam:          Constitutional: the patient is critically ill on vent  HEENT: ETT, cranial drain  Lungs:  Synchronous on vent  Cardiovascular: RRR without M,G,R  Abd/GI: soft; with positive bowel sounds. Ext: warm without cyanosis. There is trace lower leg edema. Musculoskeletal: unable on vent, unrepsonsive  Skin: no jaundice or rashes, no wounds   Neuro: does not follow commands, track      Lines/Drains: venous access/port, PIV, art line, CSF drain, ETT, ebgum  Drips: sandra, 3%  Nutrition: NPO    Ventilator Settings  Mode FIO2 Rate Tidal Volume Pressure PEEP   Pressure control  30 %          8 cm H20      Peak airway pressure: 20 cm H2O   Minute ventilation: 7.2 l/min       CHEST XRAY: No CXR today        CT neck, chest    IMPRESSION  1. Postoperative changes in the neck, as above.   2. Stable left lung mass. 3. Emphysema and coronary artery disease. Head CT:    FINDINGS: Again noted is a prior occipital craniectomy. The previously described  6.0 x 5.3 cm posterior fossa hemorrhage has increased in size to 6.6 x 5.5 cm. Intraventricular hemorrhage, as well as a tract of intraparenchymal hemorrhage  in the right frontal and parietal lobes have also minimally progressed. There  has been minimal progressive enlargement of the ventricles. A new right frontal  approach ventriculostomy catheter projects into the anterior horn of the right  lateral ventricle, with a small amount of associated pneumocephalus. No  significant supratentorial midline shift.     IMPRESSION  Slight progression of right frontal and parietal parenchymal,  intraventricular and posterior fossa hemorrhages. There is also slight  progressive dilatation of the ventricles. LAB  Recent Labs     01/23/21  0444 01/22/21  0307 01/21/21  1709   WBC 8.2 13.7* 15.1*   HGB 9.1* 9.5* 9.5*   HCT 27.8* 28.3* 27.4*    274 97*     Recent Labs     01/23/21  0444 01/23/21  0044 01/22/21  1754 01/22/21  0307 01/22/21  0307 01/21/21  1709   * 159* 158*   < > 143 141   K 4.0  --   --   --  4.5 5.3*   *  --   --   --  117* 114*   CO2 15*  --   --   --  20* 18*   *  --   --   --  149* 175*   BUN 34*  --   --   --  40* 46*   CREA 1.28  --   --   --  1.54* 1.47   MG 2.4  --   --   --  2.1  --    PHOS 2.3  --   --   --  2.4  --    INR  --   --   --   --   --  1.3    < > = values in this interval not displayed.      ABG:    Lab Results   Component Value Date/Time    PHI 7.38 01/23/2021 04:03 AM    PCO2I 25.7 (L) 01/23/2021 04:03 AM    PO2I 153 (H) 01/23/2021 04:03 AM    HCO3I 15.3 (L) 01/23/2021 04:03 AM    FIO2I 30 01/23/2021 04:03 AM       Assessment:     Hospital Problems  Date Reviewed: 12/3/2020          Codes Class Noted POA    Non-small cell lung cancer (NSCLC) ([facility] (Chronic) ICD-10-CM: C34.90  ICD-9-CM: 162.9 1/22/2021 Yes        Encounter for weaning from ventilator [facility]) ICD-10-CM: Z99.11  ICD-9-CM: V46.13  1/22/2021 Yes    Not weanable due to CNS disease    Cerebellar tumor ([facility] ICD-10-CM: D49.6  ICD-9-CM: 239.6  1/21/2021 Yes    Post Op. Hemorrhage on CT    Malignant neoplasm of upper lobe of left lung (HCC) (Chronic) ICD-10-CM: C34.12  ICD-9-CM: 162.3  8/20/2020 Yes        History of right nephrectomy (Chronic) ICD-10-CM: Z90.5  ICD-9-CM: V45.73  10/13/2017 Yes    Overview Signed 10/13/2017  1:13 PM by Shelly Orlando NP     Right                 69 y.o. male with a PMH of being ex-smoker (1 pack/day x 40 years, quit in 2017) history of NSCLC, CAD, CHF, CKD, right kidney clear cell carcinoma [id number] S/P right nephrectomy 12/17, right common femoral embolectomy, sickle cell trait, and cerebellar tumor S/P craniectomy for resection. PLAN:     --Off pressors, POC per neurosurgery  --May change directive of care to comfort - NS leading the conversations with family. Pulmonary supports that direction given the overall prognosis. --Na 159 this AM - IV fluid adjustments noted  --Supportive care, unable to wean while acutely ill. Unable to follow commands/track. --Ongoing conversations with family per PCP, LT prognosis poor. YISSEL Tobin    Addendum - 0836  Patient's wife & NS established transition to comfort measures. Assisted with orders and anticipatory medications.

## 2021-01-23 NOTE — PROGRESS NOTES
Ventilator check complete; patient has a #8. 0 ET tube secured at the 26 at the lip. Patient is sedated. Patient is not able to follow commands. Breath sounds are diminished. Trachea is midline, Negative for subcutaneous air, and chest excursion is symmetric. Patient is also Negative for cyanosis and is Negative for pitting edema. All alarms are set and audible. Resuscitation bag is at the head of the bed.       Ventilator Settings  Mode FIO2 Rate Tidal Volume Pressure PEEP I:E Ratio   Pressure control  30 %   18         8 cm H20  1:2.92      Peak airway pressure: 20 cm H2O   Minute ventilation: 8.07 l/min       Marine Lewis, RT

## 2021-01-23 NOTE — PROGRESS NOTES
Bedside report received from [facility]. Pt intubated- Vent: PC, Peep 8, RR 20, Fi02 30%. Lung sounds clear. Pt is not sedated. Does not respond to stimulation. Will withdraw to pain. Pupils 3mm, round, and fixed. Pulses palpable and present all extremities. Generalized 2+ non pitting edema. Caal in place draining yellow clear urine. EVD in place- dressing has old drainage. Hourly output on drain and CVP  recorded per order. VSS. Will continue to monitor.

## 2021-01-23 NOTE — PROGRESS NOTES
Lisa Amor. Admission Date: 1/21/2021             Daily Progress Note: 1/23/2021     Patient is a 69 y.o.  male seen and evaluated at the request of Dr. Yakelin Eli from neurosurgery. Patient coming out of the OR after he had craniotomy for frontal external ventricular drain posterior fossa craniotomy reexploration of a for evacuation of the hemorrhage. Patient came in and beginning of January with loss of balance he was found to have vermian and right cerebellar hemisphere metastatic tumor with history of renal cell carcinoma he was given steroids came back for elective surgery. Subjective:     Over the past 24 hours:  Post op on vent. No significant changes at this point. No spontaneous respiratory effort. Does not require any sedation. LT prognosis is poor. NS communicating with the family. Review of Systems  Review of systems not obtained due to patient factors.      Current Facility-Administered Medications   Medication Dose Route Frequency    0.45% sodium chloride infusion  150 mL/hr IntraVENous CONTINUOUS    HYDROmorphone (PF) (DILAUDID) injection 1-2 mg  1-2 mg IntraVENous Q4H PRN    desmopressin (DDAVP) 4 mcg/mL injection 2 mcg  2 mcg SubCUTAneous Q8H PRN    EPINEPHrine (ADRENALIN) 4 mg in 0.9% sodium chloride 250 mL infusion  1-10 mcg/min IntraVENous TITRATE    insulin lispro (HUMALOG) injection   SubCUTAneous Q6H    NOREPINephrine (LEVOPHED) 4 mg in 5% dextrose 250 mL infusion  0.5-30 mcg/min IntraVENous TITRATE    levETIRAcetam (KEPPRA) 500 mg in 0.9% sodium chloride (MBP/ADV) 100 mL MBP  500 mg IntraVENous Q12H    lidocaine (XYLOCAINE) 10 mg/mL (1 %) injection 0.1 mL  0.1 mL SubCUTAneous PRN    sodium chloride (NS) flush 5-40 mL  5-40 mL IntraVENous Q8H    sodium chloride (NS) flush 5-40 mL  5-40 mL IntraVENous PRN    naloxone (NARCAN) injection 0.4 mg  0.4 mg IntraVENous PRN    PHENYLephrine (BRENDA-SYNEPHRINE) 30 mg in 0.9% sodium chloride 250 mL infusion  mcg/min IntraVENous TITRATE    NUTRITIONAL SUPPORT ELECTROLYTE PRN ORDERS   Does Not Apply PRN    dexamethasone (DECADRON) 4 mg/mL injection 4 mg  4 mg IntraVENous Q6H    niCARdipine in Saline (CARDENE) 0.1mg/mL 200 ml premix infusion  5 mg/hr IntraVENous TITRATE    pantoprazole (PROTONIX) 40 mg in 0.9% sodium chloride 10 mL injection  40 mg IntraVENous DAILY    acetaminophen (TYLENOL) tablet 650 mg  650 mg Oral Q4H PRN    ondansetron (ZOFRAN) injection 4 mg  4 mg IntraVENous Q6H PRN    promethazine (PHENERGAN) with saline injection 6.25 mg  6.25 mg IntraVENous Q15MIN PRN         Objective:     Vitals:    01/23/21 0700 01/23/21 0707 01/23/21 0730 01/23/21 0734   BP:       Pulse: 94  (!) 106 95   Resp: 20  16 19   Temp:       SpO2: 100%  98% 99%   Weight:  129 lb 10.1 oz (58.8 kg)     Height:         Intake and Output:   01/21 1901 - 01/23 0700  In: 5216.1 [I.V.:5216.1]  Out: 9270 [Urine:3100; Drains:199]  No intake/output data recorded. Intake/Output Summary (Last 24 hours) at 1/23/2021 0751  Last data filed at 1/23/2021 0700  Gross per 24 hour   Intake 4916.12 ml   Output 1694 ml   Net 3222.12 ml       Physical Exam:          Constitutional: the patient is critically ill on vent  HEENT: ETT, cranial drain  Lungs:  Synchronous on vent  Cardiovascular: RRR without M,G,R  Abd/GI: soft; with positive bowel sounds. Ext: warm without cyanosis. There is trace lower leg edema. Musculoskeletal: unable on vent, unrepsonsive  Skin: no jaundice or rashes, no wounds   Neuro: does not follow commands, track      Lines/Drains: venous access/port, PIV, art line, CSF drain, ETT, begum  Drips: sandra, 3%  Nutrition: NPO    Ventilator Settings  Mode FIO2 Rate Tidal Volume Pressure PEEP   Pressure control  30 %          8 cm H20      Peak airway pressure: 20 cm H2O   Minute ventilation: 7.2 l/min       CHEST XRAY: No CXR today        CT neck, chest    IMPRESSION  1. Postoperative changes in the neck, as above.   2. Stable left lung mass. 3. Emphysema and coronary artery disease. Head CT:    FINDINGS: Again noted is a prior occipital craniectomy. The previously described  6.0 x 5.3 cm posterior fossa hemorrhage has increased in size to 6.6 x 5.5 cm. Intraventricular hemorrhage, as well as a tract of intraparenchymal hemorrhage  in the right frontal and parietal lobes have also minimally progressed. There  has been minimal progressive enlargement of the ventricles. A new right frontal  approach ventriculostomy catheter projects into the anterior horn of the right  lateral ventricle, with a small amount of associated pneumocephalus. No  significant supratentorial midline shift.     IMPRESSION  Slight progression of right frontal and parietal parenchymal,  intraventricular and posterior fossa hemorrhages. There is also slight  progressive dilatation of the ventricles. LAB  Recent Labs     01/23/21  0444 01/22/21  0307 01/21/21  1709   WBC 8.2 13.7* 15.1*   HGB 9.1* 9.5* 9.5*   HCT 27.8* 28.3* 27.4*    274 97*     Recent Labs     01/23/21  0444 01/23/21  0044 01/22/21  1754 01/22/21  0307 01/22/21  0307 01/21/21  1709   * 159* 158*   < > 143 141   K 4.0  --   --   --  4.5 5.3*   *  --   --   --  117* 114*   CO2 15*  --   --   --  20* 18*   *  --   --   --  149* 175*   BUN 34*  --   --   --  40* 46*   CREA 1.28  --   --   --  1.54* 1.47   MG 2.4  --   --   --  2.1  --    PHOS 2.3  --   --   --  2.4  --    INR  --   --   --   --   --  1.3    < > = values in this interval not displayed.      ABG:    Lab Results   Component Value Date/Time    PHI 7.38 01/23/2021 04:03 AM    PCO2I 25.7 (L) 01/23/2021 04:03 AM    PO2I 153 (H) 01/23/2021 04:03 AM    HCO3I 15.3 (L) 01/23/2021 04:03 AM    FIO2I 30 01/23/2021 04:03 AM       Assessment:     Hospital Problems  Date Reviewed: 12/3/2020          Codes Class Noted POA    Non-small cell lung cancer (NSCLC) ([facility] (Chronic) ICD-10-CM: C34.90  ICD-9-CM: 162.9 1/22/2021 Yes        Encounter for weaning from ventilator [facility]) ICD-10-CM: Z99.11  ICD-9-CM: V46.13  1/22/2021 Yes    Not weanable due to CNS disease    Cerebellar tumor ([facility] ICD-10-CM: D49.6  ICD-9-CM: 239.6  1/21/2021 Yes    Post Op. Hemorrhage on CT    Malignant neoplasm of upper lobe of left lung (HCC) (Chronic) ICD-10-CM: C34.12  ICD-9-CM: 162.3  8/20/2020 Yes        History of right nephrectomy (Chronic) ICD-10-CM: Z90.5  ICD-9-CM: V45.73  10/13/2017 Yes    Overview Signed 10/13/2017  1:13 PM by Corky Davis NP     Right                 69 y.o. male with a PMH of being ex-smoker (1 pack/day x 40 years, quit in 2017) history of NSCLC, CAD, CHF, CKD, right kidney clear cell carcinoma [id number] S/P right nephrectomy 12/17, right common femoral embolectomy, sickle cell trait, and cerebellar tumor S/P craniectomy for resection. PLAN:     --Off pressors, POC per neurosurgery  --May change directive of care to comfort - NS leading the conversations with family. Pulmonary supports that direction given the overall prognosis. --Na 159 this AM - IV fluid adjustments noted  --Supportive care, unable to wean while acutely ill. Unable to follow commands/track. --Ongoing conversations with family per PCP, LT prognosis poor.      YISSEL Soto

## 2021-01-23 NOTE — PROGRESS NOTES
Serum Sodium 158. Called neuro MD on call and received orders to decrease 3%NaCl infusion to 20ml.hr. Repeat lab per order and call MD if lab result is above 155.

## 2021-01-23 NOTE — PROGRESS NOTES
Pt's serum sodium 159 despite decreasing 3%NaCl gtt to 20ml/hr. Called MD and received orders to d/c the 3%NaCl, increase the NS to 150ml/hr for 4 hours, then decrease it back to 100ml/hr. Recheck serum sodium per order.

## 2021-01-23 NOTE — PROGRESS NOTES
NEUROSURGERY PROGRESS NOTE:   Admit Date: 1/21/2021  Subjective: Intubated/unresponsive - no change. ICP's remain < 10. EVD with 5-7 cc's out per hour. No eye opening. Na+ levels have been rising - last 162.  3% has been stopped, and IV fluids changed to 1/2 NS. Urine output excessive - will give DDAVP. Patient is DNR, and Family to move to Comfort measures with extubation this morning. Patient not assisting ventilator/not breathing spontaneously. Objective:  Visit Vitals  /72   Pulse 95   Temp 97.4 °F (36.3 °C)   Resp 19   Ht 5' 2\" (1.575 m)   Wt 58.8 kg (129 lb 10.1 oz)   SpO2 99%   BMI 23.71 kg/m²     General: Intubated, on ventilator. No eye opening. No response to        voice/noxious stimulus. Corneal's absent. Comatose. Pupils 4-5 mm - not reactive. Doll's Eyes negative. No cough/gag        reflex. No motor response, and no response to noxious stimulus (Nurse reported        some foot/ankle movement at times to painful stimulus). EVD in place. Assessment and Plan:   Johnathon Rodriguez 74. 69 y.o. male who is now 2 Days Post-Op from a bi-occipital craniectomy for tumor resection, followed by re-op for post-op hemorrhage, along with EVD placement. He has not responded to all efforts, and remains on ventilator, unresponsive, basically clinically brain dead. Family aware, and will be here later to decide about extubation/Comfort Care. I have made some adjustments to IV fluids and have added DDAVP. This is a grave situation.         Tj Judge MD

## 2021-01-23 NOTE — DEATH NOTE
NEUROSURGERY DISCHARGE SUMMARY     Patient Name: Naty oV Sr.  Patient MRN: [id number]  Date of Admission: 1/21/2021  Date of Discharge: 1/23/2021   Length of Hospital Stay: 2    Pre-Procedure Diagnosis: R Cerebellar Metastatic Brain Tumor (Renal or Lung)  Post-Procedure Diagnosis: SAME AS ABOVE     Admitting Attending: Vishal Garsia.  Joseph Dooley MD  Discharge Attending: SAME AS ABOVE    Past Medical History:   Diagnosis Date    Cancer [facility])     kidney ca    Cardiomyopathy ([facility] [address] [facility]     Cerebellar tumor ([facility] [address] 2020    CHF (congestive heart failure) ([facility] [address] 11/21/2017    55-60% 4/2019    Chronic kidney disease     CKD (chronic kidney disease), stage III     Stage 3    Coronary artery disease involving native coronary artery of native heart without angina pectoris 10/25/2017    followed by [facility] last cath 10/16/2017 no stents    Diabetes ([facility] [address]     oral and insulin reliant; abg fbs 115; last A1c 7.3 1/20/2020; s.s of hypoglycemia but doesn't know what BS was at the time    Foot drop, left     Former cigarette smoker     History of embolectomy 07/2019    right common femoral embolectomy    History of kidney cancer 12/2017    kidney- right removed    Hypertension     managed with med    Long term current use of anticoagulant     Plavix; stent in bilateral LE    PAD (peripheral artery disease) ([facility] [address]     stents in bilateral LE    Sickle cell trait ([facility] [address]     Sickle cell trait    Status post carotid endarterectomy 07/2019    Right side    Thyroid disease     managed with med     Past Surgical History:   Procedure Laterality Date    HX APPENDECTOMY  1963    HX CAROTID ENDARTERECTOMY      HX COLONOSCOPY      HX NEPHRECTOMY Right 12/2017    IR INSERT TUNL CVC W PORT OVER 5 YEARS  8/31/2020    right side of chest    NC LEFT HEART CATH,PERCUTANEOUS  10/16/2017    no PCI- medical management    VASCULAR SURGERY PROCEDURE UNLIST Bilateral 10/11/2018    iliac angio and stent    VASCULAR SURGERY PROCEDURE UNLIST  05/13/2019    Right lower extremity arteriogram with balloon angioplasty and stent of the right external iliac artery with 8 x 4 S. M.A.R.T. stent.  VASCULAR SURGERY PROCEDURE UNLIST Right 06/20/2019     Right common femoral artery endarterectomy,Right common femoral embolectomy,Right lower extremity arteriogram     Allergies   Allergen Reactions    Aspirin Nausea and Vomiting       Procedures Performed During Hospitalization:   Sub-occipital craniectomy for tumor resection; attempted        EVD placement  Re-op sub-occipital craniectomy for evacuation of        hemorrhage; R frontal EVD placement    Summary of Hospital Events:   606/706 Yane Prieto is a 69 y.o. male who was admitted to in-patient/ICU for post-op care. He underwent the initial surgery and was found post-op, in the [facility], to be minimally responsive. A stat CT showed a large tumor bed hemorrhage and obstructive hydrocephalus. Dr. Ernesto Mayers took him back to surgery and placed the EVD and explored the sub-occipital/posterior fossa region to remove hemorrhage. Post-op patient remained unresponsive, and required mechanical ventilation. He has remained comatose, with non-reactive pupils and his exam has been, and still is, consistent with brain death. His family requested DNR status yesterday, and this morning they requested extubation and Comfort measures. He was pronounced dead at 1249, 1/23/2021. Physical Exam:   Visit Vitals  /67   Pulse (!) 0   Temp 97.4 °F (36.3 °C)   Resp (!) 5   Ht 5' 2\" (1.575 m)   Wt 58.8 kg (129 lb 10.1 oz)   SpO2 (!) 79%   BMI 23.71 kg/m²     Patient pronounced dead at 1249, 1/23/2021. Family was present. Current Discharge Medication List            None        Juwan Mayers, [facility]

## 2021-01-23 NOTE — PROGRESS NOTES
Ventilator check complete; patient has a #8. 0 ET tube secured at the 25 at the lip. Patient is sedated. Patient is not able to follow commands. Breath sounds are diminished. Trachea is midline, Negative for subcutaneous air, and chest excursion is symmetric. Patient is also Negative for cyanosis and is Negative for pitting edema. All alarms are set and audible. Resuscitation bag is at the head of the bed. Ventilator Settings  Mode FIO2 Rate Tidal Volume Pressure PEEP I:E Ratio   Pressure control  30 %          8 cm H20  1:2.9      Peak airway pressure: 20 cm H2O   Minute ventilation: 7.2 l/min     ABG: No results for input(s): PH, PCO2, PO2, HCO3 in the last 72 hours.       Emma Givens, RT

## 2021-01-25 LAB
ABO + RH BLD: NORMAL
BLD PROD TYP BPU: NORMAL
BLOOD GROUP ANTIBODIES SERPL: NORMAL
BPU ID: NORMAL
CROSSMATCH RESULT,%XM: NORMAL
SPECIMEN EXP DATE BLD: NORMAL
STATUS OF UNIT,%ST: NORMAL
UNIT DIVISION, %UDIV: 0

## 2021-02-01 NOTE — PROGRESS NOTES
Physician Progress Note      Nai Walter  CSN #:                  [id number]  :                       1950  ADMIT DATE:       2021 5:20 AM  DISCH DATE:        2021 12:49 PM  RESPONDING  PROVIDER #:        SALAZAR Schroeder MD          QUERY TEXT:    Pt admitted with Cerebellar Tumor  . Noted documentation of  Not weanable from Vent d/t to CNS disease PN . Please document in progress notes and discharge summary the cause and correlating clinical indicators to support such: The medical record reflects the following:  Risk Factors: Ex-smoker, CAD, CHF, CKD, Cerebellar Tumor, craniotomy for frontal external ventricular drain posterior fossa craniotomy re-exploration of a for evacuation of the hemorrhage, no spontaneous resp effort. Prior to extubation 123 1241 RR 8-25, Lungs CTAB,  CXR unchanged CHRIS opacity,  CXR Clear, PN not weanable d/t CNS disease  Clinical Indicators: c/o difficulty with balance,  HP MRI of the brain that demonstrated a new peripherally enhancing cystic and nodular midline and right cerebellar hemisphere tumor, unable to wean from Vent,    Treatment: Monitoring, Decadron IV, Keppra IV, intubated on vent, Pulmonary consult,    Thank you,  Marcus Medellin RN,C BSN  Clinical   [email or web address]  Options provided:  -- Postoperative Respiratory failure due to chronic underlying condition of Cerebella Tumor  -- Postoperative Respiratory failure which was not routinely expected and was a complication of the surgical procedure  -- Respiratory failure following surgery was due to events unrelated to the surgical operation, Please specify.   -- Acute Postoperative Pulmonary Failure due to Cerebella Tumor/Intraventricular hemorrhage  -- Other - I will add my own diagnosis  -- Disagree - Not applicable / Not valid  -- Disagree - Clinically unable to determine / Unknown  -- Refer to Clinical Documentation Reviewer    PROVIDER RESPONSE TEXT:    Patient has acute postoperative pulmonary failure due to Cerebella Tumor/Intraventricular hemorrhage. Query created by:  Tg Johnson on 1/27/2021 8:11 PM      Electronically signed by:  Skip Varela MD 2/1/2021 9:38 AM

## 2022-03-01 NOTE — PROGRESS NOTES
Initial visit made to patient and a prayer was provided. A  card was left. Patient said that he was being discharged today. Cat Snow room air

## 2022-04-08 NOTE — PERIOP NOTES
Patient verified name, , and surgery as listed in [facility]. Patient provided medical/health information and PTA medications to the best of their ability. TYPE  CASE:3  Orders per surgeon: Received    Labs per surgeon:CBC,BMP,UA,UA culture, T/C 2 Units PRBS. Results: pending  Labs per anesthesia protocol: POC glucose. Results 237  EKG  :  EKG 10.11.17,10.12.17/Cath 10.12.17/ ECHO 17/ Cardiac Clearance/Plavix hold 17/ Last office note 17    Patient provided with and instructed on education handouts including Guide to Surgery, blood transfusions, pain management, and hand hygiene for the family and community, and [facility] brochure. Hibiclens and instructions given per hospital policy. Instructed patient to continue previous medications as prescribed prior to surgery unless otherwise directed and to take the following medications the day of surgery according to anesthesia guidelines : Coreg and Synthroid . Instructed patient to hold  the following medications: Plavix 7 days. Original medication prescription bottles where visualized during patient appointment. Patient teach back successful and patient demonstrates knowledge of instruction. Pt voice understanding to return to [facility] labs on 17 for T/C. Area L Indication Text: Tumors in this location are included in Area L (trunk and extremities).  Mohs surgery is indicated for larger tumors, or tumors with aggressive histologic features, in these anatomic locations.

## 2022-12-09 NOTE — PROGRESS NOTES
noted death of patient  spoke with several family members in lobby ENT Discharge Instructions    ENT follow up appointment:  - please call the office to confirm appointment:     *Please call your doctor or nurse practitioner if you have increased pain, swelling, redness, or drainage from the incision site.  *Avoid swimming and baths until your follow-up appointment.  *You may shower, and wash surgical incisions with a mild soap and warm water. Gently pat the area dry.  *If you have steri-strips, they will fall off on their own. Please remove any remaining strips 7-10 days after surgery.    Activity:  - fatigue is common after surgery, rest if you feel tired   -Please get plenty of rest, continue to ambulate several times per day, and drink adequate amounts of fluids.   -Avoid lifting weights greater than 5-10 lbs until you follow-up with your surgeon, who will instruct you further regarding activity restrictions.  -Avoid driving or operating heavy machinery while taking pain medications.  - Walking is recommended, ambulate as tolerated      Pain Expectations:  - pain after surgery is expected  - please take tylenol and motrin for pain      Medications: Please resume all regular home medications unless specifically advised not to take a particular medication. Also, please take any new medications as prescribed.   - pain medications can cause constipation, you should eat a high fiber diet and may take a stool softener while on pain meds       Warning Signs:  Please call your doctor or nurse practitioner if you experience the following:  *You experience new chest pain, pressure, squeezing or tightness.  *New or worsening cough, shortness of breath, or wheeze.  *If you are vomiting and cannot keep down fluids or your medications.  *You are getting dehydrated due to continued vomiting, diarrhea, or other reasons. Signs of dehydration include dry mouth, rapid heartbeat, or feeling dizzy or faint when standing.  *Your pain is not improving within 8-12 hours or is not gone within 24 hours.  *You have shaking chills, or fever greater than 101.5 degrees Fahrenheit or 38 degrees Celsius.  *Any change in your symptoms, or any new symptoms that concern you.     PLEASE CALL THE OFFICE WITH ANY QUESTIONS OR CONCERNS:

## (undated) DEVICE — DISH PTRI STRL --

## (undated) DEVICE — CODMAN VERSATRU™ STANDARD DISPOSABLE NON-STICK BIPOLAR FORCEPS 8" (20CM), 1.0MM TIP: Brand: CODMAN VERSATRU™

## (undated) DEVICE — 3M™ IOBAN™ 2 ANTIMICROBIAL INCISE DRAPE 6648EZ: Brand: IOBAN™ 2

## (undated) DEVICE — SUTURE MCRYL SZ 4-0 L27IN ABSRB UD L19MM PS-2 1/2 CIR PRIM Y426H

## (undated) DEVICE — WIPE INSTR HIGH ABSORBENT FAST WICKING LINT FREE COUNT 20

## (undated) DEVICE — CONTAINER,SPECIMEN,O.R.STRL,4.5OZ: Brand: MEDLINE

## (undated) DEVICE — SYR 10ML LUER LOK 1/5ML GRAD --

## (undated) DEVICE — SUTURE VCRL VIO BR 0 18IN C/R M04 J701D

## (undated) DEVICE — BLADE ELECTRODE: Brand: EDGE

## (undated) DEVICE — SUTURE PDS II SZ 1 L96IN ABSRB VLT TP-1 L65MM 1/2 CIR Z880G

## (undated) DEVICE — REM POLYHESIVE ADULT PATIENT RETURN ELECTRODE: Brand: VALLEYLAB

## (undated) DEVICE — PACKING 8004003 NEURAY 200PK 25X25MM: Brand: NEURAY ®

## (undated) DEVICE — GOWN,PREVENTION PLUS,2XL,ST,22/CS: Brand: MEDLINE

## (undated) DEVICE — ACCESS PLATFORM FOR MINIMALLY INVASIVE SURGERY.: Brand: GELPORT® LAPAROSCOPIC  SYSTEM

## (undated) DEVICE — 3M™ IOBAN™ 2 ANTIMICROBIAL INCISE DRAPE 6651EZ: Brand: IOBAN™ 2

## (undated) DEVICE — SPONGE,NEURO,0.5"X0.5",XR,STRL,10/PK: Brand: MEDLINE

## (undated) DEVICE — CATHETER ETER URETH 16FR INTMIT ROB MOD BARDX

## (undated) DEVICE — Device

## (undated) DEVICE — AGENT HEMSTAT W4XL4IN OXIDIZED REGENERATED CELOS ABSRB SFT

## (undated) DEVICE — SYRINGE, LUER LOCK, 3ML: Brand: MEDLINE

## (undated) DEVICE — AGENT HEMSTAT W2XL14IN OXIDIZED REGENERATED CELOS ABSRB FOR

## (undated) DEVICE — SINGLE USE BIOPSY VALVE MAJ-210: Brand: SINGLE USE BIOPSY VALVE (STERILE)

## (undated) DEVICE — CODMAN® RANEY SCALP CLIPS 20 UNITS OF 10 CLIPS: Brand: CODMAN®

## (undated) DEVICE — LAP CHOLE: Brand: MEDLINE INDUSTRIES, INC.

## (undated) DEVICE — SUTURE NRLN SZ 4-0 L18IN NONABSORBABLE BLK L13MM TF 1/2 CIR C584D

## (undated) DEVICE — GARMENT,MEDLINE,DVT,SEQUENTIAL,CALF,MD: Brand: MEDLINE

## (undated) DEVICE — INTENDED FOR TISSUE SEPARATION, AND OTHER PROCEDURES THAT REQUIRE A SHARP SURGICAL BLADE TO PUNCTURE OR CUT.: Brand: BARD-PARKER SAFETY BLADES SIZE 10, STERILE

## (undated) DEVICE — INTRODUCER SHTH 6FR CANN L11CM DIL TIP 35MM GRN TUNGSTEN

## (undated) DEVICE — TRAY CATH 16F URIN MTR LTX -- CONVERT TO ITEM 363111

## (undated) DEVICE — BIPOLAR IRRIGATOR INTEGRATED TUBING AND BIPOLAR CORD SET, DISPOSABLE

## (undated) DEVICE — APPLIER CLP L9.375IN APER 2.1MM CLS L3.8MM 20 SM TI CLP

## (undated) DEVICE — DRSG TELFA 4X5IN LF STRL

## (undated) DEVICE — (D)PREP SKN CHLRAPRP APPL 26ML -- CONVERT TO ITEM 371833

## (undated) DEVICE — GOWN,REINFORCED,POLY,AURORA,XXLARGE,STR: Brand: MEDLINE

## (undated) DEVICE — SUTURE PROL SZ 6-0 L30IN NONABSORBABLE BLU L9.3MM BV-1 3/8 M8709

## (undated) DEVICE — BLADE SCALP SURG BARD-PARK 10 --

## (undated) DEVICE — FOGARTY ARTERIAL EMBOLECTOMY CATHETER 4F 80CM: Brand: FOGARTY

## (undated) DEVICE — CLOTH PRE OP W9XL10.5IN 2% CHG FRAGRANCE RNS FREE READYPREP

## (undated) DEVICE — MOUTHPIECE ENDOSCP 20X27MM --

## (undated) DEVICE — SOL ANTI-FOG 6ML MEDC -- MEDICHOICE - CONVERT TO 358427

## (undated) DEVICE — UNIVERSAL FIXATION CANNULA: Brand: VERSAPORT

## (undated) DEVICE — SYRINGE MED 3ML NDL 23GA L1IN PLAS N CTRL LUERLOCK TIP REG

## (undated) DEVICE — BLADE ASSEMB CLP HAIR COARSE --

## (undated) DEVICE — DRAPE MICSCP W51XL150IN FOR LEICA M680 WILD OHS

## (undated) DEVICE — BANDAGE,GAUZE,BULKEE II,4.5"X4.1YD,STRL: Brand: MEDLINE

## (undated) DEVICE — BRONCHOSCOPY PACK: Brand: MEDLINE INDUSTRIES, INC.

## (undated) DEVICE — DRAPE TWL SURG 16X26IN BLU ORB04] ALLCARE INC]

## (undated) DEVICE — CRANIOTOMY: Brand: MEDLINE INDUSTRIES, INC.

## (undated) DEVICE — DISH PETRI W/LID STRL -- MIN CASE ORDER IS 2 CA

## (undated) DEVICE — SYR 1ML TB 1/100ML GRAD --

## (undated) DEVICE — RADIFOCUS GLIDEWIRE: Brand: GLIDEWIRE

## (undated) DEVICE — INTENDED FOR TISSUE SEPARATION, AND OTHER PROCEDURES THAT REQUIRE A SHARP SURGICAL BLADE TO PUNCTURE OR CUT.: Brand: BARD-PARKER SAFETY BLADES SIZE 15, STERILE

## (undated) DEVICE — PREP SKN CHLRAPRP APL 26ML STR --

## (undated) DEVICE — LOGICUT SCISSOR LENGTH 320MM: Brand: LOGI - LAPAROSCOPIC INSTRUMENT SYSTEM

## (undated) DEVICE — SINGLE USE SUCTION VALVE MAJ-209: Brand: SINGLE USE SUCTION VALVE (STERILE)

## (undated) DEVICE — SOL INJ SOD CL0.9% 10ML PREFIL --

## (undated) DEVICE — DRAPE SURG SPEC PROC 4 PC

## (undated) DEVICE — BAND RUB 1/8X2.5IN STRL --

## (undated) DEVICE — DRAPE SHT 3 QTR PROXIMA 53X77 --

## (undated) DEVICE — SLIM BODY SKIN STAPLER: Brand: APPOSE ULC

## (undated) DEVICE — SUTURE VCRL SZ 3-0 L27IN ABSRB UD L26MM SH 1/2 CIR J416H

## (undated) DEVICE — 2.3MM SPIRAL ROUTER

## (undated) DEVICE — MAYFIELD® DISPOSABLE ADULT SKULL PIN (PLASTIC BASE): Brand: MAYFIELD®

## (undated) DEVICE — MEDI-VAC NON-CONDUCTIVE SUCTION TUBING: Brand: CARDINAL HEALTH

## (undated) DEVICE — SPONGE LAP 18X18IN STRL -- 5/PK

## (undated) DEVICE — (D)SYR 10ML 1/5ML GRAD NSAF -- PKGING CHANGE USE ITEM 338027

## (undated) DEVICE — Device: Brand: BALLOON

## (undated) DEVICE — APPLICATOR ENDOSCP 34CM -- SURGIFLO

## (undated) DEVICE — TELFA NON-ADHERENT ABSORBENT DRESSING: Brand: TELFA

## (undated) DEVICE — KENDALL SCD EXPRESS SLEEVES, KNEE LENGTH, MEDIUM: Brand: KENDALL SCD

## (undated) DEVICE — SPONGE,NEURO,0.5"X3",XR,STRL,LF,10/PK: Brand: MEDLINE

## (undated) DEVICE — INTENDED FOR TISSUE SEPARATION, AND OTHER PROCEDURES THAT REQUIRE A SHARP SURGICAL BLADE TO PUNCTURE OR CUT.: Brand: BARD-PARKER SAFETY BLADES SIZE 11, STERILE

## (undated) DEVICE — 3M™ TEGADERM™ TRANSPARENT FILM DRESSING FRAME STYLE, 1626W, 4 IN X 4-3/4 IN (10 CM X 12 CM), 50/CT 4CT/CASE: Brand: 3M™ TEGADERM™

## (undated) DEVICE — SUTURE COAT VCRL SZ 0 L18IN ABSRB UD W/O NDL POLYGLACTIN J112T

## (undated) DEVICE — HOOKS ELASTIC STAY 12MM BLUNT -- PK/8

## (undated) DEVICE — X-RAY SPONGES,12 PLY: Brand: DERMACEA

## (undated) DEVICE — SUTURE VCRL SZ 2-0 L18IN ABSRB UD L26MM CP-2 1/2 CIR REV J762D

## (undated) DEVICE — TAPE UMB 1/8X30IN MP COT WHT --

## (undated) DEVICE — Device: Brand: SNAP ON SPHERZ

## (undated) DEVICE — 3M™ IOBAN™ 2 ANTIMICROBIAL INCISE DRAPE 6650EZ: Brand: IOBAN™ 2

## (undated) DEVICE — CODMAN® LEROY SCALP CLIPS PEDIATRIC 20 UNITS OF 10 CLIPS: Brand: CODMAN®

## (undated) DEVICE — SUTURE PDS II SZ 1 L36IN ABSRB VLT L48MM CTX 1/2 CIR Z371T

## (undated) DEVICE — MARKER UTIL W/RULER AND LBL -- STRL

## (undated) DEVICE — BLADE ASSEMB CLP HAIR FINE --

## (undated) DEVICE — SUTURE PERMAHAND SZ 3-0 L18IN NONABSORBABLE BLK SILK BRAID A184H

## (undated) DEVICE — COVER LT HNDL FOR OR SURG LAMP

## (undated) DEVICE — INTENDED TO BE USED TO OCCLUDE, RETRACT AND IDENTIFY ARTERIES, VEINS, TENDONS AND NERVES IN SURGICAL PROCEDURES: Brand: STERION®  VESSEL LOOP

## (undated) DEVICE — ARTICULATION RELOAD WITH TRI-STAPLE TECHNOLOGY: Brand: ENDO GIA

## (undated) DEVICE — SUTURE PERMAHAND SZ 2-0 L12X18IN NONABSORBABLE BLK SILK A185H

## (undated) DEVICE — KENDALL RADIOLUCENT FOAM MONITORING ELECTRODE RECTANGULAR SHAPE: Brand: KENDALL

## (undated) DEVICE — [HIGH FLOW INSUFFLATOR,  DO NOT USE IF PACKAGE IS DAMAGED,  KEEP DRY,  KEEP AWAY FROM SUNLIGHT,  PROTECT FROM HEAT AND RADIOACTIVE SOURCES.]: Brand: PNEUMOSURE

## (undated) DEVICE — SOL IRR SOD CL 0.9% 1000ML BTL --

## (undated) DEVICE — SINGLE USE ASPIRATION NEEDLE: Brand: SINGLE USE ASPIRATION NEEDLE

## (undated) DEVICE — AGENT HEMSTAT 8ML FLX TIP MTRX + DISP SURGIFLO

## (undated) DEVICE — PATCH SENS PT FOR ELECTROMAGNETIC NAVIGATION BRONCHSCP SYS

## (undated) DEVICE — APPLIER CLP LIG SM TI PREM SURGCLP SUPER INTLOK 20 DISP

## (undated) DEVICE — SET EXTN L6IN W/ S STL CLMP

## (undated) DEVICE — AMD ANTIMICROBIAL NON-ADHERENT PAD,0.2% POLYHEXAMETHYLENE BIGUANIDE HCI (PHMB): Brand: TELFA

## (undated) DEVICE — UNIVERSAL DRAPES: Brand: MEDLINE INDUSTRIES, INC.

## (undated) DEVICE — PAD,NON-ADHERENT,3X8,STERILE,LF,1/PK: Brand: MEDLINE

## (undated) DEVICE — ZYPHR DISPOSABLE CRANIAL PERFORATOR, LARGE 14/11MM: Brand: ZYPHR

## (undated) DEVICE — DRAPE MICSCP DISPOSABLE

## (undated) DEVICE — STOCKINETTE TUBE 6X48 -- MEDICHOICE

## (undated) DEVICE — CLIP APPLIER: Brand: ENDO CLIP II

## (undated) DEVICE — UNIVERSAL STAPLER: Brand: ENDO GIA ULTRA

## (undated) DEVICE — DRAPE IRRIG FLD WRM W44XL44IN W/ AORN STD PRTBL INTRATEMP

## (undated) DEVICE — BIPOLAR SEALER 23-113-1 AQM 2.3 OM NEURO: Brand: AQUAMANTYS ®

## (undated) DEVICE — NEEDLE HYPO 25GA L1.5IN BLU POLYPR HUB S STL REG BVL STR

## (undated) DEVICE — CORD,CAUTERY,BIPOLAR,STERILE: Brand: MEDLINE

## (undated) DEVICE — BONE WAX WHITE: Brand: BONE WAX WHITE

## (undated) DEVICE — SYRINGE IRRIG 60ML SFT PLIABLE BLB EZ TO GRP 1 HND USE W/

## (undated) DEVICE — SHEARS ENDOSCP L36CM DIA5MM ULTRASONIC CRV TIP W/ ADV

## (undated) DEVICE — 6.0MM PRECISION ROUND

## (undated) DEVICE — TOTAL TRAY, DB, 100% SILI FOLEY, 16FR 10: Brand: MEDLINE

## (undated) DEVICE — 2000CC GUARDIAN II: Brand: GUARDIAN

## (undated) DEVICE — PAD THRM L UNIV NONSLIP HYDRGEL RECT PROVIDE OPTIMAL ENERGY

## (undated) DEVICE — APPLICATOR BNDG 1MM ADH PREMIERPRO EXOFIN

## (undated) DEVICE — SUTURE PROL SZ 6-0 L24IN NONABSORBABLE BLU BV-1 L9.3MM 3/8 M8805

## (undated) DEVICE — INSULATED BLADE ELECTRODE: Brand: EDGE

## (undated) DEVICE — ADPT INTERMEDIC LL 16G --

## (undated) DEVICE — BLADELESS OPTICAL TROCAR WITH FIXATION CANNULA: Brand: VERSAONE

## (undated) DEVICE — 2, DISPOSABLE SUCTION/IRRIGATOR WITHOUT DISPOSABLE TIP: Brand: STRYKEFLOW

## (undated) DEVICE — GEL US 20GM NONIRRITATING OVERWRAPPED FILE PCH TRNSMIT

## (undated) DEVICE — APPLIER LIG CLP M L11IN TI STR RNG HNDL FOR 20 CLP DISP

## (undated) DEVICE — BUTTON SWITCH PENCIL BLADE ELECTRODE, HOLSTER: Brand: EDGE

## (undated) DEVICE — SYRINGE ANGIO CNTRST DEL 20 CC POLYCARB DK GRN MEDALLION

## (undated) DEVICE — ACCUDRAIN® EXTERNAL CSF DRAINAGE SYSTEM: Brand: ACCUDRAIN®

## (undated) DEVICE — SYRINGE MED 20ML WHT PLUNG CLR POLYCARB BRL FIX M LUER CONN

## (undated) DEVICE — CARBIDE MATCH HEAD

## (undated) DEVICE — GARMENT,MEDLINE,DVT,INT,CALF,MED, GEN2: Brand: MEDLINE

## (undated) DEVICE — FOGARTY ARTERIAL EMBOLECTOMY CATHETER 3F 80CM: Brand: FOGARTY

## (undated) DEVICE — SUTURE ETHLN SZ 3-0 L30IN NONABSORBABLE BLK L36MM FSLX 3/8 1673BH

## (undated) DEVICE — CONNECTOR VENT EL DBL SWVL 15M 22M 15F

## (undated) DEVICE — TUBING, SUCTION, 1/4" X 10', STRAIGHT: Brand: MEDLINE

## (undated) DEVICE — BASIC SINGLE BASIN-LF: Brand: MEDLINE INDUSTRIES, INC.

## (undated) DEVICE — CODMAN® DISPOSABLE PERFORATOR 14MM: Brand: CODMAN®

## (undated) DEVICE — DRAPE,TOP,102X53,STERILE: Brand: MEDLINE

## (undated) DEVICE — CARDINAL HEALTH FLEXIBLE LIGHT HANDLE COVER: Brand: CARDINAL HEALTH

## (undated) DEVICE — PACKING 8004002 NEURAY 200PK 13X25MM: Brand: NEURAY ®

## (undated) DEVICE — SUTURE VCRL SZ 2-0 L36IN ABSRB UD L36MM CT-1 1/2 CIR J945H

## (undated) DEVICE — SUTURE PROL SZ 5-0 L24IN NONABSORBABLE BLU L13MM C-1 3/8 M8725

## (undated) DEVICE — KIT URINE FOLEY CATH COLL --

## (undated) DEVICE — BLADELESS OPTICAL TROCAR WITH FIXATION CANNULA: Brand: VERSAPORT

## (undated) DEVICE — CATHETER VASC AD 5FR L65CM 0.038 DIAG KA 2 PERIPH W/O

## (undated) DEVICE — PACKING 8004007 NEURAY 200PK 13X76MM: Brand: NEURAY ®

## (undated) DEVICE — SURGIFOAM SPNG SZ 100